# Patient Record
Sex: MALE | Race: WHITE | Employment: UNEMPLOYED | URBAN - METROPOLITAN AREA
[De-identification: names, ages, dates, MRNs, and addresses within clinical notes are randomized per-mention and may not be internally consistent; named-entity substitution may affect disease eponyms.]

---

## 2017-01-19 ENCOUNTER — TELEPHONE (OUTPATIENT)
Dept: FAMILY MEDICINE | Facility: CLINIC | Age: 62
End: 2017-01-19

## 2017-01-19 DIAGNOSIS — F41.9 ANXIETY: Primary | ICD-10-CM

## 2017-01-19 RX ORDER — DIAZEPAM 10 MG
TABLET ORAL
Qty: 60 TABLET | Refills: 0 | Status: SHIPPED | OUTPATIENT
Start: 2017-01-19 | End: 2017-03-14

## 2017-01-19 NOTE — TELEPHONE ENCOUNTER
Controlled Substance Refill Request for Diazepam  Problem List Complete:  No     PROVIDER TO CONSIDER COMPLETION OF PROBLEM LIST AND OVERVIEW/CONTROLLED SUBSTANCE AGREEMENT    Last Written Prescription Date:  12/07/16  Last Fill Quantity: 60,   # refills: 0    Last Office Visit with [facility] primary care provider: 12/07/16 w/Raisa Calvillo    Future Office visit:     Controlled substance agreement on file: No.     Processing:  Fax Rx to [facility]   checked in past 6 months?  No, route to RN

## 2017-01-19 NOTE — TELEPHONE ENCOUNTER
done 1.19.17, report on desk  Routing refill request to provider for review/approval because:  Drug not on the [facility] refill protocol, controlled subtance    Nishi Yang RN, BS  Clinical Nurse Triage .

## 2017-02-03 NOTE — TELEPHONE ENCOUNTER
Prescription approved per [facility] Refill Protocol.  Sent [employer] message.  Is he using 1 or 2 needles daily? We will change Rx later if using 2.   Constantine Sexton RN

## 2017-02-03 NOTE — TELEPHONE ENCOUNTER
BD Ultra Fine Pen [id number]      Last Written Prescription Date: 10/21/16  Last Fill Quantity: 1 device,  # refills: 100   Last Office Visit with [facility], [facility] or [facility] prescribing provider: 12/07/16 Brandee Calvillo

## 2017-03-03 DIAGNOSIS — F41.9 ANXIETY: ICD-10-CM

## 2017-03-03 RX ORDER — DIAZEPAM 10 MG
TABLET ORAL
Qty: 60 TABLET | Refills: 0 | Status: CANCELLED | OUTPATIENT
Start: 2017-03-03

## 2017-03-03 NOTE — TELEPHONE ENCOUNTER
Controlled Substance Refill Request for diazepam (VALIUM) 10 MG tablet    Problem List Complete:  No     PROVIDER TO CONSIDER COMPLETION OF PROBLEM LIST AND OVERVIEW/CONTROLLED SUBSTANCE AGREEMENT    Last Written Prescription Date:  01/19/17  Last Fill Quantity: 60,   # refills: 0    Last Office Visit with [facility] primary care provider: Raisa Calvillo, 12/07/16    Future Office visit: Nothing scheduled as of this date.    Controlled substance agreement on file: No.     Processing:  Patient will  in clinic   checked in past 6 months?  No, route to RN

## 2017-03-09 ENCOUNTER — TELEPHONE (OUTPATIENT)
Dept: FAMILY MEDICINE | Facility: CLINIC | Age: 62
End: 2017-03-09

## 2017-03-09 DIAGNOSIS — G47.00 INSOMNIA, UNSPECIFIED: ICD-10-CM

## 2017-03-09 RX ORDER — ZOLPIDEM TARTRATE 10 MG/1
TABLET ORAL
Qty: 90 TABLET | Refills: 0 | Status: SHIPPED | OUTPATIENT
Start: 2017-03-09 | End: 2017-04-02

## 2017-03-09 NOTE — TELEPHONE ENCOUNTER
Controlled Substance Refill Request for Zolpidem 10 mg tab   Problem List Complete:  No     PROVIDER TO CONSIDER COMPLETION OF PROBLEM LIST AND OVERVIEW/CONTROLLED SUBSTANCE AGREEMENT    Last Written Prescription Date:  12/07/16  Last Fill Quantity: 90,   # refills: 0    Last Office Visit with [facility] primary care provider: 12/07/16 Raisa Tilley Office visit:     Controlled substance agreement on file: No.     Processing:  Fax Rx to [facility] pharmacy   checked in past 6 months?  No, route to RN

## 2017-03-14 ENCOUNTER — TELEPHONE (OUTPATIENT)
Dept: FAMILY MEDICINE | Facility: CLINIC | Age: 62
End: 2017-03-14

## 2017-03-14 DIAGNOSIS — F41.9 ANXIETY: ICD-10-CM

## 2017-03-15 NOTE — TELEPHONE ENCOUNTER
Duplicate, See refill from 3/3/17.    I don't know why that one hasn't been addressed yet, please check into that.  Thanks!    Ena Gilliland, GARYT

## 2017-03-16 RX ORDER — DIAZEPAM 10 MG
TABLET ORAL
Qty: 60 TABLET | Refills: 0 | Status: SHIPPED | OUTPATIENT
Start: 2017-03-16 | End: 2017-05-08

## 2017-03-16 NOTE — TELEPHONE ENCOUNTER
Previous refill request was never routed anywhere by person who made the 3/3/17 encounter    [place]:  reviewed- no concerns    [place]  [email or web address]

## 2017-04-02 ENCOUNTER — TELEPHONE (OUTPATIENT)
Dept: FAMILY MEDICINE | Facility: CLINIC | Age: 62
End: 2017-04-02

## 2017-04-02 DIAGNOSIS — G47.00 INSOMNIA, UNSPECIFIED: ICD-10-CM

## 2017-04-03 NOTE — TELEPHONE ENCOUNTER
Controlled Substance Refill Request for Zolpidem 10 mg tab   Problem List Complete:  No     PROVIDER TO CONSIDER COMPLETION OF PROBLEM LIST AND OVERVIEW/CONTROLLED SUBSTANCE AGREEMENT    Last Written Prescription Date:  03/09/17  Last Fill Quantity: 90,   # refills: 0    Last Office Visit with [facility] primary care provider: 12/07/16 Raisa Calvillo    Future Office visit:   Next 5 appointments (look out 90 days)     Apr 05, 2017 10:30 AM CDT   SHORT with Raisa Calvillo PA-C   [facility]    [address] [place] [id number] [phone]                  Controlled substance agreement on file: No.     Processing:  Fax Rx to [facility] pharmacy   checked in past 6 months?  No, route to RN

## 2017-04-04 RX ORDER — ZOLPIDEM TARTRATE 10 MG/1
TABLET ORAL
Qty: 90 TABLET | Refills: 0 | Status: SHIPPED | OUTPATIENT
Start: 2017-04-04 | End: 2017-06-20

## 2017-04-11 ENCOUNTER — TELEPHONE (OUTPATIENT)
Dept: FAMILY MEDICINE | Facility: CLINIC | Age: 62
End: 2017-04-11

## 2017-04-11 RX ORDER — INSULIN GLARGINE 100 [IU]/ML
24 INJECTION, SOLUTION SUBCUTANEOUS AT BEDTIME
Qty: 15 ML | Refills: 1 | Status: SHIPPED | OUTPATIENT
Start: 2017-04-11 | End: 2017-06-05

## 2017-04-11 NOTE — TELEPHONE ENCOUNTER
Fax from [facility]   PT is out of insulin. And formulary issue Lantus  Must use:  Basaglar  Levemir  Tresiba  Or medical necessity PA only   Please advise   Constantine Sexton RN

## 2017-05-08 DIAGNOSIS — F41.9 ANXIETY: ICD-10-CM

## 2017-05-08 NOTE — TELEPHONE ENCOUNTER
Controlled Substance Refill Request for diazepam (VALIUM) 10 MG tablet  Problem List Complete:  No     PROVIDER TO CONSIDER COMPLETION OF PROBLEM LIST AND OVERVIEW/CONTROLLED SUBSTANCE AGREEMENT    Last Written Prescription Date:  3/16/17  Last Fill Quantity: 60,   # refills: 0    Last Office Visit with [facility] primary care provider: 12/7/2016      Future Office visit:     Controlled substance agreement on file: No.     Processing:  Fax Rx to [facility]   checked in past 6 months?  No, route to SANJU Brand  May 8, 2017  10:48 AM

## 2017-05-09 RX ORDER — DIAZEPAM 10 MG
10 TABLET ORAL EVERY 12 HOURS PRN
Qty: 60 TABLET | Refills: 0 | Status: SHIPPED | OUTPATIENT
Start: 2017-05-09 | End: 2017-06-22

## 2017-05-09 NOTE — TELEPHONE ENCOUNTER
Problem list updated.    run today 5/9/17, report in PCP office in-basket  Constantine Sexton RN

## 2017-05-24 NOTE — TELEPHONE ENCOUNTER
B-D U/F 31G X 8 MM insulin pen needle  Last Written Prescription Date: 2/3/17  Last Fill Quantity: 100,  # refills: 0   Last Office Visit with ROSA, LISA or [facility] prescribing provider:  12/7/2016                                              SANJU Cano  May 24, 2017  1:53 PM

## 2017-05-26 RX ORDER — PEN NEEDLE, DIABETIC 31 GX5/16"
NEEDLE, DISPOSABLE MISCELLANEOUS
Qty: 100 EACH | Refills: 0 | Status: SHIPPED | OUTPATIENT
Start: 2017-05-26 | End: 2017-09-22

## 2017-05-26 NOTE — TELEPHONE ENCOUNTER
Routing refill request to provider for review/approval because:  Barbara given x1 and patient did not follow up, please advise  Pt no showed to last 2 scheduled appts  Chanel Vásquez RN, BSN

## 2017-06-03 DIAGNOSIS — F41.9 ANXIETY: ICD-10-CM

## 2017-06-05 RX ORDER — BUSPIRONE HYDROCHLORIDE 15 MG/1
15 TABLET ORAL 2 TIMES DAILY
Qty: 180 TABLET | Refills: 1 | Status: SHIPPED | OUTPATIENT
Start: 2017-06-05 | End: 2017-06-30

## 2017-06-05 NOTE — LETTER
[address] [address]  [phone]          June 6, 2017    Gerardo Gonzalez                                                                                                                     [address] [facility] [address]            Raisa Carrillo sent a one month refill of Basaglar insulin to your pharmacy today. An office visit is required before your next refill.     Please call [phone] to schedule an appointment. Will you ask the  for a 30 minute visit with Raisa?    Let us know if you have any questions.     Take Constantine barriga RN for Raisa Calvillo PA-C

## 2017-06-05 NOTE — TELEPHONE ENCOUNTER
Prescription approved per [facility] Refill Protocol  Nishi Yang RN BS

## 2017-06-05 NOTE — TELEPHONE ENCOUNTER
insulin glargine (BASAGLAR KWIKPEN) 100 UNIT/ML injection         Last Written Prescription Date: 4/11/17  Last Fill Quantity: 15mL  , # refills: 1  Last Office Visit with [facility], [facility] or [facility] prescribing provider:  12/7/2016        BP Readings from Last 3 Encounters:   12/12/16 (!) 135/98   12/07/16 109/76   07/14/16 138/89     Lab Results   Component Value Date    MICROL <5 07/14/2016     Lab Results   Component Value Date    UMALCR Unable to calculate due to low value 07/14/2016     Creatinine   Date Value Ref Range Status   12/12/2016 0.95 0.66 - 1.25 mg/dL Final   ]  GFR Estimate   Date Value Ref Range Status   12/12/2016 81 >60 mL/min/1.7m2 Final     Comment:     Non  GFR Calc   12/07/2016 78 >60 mL/min/1.7m2 Final     Comment:     Non  GFR Calc   07/14/2016 77 >60 mL/min/1.7m2 Final     Comment:     Non  GFR Calc     GFR Estimate If Black   Date Value Ref Range Status   12/12/2016 >90   GFR Calc   >60 mL/min/1.7m2 Final   12/07/2016 >90   GFR Calc   >60 mL/min/1.7m2 Final   07/14/2016 >90   GFR Calc   >60 mL/min/1.7m2 Final     Lab Results   Component Value Date    CHOL 204 07/14/2016     Lab Results   Component Value Date    HDL 52 07/14/2016     Lab Results   Component Value Date     07/14/2016     Lab Results   Component Value Date    TRIG 152 07/14/2016     Lab Results   Component Value Date    CHOLHDLRATIO 4.4 07/06/2015     Lab Results   Component Value Date    AST 42 12/07/2016     Lab Results   Component Value Date    ALT 41 12/07/2016     Lab Results   Component Value Date    A1C 8.6 12/07/2016    A1C 9.2 07/14/2016    A1C 7.4 03/11/2016    A1C 9.0 07/06/2015    A1C 6.3 12/03/2013     Potassium   Date Value Ref Range Status   12/12/2016 3.8 3.4 - 5.3 mmol/L Final     SANJU Cano  June 5, 2017  2:52 PM

## 2017-06-05 NOTE — TELEPHONE ENCOUNTER
busPIRone (BUSPAR) 15 MG tablet    Last Written Prescription Date: 12/7/2016  Last Fill Quantity: 180;03/03/2017 # refills: 1  Last Office Visit with [facility], [facility] or [facility] prescribing provider:  12/7/2016-Raisa Calvillo        Last PHQ-9 score on record= No flowsheet data found.    Lab Results   Component Value Date    AST 42 12/07/2016     Lab Results   Component Value Date    ALT 41 12/07/2016

## 2017-06-06 RX ORDER — INSULIN GLARGINE 100 [IU]/ML
INJECTION, SOLUTION SUBCUTANEOUS
Qty: 15 ML | Refills: 0 | Status: SHIPPED | OUTPATIENT
Start: 2017-06-06 | End: 2017-06-30

## 2017-06-06 NOTE — TELEPHONE ENCOUNTER
Prescription approved per [facility] Refill Protocol x 1 month.   Raisa arias pt reminded due for OV  (please schedule 30 min)   Left message on VM to call back and letter mailed.   Constantine Sexton RN

## 2017-06-20 ENCOUNTER — TELEPHONE (OUTPATIENT)
Dept: FAMILY MEDICINE | Facility: CLINIC | Age: 62
End: 2017-06-20

## 2017-06-20 DIAGNOSIS — G47.00 INSOMNIA, UNSPECIFIED: ICD-10-CM

## 2017-06-20 NOTE — TELEPHONE ENCOUNTER
Controlled Substance Refill Request for Zolpidem 10 mg   Problem List Complete:  No     PROVIDER TO CONSIDER COMPLETION OF PROBLEM LIST AND OVERVIEW/CONTROLLED SUBSTANCE AGREEMENT    Last Written Prescription Date:  04/04/17  Last Fill Quantity: 90,   # refills: 0    Last Office Visit with [facility] primary care provider: 05/02/17 Raisa Tilley Office visit:     Controlled substance agreement on file: No.     Processing:  Fax Rx to [facility] pharmacy   checked in past 6 months?  No, route to RN

## 2017-06-21 RX ORDER — ZOLPIDEM TARTRATE 10 MG/1
10 TABLET ORAL
Qty: 30 TABLET | Refills: 0 | Status: SHIPPED | OUTPATIENT
Start: 2017-06-21 | End: 2017-06-30

## 2017-06-21 NOTE — TELEPHONE ENCOUNTER
In my outbox.   Please call. Patient is due for office visit for future refills.   Is also due for DM2 visit and labs.

## 2017-06-22 ENCOUNTER — TELEPHONE (OUTPATIENT)
Dept: FAMILY MEDICINE | Facility: CLINIC | Age: 62
End: 2017-06-22

## 2017-06-22 DIAGNOSIS — F41.9 ANXIETY: ICD-10-CM

## 2017-06-22 RX ORDER — DIAZEPAM 10 MG
TABLET ORAL
Qty: 60 TABLET | Refills: 0 | Status: SHIPPED | OUTPATIENT
Start: 2017-06-22 | End: 2017-08-17

## 2017-06-22 NOTE — TELEPHONE ENCOUNTER
Controlled Substance Refill Request for diazepam (VALIUM) 10 MG tablet    Problem List Complete:  No     PROVIDER TO CONSIDER COMPLETION OF PROBLEM LIST AND OVERVIEW/CONTROLLED SUBSTANCE AGREEMENT    Last Written Prescription Date:  05/09/2017  Last Fill Quantity: 60,   # refills: 0    Last Office Visit with [facility] primary care provider: 05/02/2017-Raisa Tilley Office visit:     Controlled substance agreement on file: No.     Processing:  Fax Rx to [facility] pharmacy   checked in past 6 months?  No, route to RN

## 2017-06-23 NOTE — TELEPHONE ENCOUNTER
LM for patient to call back to clinic.   RX faxed.    Patient due for fasting OV visit before any future refills.

## 2017-06-27 NOTE — TELEPHONE ENCOUNTER
Pt returned call-rescheduled appointment to 06/30/17 with Raisa Calvillo, will be fasting.    Azucena Cintron/LEX

## 2017-06-30 ENCOUNTER — OFFICE VISIT (OUTPATIENT)
Dept: FAMILY MEDICINE | Facility: CLINIC | Age: 62
End: 2017-06-30
Payer: COMMERCIAL

## 2017-06-30 VITALS
HEART RATE: 103 BPM | RESPIRATION RATE: 16 BRPM | DIASTOLIC BLOOD PRESSURE: 76 MMHG | WEIGHT: 177 LBS | TEMPERATURE: 98.5 F | OXYGEN SATURATION: 97 % | SYSTOLIC BLOOD PRESSURE: 119 MMHG | HEIGHT: 67 IN | BODY MASS INDEX: 27.78 KG/M2

## 2017-06-30 DIAGNOSIS — G47.00 INSOMNIA, UNSPECIFIED: ICD-10-CM

## 2017-06-30 DIAGNOSIS — F41.9 ANXIETY: ICD-10-CM

## 2017-06-30 DIAGNOSIS — L40.9 PSORIASIS: ICD-10-CM

## 2017-06-30 DIAGNOSIS — L40.50 PSORIATIC ARTHRITIS (H): ICD-10-CM

## 2017-06-30 LAB — HBA1C MFR BLD: 10.2 % (ref 4.3–6)

## 2017-06-30 PROCEDURE — 80061 LIPID PANEL: CPT | Performed by: PHYSICIAN ASSISTANT

## 2017-06-30 PROCEDURE — 36415 COLL VENOUS BLD VENIPUNCTURE: CPT | Performed by: PHYSICIAN ASSISTANT

## 2017-06-30 PROCEDURE — 82043 UR ALBUMIN QUANTITATIVE: CPT | Performed by: PHYSICIAN ASSISTANT

## 2017-06-30 PROCEDURE — 99214 OFFICE O/P EST MOD 30 MIN: CPT | Performed by: PHYSICIAN ASSISTANT

## 2017-06-30 PROCEDURE — 83036 HEMOGLOBIN GLYCOSYLATED A1C: CPT | Performed by: PHYSICIAN ASSISTANT

## 2017-06-30 RX ORDER — METFORMIN HCL 500 MG
1000 TABLET, EXTENDED RELEASE 24 HR ORAL
Qty: 180 TABLET | Refills: 1 | Status: SHIPPED | OUTPATIENT
Start: 2017-06-30 | End: 2018-02-08

## 2017-06-30 RX ORDER — INSULIN GLARGINE 100 [IU]/ML
INJECTION, SOLUTION SUBCUTANEOUS
Qty: 15 ML | Refills: 1 | Status: SHIPPED | OUTPATIENT
Start: 2017-06-30 | End: 2017-11-24

## 2017-06-30 RX ORDER — CLOBETASOL PROPIONATE 0.5 MG/G
OINTMENT TOPICAL
Qty: 45 G | Refills: 1 | Status: SHIPPED | OUTPATIENT
Start: 2017-06-30 | End: 2018-02-08

## 2017-06-30 RX ORDER — ZOLPIDEM TARTRATE 10 MG/1
10 TABLET ORAL
Qty: 90 TABLET | Refills: 0 | Status: CANCELLED | OUTPATIENT
Start: 2017-06-30

## 2017-06-30 RX ORDER — FLUOXETINE 40 MG/1
80 CAPSULE ORAL DAILY
Qty: 180 CAPSULE | Refills: 1 | Status: SHIPPED | OUTPATIENT
Start: 2017-06-30 | End: 2018-02-08

## 2017-06-30 RX ORDER — BUSPIRONE HYDROCHLORIDE 15 MG/1
TABLET ORAL
Qty: 270 TABLET | Refills: 1 | Status: SHIPPED | OUTPATIENT
Start: 2017-06-30 | End: 2018-02-08

## 2017-06-30 ASSESSMENT — ANXIETY QUESTIONNAIRES
5. BEING SO RESTLESS THAT IT IS HARD TO SIT STILL: MORE THAN HALF THE DAYS
6. BECOMING EASILY ANNOYED OR IRRITABLE: MORE THAN HALF THE DAYS
7. FEELING AFRAID AS IF SOMETHING AWFUL MIGHT HAPPEN: MORE THAN HALF THE DAYS
GAD7 TOTAL SCORE: 15
3. WORRYING TOO MUCH ABOUT DIFFERENT THINGS: MORE THAN HALF THE DAYS
IF YOU CHECKED OFF ANY PROBLEMS ON THIS QUESTIONNAIRE, HOW DIFFICULT HAVE THESE PROBLEMS MADE IT FOR YOU TO DO YOUR WORK, TAKE CARE OF THINGS AT HOME, OR GET ALONG WITH OTHER PEOPLE: SOMEWHAT DIFFICULT
2. NOT BEING ABLE TO STOP OR CONTROL WORRYING: MORE THAN HALF THE DAYS
1. FEELING NERVOUS, ANXIOUS, OR ON EDGE: MORE THAN HALF THE DAYS

## 2017-06-30 ASSESSMENT — PATIENT HEALTH QUESTIONNAIRE - PHQ9: 5. POOR APPETITE OR OVEREATING: NEARLY EVERY DAY

## 2017-06-30 NOTE — PROGRESS NOTES
SUBJECTIVE:                                                    Gerardo Gonzalez is a 62 year old male who presents to clinic today for the following health issues:      Diabetes Follow-up      Patient is checking blood sugars: twice daily.    Blood sugar testing frequency justification: Uncontrolled diabetes  Results are as follows:         am - 112-126         bedtime - 240-250    Diabetic concerns: None     Symptoms of hypoglycemia (low blood sugar): shaky, sometimes, will stop and grab a snack when this happens     Paresthesias (numbness or burning in feet) or sores: No     Date of last diabetic eye exam: 6/16, states vision has improved.    Anxiety Follow-Up    Status since last visit: Worsened has been working more hrs at work    Other associated symptoms: trouble sleeping, takes Ambien, sometimes has to take two, would like to discuss dose adjustment     Complicating factors:   Significant life event: Yes-  Job work load    Current substance abuse: None  Depression symptoms: No  NAOMI-7 SCORE 1/2/2012   Total Score 16       GAD7            Amount of exercise or physical activity: walks as much as possible, walks a lot at work.    Problems taking medications regularly: No    Medication side effects: none    Diet: tries to watch diet, sometimes hard with work.      Insomnia  Onset: years    Description:   Time to fall asleep (sleep latency): 1 hour  Middle of night awakening:  YES  Early morning awakening:  YES    Progression of Symptoms:  waxing and waning    Accompanying Signs & Symptoms:  Daytime sleepiness/napping: no  Excessive snoring/apnea: no  Restless legs: no  Frequent urination: no  Chronic pain:  YES- hands    History:  Prior Insomnia: YES    Precipitating factors:   New stressful situation: no  Caffeine intake: YES  OTC decongestants: no  Any new medications: no    Alleviating factors:  Self medicating (alcohol, etc.):  alcohol    Therapies Tried and outcome: ambien. Not working anymore    Joint  Pain    Onset: 1 year    Description:   Location: hands  Character: Dull ache and swelling    Intensity: moderate, severe    Progression of Symptoms: intermittent    Accompanying Signs & Symptoms:  Other symptoms: swelling    History:   Previous similar pain: YES      Precipitating factors:   Trauma or overuse: no     Alleviating factors:  Improved by: rest/inactivity    Therapies Tried and outcome:         Problem list and histories reviewed & adjusted, as indicated.  Additional history: as documented    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)     Elevated liver enzymes     Hyponatremia     Type 2 diabetes mellitus without complication, with long-term current use of insulin (H)     Psoriatic arthritis (H)     Past Surgical History:   Procedure Laterality Date     HC KNEE SCOPE, DIAGNOSTIC      Arthroscopy, Knee- Left       Social History   Substance Use Topics     Smoking status: Never Smoker     Smokeless tobacco: Never Used      Comment: quit in 1978     Alcohol use 3.6 oz/week     6 Cans of beer per week      Comment: weekly     Family History   Problem Relation Age of Onset     DIABETES Mother      age 55     DIABETES Father      age 64     Family History Negative Sister      x3     Hypertension Brother            Reviewed and updated as needed this visit by clinical staff  Tobacco  Allergies  Med Hx  Surg Hx  Fam Hx  Soc Hx      Reviewed and updated as needed this visit by Provider         ROS:  Constitutional, HEENT, cardiovascular, pulmonary, GI, , musculoskeletal, neuro, skin, endocrine and psych systems are negative, except as otherwise noted.    OBJECTIVE:     /76 (BP Location: Right arm, Patient Position: Chair, Cuff Size: Adult Regular)  Pulse 103  Temp 98.5  F (36.9  C) (Oral)  Resp 16  Ht 5'  "7\" (1.702 m)  Wt 177 lb (80.3 kg)  SpO2 97%  BMI 27.72 kg/m2  Body mass index is 27.72 kg/(m^2).  GENERAL APPEARANCE: healthy, alert and no distress  RESP: lungs clear to auscultation - no rales, rhonchi or wheezes  CV: regular rates and rhythm, normal S1 S2, no S3 or S4 and no murmur, click or rub  MS: swelling PIP and DIP of fingers  SKIN: no suspicious lesions or rashes  PSYCH: mentation appears normal and anxious        ASSESSMENT/PLAN:             1. 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)  Will increase insulin and metformin.  Please monitor your diet and sugars.  Exercise.   Recheck in 3 months.   Await a1c, if high, will refer.   - Albumin Random Urine Quantitative  - Hemoglobin A1c  - Lipid Profile with reflex to direct LDL  - insulin glargine (BASAGLAR KWIKPEN) 100 UNIT/ML injection; INJECT 24-26UNITS SUBCUTANEOUS AT BEDTIME  Dispense: 15 mL; Refill: 1  - metFORMIN (GLUCOPHAGE-XR) 500 MG 24 hr tablet; Take 2 tablets (1,000 mg) by mouth daily (with dinner)  Dispense: 180 tablet; Refill: 1  - ENDOCRINOLOGY ADULT REFERRAL    2. Psoriatic arthritis (H)  Recommend rheum. Consult.   - RHEUMATOLOGY REFERRAL    3. Anxiety  Will increase buspar  Recheck in 3 months.   - FLUoxetine (PROZAC) 40 MG capsule; Take 2 capsules (80 mg) by mouth daily  Dispense: 180 capsule; Refill: 1  - busPIRone (BUSPAR) 15 MG tablet; Take 1 tab in the morning and 2 tabs at bedtimes  Dispense: 270 tablet; Refill: 1    4. Psoriasis    - clobetasol (TEMOVATE) 0.05 % ointment; Apply sparingly to affected area twice daily as needed.  Do not apply to face.  Dispense: 45 g; Refill: 1    5. Insomnia, unspecified  Stop ambien, since it is not working anyway.   Trial of elvail, send MyChart in 2 weeks to let me know how it's working.     - amitriptyline (ELAVIL) 25 MG tablet; Take 1-2 tablets (25-50 mg) by mouth At Bedtime  Dispense: 60 tablet; Refill: 1        Raisa Calvillo PA-C, PA-C  [facility]

## 2017-06-30 NOTE — NURSING NOTE
"Chief Complaint   Patient presents with     Diabetes     fasting        Initial /76 (BP Location: Right arm, Patient Position: Chair, Cuff Size: Adult Regular)  Pulse 103  Temp 98.5  F (36.9  C) (Oral)  Resp 16  Ht 5' 7\" (1.702 m)  Wt 177 lb (80.3 kg)  SpO2 97%  BMI 27.72 kg/m2 Estimated body mass index is 27.72 kg/(m^2) as calculated from the following:    Height as of this encounter: 5' 7\" (1.702 m).    Weight as of this encounter: 177 lb (80.3 kg).  Medication Reconciliation: complete.Sabiha PHILLIPS MA      "

## 2017-06-30 NOTE — MR AVS SNAPSHOT
After Visit Summary   6/30/2017    Gerardo Gonzalez    MRN: [id number]           Patient Information     Date Of Birth          1955        Visit Information        Provider Department      6/30/2017 9:30 AM Raisa Calvillo PA-C [facility]        Today's Diagnoses     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)    -  1    Anxiety        Psoriasis        Insomnia, unspecified        Psoriatic arthritis (H)           Follow-ups after your visit        Additional Services     RHEUMATOLOGY REFERRAL       Your provider has referred you to: [facility]:  [facility] Allred   [phone] [email or web address]    Please be aware that coverage of these services is subject to the terms and limitations of your health insurance plan.  Call member services at your health plan with any benefit or coverage questions.      Please bring the following with you to your appointment:    (1) Any X-Rays, CTs or MRIs which have been performed.  Contact the facility where they were done to arrange for  prior to your scheduled appointment.    (2) List of current medications   (3) This referral request   (4) Any documents/labs given to you for this referral                  Who to contact     If you have questions or need follow up information about today's clinic visit or your schedule please contact [facility] directly at [phone].  Normal or non-critical lab and imaging results will be communicated to you by MyChart, letter or phone within 4 business days after the clinic has received the results. If you do not hear from us within 7 days, please contact the clinic through MyChart or phone. If you have a critical or abnormal lab result, we will notify you by phone as soon as possible.  Submit refill requests through [employer] or call your pharmacy and they will forward the refill request to us. Please allow 3 business days for your refill  "to be completed.          Additional Information About Your Visit        OndaViahart Information     [employer] gives you secure access to your electronic health record. If you see a primary care provider, you can also send messages to your care team and make appointments. If you have questions, please call your primary care clinic.  If you do not have a primary care provider, please call [phone] and they will assist you.        Care EveryWhere ID     This is your Care EveryWhere ID. This could be used by other organizations to access your [place] medical records  [id number]        Your Vitals Were     Pulse Temperature Respirations Height Pulse Oximetry BMI (Body Mass Index)    103 98.5  F (36.9  C) (Oral) 16 5' 7\" (1.702 m) 97% 27.72 kg/m2       Blood Pressure from Last 3 Encounters:   06/30/17 119/76   12/12/16 (!) 135/98   12/07/16 109/76    Weight from Last 3 Encounters:   06/30/17 177 lb (80.3 kg)   12/07/16 178 lb (80.7 kg)   07/14/16 188 lb 6.4 oz (85.5 kg)              We Performed the Following     Albumin Random Urine Quantitative     Hemoglobin A1c     Lipid Profile with reflex to direct LDL     RHEUMATOLOGY REFERRAL          Today's Medication Changes          These changes are accurate as of: 6/30/17  9:59 AM.  If you have any questions, ask your nurse or doctor.               Start taking these medicines.        Dose/Directions    amitriptyline 25 MG tablet   Commonly known as:  ELAVIL   Used for:  Insomnia, unspecified   Started by:  Raisa Calvillo PA-C        Dose:  25-50 mg   Take 1-2 tablets (25-50 mg) by mouth At Bedtime   Quantity:  60 tablet   Refills:  1         These medicines have changed or have updated prescriptions.        Dose/Directions    busPIRone 15 MG tablet   Commonly known as:  BUSPAR   This may have changed:    - how much to take  - how to take this  - when to take this  - additional instructions   Used for:  Anxiety   Changed by:  Raisa Calvillo PA-C        " Take 1 tab in the morning and 2 tabs at bedtimes   Quantity:  270 tablet   Refills:  1       * insulin glargine 100 UNIT/ML injection   Commonly known as:  LANTUS   This may have changed:  Another medication with the same name was changed. Make sure you understand how and when to take each.   Used for:  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)   Changed by:  Raisa Calvillo PA-C        Dose:  24 Units   Inject 24 Units Subcutaneous At Bedtime   Quantity:  30 mL   Refills:  2       * insulin glargine 100 UNIT/ML injection   Commonly known as:  LANTUS SOLOSTAR   This may have changed:  Another medication with the same name was changed. Make sure you understand how and when to take each.   Used for:  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)   Changed by:  Raisa Calvillo PA-C        Dose:  24 Units   Inject 24 Units Subcutaneous At Bedtime   Quantity:  3 mL   Refills:  9       * insulin glargine 100 UNIT/ML injection   This may have changed:  See the new instructions.   Used for:  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)   Changed by:  Raisa Calvillo PA-C        INJECT 24-26UNITS SUBCUTANEOUS AT BEDTIME   Quantity:  15 mL   Refills:  1       metFORMIN 500 MG 24 hr tablet   Commonly known as:  GLUCOPHAGE-XR   This may have changed:  how much to take   Used for:  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)   Changed by:  Raisa Calvillo PA-C        Dose:  1000 mg   Take 2 tablets (1,000 mg) by mouth daily (with dinner)   Quantity:  180 tablet   Refills:  1       * Notice:  This list has 3 medication(s) that are the same as other medications prescribed for you. Read the directions carefully, and ask your doctor or other care provider to review them with you.         Where to get your medicines      These medications were sent to [id number] - [place] [place] [facility] [address] [facility]  [address], [place]  [place] [address]     Phone:  [phone]     amitriptyline 25 MG tablet    busPIRone 15 MG tablet    clobetasol 0.05 % ointment    FLUoxetine 40 MG capsule    insulin glargine 100 UNIT/ML injection    metFORMIN 500 MG 24 hr tablet                Primary Care Provider Office Phone # Fax #    Raisa Vivian Calvillo PA-C [phone]       [facility] [address]  [facility] [address]        Equal Access to Services     CRISTY GUTIERREZ : Hadii aad ku hadasho Soomaali, waaxda luqadaha, qaybta kaalmada adeegyada, waxay idiin hayaan adeeg kharash la'warrenn . So [facility] [phone].    ATENCIÓN: Si habla español, tiene a khan disposición servicios gratuitos de asistencia lingüística. [facility] [phone].    We comply with applicable federal civil rights laws and [place] laws. We do not discriminate on the basis of race, color, national origin, age, disability sex, sexual orientation or gender identity.            Thank you!     Thank you for choosing [facility]  for your care. Our goal is always to provide you with excellent care. Hearing back from our patients is one way we can continue to improve our services. Please take a few minutes to complete the written survey that you may receive in the mail after your visit with us. Thank you!             Your Updated Medication List - Protect others around you: Learn how to safely use, store and throw away your medicines at www.disposemymeds.org.          This list is accurate as of: 6/30/17  9:59 AM.  Always use your most recent med list.                   Brand Name Dispense Instructions for use Diagnosis    amitriptyline 25 MG tablet    ELAVIL    60 tablet    Take 1-2 tablets (25-50 mg) by mouth At Bedtime    Insomnia, unspecified       aspirin 81 MG tablet     100    1 tab po QD (Once per day)    Type II or unspecified type diabetes mellitus without mention of complication, not stated as uncontrolled       blood glucose monitoring  lancets     1 Box    1 each 3 times daily    Type 2 diabetes, HbA1C goal < 8% (H)       BLOOD GLUCOSE TEST STRIPS STRP     100 Strip    PER PATIENT HEALTH PLAN    Type II or unspecified type diabetes mellitus without mention of complication, not stated as uncontrolled       BLOOD GLUCOSE TEST STRIPS Strp     100 strip    1 Device by In Vitro route 3 times daily    Type 2 diabetes, HbA1C goal < 8% (H)       busPIRone 15 MG tablet    BUSPAR    270 tablet    Take 1 tab in the morning and 2 tabs at bedtimes    Anxiety       clobetasol 0.05 % ointment    TEMOVATE    45 g    Apply sparingly to affected area twice daily as needed.  Do not apply to face.    Psoriasis       diazepam 10 MG tablet    VALIUM    60 tablet    TAKE 1 TABLET BY MOUTH EVERY 12 HOURS AS NEEDED FOR ANXIETY ***DUE FOR OFICE VISIT***    Anxiety       FLUoxetine 40 MG capsule    PROzac    180 capsule    Take 2 capsules (80 mg) by mouth daily    Anxiety       * insulin glargine 100 UNIT/ML injection    LANTUS    30 mL    Inject 24 Units Subcutaneous At Bedtime    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)       * insulin glargine 100 UNIT/ML injection    LANTUS SOLOSTAR    3 mL    Inject 24 Units Subcutaneous At Bedtime    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)       * insulin glargine 100 UNIT/ML injection     15 mL    INJECT 24-26UNITS SUBCUTANEOUS AT BEDTIME    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)       * insulin pen needle 31G X 8 MM     100 each    1 Device daily Pharmacist may dispense brand and/or needle size per patient's preference and health care plan.    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)       * B-D U/F 31G X 8 MM   Generic drug:  insulin pen needle     100 each    USE AS DIRECTED    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)       losartan 50 MG tablet    COZAAR    90 tablet    Take 1 tablet (50 mg) by mouth daily    Uncontrolled type 2 diabetes  mellitus with nephropathy (H)       metFORMIN 500 MG 24 hr tablet    GLUCOPHAGE-XR    180 tablet    Take 2 tablets (1,000 mg) by mouth daily (with dinner)    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)       simvastatin 80 MG tablet    ZOCOR    90 tablet    Take 1 tablet (80 mg) by mouth At Bedtime    Hyperlipidemia LDL goal <100       zolpidem 10 MG tablet    AMBIEN    30 tablet    Take 1 tablet (10 mg) by mouth nightly as needed for sleep Due for office visit for further refills.    Insomnia, unspecified       * Notice:  This list has 5 medication(s) that are the same as other medications prescribed for you. Read the directions carefully, and ask your doctor or other care provider to review them with you.

## 2017-07-01 ENCOUNTER — TELEPHONE (OUTPATIENT)
Dept: FAMILY MEDICINE | Facility: CLINIC | Age: 62
End: 2017-07-01

## 2017-07-01 LAB
CHOLEST SERPL-MCNC: 151 MG/DL
CREAT UR-MCNC: 205 MG/DL
HDLC SERPL-MCNC: 44 MG/DL
LDLC SERPL CALC-MCNC: 70 MG/DL
MICROALBUMIN UR-MCNC: 80 MG/L
MICROALBUMIN/CREAT UR: 39.02 MG/G CR (ref 0–17)
NONHDLC SERPL-MCNC: 107 MG/DL
TRIGL SERPL-MCNC: 186 MG/DL

## 2017-07-01 ASSESSMENT — ANXIETY QUESTIONNAIRES: GAD7 TOTAL SCORE: 15

## 2017-07-01 NOTE — PATIENT INSTRUCTIONS
Please call ptHarjeet Carrillo,    Your A1c has greatly increased to 10.2. Please take your medications as directed, as we discussed. I think it would be beneficial to have you see our Endocrinology specialist Erika Trammell. She is excellent at diabetes management and located in our office. I have placed a referral for you. We can help you schedule this today.  I recommend an appointment with her in the next 3 months.         ~ Raisa

## 2017-07-05 NOTE — TELEPHONE ENCOUNTER
Left message to call back.   Pt is NOT checking MyChart.    If no reply by tomorrow we will ask PCP if OK to mail letter.   Constantine Sexton RN

## 2017-07-05 NOTE — TELEPHONE ENCOUNTER
Pt returned call, given message below. Appointment scheduled with Erika Trammell 7/14/17.    Xu Lutz   07/05/17

## 2017-08-17 ENCOUNTER — TELEPHONE (OUTPATIENT)
Dept: FAMILY MEDICINE | Facility: CLINIC | Age: 62
End: 2017-08-17

## 2017-08-17 DIAGNOSIS — F41.9 ANXIETY: ICD-10-CM

## 2017-08-18 RX ORDER — DIAZEPAM 10 MG
10 TABLET ORAL EVERY 12 HOURS PRN
Qty: 60 TABLET | Refills: 0 | Status: SHIPPED | OUTPATIENT
Start: 2017-08-18 | End: 2017-09-22

## 2017-08-18 NOTE — TELEPHONE ENCOUNTER
Controlled Substance Refill Request for diazepam (VALIUM) 10 MG tablet  Problem List Complete:  Yes  Priority:  Medium       Overview Addendum 5/9/2017  2:05 PM by Constantine Sexton RN     Patient is followed by MADELYN NORTH for ongoing prescription of benzodiazepines.  All refills should be approved by this provider, or covering partner.     Medication(s): diazepam (VALIUM) 10 MG tablet.   Maximum quantity per month: 60  Clinic visit frequency required: Q 6  months      Controlled substance agreement on file: No  Benzodiazepine use reviewed by psychiatry:  No     Last [facility] website verification:  done on 5/9/2017         Last Written Prescription Date:  6/22/17  Last Fill Quantity: 60,   # refills: 0    Last Office Visit with [facility] primary care provider: 6/30/2017    Clinic visit frequency required: Q 6  months     Future Office visit:     Controlled substance agreement on file: No.     Processing:  Fax Rx to [facility] pharmacy     checked in past 6 months?  Yes 5/9/17    SANJU Cano  August 18, 2017  8:58 AM

## 2017-08-22 ENCOUNTER — TRANSFERRED RECORDS (OUTPATIENT)
Dept: HEALTH INFORMATION MANAGEMENT | Facility: CLINIC | Age: 62
End: 2017-08-22

## 2017-08-22 DIAGNOSIS — G47.00 INSOMNIA, UNSPECIFIED: ICD-10-CM

## 2017-08-22 RX ORDER — MIRTAZAPINE 30 MG/1
30 TABLET, FILM COATED ORAL AT BEDTIME
Qty: 90 TABLET | Refills: 1 | Status: SHIPPED | OUTPATIENT
Start: 2017-08-22 | End: 2018-02-27

## 2017-08-22 NOTE — TELEPHONE ENCOUNTER
LMOVM informing below, DC amitriptyline and start Remeron, call if ?'s, CJ ok to put amitriptyline in DC file?, route back if you want us to  Alda Kingston RN, BSN  Message handled by Nurse Triage.

## 2017-08-22 NOTE — TELEPHONE ENCOUNTER
Call pt back.    Let's change to Remeron, I don't recommend taking more than prescribed dose of Elavil.  Rx sent for Remeron

## 2017-08-22 NOTE — TELEPHONE ENCOUNTER
LMTRC-  Need to know how he is doing with medication per LOV.  Pt will be due next month for DM appt       Per LOV:  5. Insomnia, unspecified  Stop ambien, since it is not working anyway.   Trial of elvail, send MyChart in 2 weeks to let me know how it's working.

## 2017-08-22 NOTE — TELEPHONE ENCOUNTER
AMITRIPTYLINE HCL 25 MG TAB        Last Written Prescription Date: 06/30/17  Last Fill Quantity: 60, # refills: 1  Last Office Visit with [facility], [facility] or [facility] prescribing provider: 06/30/17 Raisa Calvillo       BP Readings from Last 3 Encounters:   06/30/17 119/76   12/12/16 (!) 135/98   12/07/16 109/76

## 2017-08-23 DIAGNOSIS — G47.00 INSOMNIA, UNSPECIFIED: ICD-10-CM

## 2017-09-22 ENCOUNTER — TELEPHONE (OUTPATIENT)
Dept: FAMILY MEDICINE | Facility: CLINIC | Age: 62
End: 2017-09-22

## 2017-09-22 DIAGNOSIS — G47.00 PERSISTENT INSOMNIA: Primary | ICD-10-CM

## 2017-09-22 DIAGNOSIS — F41.9 ANXIETY: ICD-10-CM

## 2017-09-22 RX ORDER — PEN NEEDLE, DIABETIC 31 GX5/16"
NEEDLE, DISPOSABLE MISCELLANEOUS
Qty: 100 EACH | Refills: 0 | Status: SHIPPED | OUTPATIENT
Start: 2017-09-22 | End: 2017-11-28

## 2017-09-22 NOTE — TELEPHONE ENCOUNTER
Zolpidem      Last Written Prescription Date:  6/21/2017  Last Fill Quantity: 30,   # refills: 0  Last Office Visit with [facility] or  Health prescribing provider: 6/21/2017  Future Office visit:       Routing refill request to provider for review/approval because:  Drug not on the [facility], [facility] or  [employer] refill protocol or controlled substance.  Medication not active on med list. ( per 6/30/2017) medication stopped working for patient.     Apple WONG, RN, BSN, PHN  [facility]

## 2017-09-22 NOTE — TELEPHONE ENCOUNTER
B-D U/F 31 GX 8MM insulin pen needle      Last Written Prescription Date: 5/26/2017  Last Fill Quantity: 100each,  # refills: 0   Last Office Visit with [facility] or [facility] prescribing provider: 6/30/2017    Medication is being filled for 1 time refill only due to:  Patient needs to be seen because due for diabetic recheck.     Prescription approved per [facility] Refill Protocol.    Apple WONG RN, BSN, PHN  [facility]

## 2017-09-22 NOTE — TELEPHONE ENCOUNTER
Diazepam      Last Written Prescription Date:  8/18/2017  Last Fill Quantity: 60 tablet,   # refills: 0  Last Office Visit with [facility], [facility] or [facility] prescribing provider: 6/30/2017  Future Office visit:       Routing refill request to provider for review/approval because:  Drug not on the [facility], [facility] or [facility] refill protocol or controlled substance      [place] reviewed:  reviewed- no concerns    [place]  [email or web address]    Last Filled:  8/18/2017, #60    Apple WONG RN, BSN, PHN  [place] [facility] RN

## 2017-09-26 RX ORDER — ZOLPIDEM TARTRATE 10 MG/1
TABLET ORAL
Qty: 90 TABLET | Refills: 0 | Status: SHIPPED | OUTPATIENT
Start: 2017-09-26 | End: 2017-11-21

## 2017-09-26 RX ORDER — DIAZEPAM 10 MG
TABLET ORAL
Qty: 60 TABLET | Refills: 0 | Status: SHIPPED | OUTPATIENT
Start: 2017-09-26 | End: 2017-11-21

## 2017-11-21 DIAGNOSIS — G47.00 PERSISTENT INSOMNIA: ICD-10-CM

## 2017-11-21 DIAGNOSIS — F41.9 ANXIETY: ICD-10-CM

## 2017-11-22 NOTE — TELEPHONE ENCOUNTER
zolpidem (AMBIEN) 10 MG tablet      Last Written Prescription Date:  9/26/2017  Last Fill Quantity: 90,   # refills: 0    Last Office visit: 6/30/2017      Routing refill request to provider for review/approval because:  Drug not on the [facility], [facility] or  Health refill protocol or controlled substance      diazepam (VALIUM) 10 MG tablet   Last Written Prescription Date:  9/26/2017  Last Fill Quantity: 60,   # refills: 0    Last Office visit:  6/30/2017     Routing refill request to provider for review/approval because:  Drug not on the [facility], [facility] or  Health refill protocol or controlled substance

## 2017-11-24 RX ORDER — ZOLPIDEM TARTRATE 10 MG/1
TABLET ORAL
Qty: 90 TABLET | COMMUNITY
Start: 2017-11-24 | End: 2018-02-08

## 2017-11-24 RX ORDER — DIAZEPAM 10 MG
TABLET ORAL
Qty: 60 TABLET | Refills: 0 | COMMUNITY
Start: 2017-11-24 | End: 2017-11-24

## 2017-11-24 RX ORDER — DIAZEPAM 10 MG
TABLET ORAL
Qty: 60 TABLET | Refills: 0 | Status: SHIPPED | OUTPATIENT
Start: 2017-11-24 | End: 2017-12-24

## 2017-11-24 NOTE — TELEPHONE ENCOUNTER
Okay I'm confused, do we need to fax in Valium then?    Rx given to ANDREA Rogers to fax.     Raisa Calvillo PA-C

## 2017-11-24 NOTE — TELEPHONE ENCOUNTER
11/24/17 report on provider's desk.  Appears 9/26/17 zolpidem Rx was not dispensed.   Constantine Sexton RN

## 2017-11-24 NOTE — TELEPHONE ENCOUNTER
[facility] calls to check status, they think they never received ambien from 9/26, last one from July, they will inform pt needs appointment, may have one month of each, took verbal on ambien from 9/26 nahum Kingston RN, BSN  Message handled by Nurse Triage.

## 2017-11-28 ENCOUNTER — TELEPHONE (OUTPATIENT)
Dept: FAMILY MEDICINE | Facility: CLINIC | Age: 62
End: 2017-11-28

## 2017-11-28 NOTE — LETTER
December 4, 2017          Gerardo Gonzalez  [address]  [place] [address]        Dear ,    Your prescriptions for insulin, losartan and requested supplies were refilled 11/30/2017.     You need to schedule an appointment in Erika Trammell in Endocrinology for a follow up ASAP. Scheduling [phone].     I cannot continue to refill medications of ANY sort--i.e. Valium, Ambien, diabetes medications, etc., if you are not following up.     If you have any questions or concerns, please call the clinic at the number listed above.     Raisa Calvillo PA-C

## 2017-11-30 RX ORDER — LOSARTAN POTASSIUM 50 MG/1
TABLET ORAL
Qty: 90 TABLET | Refills: 0 | Status: SHIPPED | OUTPATIENT
Start: 2017-11-30 | End: 2018-02-08

## 2017-11-30 RX ORDER — PEN NEEDLE, DIABETIC 31 GX5/16"
NEEDLE, DISPOSABLE MISCELLANEOUS
Qty: 100 EACH | Refills: 0 | Status: SHIPPED | OUTPATIENT
Start: 2017-11-30 | End: 2018-03-12

## 2017-11-30 NOTE — TELEPHONE ENCOUNTER
Please call pt.     Patient's insulin, losartan and requested supplies filled.    He needs to get an appointment in Erika Trammell in Endocrinology for a follow up ASAP.     I cannot continue to refill medications next month of ANY sort--I.e. Valium, Ambien, DM meds etc if he is not following up.    Raisa Calvillo PA-C

## 2017-11-30 NOTE — TELEPHONE ENCOUNTER
LM on  to call back.     Routing refill request to provider for review/approval because:  Patient needs to be seen because:  Due for diabetes recheck per last office visit note. Patient was also referred to Endo in June/2017, does not look like there was an appointment made.    Apple WONG RN, BSN, PHN  [facility] [facility] RN

## 2017-12-04 NOTE — TELEPHONE ENCOUNTER
Left message on VM. 3rd and final call to reach you by phone for message from Raisa.   Letter mailed.   Constantine Sexton RN

## 2017-12-24 DIAGNOSIS — F41.9 ANXIETY: ICD-10-CM

## 2017-12-26 RX ORDER — DIAZEPAM 10 MG
TABLET ORAL
Qty: 60 TABLET | Refills: 0 | Status: SHIPPED | OUTPATIENT
Start: 2017-12-26 | End: 2018-02-08

## 2017-12-26 NOTE — TELEPHONE ENCOUNTER
Pending Prescriptions:                       Disp   Refills    diazepam (VALIUM) 10 MG tablet [Pharmacy *60 tab*0            Sig: TAKE 1 TABLET BY MOUTH EVERY 12 HOURS AS NEEDED           FOR ANXIETY OR SLEEP. DUE FOR OFFICE VISIT

## 2017-12-26 NOTE — TELEPHONE ENCOUNTER
In my outbox.     Please call pt.   I cannot refill this medication without a follow up visit--Evisit, phone or office visit in next 30 days.    Also, pt due to f/u with Endocrinology for diabetes. Has not done this through [place], has he gone somewhere else?

## 2017-12-27 NOTE — TELEPHONE ENCOUNTER
Called pt-lm regarding setting up appointment before another refill and that he needs to schedule with endocrinology. Prescription faxed to [facility].    Azucena Cintron/LEX

## 2018-01-29 DIAGNOSIS — G47.00 PERSISTENT INSOMNIA: ICD-10-CM

## 2018-01-29 DIAGNOSIS — F41.9 ANXIETY: ICD-10-CM

## 2018-01-29 NOTE — TELEPHONE ENCOUNTER
Controlled Substance Refill Request for diazepam (VALIUM) 10 MG tablet  Problem List Complete:  No     PROVIDER TO CONSIDER COMPLETION OF PROBLEM LIST AND OVERVIEW/CONTROLLED SUBSTANCE AGREEMENT    Last Written Prescription Date:  12/26/2017  Last Fill Quantity: 60 tablet,   # refills: 0    Last Office Visit with [facility] [place] [place] primary care provider: 6/30/2017    Controlled substance agreement on file: No.     Processing:  Staff will hand deliver Rx to on-site pharmacy   checked in past 6 months?  Yes 11/24/2017    Last [place] [employer] website verification:  11/24/17   [email or web address]        Controlled Substance Refill Request for zolpidem (AMBIEN) 10 MG tablet  Problem List Complete:  No     PROVIDER TO CONSIDER COMPLETION OF PROBLEM LIST AND OVERVIEW/CONTROLLED SUBSTANCE AGREEMENT    Last Written Prescription Date:  11/24/2017  Last Fill Quantity: 90 tablet,   # refills: 0    Last Office Visit with [facility] [place] [place] primary care provider: 6/30/2017    Controlled substance agreement on file: No.     Processing:  Staff will hand deliver Rx to on-site pharmacy   checked in past 6 months?  Yes 11/24/2017     Last [place] [employer] website verification:  11/24/17   [email or web address]

## 2018-01-31 RX ORDER — DIAZEPAM 10 MG
TABLET ORAL
Start: 2018-01-31

## 2018-01-31 RX ORDER — ZOLPIDEM TARTRATE 10 MG/1
TABLET ORAL
Start: 2018-01-31

## 2018-01-31 NOTE — TELEPHONE ENCOUNTER
Per huddle with PCP refills denied. Multiple reminders needs visit.    Visits with Joseph Trammell and PCP scheduled.   Constantine Sexton RN

## 2018-02-08 ENCOUNTER — OFFICE VISIT (OUTPATIENT)
Dept: FAMILY MEDICINE | Facility: CLINIC | Age: 63
End: 2018-02-08
Payer: COMMERCIAL

## 2018-02-08 VITALS
DIASTOLIC BLOOD PRESSURE: 81 MMHG | BODY MASS INDEX: 28.72 KG/M2 | SYSTOLIC BLOOD PRESSURE: 128 MMHG | WEIGHT: 183 LBS | HEART RATE: 92 BPM | OXYGEN SATURATION: 97 % | TEMPERATURE: 97.4 F | HEIGHT: 67 IN

## 2018-02-08 DIAGNOSIS — Z13.89 SCREENING FOR DIABETIC PERIPHERAL NEUROPATHY: ICD-10-CM

## 2018-02-08 DIAGNOSIS — G47.00 PERSISTENT INSOMNIA: ICD-10-CM

## 2018-02-08 DIAGNOSIS — F41.9 ANXIETY: ICD-10-CM

## 2018-02-08 DIAGNOSIS — Z23 NEED FOR PROPHYLACTIC VACCINATION WITH TETANUS-DIPHTHERIA (TD): ICD-10-CM

## 2018-02-08 DIAGNOSIS — L40.9 PSORIASIS: ICD-10-CM

## 2018-02-08 DIAGNOSIS — E78.5 HYPERLIPIDEMIA LDL GOAL <100: ICD-10-CM

## 2018-02-08 LAB
ALBUMIN SERPL-MCNC: 4.1 G/DL (ref 3.4–5)
ALP SERPL-CCNC: 76 U/L (ref 40–150)
ALT SERPL W P-5'-P-CCNC: 26 U/L (ref 0–70)
ANION GAP SERPL CALCULATED.3IONS-SCNC: 6 MMOL/L (ref 3–14)
AST SERPL W P-5'-P-CCNC: 27 U/L (ref 0–45)
BILIRUB SERPL-MCNC: 0.5 MG/DL (ref 0.2–1.3)
BUN SERPL-MCNC: 11 MG/DL (ref 7–30)
CALCIUM SERPL-MCNC: 9 MG/DL (ref 8.5–10.1)
CHLORIDE SERPL-SCNC: 101 MMOL/L (ref 94–109)
CO2 SERPL-SCNC: 29 MMOL/L (ref 20–32)
CREAT SERPL-MCNC: 0.83 MG/DL (ref 0.66–1.25)
GFR SERPL CREATININE-BSD FRML MDRD: >90 ML/MIN/1.7M2
GLUCOSE SERPL-MCNC: 275 MG/DL (ref 70–99)
HBA1C MFR BLD: 10.8 % (ref 4.3–6)
POTASSIUM SERPL-SCNC: 4.3 MMOL/L (ref 3.4–5.3)
PROT SERPL-MCNC: 7.4 G/DL (ref 6.8–8.8)
SODIUM SERPL-SCNC: 136 MMOL/L (ref 133–144)

## 2018-02-08 PROCEDURE — 99207 C FOOT EXAM  NO CHARGE: CPT | Performed by: PHYSICIAN ASSISTANT

## 2018-02-08 PROCEDURE — 80053 COMPREHEN METABOLIC PANEL: CPT | Performed by: PHYSICIAN ASSISTANT

## 2018-02-08 PROCEDURE — 83036 HEMOGLOBIN GLYCOSYLATED A1C: CPT | Performed by: PHYSICIAN ASSISTANT

## 2018-02-08 PROCEDURE — 90471 IMMUNIZATION ADMIN: CPT | Performed by: PHYSICIAN ASSISTANT

## 2018-02-08 PROCEDURE — 90715 TDAP VACCINE 7 YRS/> IM: CPT | Performed by: PHYSICIAN ASSISTANT

## 2018-02-08 PROCEDURE — 36415 COLL VENOUS BLD VENIPUNCTURE: CPT | Performed by: PHYSICIAN ASSISTANT

## 2018-02-08 PROCEDURE — 99214 OFFICE O/P EST MOD 30 MIN: CPT | Mod: 25 | Performed by: PHYSICIAN ASSISTANT

## 2018-02-08 RX ORDER — CLOBETASOL PROPIONATE 0.5 MG/G
OINTMENT TOPICAL
Qty: 45 G | Refills: 1 | Status: SHIPPED | OUTPATIENT
Start: 2018-02-08 | End: 2019-11-06

## 2018-02-08 RX ORDER — ZOLPIDEM TARTRATE 10 MG/1
TABLET ORAL
Qty: 90 TABLET | Refills: 1 | Status: SHIPPED | OUTPATIENT
Start: 2018-02-08 | End: 2018-05-09

## 2018-02-08 RX ORDER — LOSARTAN POTASSIUM 50 MG/1
TABLET ORAL
Qty: 90 TABLET | Refills: 0 | Status: SHIPPED | OUTPATIENT
Start: 2018-02-08 | End: 2018-07-14

## 2018-02-08 RX ORDER — FLUOXETINE 40 MG/1
80 CAPSULE ORAL DAILY
Qty: 180 CAPSULE | Refills: 1 | Status: SHIPPED | OUTPATIENT
Start: 2018-02-08 | End: 2018-07-24

## 2018-02-08 RX ORDER — DIAZEPAM 10 MG
TABLET ORAL
Qty: 180 TABLET | Refills: 0 | Status: SHIPPED | OUTPATIENT
Start: 2018-02-08 | End: 2018-05-14

## 2018-02-08 RX ORDER — BUSPIRONE HYDROCHLORIDE 15 MG/1
TABLET ORAL
Qty: 270 TABLET | Refills: 1 | Status: SHIPPED | OUTPATIENT
Start: 2018-02-08 | End: 2018-07-24

## 2018-02-08 RX ORDER — SIMVASTATIN 80 MG
40 TABLET ORAL AT BEDTIME
Qty: 90 TABLET | Refills: 3 | Status: SHIPPED | OUTPATIENT
Start: 2018-02-08 | End: 2019-02-18

## 2018-02-08 RX ORDER — METFORMIN HCL 500 MG
1000 TABLET, EXTENDED RELEASE 24 HR ORAL
Qty: 180 TABLET | Refills: 1 | Status: SHIPPED | OUTPATIENT
Start: 2018-02-08 | End: 2018-10-26

## 2018-02-08 ASSESSMENT — PATIENT HEALTH QUESTIONNAIRE - PHQ9
SUM OF ALL RESPONSES TO PHQ QUESTIONS 1-9: 10
10. IF YOU CHECKED OFF ANY PROBLEMS, HOW DIFFICULT HAVE THESE PROBLEMS MADE IT FOR YOU TO DO YOUR WORK, TAKE CARE OF THINGS AT HOME, OR GET ALONG WITH OTHER PEOPLE: SOMEWHAT DIFFICULT
SUM OF ALL RESPONSES TO PHQ QUESTIONS 1-9: 10

## 2018-02-08 NOTE — MR AVS SNAPSHOT
After Visit Summary   2/8/2018    Gerardo Gonzalez    MRN: [id number]           Patient Information     Date Of Birth          1955        Visit Information        Provider Department      2/8/2018 11:15 AM Raisa Calvillo PA-C [facility]        Today's Diagnoses     Anxiety        Persistent insomnia        Screening for diabetic peripheral neuropathy        Need for prophylactic vaccination and inoculation against influenza        Need for prophylactic vaccination with tetanus-diphtheria (TD)           Follow-ups after your visit        Your next 10 appointments already scheduled     Feb 08, 2018 11:15 AM CST   Office Visit with Raisa Calvillo PA-C   [facility] ([facility])    [address] [place] [id number] [phone]           Bring a current list of meds and any records pertaining to this visit. For Physicals, please bring immunization records and any forms needing to be filled out. Please arrive 10 minutes early to complete paperwork.            Feb 13, 2018  2:30 PM CST   New Visit with IRAM Byrne CNP   [facility] ([address] [address] [phone]              Who to contact     If you have questions or need follow up information about today's clinic visit or your schedule please contact [facility] directly at [phone].  Normal or non-critical lab and imaging results will be communicated to you by MyChart, letter or phone within 4 business days after the clinic has received the results. If you do not hear from us within 7 days, please contact the clinic through MyChart or phone. If you have a critical or abnormal lab result, we will notify you by phone as soon as possible.  Submit refill requests through [facility] or call your pharmacy and they will forward the refill request to us. Please  "allow 3 business days for your refill to be completed.          Additional Information About Your Visit        MyChart Information     [employer] gives you secure access to your electronic health record. If you see a primary care provider, you can also send messages to your care team and make appointments. If you have questions, please call your primary care clinic.  If you do not have a primary care provider, please call [phone] and they will assist you.        Care EveryWhere ID     This is your Care EveryWhere ID. This could be used by other organizations to access your [facility] medical records  [id number]        Your Vitals Were     Pulse Temperature Height Pulse Oximetry BMI (Body Mass Index)       92 97.4  F (36.3  C) (Oral) 5' 7\" (1.702 m) 97% 28.66 kg/m2        Blood Pressure from Last 3 Encounters:   02/08/18 128/81   06/30/17 119/76   12/12/16 (!) 135/98    Weight from Last 3 Encounters:   02/08/18 183 lb (83 kg)   06/30/17 177 lb (80.3 kg)   12/07/16 178 lb (80.7 kg)              Today, you had the following     No orders found for display         Today's Medication Changes          These changes are accurate as of 2/8/18 10:44 AM.  If you have any questions, ask your nurse or doctor.               Stop taking these medicines if you haven't already. Please contact your care team if you have questions.     diazepam 10 MG tablet   Commonly known as:  VALIUM   Stopped by:  Raisa Calvillo PA-C                    Primary Care Provider Office Phone # Fax #    Raisa Calvillo PA-C [phone] [address] [facility] [address]        Equal Access to Services     [facility] : Hadii rashawn Wilson, waaxda lutin, qaybta kaalclaribel wallace. So [facility] [phone].    ATENCIÓN: Si habla español, tiene a khan disposición servicios gratuitos de asistencia lingüística. Justiname al [phone].    We comply with applicable federal civil " rights laws and [place] laws. We do not discriminate on the basis of race, color, national origin, age, disability, sex, sexual orientation, or gender identity.            Thank you!     Thank you for choosing [facility]  for your care. Our goal is always to provide you with excellent care. Hearing back from our patients is one way we can continue to improve our services. Please take a few minutes to complete the written survey that you may receive in the mail after your visit with us. Thank you!             Your Updated Medication List - Protect others around you: Learn how to safely use, store and throw away your medicines at www.disposemymeds.org.          This list is accurate as of 2/8/18 10:44 AM.  Always use your most recent med list.                   Brand Name Dispense Instructions for use Diagnosis    aspirin 81 MG tablet     100    1 tab po QD (Once per day)    Type II or unspecified type diabetes mellitus without mention of complication, not stated as uncontrolled       blood glucose monitoring lancets     1 Box    1 each 3 times daily    Type 2 diabetes, HbA1C goal < 8% (H)       BLOOD GLUCOSE TEST STRIPS STRP     100 Strip    PER PATIENT HEALTH PLAN    Type II or unspecified type diabetes mellitus without mention of complication, not stated as uncontrolled       BLOOD GLUCOSE TEST STRIPS Strp     100 strip    1 Device by In Vitro route 3 times daily    Type 2 diabetes, HbA1C goal < 8% (H)       busPIRone 15 MG tablet    BUSPAR    270 tablet    Take 1 tab in the morning and 2 tabs at bedtimes    Anxiety       clobetasol 0.05 % ointment    TEMOVATE    45 g    Apply sparingly to affected area twice daily as needed.  Do not apply to face.    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)       metFORMIN 500 MG 24 hr tablet    GLUCOPHAGE-XR    180 tablet    Take 2 tablets (1,000 mg) by mouth daily (with dinner)    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)       mirtazapine 30 MG tablet    REMERON    90 tablet    Take 1 tablet (30 mg) by mouth At Bedtime    Insomnia, unspecified       simvastatin 80 MG tablet    ZOCOR    90 tablet    Take 1 tablet (80 mg) by mouth At Bedtime    Hyperlipidemia LDL goal <100       zolpidem 10 MG tablet    AMBIEN    90 tablet    TAKE 1 TABLET BY MOUTH AS NEEDED FOR SLEEP    Persistent insomnia       * Notice:  This list has 5 medication(s) that are the same as other medications prescribed for you. Read the directions carefully, and ask your doctor or other care provider to review them with you.

## 2018-02-09 ASSESSMENT — PATIENT HEALTH QUESTIONNAIRE - PHQ9: SUM OF ALL RESPONSES TO PHQ QUESTIONS 1-9: 10

## 2018-02-13 ENCOUNTER — ALLIED HEALTH/NURSE VISIT (OUTPATIENT)
Dept: EDUCATION SERVICES | Facility: CLINIC | Age: 63
End: 2018-02-13
Payer: COMMERCIAL

## 2018-02-13 ENCOUNTER — OFFICE VISIT (OUTPATIENT)
Dept: ENDOCRINOLOGY | Facility: CLINIC | Age: 63
End: 2018-02-13
Payer: COMMERCIAL

## 2018-02-13 VITALS
RESPIRATION RATE: 16 BRPM | HEART RATE: 84 BPM | SYSTOLIC BLOOD PRESSURE: 128 MMHG | WEIGHT: 181 LBS | BODY MASS INDEX: 28.35 KG/M2 | DIASTOLIC BLOOD PRESSURE: 73 MMHG | TEMPERATURE: 98 F

## 2018-02-13 DIAGNOSIS — E11.65 TYPE 2 DIABETES MELLITUS WITH HYPERGLYCEMIA, UNSPECIFIED LONG TERM INSULIN USE STATUS: ICD-10-CM

## 2018-02-13 DIAGNOSIS — Z79.4 TYPE 2 DIABETES MELLITUS WITHOUT COMPLICATION, WITH LONG-TERM CURRENT USE OF INSULIN (H): Primary | ICD-10-CM

## 2018-02-13 DIAGNOSIS — E11.9 TYPE 2 DIABETES MELLITUS WITHOUT COMPLICATION, WITH LONG-TERM CURRENT USE OF INSULIN (H): Primary | ICD-10-CM

## 2018-02-13 PROCEDURE — 84681 ASSAY OF C-PEPTIDE: CPT | Performed by: CLINICAL NURSE SPECIALIST

## 2018-02-13 PROCEDURE — 36415 COLL VENOUS BLD VENIPUNCTURE: CPT | Performed by: CLINICAL NURSE SPECIALIST

## 2018-02-13 PROCEDURE — 82947 ASSAY GLUCOSE BLOOD QUANT: CPT | Performed by: CLINICAL NURSE SPECIALIST

## 2018-02-13 PROCEDURE — 99204 OFFICE O/P NEW MOD 45 MIN: CPT | Performed by: CLINICAL NURSE SPECIALIST

## 2018-02-13 PROCEDURE — 95250 CONT GLUC MNTR PHYS/QHP EQP: CPT

## 2018-02-13 NOTE — MR AVS SNAPSHOT
After Visit Summary   2/13/2018    Gerardo Gonzalez    MRN: [id number]           Patient Information     Date Of Birth          1955        Visit Information        Provider Department      2/13/2018 2:30 PM Natalie Harding RN [facility]        Today's Diagnoses     Type 2 diabetes mellitus without complication, with long-term current use of insulin (H)    -  1    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)           Follow-ups after your visit        Your next 10 appointments already scheduled     Feb 22, 2018  1:30 PM CST   Diabetic Education with Natalie Harding RN   [facility] ([facility])    [address] [facility] [id number] [phone]            Mar 01, 2018  8:30 AM CST   Diabetic Education with Natalie Harding RN   [facility] ([facility])    [address] [facility] [id number] [phone]              Future tests that were ordered for you today     Open Future Orders        Priority Expected Expires Ordered    GLUCOSE MONITORING CONTINUOUS, >=72 HR Routine  2/13/2019 2/13/2018            Who to contact     If you have questions or need follow up information about today's clinic visit or your schedule please contact [facility] directly at [phone].  Normal or non-critical lab and imaging results will be communicated to you by MyChart, letter or phone within 4 business days after the clinic has received the results. If you do not hear from us within 7 days, please contact the clinic through MyChart or phone. If you have a critical or abnormal lab result, we will notify you by phone as soon as possible.  Submit refill requests through [employer] or call your pharmacy and they will forward the refill request to us. Please allow 3 business days for your refill to be completed.           Additional Information About Your Visit        [email or web address] Information     [employer] gives you secure access to your electronic health record. If you see a primary care provider, you can also send messages to your care team and make appointments. If you have questions, please call your primary care clinic.  If you do not have a primary care provider, please call [phone] and they will assist you.        Care EveryWhere ID     This is your Care EveryWhere ID. This could be used by other organizations to access your [facility] medical records  [id number]         Blood Pressure from Last 3 Encounters:   02/13/18 128/73   02/08/18 128/81   06/30/17 119/76    Weight from Last 3 Encounters:   02/13/18 82.1 kg (181 lb)   02/08/18 83 kg (183 lb)   06/30/17 80.3 kg (177 lb)              We Performed the Following     CONTINUOUS GLUCOSE MONITORING                              [[id number]]        Primary Care Provider Office Phone # Fax #    Raisa Calvillo PA-C [phone] [address] [facility] [address]        Equal Access to Services     [facility]: Hadii aad ku hadasho Soomaali, waaxda luqadaha, qaybta kaalmada adeegyagreg, waxtanner temple . So [facility] [phone].    ATENCIÓN: Si habla español, tiene a khan disposición servicios gratuitos de asistencia lingüística. Llame al [phone].    We comply with applicable federal civil rights laws and [place] laws. We do not discriminate on the basis of race, color, national origin, age, disability, sex, sexual orientation, or gender identity.            Thank you!     Thank you for choosing [facility]  for your care. Our goal is always to provide you with excellent care. Hearing back from our patients is one way we can continue to improve our services. Please take a few minutes to complete the written survey that you may receive in the mail after your visit with us. Thank you!             Your Updated  Medication List - Protect others around you: Learn how to safely use, store and throw away your medicines at www.disposemymeds.org.          This list is accurate as of 2/13/18  4:24 PM.  Always use your most recent med list.                   Brand Name Dispense Instructions for use Diagnosis    aspirin 81 MG tablet     100    1 tab po QD (Once per day)    Type II or unspecified type diabetes mellitus without mention of complication, not stated as uncontrolled       blood glucose monitoring lancets     1 Box    1 each 3 times daily    Type 2 diabetes, HbA1C goal < 8% (H)       BLOOD GLUCOSE TEST STRIPS STRP     100 Strip    PER PATIENT HEALTH PLAN    Type II or unspecified type diabetes mellitus without mention of complication, not stated as uncontrolled       BLOOD GLUCOSE TEST STRIPS Strp     100 strip    1 Device by In Vitro route 3 times daily    Type 2 diabetes, HbA1C goal < 8% (H)       busPIRone 15 MG tablet    BUSPAR    270 tablet    Take 1 tab in the morning and 2 tabs at bedtimes    Anxiety       clobetasol 0.05 % ointment    TEMOVATE    45 g    Apply sparingly to affected area twice daily as needed.  Do not apply to face.    Psoriasis       diazepam 10 MG tablet    VALIUM    180 tablet    TAKE 1 TABLET BY MOUTH EVERY 12 HOURS AS NEEDED FOR ANXIETY OR SLEEP.    Anxiety       FLUoxetine 40 MG capsule    PROzac    180 capsule    Take 2 capsules (80 mg) by mouth daily    Anxiety       * insulin glargine 100 UNIT/ML injection    LANTUS    30 mL    Inject 24 Units Subcutaneous At Bedtime    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)       * insulin glargine 100 UNIT/ML injection    LANTUS SOLOSTAR    3 mL    Inject 24 Units Subcutaneous At Bedtime    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)       * BASAGLAR 100 UNIT/ML injection     15 mL    INJECT 24-26UNITS SUBCUTANEOUS AT BEDTIME    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)       * insulin pen needle 31G X  8 MM     100 each    1 Device daily Pharmacist may dispense brand and/or needle size per patient's preference and health care plan.    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)       metFORMIN 500 MG 24 hr tablet    GLUCOPHAGE-XR    180 tablet    Take 2 tablets (1,000 mg) by mouth daily (with dinner)    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)       mirtazapine 30 MG tablet    REMERON    90 tablet    Take 1 tablet (30 mg) by mouth At Bedtime    Insomnia, unspecified       simvastatin 80 MG tablet    ZOCOR    90 tablet    Take 0.5 tablets (40 mg) by mouth At Bedtime    Hyperlipidemia LDL goal <100       zolpidem 10 MG tablet    AMBIEN    90 tablet    TAKE 1 TABLET BY MOUTH AS NEEDED FOR SLEEP    Persistent insomnia       * Notice:  This list has 5 medication(s) that are the same as other medications prescribed for you. Read the directions carefully, and ask your doctor or other care provider to review them with you.

## 2018-02-13 NOTE — PROGRESS NOTES
Name: Gerardo Gonzalez  Seen at the request of Raisa Calvillo for Diabetes.  HPI:  Gerardo Gonzalez is a 62 year old male who presents for the evaluation/management of:    1. Type 2 DM:  Originally diagnosed in 2006.  Wasn't feeling wall at the time- saw PCP for further evaluation.  Serum glucose was 885 at the time of diagnosis.  Was started on insulin at the time of diagnosis.  Metformin was added shortly after starting insulin.  His A1c was mainly in the 7.0% range until 7/2016 when his job changed - new job much more demanding and longer hours, ++stressed and no time for exercise  Now has a different manager - work schedule is more manageable and he has time for three meals per day and thinks he will be able to restart regular exercise.    Current Regimen: Metformin 1000 mg qd, Basaglar 24-26 units q hs  BS checks: two times per day, before breakfast and dinner  Meter Download: Did not know     Complications:   Diabetes Complications  Description / Detail    Diabetic Retinopathy  No retinopathy, last exam 6 weeks ago   CAD / PAD  No   Neuropathy  No   Nephropathy / Microalbuminuria  Yes: intermittent microalbuminuria   Gastroparesis  No   Hypoglycemia Unawareness  No     2. Hypertension: Blood Pressure today:   BP Readings from Last 3 Encounters:   02/13/18 128/73   02/08/18 128/81   06/30/17 119/76   .  Blood pressure medications include Losartan 50 mg qd.  Takes medications everyday without forgetting a dose.  Denies feeling lightheaded or dizzy.   3. Hyperlipidemia: Takes Simvastatin 40 mg qd for lipid control.  Denies muscle aches of pains.       4. Prevention:  Flu Shot- 11/2017  Pneumovax- 6/26/2016 (23 valent),  3/11/2016 (13-V)  Opthalmology-Yes: annual exam  Dental-semiannually   ASA- Yes, 81 mg qd  Smoking- No  PMH/PSH:  Past Medical History:   Diagnosis Date     Psoriatic arthropathy (H)      Type II or unspecified type diabetes mellitus without mention of complication, not stated as uncontrolled      2006     Vaccination not carried out      Past Surgical History:   Procedure Laterality Date     HC KNEE SCOPE, DIAGNOSTIC      Arthroscopy, Knee- Left     Family Hx:  Family History   Problem Relation Age of Onset     DIABETES Mother      age 55     DIABETES Father      age 64     Family History Negative Sister      x3     Hypertension Brother      Thyroid disease:         DM2: Yes: mother and father         Autoimmune: DM1, SLE, RA, Vitiligo     Social Hx:  Social History     Social History     Marital status:      Spouse name: Virginia     Number of children: 0     Years of education: N/A     Occupational History     operations      [employer]     Social History Main Topics     Smoking status: Never Smoker     Smokeless tobacco: Never Used      Comment: quit in 1978     Alcohol use 3.6 oz/week     6 Cans of beer per week      Comment: weekly     Drug use: No     Sexual activity: Yes     Partners: Female     Other Topics Concern      Service No     Blood Transfusions Yes     no complications     Caffeine Concern No     Occupational Exposure No     Hobby Hazards No     Sleep Concern Yes     Stress Concern Yes     Weight Concern Yes     Special Diet Yes     Back Care Yes     past motorcross racing     Exercise Yes     Bike Helmet No     Seat Belt Yes     Self-Exams No     Social History Narrative          MEDICATIONS:  has a current medication list which includes the following prescription(s): fluoxetine, zolpidem, losartan, metformin, buspirone, diazepam, clobetasol, simvastatin, basaglar, b-d u/f, mirtazapine, insulin pen needle, insulin glargine, insulin glargine, blood glucose monitoring, blood glucose test strips, blood glucose monitoring suppl, and aspirin.    ROS     ROS: 10 point ROS neg other than the symptoms noted above in the HPI.    Physical Exam   VS: /73 (BP Location: Right arm, Patient Position: Chair, Cuff Size: Adult Regular)  Pulse 84  Temp 98  F (36.7  C) (Oral)  Resp  16  Wt 82.1 kg (181 lb)  BMI 28.35 kg/m2  GENERAL: AXOX3, NAD, well dressed, answering questions appropriately, appears stated age.  HEENT: no exophthalmos, no proptosis, EOMI, no lig lag, no retraction  CV: RRR, no rubs, gallops, no murmurs  LUNGS: CTAB, no wheezes, rales, or rhonchi  ABDOMEN: soft, nontender, nondistended  EXTREMITIES: no edema, +pulses, no rashes, no lesions  NEUROLOGY: CN grossly intact, no tremors  MSK: grossly intact  SKIN: no rashes, no lesions    LABS:  A1c:   Component    Latest Ref Rng & Units 12/7/2016 6/30/2017 2/8/2018   Hemoglobin A1C    4.3 - 6.0 % 8.6 (H) 10.2 (H) 10.8 (H)     Basic Metabolic Panel:  !COMPREHENSIVE Latest Ref Rng & Units 2/8/2018   SODIUM 133 - 144 mmol/L 136   POTASSIUM 3.4 - 5.3 mmol/L 4.3   CHLORIDE 94 - 109 mmol/L 101   BUN 7 - 30 mg/dL 11   Creatinine 0.66 - 1.25 mg/dL 0.83   Glucose 70 - 99 mg/dL 275 (H)   ANION GAP 3 - 14 mmol/L 6   CALCIUM 8.5 - 10.1 mg/dL 9.0     LFTS/Cholesterol Panel:  !LIPID/HEPATIC Latest Ref Rng & Units 6/30/2017 2/8/2018   CHOLESTEROL <200 mg/dL 151    TRIGLYCERIDES <150 mg/dL 186 (H)    HDL CHOLESTEROL >39 mg/dL 44    LDL CHOLESTEROL, CALCULATED <100 mg/dL 70    VLDL-CHOLESTEROL 0 - 30 mg/dL     NON HDL CHOLESTEROL <130 mg/dL 107    CHOLESTEROL/HDL RATIO 0.0 - 5.0     AST 0 - 45 U/L  27   ALT 0 - 70 U/L  26     Thyroid Function:   !THYROID Latest Ref Rng & Units 3/11/2016   TSH 0.40 - 4.00 mU/L 1.46     Urine MicroAlbumin:  Component    Latest Ref Rng & Units 6/30/2017   Creatinine Urine    mg/dL 205   Albumin Urine mg/L    mg/L 80   Albumin Urine mg/g Cr    0 - 17 mg/g Cr 39.02 (H)     Vitamin D:    All pertinent notes, labs, and images personally reviewed by me.     A/P  Mr.Tony TAN Gonzalez is a 62 year old here for the evaluation/management of diabetes:    1. DM2 - Uncontrolled.  Recommend checking blood sugars before meals and 2 hours after meals.    Will obtain glucose and cpeptide to assess pancreatic function  Will obtain  diagnostic CGM  Follow up in 1 and 2 weeks for CGM download   Depending on results, consider the addition of a GLP-1 or meal-time insulin.  He would potentially be interested in an insulin pump if testing indicates the need for multiple daily insulin injections.    There is some variability among people, most will usually develop symptoms suggestive of hypoglycemia when blood glucose levels are lowered to the mid 60's. The first set of symptoms are called adrenergic. Patients may experience any of the following nervousness, sweating, intense hunger, trembling, weakness, palpitations, and difficulty speaking.   The acute management of hypoglycemia involves the rapid delivery of a source of easily absorbed sugar. Regular soda, juice, lifesavers, table sugar, are good options. 15 grams of glucose is the dose that is given, followed by an assessment of symptoms and a blood glucose check if possible. If after 10 minutes there is no improvement, another 10-15 grams should be given. This can be repeated up to three times. The equivalency of 10-15 grams of glucose (approximate servings) are: 3-5 hard candies, 3 teaspoons of sugar, or 1/2 cup of regular soda or juice.      2. Hypertension - Controlled, continue Losartan 50 mg qd.    3. Hyperlipidemia - On statin therapy      Labs ordered today:   Orders Placed This Encounter   Procedures     GLUCOSE MONITORING CONTINUOUS, >=72 HR     Glucose     C-peptide     DIABETES EDUCATOR REFERRAL       Radiology/Consults ordered today: DIABETES EDUCATOR REFERRAL    All questions were answered.  The patient indicates understanding of the above issues and agrees with the plan set forth.  Total face to face time greater than or equal to 45 minutes.       Follow-up:  follow up in 1-3 months depending on diabetes ed follow up     Erika Trammell NP  Endocrinology  [facility]  CC: Raisa Calvillo

## 2018-02-13 NOTE — PROGRESS NOTES
Diabetes Self Management Training: Professional Continuous Glucose Monitor Insertion    SUBJECTIVE:   Gerardo Gonzalez is accompanied by self    Patient concerns: high a1c    OBJECTIVE:    Lab Results:  Lab Results   Component Value Date    A1C 10.8 02/08/2018      Lab Results   Component Value Date     02/08/2018     Lab Results   Component Value Date    HDL 44 06/30/2017       Vitals:  There were no vitals taken for this visit.    ASSESSMENT:    LibrePro sensor started today. (Lot # [id number], Serial # [id number], Expiration date 3/31/18) Sensor was inserted with no resistance or bleeding at insertion site.    Pt will plan to wear the sensor through  3/1/18.    WRITTEN AND VERBAL INFORMATION GIVEN TO SUPPORT UNDERSTANDING OF:  LibrePro CGM: Sensor insertion, intention of monitoring for 14 days. Keep records of BG, food intake, exercise, and medication dosing during wear.       Patient verbalizes understanding on all instructions provided.     Educational and other materials:  Food/exercise/medication log sheets  Contact information  Return Check List    PLAN:  Pt was given instructions for tracking BG, medications, food intake and activity.      Follow-up:    2/22/18      Natalie Harding RN,CDE     Encounter Type: Individual     Any diabetes medication dose changes were made via the CDE Protocol and Collaborative Practice Agreement with the patient's referring provider. A copy of this encounter was shared with the provider.

## 2018-02-13 NOTE — LETTER
2/13/2018         RE: Gerardo Gonzalez  [address]  [place] [address]        Dear Colleague,    Thank you for referring your patient, Gerardo Gonzalez, to the [facility]. Please see a copy of my visit note below.    Name: Gerardo Gonzalez  Seen at the request of Raisa Calvillo for Diabetes.  HPI:  Gerardo Gonzalez is a 62 year old male who presents for the evaluation/management of:    1. Type 2 DM:  Originally diagnosed in 2006.  Wasn't feeling wall at the time- saw PCP for further evaluation.  Serum glucose was 885 at the time of diagnosis.  Was started on insulin at the time of diagnosis.  Metformin was added shortly after starting insulin.  His A1c was mainly in the 7.0% range until 7/2016 when his job changed - new job much more demanding and longer hours, ++stressed and no time for exercise  Now has a different manager - work schedule is more manageable and he has time for three meals per day and thinks he will be able to restart regular exercise.    Current Regimen: Metformin 1000 mg qd, Basaglar 24-26 units q hs  BS checks: two times per day, before breakfast and dinner  Meter Download: Did not know     Complications:   Diabetes Complications  Description / Detail    Diabetic Retinopathy  No retinopathy, last exam 6 weeks ago   CAD / PAD  No   Neuropathy  No   Nephropathy / Microalbuminuria  Yes: intermittent microalbuminuria   Gastroparesis  No   Hypoglycemia Unawareness  No     2. Hypertension: Blood Pressure today:   BP Readings from Last 3 Encounters:   02/13/18 128/73   02/08/18 128/81   06/30/17 119/76   .  Blood pressure medications include Losartan 50 mg qd.  Takes medications everyday without forgetting a dose.  Denies feeling lightheaded or dizzy.   3. Hyperlipidemia: Takes Simvastatin 40 mg qd for lipid control.  Denies muscle aches of pains.       4. Prevention:  Flu Shot- 11/2017  Pneumovax- 6/26/2016 (23 valent),  3/11/2016 (13-V)  Opthalmology-Yes: annual  exam  Dental-semiannually   ASA- Yes, 81 mg qd  Smoking- No  PMH/PSH:  Past Medical History:   Diagnosis Date     Psoriatic arthropathy (H)      Type II or unspecified type diabetes mellitus without mention of complication, not stated as uncontrolled     2006     Vaccination not carried out      Past Surgical History:   Procedure Laterality Date     HC KNEE SCOPE, DIAGNOSTIC      Arthroscopy, Knee- Left     Family Hx:  Family History   Problem Relation Age of Onset     DIABETES Mother      age 55     DIABETES Father      age 64     Family History Negative Sister      x3     Hypertension Brother      Thyroid disease:         DM2: Yes: mother and father         Autoimmune: DM1, SLE, RA, Vitiligo     Social Hx:  Social History     Social History     Marital status:      Spouse name: Virginia     Number of children: 0     Years of education: N/A     Occupational History     operations      [employer]     Social History Main Topics     Smoking status: Never Smoker     Smokeless tobacco: Never Used      Comment: quit in 1978     Alcohol use 3.6 oz/week     6 Cans of beer per week      Comment: weekly     Drug use: No     Sexual activity: Yes     Partners: Female     Other Topics Concern      Service No     Blood Transfusions Yes     no complications     Caffeine Concern No     Occupational Exposure No     Hobby Hazards No     Sleep Concern Yes     Stress Concern Yes     Weight Concern Yes     Special Diet Yes     Back Care Yes     past motorcross racing     Exercise Yes     Bike Helmet No     Seat Belt Yes     Self-Exams No     Social History Narrative          MEDICATIONS:  has a current medication list which includes the following prescription(s): fluoxetine, zolpidem, losartan, metformin, buspirone, diazepam, clobetasol, simvastatin, basaglar, b-d u/f, mirtazapine, insulin pen needle, insulin glargine, insulin glargine, blood glucose monitoring, blood glucose test strips, blood glucose monitoring  suppl, and aspirin.    ROS     ROS: 10 point ROS neg other than the symptoms noted above in the HPI.    Physical Exam   VS: /73 (BP Location: Right arm, Patient Position: Chair, Cuff Size: Adult Regular)  Pulse 84  Temp 98  F (36.7  C) (Oral)  Resp 16  Wt 82.1 kg (181 lb)  BMI 28.35 kg/m2  GENERAL: AXOX3, NAD, well dressed, answering questions appropriately, appears stated age.  HEENT: no exophthalmos, no proptosis, EOMI, no lig lag, no retraction  CV: RRR, no rubs, gallops, no murmurs  LUNGS: CTAB, no wheezes, rales, or rhonchi  ABDOMEN: soft, nontender, nondistended  EXTREMITIES: no edema, +pulses, no rashes, no lesions  NEUROLOGY: CN grossly intact, no tremors  MSK: grossly intact  SKIN: no rashes, no lesions    LABS:  A1c:   Component    Latest Ref Rng & Units 12/7/2016 6/30/2017 2/8/2018   Hemoglobin A1C    4.3 - 6.0 % 8.6 (H) 10.2 (H) 10.8 (H)     Basic Metabolic Panel:  !COMPREHENSIVE Latest Ref Rng & Units 2/8/2018   SODIUM 133 - 144 mmol/L 136   POTASSIUM 3.4 - 5.3 mmol/L 4.3   CHLORIDE 94 - 109 mmol/L 101   BUN 7 - 30 mg/dL 11   Creatinine 0.66 - 1.25 mg/dL 0.83   Glucose 70 - 99 mg/dL 275 (H)   ANION GAP 3 - 14 mmol/L 6   CALCIUM 8.5 - 10.1 mg/dL 9.0     LFTS/Cholesterol Panel:  !LIPID/HEPATIC Latest Ref Rng & Units 6/30/2017 2/8/2018   CHOLESTEROL <200 mg/dL 151    TRIGLYCERIDES <150 mg/dL 186 (H)    HDL CHOLESTEROL >39 mg/dL 44    LDL CHOLESTEROL, CALCULATED <100 mg/dL 70    VLDL-CHOLESTEROL 0 - 30 mg/dL     NON HDL CHOLESTEROL <130 mg/dL 107    CHOLESTEROL/HDL RATIO 0.0 - 5.0     AST 0 - 45 U/L  27   ALT 0 - 70 U/L  26     Thyroid Function:   !THYROID Latest Ref Rng & Units 3/11/2016   TSH 0.40 - 4.00 mU/L 1.46     Urine MicroAlbumin:  Component    Latest Ref Rng & Units 6/30/2017   Creatinine Urine    mg/dL 205   Albumin Urine mg/L    mg/L 80   Albumin Urine mg/g Cr    0 - 17 mg/g Cr 39.02 (H)     Vitamin D:    All pertinent notes, labs, and images personally reviewed by me.      A/P  Mr.Tony TAN Gonzalez is a 62 year old here for the evaluation/management of diabetes:    1. DM2 - Uncontrolled.  Recommend checking blood sugars before meals and 2 hours after meals.    Will obtain glucose and cpeptide to assess pancreatic function  Will obtain diagnostic CGM  Follow up in 1 and 2 weeks for CGM download   Depending on results, consider the addition of a GLP-1 or meal-time insulin.  He would potentially be interested in an insulin pump if testing indicates the need for multiple daily insulin injections.    There is some variability among people, most will usually develop symptoms suggestive of hypoglycemia when blood glucose levels are lowered to the mid 60's. The first set of symptoms are called adrenergic. Patients may experience any of the following nervousness, sweating, intense hunger, trembling, weakness, palpitations, and difficulty speaking.   The acute management of hypoglycemia involves the rapid delivery of a source of easily absorbed sugar. Regular soda, juice, lifesavers, table sugar, are good options. 15 grams of glucose is the dose that is given, followed by an assessment of symptoms and a blood glucose check if possible. If after 10 minutes there is no improvement, another 10-15 grams should be given. This can be repeated up to three times. The equivalency of 10-15 grams of glucose (approximate servings) are: 3-5 hard candies, 3 teaspoons of sugar, or 1/2 cup of regular soda or juice.      2. Hypertension - Controlled, continue Losartan 50 mg qd.    3. Hyperlipidemia - On statin therapy      Labs ordered today:   Orders Placed This Encounter   Procedures     GLUCOSE MONITORING CONTINUOUS, >=72 HR     Glucose     C-peptide     DIABETES EDUCATOR REFERRAL       Radiology/Consults ordered today: DIABETES EDUCATOR REFERRAL    All questions were answered.  The patient indicates understanding of the above issues and agrees with the plan set forth.  Total face to face time greater than  or equal to 45 minutes.       Follow-up:  follow up in 1-3 months depending on diabetes ed follow up     Erika Trammell NP  Endocrinology  [facility]  CC: Raisa Calvillo      Again, thank you for allowing me to participate in the care of your patient.        Sincerely,        IRAM Byrne CNP

## 2018-02-13 NOTE — MR AVS SNAPSHOT
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)    -  1    Type 2 diabetes mellitus with hyperglycemia, unspecified long term insulin use status (H)           Follow-ups after your visit        Additional Services     DIABETES EDUCATOR REFERRAL       DIABETES SELF MANAGEMENT TRAINING (DSMT)      Your provider has referred you to Diabetes Education: FMG: Diabetes Education - All [facility] [phone]   [email or web address]     If an urgent visit is needed or A1C is above 12, Care Team to call the Diabetes  Education Team at [phone] or send an In Basket message to the Diabetes Education Pool (P DIAB ED-PATIENT CARE).    A  will call you to make your appointment. If it has been more than 3 business days since your referral was placed, please call the above phone number to schedule.    Type of training and number of hours: Previous Diagnosis: Follow-up DSMT - 2 hours.    Medicare covers: 10 hours of initial DSMT in 12 month period from the time of first visit, plus 2 hours of follow-up DSMT annually, and additional hours as requested for insulin training.    Diabetes Type: Type 2 - On Insulin             Diabetes Co-Morbidities: none               A1C Goal:  <7.0       A1C is: Lab Results       Component                Value               Date                       A1C                      10.8                02/08/2018              Diabetes Education Topics: Diagnostic Continuous Glucose Monitoring     Special Educational Needs Requiring Individual DSMT: None       MEDICAL NUTRITION THERAPY (MNT) for Diabetes    Medical Nutrition  Therapy with a Registered Dietitian can be provided in coordination with Diabetes Self-Management Training to assist in achieving optimal diabetes management.    MNT Type and Hours: Do not initiate MNT at this time.                       Medicare will cover: 3 hours initial MNT in 12 month period after first visit, plus 2 hours of follow-up MNT annually    Please be aware that coverage of these services is subject to the terms and limitations of your health insurance plan.  Call member services at your health plan to determine Diabetes Self-Management Training (Codes  &amp; ) and Medical Nutrition Therapy (Codes 35246 & 12360) benefits and ask which blood glucose monitor brands are covered by your plan.  Please bring the following with you to your appointment:    (1)  List of current medications   (2)  List of Blood Glucose Monitor brands that are covered by your insurance plan  (3)  Blood Glucose Monitor and log book  (4)   Food records for the 3 days prior to your visit    The Certified Diabetes Educator may make diabetes medication adjustments per the CDE Protocol and Collaborative Practice Agreement.                  Follow-up notes from your care team     Return in about 3 months (around 5/13/2018).      Your next 10 appointments already scheduled     Feb 22, 2018  1:30 PM CST   Diabetic Education with Natalie Harding RN   [facility] [place] ([facility])    [address] [address] [phone]            Mar 01, 2018  8:30 AM CST   Diabetic Education with Natalie Harding RN   [facility] [place] ([facility])    [address]  [facility] [id number] [phone]              Who to contact     If you have questions or need follow up information about today's clinic visit or your schedule please contact [facility] directly at [phone].  Normal or non-critical lab  and imaging results will be communicated to you by MyChart, letter or phone within 4 business days after the clinic has received the results. If you do not hear from us within 7 days, please contact the clinic through [employer] or phone. If you have a critical or abnormal lab result, we will notify you by phone as soon as possible.  Submit refill requests through TORIA or call your pharmacy and they will forward the refill request to us. Please allow 3 business days for your refill to be completed.          Additional Information About Your Visit        [employer] Information     TORIA gives you secure access to your electronic health record. If you see a primary care provider, you can also send messages to your care team and make appointments. If you have questions, please call your primary care clinic.  If you do not have a primary care provider, please call [phone] and they will assist you.        Care EveryWhere ID     This is your Care EveryWhere ID. This could be used by other organizations to access your [facility] medical records  [id number]        Your Vitals Were     Pulse Temperature Respirations BMI (Body Mass Index)          84 98  F (36.7  C) (Oral) 16 28.35 kg/m2         Blood Pressure from Last 3 Encounters:   02/13/18 128/73   02/08/18 128/81   06/30/17 119/76    Weight from Last 3 Encounters:   02/13/18 82.1 kg (181 lb)   02/08/18 83 kg (183 lb)   06/30/17 80.3 kg (177 lb)              We Performed the Following     C-peptide     DIABETES EDUCATOR REFERRAL     Glucose        Primary Care Provider Office Phone # Fax #    Raisa Calvillo PA-C [phone] [address] [address]        Equal Access to Services     CRISTY GUTIERREZ : Hadii rashawn Wilson, cat cam, qaybclaribel sanders. So [facility] [phone].    ATENCIÓN: Si habla español, tiene a khan disposición servicios gratuitos de asistencia  lingüísticaHarjeet Quiñones al [phone].    We comply with applicable federal civil rights laws and [place] laws. We do not discriminate on the basis of race, color, national origin, age, disability, sex, sexual orientation, or gender identity.            Thank you!     Thank you for choosing [facility]  for your care. Our goal is always to provide you with excellent care. Hearing back from our patients is one way we can continue to improve our services. Please take a few minutes to complete the written survey that you may receive in the mail after your visit with us. Thank you!             Your Updated Medication List - Protect others around you: Learn how to safely use, store and throw away your medicines at www.disposemymeds.org.          This list is accurate as of 2/13/18 11:59 PM.  Always use your most recent med list.                   Brand Name Dispense Instructions for use Diagnosis    aspirin 81 MG tablet     100    1 tab po QD (Once per day)    Type II or unspecified type diabetes mellitus without mention of complication, not stated as uncontrolled       blood glucose monitoring lancets     1 Box    1 each 3 times daily    Type 2 diabetes, HbA1C goal < 8% (H)       BLOOD GLUCOSE TEST STRIPS STRP     100 Strip    PER PATIENT HEALTH PLAN    Type II or unspecified type diabetes mellitus without mention of complication, not stated as uncontrolled       BLOOD GLUCOSE TEST STRIPS Strp     100 strip    1 Device by In Vitro route 3 times daily    Type 2 diabetes, HbA1C goal < 8% (H)       busPIRone 15 MG tablet    BUSPAR    270 tablet    Take 1 tab in the morning and 2 tabs at bedtimes    Anxiety       clobetasol 0.05 % ointment    TEMOVATE    45 g    Apply sparingly to affected area twice daily as needed.  Do not apply to face.    Psoriasis       diazepam 10 MG tablet    VALIUM    180 tablet    TAKE 1 TABLET BY MOUTH EVERY 12 HOURS AS NEEDED FOR ANXIETY OR SLEEP.    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)       metFORMIN 500 MG 24 hr tablet    GLUCOPHAGE-XR    180 tablet    Take 2 tablets (1,000 mg) by mouth daily (with dinner)    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)       mirtazapine 30 MG tablet    REMERON    90 tablet    Take 1 tablet (30 mg) by mouth At Bedtime    Insomnia, unspecified       simvastatin 80 MG tablet    ZOCOR    90 tablet    Take 0.5 tablets (40 mg) by mouth At Bedtime    Hyperlipidemia LDL goal <100       zolpidem 10 MG tablet    AMBIEN    90 tablet    TAKE 1 TABLET BY MOUTH AS NEEDED FOR SLEEP    Persistent insomnia       * Notice:  This list has 5 medication(s) that are the same as other medications prescribed for you. Read the directions carefully, and ask your doctor or other care provider to review them with you.

## 2018-02-13 NOTE — LETTER
2/13/2018         RE: Gerardo Gonzalez  [address]  [place] [address]        Dear Colleague,    Thank you for referring your patient, Gerardo Gonzalez, to the [facility] [place]. Please see a copy of my visit note below.    Diabetes Self Management Training: Professional Continuous Glucose Monitor Insertion    SUBJECTIVE:   Gerardo Gonzalez is accompanied by self    Patient concerns: high a1c    OBJECTIVE:    Lab Results:  Lab Results   Component Value Date    A1C 10.8 02/08/2018      Lab Results   Component Value Date     02/08/2018     Lab Results   Component Value Date    HDL 44 06/30/2017       Vitals:  There were no vitals taken for this visit.    ASSESSMENT:    LibrePro sensor started today. (Lot # [id number], Serial # [id number], Expiration date 3/31/18) Sensor was inserted with no resistance or bleeding at insertion site.    Pt will plan to wear the sensor through  3/1/18.    WRITTEN AND VERBAL INFORMATION GIVEN TO SUPPORT UNDERSTANDING OF:  LibrePro CGM: Sensor insertion, intention of monitoring for 14 days. Keep records of BG, food intake, exercise, and medication dosing during wear.       Patient verbalizes understanding on all instructions provided.     Educational and other materials:  Food/exercise/medication log sheets  Contact information  Return Check List    PLAN:  Pt was given instructions for tracking BG, medications, food intake and activity.      Follow-up:    2/22/18      Natalie Harding RN,CDE     Encounter Type: Individual     Any diabetes medication dose changes were made via the CDE Protocol and Collaborative Practice Agreement with the patient's referring provider. A copy of this encounter was shared with the provider.

## 2018-02-13 NOTE — NURSING NOTE
"Chief Complaint   Patient presents with     Diabetes       Initial /73 (BP Location: Right arm, Patient Position: Chair, Cuff Size: Adult Regular)  Pulse 84  Temp 98  F (36.7  C) (Oral)  Resp 16  Wt 181 lb (82.1 kg)  BMI 28.35 kg/m2 Estimated body mass index is 28.35 kg/(m^2) as calculated from the following:    Height as of 2/8/18: 5' 7\" (1.702 m).    Weight as of this encounter: 181 lb (82.1 kg).  Medication Reconciliation: complete    "

## 2018-02-14 LAB — GLUCOSE SERPL-MCNC: 363 MG/DL (ref 70–99)

## 2018-02-15 LAB — C PEPTIDE SERPL-MCNC: 3.6 NG/ML (ref 0.9–6.9)

## 2018-02-15 NOTE — PROGRESS NOTES
Destin Carrillo,  You are making some insulin on your own but not nearly as much as you should with a glucose of 363.  We'll talk about this in detail when you follow up to review the continuous glucose monitoring test.  Erika Trammell NP  Endocrinology

## 2018-02-22 ENCOUNTER — ALLIED HEALTH/NURSE VISIT (OUTPATIENT)
Dept: EDUCATION SERVICES | Facility: CLINIC | Age: 63
End: 2018-02-22
Payer: COMMERCIAL

## 2018-02-22 ENCOUNTER — TELEPHONE (OUTPATIENT)
Dept: FAMILY MEDICINE | Facility: CLINIC | Age: 63
End: 2018-02-22

## 2018-02-22 PROCEDURE — G0108 DIAB MANAGE TRN  PER INDIV: HCPCS

## 2018-02-22 RX ORDER — INSULIN GLARGINE 100 [IU]/ML
12 INJECTION, SOLUTION SUBCUTANEOUS 2 TIMES DAILY
Qty: 15 ML | Refills: 0
Start: 2018-02-22 | End: 2018-10-26

## 2018-02-22 NOTE — PROGRESS NOTES
Diabetes Self Management Training: Follow-up Visit and Continuous Glucose Monitor Download    Gerardo Gonzalez presents today for modification of medication(s) and download of continuous glucose monitoring related to Type 2 diabetes.    He is accompanied by self    Patient's diabetes management related comments/concerns: feels he is eating bad while traveling, no issues with wearing the CGM      Current Diabetes Management per Patient:  Taking diabetes medications?   yes:     Diabetes Medication(s)     Biguanides Sig    metFORMIN (GLUCOPHAGE-XR) 500 MG 24 hr tablet Take 2 tablets (1,000 mg) by mouth daily (with dinner)    Insulin Sig    BASAGLAR 100 UNIT/ML injection INJECT 24-26UNITS SUBCUTANEOUS AT BEDTIME     *Abbreviated insulin dose documentation key: Insulin Trade Name (gevmljzjx-hcyhl-faqiye-bedtime) - i.e. Humalog 5-5-5-0 (Humalog 5 units at breakfast, 5 units at lunch, and 5 units at dinner).    LibrePro Continuous Glucose Monitor Interpretation         Most Recent A1c Result:    Lab Results   Component Value Date    A1C 10.8 2018     Indication/s for LibrePro study: Difficult to manage hypoglycemia and/or hyperglycemia, despite multiple adjustments in self-monitoring of blood glucose and diabetes medication administration.    Statistics:   1. Sensor worn for 10 days.  2. Glucose excursions:   Percent in target is: 21%  Percent above target is: 79%  Percent below target is: 0%  3. Estimated A1c: 11.2%                        Data evaluation:   1. Sensor modal day evaluation shows the followin. Consistent day-to-day patterns noted: pattern of daytime hyperglycemia, downward drift at night  2. Average glucose: 275 mg/dL.  3. Low glucose events: 0 events with and average duration of 0 minutes          Patient's Logbook shows the following:   Carbohydrate counting is: patient does not carbohydrate count  Food log shows that patient is consistently eating lunch & dinner but does on occasion skip  breakfast. Patient verbalizes his goal of trying to avoid skipping breakfast. He typically eats a tuna, ham sandwich, hamburgers, enchiladas or pizza. He has been eating frosted shredded wheat or eggs for breakfast with coffee & sweet-n-low. He is trying to quit drinking Diet Coke and has substituted Crystal Light for this.     Assessment:  CGM data shows that patient is having a decrease in blood sugars into goal in the morning hours, likely due to basaglar before bed the night before. He spikes around lunch and remains high through the evening and into the night. Discussed need for either a GLP-1 or mealtime insulin dosing to help control highs as well as splitting basaglar dosing to improve coverage. Patient is willing to start an additional medication to help control his blood sugars. Patient does feel his low blood sugars and verbalizes understanding of need to carry a snack with him at all times for hypoglycemia treatment, especially since he skips meals when traveling.       INTERVENTION:  Per corkydle with Erika Trammell, ordered Trulicity and educated on how to give the weekly injections. Patient verbalized understanding and demonstrated demo injection without issue. If patient has any abdominal pain, with or without nausea and/or vomiting, stop Trulicity and call clinic at [phone] or go to the emergency room.  Split basaglar dosing to 12 units in the evening & 12 units in the morning  Discussed food logs, dietary changes, and foods that contain carbohydrates. Patient verbalized understanding.   Take metformin with dinner meal instead of right before bed.     Education provided today on:  AADE Self-Care Behaviors:  Healthy Eating: carbohydrate counting and consistency in amount, composition, and timing of food intake  Taking Medication: drawing up, administering and storing injectable diabetes medications, side effects of prescribed medications, when to take medications and dosing   Problem Solving:  carrying a carbohydrate source at all times    Pt verbalized understanding of concepts discussed and recommendations provided today.       Education Materials Provided:  No new materials provided today    PLAN:  See Patient Instructions for co-developed, patient-stated behavior change goals.  Keep a food record for the next visit.  AVS printed and provided to patient today.    FOLLOW-UP:  Follow-up appointment scheduled on 3/1/18.  Chart routed to referring provider.    Natalie Harding RN,SHARIE  Vicky Downing RD, LD  Time Spent: 60 minutes  Encounter Type: Individual    Any diabetes medication dose changes were made via the CDE Protocol and Collaborative Practice Agreement with the patient's referring provider. A copy of this encounter was shared with the provider.

## 2018-02-22 NOTE — PATIENT INSTRUCTIONS
Start Trulicity today and take once weekly on Thursdays  Split basaglar insulin dose - 12 units in the morning and 12 units in the evening  Take metformin with dinner   If you have any abdominal pain, with or without nausea and/or vomiting, stop Trulicity and call clinic at [phone] or go to the emergency room.

## 2018-02-22 NOTE — LETTER
2018         RE: Gerardo Gonzalez  [address]  [place] [address]        Dear Colleague,    Thank you for referring your patient, Gerardo Gonzalez, to the [facility]. Please see a copy of my visit note below.    Diabetes Self Management Training: Follow-up Visit and Continuous Glucose Monitor Download    Gerardo Gonzalez presents today for modification of medication(s) and download of continuous glucose monitoring related to Type 2 diabetes.    He is accompanied by self    Patient's diabetes management related comments/concerns: feels he is eating bad while traveling, no issues with wearing the CGM      Current Diabetes Management per Patient:  Taking diabetes medications?   yes:     Diabetes Medication(s)     Biguanides Sig    metFORMIN (GLUCOPHAGE-XR) 500 MG 24 hr tablet Take 2 tablets (1,000 mg) by mouth daily (with dinner)    Insulin Sig    BASAGLAR 100 UNIT/ML injection INJECT 24-26UNITS SUBCUTANEOUS AT BEDTIME     *Abbreviated insulin dose documentation key: Insulin Trade Name (vqaxfoxym-awyyb-zlbdgs-bedtime) - i.e. Humalog 5-5-5-0 (Humalog 5 units at breakfast, 5 units at lunch, and 5 units at dinner).    LibrePro Continuous Glucose Monitor Interpretation         Most Recent A1c Result:    Lab Results   Component Value Date    A1C 10.8 2018     Indication/s for LibrePro study: Difficult to manage hypoglycemia and/or hyperglycemia, despite multiple adjustments in self-monitoring of blood glucose and diabetes medication administration.    Statistics:   1. Sensor worn for 10 days.  2. Glucose excursions:   Percent in target is: 21%  Percent above target is: 79%  Percent below target is: 0%  3. Estimated A1c: 11.2%                        Data evaluation:   1. Sensor modal day evaluation shows the followin. Consistent day-to-day patterns noted: pattern of daytime hyperglycemia, downward drift at night  2. Average glucose: 275 mg/dL.  3. Low glucose events: 0  events with and average duration of 0 minutes          Patient's Logbook shows the following:   Carbohydrate counting is: patient does not carbohydrate count  Food log shows that patient is consistently eating lunch & dinner but does on occasion skip breakfast. Patient verbalizes his goal of trying to avoid skipping breakfast. He typically eats a tuna, ham sandwich, hamburgers, enchiladas or pizza. He has been eating frosted shredded wheat or eggs for breakfast with coffee & sweet-n-low. He is trying to quit drinking Diet Coke and has substituted Crystal Light for this.     Assessment:  CGM data shows that patient is having a decrease in blood sugars into goal in the morning hours, likely due to basaglar before bed the night before. He spikes around lunch and remains high through the evening and into the night. Discussed need for either a GLP-1 or mealtime insulin dosing to help control highs as well as splitting basaglar dosing to improve coverage. Patient is willing to start an additional medication to help control his blood sugars. Patient does feel his low blood sugars and verbalizes understanding of need to carry a snack with him at all times for hypoglycemia treatment, especially since he skips meals when traveling.       INTERVENTION:  Per corkydle with Erika Trammell, ordered Trulicity and educated on how to give the weekly injections. Patient verbalized understanding and demonstrated demo injection without issue. If patient has any abdominal pain, with or without nausea and/or vomiting, stop Trulicity and call clinic at [phone] or go to the emergency room.  Split basaglar dosing to 12 units in the evening & 12 units in the morning  Discussed food logs, dietary changes, and foods that contain carbohydrates. Patient verbalized understanding.   Take metformin with dinner meal instead of right before bed.     Education provided today on:  AADE Self-Care Behaviors:  Healthy Eating: carbohydrate counting and  consistency in amount, composition, and timing of food intake  Taking Medication: drawing up, administering and storing injectable diabetes medications, side effects of prescribed medications, when to take medications and dosing   Problem Solving: carrying a carbohydrate source at all times    Pt verbalized understanding of concepts discussed and recommendations provided today.       Education Materials Provided:  No new materials provided today    PLAN:  See Patient Instructions for co-developed, patient-stated behavior change goals.  Keep a food record for the next visit.  AVS printed and provided to patient today.    FOLLOW-UP:  Follow-up appointment scheduled on 3/1/18.  Chart routed to referring provider.    Natalie Harding RN,CDE  Vicky Downing RD, LD  Time Spent: 60 minutes  Encounter Type: Individual    Any diabetes medication dose changes were made via the CDE Protocol and Collaborative Practice Agreement with the patient's referring provider. A copy of this encounter was shared with the provider.

## 2018-02-22 NOTE — MR AVS SNAPSHOT
After Visit Summary   2/22/2018    Gerardo Gonzalez    MRN: [id number]           Patient Information     Date Of Birth          1955        Visit Information        Provider Department      2/22/2018 1:30 PM Natalie Harding RN [facility]        Today's Diagnoses     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)          Care Instructions    Start Trulicity today and take once weekly   Split basaglar insulin dose - 12 units in the morning and 12 units in the evening  Take metformin with dinner   If you have any abdominal pain, with or without nausea and/or vomiting, stop Trulicity and call clinic at [phone] or go to the emergency room.            Follow-ups after your visit        Your next 10 appointments already scheduled     Mar 01, 2018  8:30 AM CST   Diabetic Education with Natalie Harding RN   [facility]    [address] [address] [phone]              Who to contact     If you have questions or need follow up information about today's clinic visit or your schedule please contact [facility] directly at [phone].  Normal or non-critical lab and imaging results will be communicated to you by MyChart, letter or phone within 4 business days after the clinic has received the results. If you do not hear from us within 7 days, please contact the clinic through MyChart or phone. If you have a critical or abnormal lab result, we will notify you by phone as soon as possible.  Submit refill requests through [employer] or call your pharmacy and they will forward the refill request to us. Please allow 3 business days for your refill to be completed.          Additional Information About Your Visit        MyChart Information     [employer] gives you secure access to your electronic health record. If you see a primary care provider, you can  also send messages to your care team and make appointments. If you have questions, please call your primary care clinic.  If you do not have a primary care provider, please call [phone] and they will assist you.        Care EveryWhere ID     This is your Care EveryWhere ID. This could be used by other organizations to access your [facility] medical records  [id number]         Blood Pressure from Last 3 Encounters:   02/13/18 128/73   02/08/18 128/81   06/30/17 119/76    Weight from Last 3 Encounters:   02/13/18 82.1 kg (181 lb)   02/08/18 83 kg (183 lb)   06/30/17 80.3 kg (177 lb)              Today, you had the following     No orders found for display         Today's Medication Changes          These changes are accurate as of 2/22/18  2:19 PM.  If you have any questions, ask your nurse or doctor.               Start taking these medicines.        Dose/Directions    dulaglutide 0.75 MG/0.5ML pen   Commonly known as:  TRULICITY   Used for:  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)        Dose:  0.75 mg   Inject 0.75 mg Subcutaneous every 7 days   Quantity:  2 mL   Refills:  0         These medicines have changed or have updated prescriptions.        Dose/Directions    BASAGLAR 100 UNIT/ML injection   This may have changed:  See the new instructions.   Used for:  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)        Dose:  12 Units   Inject 12 Units Subcutaneous 2 times daily   Quantity:  15 mL   Refills:  0            Where to get your medicines      These medications were sent to [facility] [address]  [address] [facility] [address]     Phone:  [phone]     dulaglutide 0.75 MG/0.5ML pen         Some of these will need a paper prescription and others can be bought over the counter.  Ask your nurse if you have questions.     You don't need a prescription for these medications     BASAGLAR 100 UNIT/ML injection                 Primary Care Provider Office Phone # Fax #    Raisa ELIGIO Calvillo PA-C [phone] [address] [facility] [address]        Equal Access to Services     CRISTY GUTIERREZ : Hadii rashawn ku deseano Solindaali, waaxda luqadaha, qaybta kaalmada adeegyada, claribel thompsonradha omaira. So [place] [phone].    ATENCIÓN: Si habla español, tiene a khan disposición servicios gratuitos de asistencia lingüística. Llame al [phone].    We comply with applicable federal civil rights laws and [place] laws. We do not discriminate on the basis of race, color, national origin, age, disability, sex, sexual orientation, or gender identity.            Thank you!     Thank you for choosing [facility]  for your care. Our goal is always to provide you with excellent care. Hearing back from our patients is one way we can continue to improve our services. Please take a few minutes to complete the written survey that you may receive in the mail after your visit with us. Thank you!             Your Updated Medication List - Protect others around you: Learn how to safely use, store and throw away your medicines at www.disposemymeds.org.          This list is accurate as of 2/22/18  2:19 PM.  Always use your most recent med list.                   Brand Name Dispense Instructions for use Diagnosis    aspirin 81 MG tablet     100    1 tab po QD (Once per day)    Type II or unspecified type diabetes mellitus without mention of complication, not stated as uncontrolled       BASAGLAR 100 UNIT/ML injection     15 mL    Inject 12 Units Subcutaneous 2 times daily    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)       blood glucose monitoring lancets     1 Box    1 each 3 times daily    Type 2 diabetes, HbA1C goal < 8% (H)       BLOOD GLUCOSE TEST STRIPS STRP     100 Strip    PER PATIENT HEALTH PLAN    Type II or unspecified type diabetes mellitus without mention of  complication, not stated as uncontrolled       BLOOD GLUCOSE TEST STRIPS Strp     100 strip    1 Device by In Vitro route 3 times daily    Type 2 diabetes, HbA1C goal < 8% (H)       busPIRone 15 MG tablet    BUSPAR    270 tablet    Take 1 tab in the morning and 2 tabs at bedtimes    Anxiety       clobetasol 0.05 % ointment    TEMOVATE    45 g    Apply sparingly to affected area twice daily as needed.  Do not apply to face.    Psoriasis       diazepam 10 MG tablet    VALIUM    180 tablet    TAKE 1 TABLET BY MOUTH EVERY 12 HOURS AS NEEDED FOR ANXIETY OR SLEEP.    Anxiety       dulaglutide 0.75 MG/0.5ML pen    TRULICITY    2 mL    Inject 0.75 mg Subcutaneous every 7 days    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)       FLUoxetine 40 MG capsule    PROzac    180 capsule    Take 2 capsules (80 mg) by mouth daily    Anxiety       * insulin pen needle 31G X 8 MM     100 each    1 Device daily Pharmacist may dispense brand and/or needle size per patient's preference and health care plan.    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)       metFORMIN 500 MG 24 hr tablet    GLUCOPHAGE-XR    180 tablet    Take 2 tablets (1,000 mg) by mouth daily (with dinner)    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)       mirtazapine 30 MG tablet    REMERON    90 tablet    Take 1 tablet (30 mg) by mouth At Bedtime    Insomnia, unspecified       simvastatin 80 MG tablet    ZOCOR    90 tablet    Take 0.5 tablets (40 mg) by mouth At Bedtime    Hyperlipidemia LDL goal <100       zolpidem 10 MG tablet    AMBIEN    90 tablet    TAKE 1 TABLET BY  MOUTH AS NEEDED FOR SLEEP    Persistent insomnia       * Notice:  This list has 2 medication(s) that are the same as other medications prescribed for you. Read the directions carefully, and ask your doctor or other care provider to review them with you.

## 2018-02-23 NOTE — TELEPHONE ENCOUNTER
Returned patient call, CGM was on for 10 days, no need to replace it.  Recommend he test bg before and 2 hour after meals over the next week to evaluate how change to insulin and Trulicity is working for him. Patient states understanding and agrees with plan.  Natalie Harding RN,CDE

## 2018-02-27 DIAGNOSIS — G47.09 OTHER INSOMNIA: ICD-10-CM

## 2018-02-27 DIAGNOSIS — E78.5 HYPERLIPIDEMIA LDL GOAL <100: ICD-10-CM

## 2018-02-27 NOTE — TELEPHONE ENCOUNTER
"Requested Prescriptions   Pending Prescriptions Disp Refills     mirtazapine (REMERON) 30 MG tablet [Pharmacy Med Name: MIRTAZAPINE 30 MG TABLET] 90 tablet 1    Last Written Prescription Date:  08/22/2017  Last Fill Quantity: 90 tablet,  # refills: 1   Last office visit: 2/8/2018 with prescribing provider:  02/08/2018   Future Office Visit:     Sig: TAKE 1 TABLET (30 MG) BY MOUTH AT BEDTIME    Atypical Antidepressants Protocol Passed    2/27/2018  2:07 AM       Passed - Recent or future visit with authorizing provider's specialty    Patient had office visit in the last year or has a visit in the next 30 days with authorizing provider.  See \"Patient Info\" tab in inbasket, or \"Choose Columns\" in Meds & Orders section of the refill encounter.            Passed - Patient is age 18 or older            simvastatin (ZOCOR) 80 MG tablet [Pharmacy Med Name: SIMVASTATIN 80 MG TABLET] 90 tablet 0      DUPLICATE !!! MEDICATION FILLED ON 02/08/2018 FOR 1 YEAR SUPPLY    Last Written Prescription Date:  02/08/2018  Last Fill Quantity: 90 tablet,  # refills: 3   Last office visit: 2/8/2018 with prescribing provider:  02/08/2018   Future Office Visit:     Sig: TAKE 1 TABLET (80 MG) BY MOUTH AT BEDTIME    Statins Protocol Passed    2/27/2018  2:07 AM       Passed - LDL on file in past 12 months    Recent Labs   Lab Test  06/30/17   1005   LDL  70            Passed - No abnormal creatine kinase in past 12 months    No lab results found.         Passed - Recent or future visit with authorizing provider    Patient had office visit in the last year or has a visit in the next 30 days with authorizing provider.  See \"Patient Info\" tab in inbasket, or \"Choose Columns\" in Meds & Orders section of the refill encounter.            Passed - Patient is age 18 or older          Salty VEGAT  "

## 2018-02-28 ENCOUNTER — TELEPHONE (OUTPATIENT)
Dept: FAMILY MEDICINE | Facility: CLINIC | Age: 63
End: 2018-02-28

## 2018-02-28 ENCOUNTER — DOCUMENTATION ONLY (OUTPATIENT)
Dept: ENDOCRINOLOGY | Facility: CLINIC | Age: 63
End: 2018-02-28
Payer: COMMERCIAL

## 2018-02-28 DIAGNOSIS — Z79.4 TYPE 2 DIABETES MELLITUS WITHOUT COMPLICATION, WITH LONG-TERM CURRENT USE OF INSULIN (H): Primary | ICD-10-CM

## 2018-02-28 DIAGNOSIS — E11.9 TYPE 2 DIABETES MELLITUS WITHOUT COMPLICATION, WITH LONG-TERM CURRENT USE OF INSULIN (H): Primary | ICD-10-CM

## 2018-02-28 DIAGNOSIS — E78.5 HYPERLIPIDEMIA LDL GOAL <100: ICD-10-CM

## 2018-02-28 PROCEDURE — 95251 CONT GLUC MNTR ANALYSIS I&R: CPT | Performed by: CLINICAL NURSE SPECIALIST

## 2018-02-28 NOTE — PROGRESS NOTES
LibrePro Continuous Glucose Monitor Interpretation          Most Recent A1c Result:          Lab Results   Component Value Date     A1C 10.8 2018      Indication/s for LibrePro study: Difficult to manage hypoglycemia and/or hyperglycemia, despite multiple adjustments in self-monitoring of blood glucose and diabetes medication administration.     Statistics:   1. Sensor worn for 10 days.  2. Glucose excursions:   Percent in target is: 21%  Percent above target is: 79%  Percent below target is: 0%  3. Estimated A1c: 11.2%      Data evaluation:   1. Sensor modal day evaluation shows the followin. Consistent day-to-day patterns noted: pattern of daytime hyperglycemia, downward drift at night  2. Average glucose: 275 mg/dL.  3. Low glucose events: 0 events with and average duration of 0 minutes         Patient's Logbook shows the following:   Carbohydrate counting is: patient does not carbohydrate count  Food log shows that patient is consistently eating lunch & dinner but does on occasion skip breakfast. Patient verbalizes his goal of trying to avoid skipping breakfast. He typically eats a tuna, ham sandwich, hamburgers, enchiladas or pizza. He has been eating frosted shredded wheat or eggs for breakfast with coffee & sweet-n-low. He is trying to quit drinking Diet Coke and has substituted Crystal Light for this.      Assessment:  CGM data shows that patient is having a decrease in blood sugars into goal in the morning hours, likely due to basaglar before bed the night before. He spikes around lunch and remains high through the evening and into the night. Discussed need for either a GLP-1 or mealtime insulin dosing to help control highs as well as splitting basaglar dosing to improve coverage. Patient is willing to start an additional medication to help control his blood sugars. Patient does feel his low blood sugars and verbalizes understanding of need to carry a snack with him at all times for hypoglycemia  treatment, especially since he skips meals when traveling.         Plan:  Start Trulicity 0.75 mg weekly, educated on how to give the weekly injections. Patient verbalized understanding and demonstrated demo injection without issue. Advised if has any abdominal pain, with or without nausea and/or vomiting, stop Trulicity and call clinic at [phone] or go to the emergency room.  Split basaglar dosing to 12 units in the evening & 12 units in the morning  Discussed food logs, dietary changes, and foods that contain carbohydrates. Patient verbalized understanding.   Take metformin with dinner meal instead of right before bed.      Education provided:  AADE Self-Care Behaviors:  Healthy Eating: carbohydrate counting and consistency in amount, composition, and timing of food intake  Taking Medication: drawing up, administering and storing injectable diabetes medications, side effects of prescribed medications, when to take medications and dosing   Problem Solving: carrying a carbohydrate source at all times     Pt verbalized understanding of concepts discussed and recommendations provided today.        Education Materials Provided:  No new materials provided today        FOLLOW-UP:  Follow-up appointment scheduled with diabetes ed on 3/1/18.     Natalie Harding RN,SHARIE  Vicky Downing RD, LD  Erika Trammell NP  Endocrinology  [facility]

## 2018-02-28 NOTE — TELEPHONE ENCOUNTER
"Requested Prescriptions   Pending Prescriptions Disp Refills             simvastatin (ZOCOR) 80 MG tablet  This may be too soon  Last Written Prescription Date:  2/8/18  Last Fill Quantity: 90 tablets,  # refills: 3   Last office visit: 2/8/2018 with prescribing provider:  Rajinder   Future Office Visit:   90 tablet 3     Sig: Take 0.5 tablets (40 mg) by mouth At Bedtime    Statins Protocol Passed    2/28/2018 11:46 AM       Passed - LDL on file in past 12 months    Recent Labs   Lab Test  06/30/17   1005   LDL  70            Passed - No abnormal creatine kinase in past 12 months    No lab results found.         Passed - Recent or future visit with authorizing provider    Patient had office visit in the last year or has a visit in the next 30 days with authorizing provider.  See \"Patient Info\" tab in inbasket, or \"Choose Columns\" in Meds & Orders section of the refill encounter.            Passed - Patient is age 18 or older        "

## 2018-03-01 RX ORDER — SIMVASTATIN 80 MG
40 TABLET ORAL AT BEDTIME
Start: 2018-03-01

## 2018-03-01 RX ORDER — SIMVASTATIN 80 MG
TABLET ORAL
Start: 2018-03-01

## 2018-03-01 RX ORDER — MIRTAZAPINE 30 MG/1
TABLET, FILM COATED ORAL
Qty: 90 TABLET | Refills: 1 | Status: SHIPPED | OUTPATIENT
Start: 2018-03-01 | End: 2018-08-20

## 2018-03-01 NOTE — TELEPHONE ENCOUNTER
Simvastatin denied with note to pharmacist to check file refills sent E-Prescribing Status: Receipt confirmed by pharmacy (2/8/2018 11:23 AM CST)    Raisa, please advise mirtazapine - also taking zolpidem for insomnia.  Thanks.   Constantine Sexton, RN

## 2018-03-01 NOTE — TELEPHONE ENCOUNTER
Denied with note to pharmacy : check file. Refill sent E-Prescribing Status: Receipt confirmed by pharmacy (2/8/2018 11:23 AM CST)  Constantine Sexton RN

## 2018-03-12 NOTE — TELEPHONE ENCOUNTER
"Requested Prescriptions   Pending Prescriptions Disp Refills     B-D U/F 31G X 8 MM insulin pen needle [Pharmacy Med Name: BD ULTRA-FINE PEN NDL [id number]]  Last Written Prescription Date:  2/3/2017  Last Fill Quantity: 100 each,  # refills: 0   Last office visit: 2/8/2018 with prescribing provider:  Rajinder Roach     Sig: USE AS DIRECTED *PLEASE FOLLOW UP WITH DR FOR DIABETIC RECHECK*    Diabetic Supplies Protocol Passed    3/12/2018  1:49 PM       Passed - Patient is 18 years of age or older       Passed - Recent (6 mo) or future (30 days) visit within the authorizing provider's specialty    Patient had office visit in the last 6 months or has a visit in the next 30 days with authorizing provider.  See \"Patient Info\" tab in inbasket, or \"Choose Columns\" in Meds & Orders section of the refill encounter.              "

## 2018-03-13 NOTE — TELEPHONE ENCOUNTER
Prescription approved per [facility] Refill Protocol.    Constantine Sexton RN

## 2018-03-19 ENCOUNTER — TELEPHONE (OUTPATIENT)
Dept: EDUCATION SERVICES | Facility: CLINIC | Age: 63
End: 2018-03-19

## 2018-03-19 ENCOUNTER — ALLIED HEALTH/NURSE VISIT (OUTPATIENT)
Dept: EDUCATION SERVICES | Facility: CLINIC | Age: 63
End: 2018-03-19
Payer: COMMERCIAL

## 2018-03-19 DIAGNOSIS — E11.9 TYPE 2 DIABETES MELLITUS WITHOUT COMPLICATION, WITH LONG-TERM CURRENT USE OF INSULIN (H): Primary | ICD-10-CM

## 2018-03-19 DIAGNOSIS — Z79.4 TYPE 2 DIABETES MELLITUS WITHOUT COMPLICATION, WITH LONG-TERM CURRENT USE OF INSULIN (H): Primary | ICD-10-CM

## 2018-03-19 PROCEDURE — G0108 DIAB MANAGE TRN  PER INDIV: HCPCS

## 2018-03-19 NOTE — PROGRESS NOTES
Diabetes Self Management Training: Follow-up Visit    Gerardo Gonzalez presents today for education and evaluation of glucose control related to Type 2 diabetes.    He is accompanied by self    Patient's diabetes management related comments/concerns: doing well on the Trulicity, blood sugars are now in the 100 range and feeling really good.    Patient would like this visit to be focused around the following diabetes-related behaviors and goals: Monitoring and Taking Medication    ASSESSMENT:  Patient Problem List reviewed for relevant medical history and current medical status.    Current Diabetes Management per Patient:  Taking diabetes medications?   yes:     Diabetes Medication(s)     Biguanides Sig    metFORMIN (GLUCOPHAGE-XR) 500 MG 24 hr tablet Take 2 tablets (1,000 mg) by mouth daily (with dinner)    Insulin Sig    BASAGLAR 100 UNIT/ML injection Inject 12 Units Subcutaneous 2 times daily    Incretin Mimetic Agents (GLP-1 Receptor Agonists) Sig    dulaglutide (TRULICITY) 0.75 MG/0.5ML pen Inject 0.75 mg Subcutaneous every 7 days        Patient glucose self monitoring as follows: two times daily.   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 yes  Symptoms of low blood sugar? none    Vitals:  There were no vitals taken for this  "visit.  Estimated body mass index is 28.35 kg/(m^2) as calculated from the following:    Height as of 2/8/18: 1.702 m (5' 7\").    Weight as of 2/13/18: 82.1 kg (181 lb).   Last 3 BP:   BP Readings from Last 3 Encounters:   02/13/18 128/73   02/08/18 128/81   06/30/17 119/76       History   Smoking Status     Never Smoker   Smokeless Tobacco     Never Used     Comment: quit in 1978       Labs:  Lab Results   Component Value Date    A1C 10.8 02/08/2018     Lab Results   Component Value Date     02/13/2018     Lab Results   Component Value Date    LDL 70 06/30/2017     HDL Cholesterol   Date Value Ref Range Status   06/30/2017 44 >39 mg/dL Final   ]  GFR Estimate   Date Value Ref Range Status   02/08/2018 >90 >60 mL/min/1.7m2 Final     Comment:     Non  GFR Calc     GFR Estimate If Black   Date Value Ref Range Status   02/08/2018 >90 >60 mL/min/1.7m2 Final     Comment:      GFR Calc     Lab Results   Component Value Date    CR 0.83 02/08/2018     No results found for: MICROALBUMIN    Health Beliefs and Attitudes:   Patient Activation Measure Survey Score:  No flowsheet data found.    Diabetes knowledge and skills assessment:     Patient is knowledgeable in diabetes management concepts related to: Healthy Eating-portion control, SBG monitoring, and consistently taking medicatoin    Patient needs further education on the following diabetes management concepts: Healthy Eating, Being Active, Monitoring, Taking Medication and Problem Solving    Barriers to Learning Assessment: No Barriers identified    Based on learning assessment above, most appropriate setting for further diabetes education would be: Group class or Individual setting.    INTERVENTION:  Patient taking basaglar 15-0-0-15, he is not sure why he is taking 15 units bid.  He is responding well to the Trulicity and glucose is trending down, recommend increase Trulicity to 1.5mg weekly.  If he continues to respond well to " the Trulicity he may experience hypoglycemia, recommend decrease to the Basaglar to 12-0-0-12 to prevent hypoglycemia.     Education provided today on:  AADE Self-Care Behaviors:  Healthy Eating: consistency in amount, composition, and timing of food intake  Being Active: relationship to blood glucose, describe appropriate activity program and precautions to take  Monitoring: log and interpret results and individual blood glucose targets  Taking Medication: action of prescribed medication, side effects of prescribed medications, when to take medications and dosing  Problem Solving: low blood glucose - causes, signs/symptoms, treatment and prevention and carrying a carbohydrate source at all times  Reducing Risks: A1C - goals, relating to blood glucose levels, how often to check    Opportunities for ongoing education and support in diabetes-self management were discussed.    Pt verbalized understanding of concepts discussed and recommendations provided today.       Education Materials Provided:  BG Log Sheet and One Touch Verio Flex meter kit    PLAN:  See Patient Instructions for co-developed, patient-stated behavior change goals.  AVS printed and provided to patient today.    FOLLOW-UP:  Chart routed to referring provider.    Ongoing plan for education and support: Follow-up visit with diabetes educator in 1 month    Natalie Harding RN,CDE   Time Spent: 45 minutes  Encounter Type: Individual    Any diabetes medication dose changes were made via the CDE Protocol and Collaborative Practice Agreement with the patient's referring provider. A copy of this encounter was shared with the provider.

## 2018-03-19 NOTE — MR AVS SNAPSHOT
After Visit Summary   3/19/2018    Gerardo Gonzalez    MRN: [id number]           Patient Information     Date Of Birth          1955        Visit Information        Provider Department      3/19/2018 10:30 AM Natalie Harding RN [facility] [place]        Care Instructions      Care Plan:  Meal Plan Recommendation: eat 3 meals a day, have small snacks between meals, if needed, use portion control and choose heart healthy foods  Exercise / activity plan: 30 minutes 5 times a week   Check blood sugars- 2 times a day before meals and 2 hours after the start of meals (rotate between breakfast, lunch, supper) and if you have symptoms of low blood sugars  Recommend decrease to insulin - 12 units twice a day  Recommend increase to Trulicity 1.5mg dose once weekly    Follow up:  Follow-up diabetes education appointment scheduled on 4/27/18 at 10:30am.      Bring blood glucose meter and logbook with you to all doctor and follow-up appointments.     [facility] for the [facility] Area:  For Your Diabetes or Nutrition Education Appointments Call:  [phone]   For Diabetes or Nutrition Related Questions Call or Email:   [phone]  [email or web address]  Fax: [phone]   If you need a medication refill please contact your pharmacy. Please allow 3 business days for your refills to be completed.     Instructions for emailing the Diabetes Educators    If you need to communicate a non-urgent message to a Diabetes Educator via email, please send to [email or web address].    Please follow the following email guidelines:    Subject line: Secure: your clinic name (example: Secure: [facility])  In the email please include: First name, middle initial, last name and date of birth.    We will be in touch with you within one (1) business day.            Follow-ups after your visit        Your next 10 appointments already scheduled     Apr 27, 2018 10:30  AM CDT   Diabetic Education with Natalie Harding RN   [facility] [place] ([facility])    [address]  [facility] [address] [phone]              Who to contact     If you have questions or need follow up information about today's clinic visit or your schedule please contact [facility] directly at [phone].  Normal or non-critical lab and imaging results will be communicated to you by [employer], letter or phone within 4 business days after the clinic has received the results. If you do not hear from us within 7 days, please contact the clinic through [employer] or phone. If you have a critical or abnormal lab result, we will notify you by phone as soon as possible.  Submit refill requests through [employer] or call your pharmacy and they will forward the refill request to us. Please allow 3 business days for your refill to be completed.          Additional Information About Your Visit        [id number] Information     [employer] gives you secure access to your electronic health record. If you see a primary care provider, you can also send messages to your care team and make appointments. If you have questions, please call your primary care clinic.  If you do not have a primary care provider, please call [phone] and they will assist you.        Care EveryWhere ID     This is your Care EveryWhere ID. This could be used by other organizations to access your [facility] medical records  [id number]         Blood Pressure from Last 3 Encounters:   02/13/18 128/73   02/08/18 128/81   06/30/17 119/76    Weight from Last 3 Encounters:   02/13/18 82.1 kg (181 lb)   02/08/18 83 kg (183 lb)   06/30/17 80.3 kg (177 lb)              Today, you had the following     No orders found for display       Primary Care Provider Office Phone # Fax #    Raisa Calvillo PA-C [phone] [id number]  [facility] [address] [address]  Access to Services     [facility]: Hadii aad ku hadlarisagwyn Joseali, waaxda luqadaha, qaybta kaalmaclaribel kimlbe. So [facility] [phone].    ATENCIÓN: Si habla español, tiene a khan disposición servicios gratuitos de asistencia lingüística. Llame al [phone].    We comply with applicable federal civil rights laws and [place] laws. We do not discriminate on the basis of race, color, national origin, age, disability, sex, sexual orientation, or gender identity.            Thank you!     Thank you for choosing [facility]  for your care. Our goal is always to provide you with excellent care. Hearing back from our patients is one way we can continue to improve our services. Please take a few minutes to complete the written survey that you may receive in the mail after your visit with us. Thank you!             Your Updated Medication List - Protect others around you: Learn how to safely use, store and throw away your medicines at www.disposemymeds.org.          This list is accurate as of 3/19/18 11:13 AM.  Always use your most recent med list.                   Brand Name Dispense Instructions for use Diagnosis    aspirin 81 MG tablet     100    1 tab po QD (Once per day)    Type II or unspecified type diabetes mellitus without mention of complication, not stated as uncontrolled       BASAGLAR 100 UNIT/ML injection     15 mL    Inject 12 Units Subcutaneous 2 times daily    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)       blood glucose monitoring lancets     1 Box    1 each 3 times daily    Type 2 diabetes, HbA1C goal < 8% (H)       BLOOD GLUCOSE TEST STRIPS STRP     100 Strip    PER PATIENT HEALTH PLAN    Type II or unspecified type diabetes mellitus without mention of complication, not stated as uncontrolled       BLOOD GLUCOSE TEST STRIPS Strp     100 strip    1 Device by In Vitro route 3 times daily    Type 2 diabetes, HbA1C  goal < 8% (H)       busPIRone 15 MG tablet    BUSPAR    270 tablet    Take 1 tab in the morning and 2 tabs at bedtimes    Anxiety       clobetasol 0.05 % ointment    TEMOVATE    45 g    Apply sparingly to affected area twice daily as needed.  Do not apply to face.    Psoriasis       diazepam 10 MG tablet    VALIUM    180 tablet    TAKE 1 TABLET BY MOUTH EVERY 12 HOURS AS NEEDED FOR ANXIETY OR SLEEP.    Anxiety       dulaglutide 0.75 MG/0.5ML pen    TRULICITY    2 mL    Inject 0.75 mg Subcutaneous every 7 days    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)       FLUoxetine 40 MG capsule    PROzac    180 capsule    Take 2 capsules (80 mg) by mouth daily    Anxiety       * insulin pen needle 31G X 8 MM     100 each    1 Device daily Pharmacist may dispense brand and/or needle size per patient's preference and health care plan.    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)       * insulin pen needle 31G X 8 MM    B-D U/F    100 each    Inject 1 each Subcutaneous 2 times daily    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)       losartan 50 MG tablet    COZAAR    90 tablet    TAKE 1 TABLET (50 MG) BY MOUTH DAILY    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)       metFORMIN 500 MG 24 hr tablet    GLUCOPHAGE-XR    180 tablet    Take 2 tablets (1,000 mg) by mouth daily (with dinner)    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)       mirtazapine 30 MG tablet    REMERON    90 tablet    TAKE 1 TABLET (30 MG) BY MOUTH AT BEDTIME    Other insomnia       simvastatin 80 MG tablet    ZOCOR    90 tablet    Take 0.5 tablets (40 mg) by mouth At Bedtime    Hyperlipidemia LDL goal <100       zolpidem 10 MG tablet    AMBIEN    90 tablet    TAKE 1 TABLET BY MOUTH AS NEEDED FOR SLEEP    Persistent insomnia       * Notice:  This list has 2 medication(s) that are the same as other medications prescribed for you. Read the directions carefully, and ask your doctor  or other care provider to review them with you.

## 2018-03-19 NOTE — LETTER
3/19/2018         RE: Gerardo Gonzalez  [address]  [place] [address]        Dear Colleague,    Thank you for referring your patient, Gerardo Gonzalez, to the [facility] [place]. Please see a copy of my visit note below.    Diabetes Self Management Training: Follow-up Visit    Gerardo Gonzalez presents today for education and evaluation of glucose control related to Type 2 diabetes.    He is accompanied by self    Patient's diabetes management related comments/concerns: doing well on the Trulicity, blood sugars are now in the 100 range and feeling really good.    Patient would like this visit to be focused around the following diabetes-related behaviors and goals: Monitoring and Taking Medication    ASSESSMENT:  Patient Problem List reviewed for relevant medical history and current medical status.    Current Diabetes Management per Patient:  Taking diabetes medications?   yes:     Diabetes Medication(s)     Biguanides Sig    metFORMIN (GLUCOPHAGE-XR) 500 MG 24 hr tablet Take 2 tablets (1,000 mg) by mouth daily (with dinner)    Insulin Sig    BASAGLAR 100 UNIT/ML injection Inject 12 Units Subcutaneous 2 times daily    Incretin Mimetic Agents (GLP-1 Receptor Agonists) Sig    dulaglutide (TRULICITY) 0.75 MG/0.5ML pen Inject 0.75 mg Subcutaneous every 7 days        Patient glucose self monitoring as follows: two times daily.   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 yes  Symptoms of low blood sugar? none    Vitals:  There were no vitals taken for this visit.  Estimated body mass index is 28.35 kg/(m^2) as calculated from the following:    Height as of 2/8/18: 1.702 m (5' 7\").    Weight as of 2/13/18: 82.1 kg (181 lb).   Last 3 BP:   BP Readings from Last 3 Encounters:   02/13/18 128/73   02/08/18 128/81   06/30/17 119/76       History   Smoking Status     Never Smoker   Smokeless Tobacco     Never Used     Comment: quit in 1978       Labs:  Lab Results   Component Value Date    A1C 10.8 02/08/2018     Lab Results   Component Value Date     02/13/2018     Lab Results   Component Value Date    LDL 70 06/30/2017     HDL Cholesterol   Date Value Ref Range Status   06/30/2017 44 >39 mg/dL Final   ]  GFR Estimate   Date Value Ref Range Status   02/08/2018 >90 >60 mL/min/1.7m2 Final     Comment:     Non  GFR Calc     GFR Estimate If Black   Date Value Ref Range Status   02/08/2018 >90 >60 mL/min/1.7m2 Final     Comment:      GFR Calc     Lab Results   Component Value Date    CR 0.83 02/08/2018     No results found for: MICROALBUMIN    Health Beliefs and Attitudes:   Patient Activation Measure Survey Score:  No flowsheet data found.    Diabetes knowledge and skills assessment:     Patient is knowledgeable in diabetes management concepts related to: Healthy Eating-portion control, SBG monitoring, and consistently taking medicatoin    Patient needs further education on the following diabetes management concepts: Healthy Eating, Being Active, Monitoring, Taking Medication and Problem Solving    Barriers to Learning Assessment: No Barriers identified    Based on learning assessment above, most appropriate setting for further diabetes education would be: Group class or Individual " setting.    INTERVENTION:  Patient taking basaglar 15-0-0-15, he is not sure why he is taking 15 units bid.  He is responding well to the Trulicity and glucose is trending down, recommend increase Trulicity to 1.5mg weekly.  If he continues to respond well to the Trulicity he may experience hypoglycemia, recommend decrease to the Basaglar to 12-0-0-12 to prevent hypoglycemia.     Education provided today on:  AADE Self-Care Behaviors:  Healthy Eating: consistency in amount, composition, and timing of food intake  Being Active: relationship to blood glucose, describe appropriate activity program and precautions to take  Monitoring: log and interpret results and individual blood glucose targets  Taking Medication: action of prescribed medication, side effects of prescribed medications, when to take medications and dosing  Problem Solving: low blood glucose - causes, signs/symptoms, treatment and prevention and carrying a carbohydrate source at all times  Reducing Risks: A1C - goals, relating to blood glucose levels, how often to check    Opportunities for ongoing education and support in diabetes-self management were discussed.    Pt verbalized understanding of concepts discussed and recommendations provided today.       Education Materials Provided:  BG Log Sheet and One Touch Verio Flex meter kit    PLAN:  See Patient Instructions for co-developed, patient-stated behavior change goals.  AVS printed and provided to patient today.    FOLLOW-UP:  Chart routed to referring provider.    Ongoing plan for education and support: Follow-up visit with diabetes educator in 1 month    Natalie Harding RN,CDE   Time Spent: 45 minutes  Encounter Type: Individual    Any diabetes medication dose changes were made via the CDE Protocol and Collaborative Practice Agreement with the patient's referring provider. A copy of this encounter was shared with the provider.

## 2018-03-19 NOTE — TELEPHONE ENCOUNTER
ErikaGerardo adler is doing well on the Trulicity, his glucose is not consistently under 200 and having no side effects. Recommend he increase to the 1.5mg dose.  If you agree please send script to pharmacy.  Natalie Harding RN,CDE

## 2018-03-19 NOTE — PATIENT INSTRUCTIONS
Care Plan:  Meal Plan Recommendation: eat 3 meals a day, have small snacks between meals, if needed, use portion control and choose heart healthy foods  Exercise / activity plan: 30 minutes 5 times a week   Check blood sugars- 2 times a day before meals and 2 hours after the start of meals (rotate between breakfast, lunch, supper) and if you have symptoms of low blood sugars  Recommend decrease to insulin - 12 units twice a day  Recommend increase to Trulicity 1.5mg dose once weekly    Follow up:  Follow-up diabetes education appointment scheduled on 4/27/18 at 10:30am.      Bring blood glucose meter and logbook with you to all doctor and follow-up appointments.     [facility] for the [facility] Area:  For Your Diabetes or Nutrition Education Appointments Call:  [phone]   For Diabetes or Nutrition Related Questions Call or Email:   [phone]  [email or web address]  Fax: [phone]   If you need a medication refill please contact your pharmacy. Please allow 3 business days for your refills to be completed.     Instructions for emailing the Diabetes Educators    If you need to communicate a non-urgent message to a Diabetes Educator via email, please send to [email or web address].    Please follow the following email guidelines:    Subject line: Secure: your clinic name (example: Secure: [facility])  In the email please include: First name, middle initial, last name and date of birth.    We will be in touch with you within one (1) business day.

## 2018-03-23 NOTE — TELEPHONE ENCOUNTER
Rx for Trulicity 1.5 mg weekly sent to his pharmacy.  Glad he's doing well on it!  Erika Trammell NP  Endocrinology

## 2018-05-09 ENCOUNTER — TELEPHONE (OUTPATIENT)
Dept: FAMILY MEDICINE | Facility: CLINIC | Age: 63
End: 2018-05-09

## 2018-05-09 DIAGNOSIS — G47.00 PERSISTENT INSOMNIA: ICD-10-CM

## 2018-05-09 DIAGNOSIS — F41.9 ANXIETY: ICD-10-CM

## 2018-05-09 NOTE — TELEPHONE ENCOUNTER
Note from pharmacy: Pt requests new Rx. Has been taking more than one tablet at night so he now only has one left.  We will need a new Rx with increased dose.   I put the note on Raisa's desk.  Constantine Sexton RN

## 2018-05-09 NOTE — TELEPHONE ENCOUNTER
Pending Prescriptions:                       Disp   Refills    zolpidem (AMBIEN) 10 MG tablet            90 tab*1            Sig: TAKE 1 TABLET BY MOUTH AS NEEDED FOR SLEEP    Rx given to pt at [facility] with [facility] 2/18/18 #90 + 1.    LMTCB  Do you have this prescription?   Constantine Sexton, RN

## 2018-05-10 RX ORDER — ZOLPIDEM TARTRATE 10 MG/1
TABLET ORAL
Qty: 90 TABLET | Refills: 1 | Status: SHIPPED | OUTPATIENT
Start: 2018-05-10 | End: 2018-10-09

## 2018-05-10 NOTE — TELEPHONE ENCOUNTER
LM to pt to call back, needs message below, absolutely no early fill, to take 1/d only, needs to see Raisa if the 10 mg is not effective to discuss other options    Call out to pharmacy, message given, rx faxed to pharmacy, they will dispense early since there is not a change in rx    Nishi Yang RN, BS  Clinical Nurse Triage.

## 2018-05-10 NOTE — TELEPHONE ENCOUNTER
Please call pharmacy.  2 tablets of Ambien 10 mg is NOT recommended.    Rx will be sent for 1 tab daily. In my outbox.     Please call pt as well.  Taking 2 tabs of Ambien 10 mg is VERY dangerous.     If you are having to take 2 tabs to get sleep, this medication is NO longer working.     You need to f/u in office to discuss further options.    Raisa Calvillo PA-C

## 2018-05-14 DIAGNOSIS — F41.9 ANXIETY: ICD-10-CM

## 2018-05-14 NOTE — TELEPHONE ENCOUNTER
Controlled Substance Refill Request for diazepam (VALIUM) 10 MG tablet  Problem List Complete:  Yes    Last Written Prescription Date:  2/8/2018  Last Fill Quantity: 180 tablet,   # refills: 0    [place] [facility] Visit with [facility] [place]: 2/8/2018, Rajinder    Patient is followed by MADELYN NORTH for ongoing prescription of benzodiazepines.  All refills should be approved by this provider, or covering partner.    Medication(s): diazepam (VALIUM) 10 MG tablet.   Maximum quantity per month: 60  Clinic visit frequency required: Q 6  months     Controlled substance agreement on file: No  Benzodiazepine use reviewed by psychiatry:  No      Clinic visit frequency required: Q 6  months     Future Office visit:     Controlled substance agreement on file: No.     Processing:  Staff will hand deliver Rx to on-site pharmacy    Last [facility] website verification:  5.10.18   [email or web address]     checked in past 6 months?  Yes 5/10/2018

## 2018-05-15 RX ORDER — DIAZEPAM 10 MG
TABLET ORAL
Qty: 180 TABLET | Refills: 0 | Status: SHIPPED | OUTPATIENT
Start: 2018-05-15 | End: 2018-08-23

## 2018-05-29 NOTE — TELEPHONE ENCOUNTER
"Last Written Prescription Date:  2/22/18  Last Fill Quantity: 15 mL,  # refills: 0   Last office visit: 2/8/2018 with prescribing provider:  Rajinder   Future Office Visit:      Requested Prescriptions   Pending Prescriptions Disp Refills     BASAGLAR 100 UNIT/ML injection [Pharmacy Med Name: BASAGLAR 100 UNIT/ML KWIKPEN]  0     Sig: INJECT 24-26UNITS SUBCUTANEOUS AT BEDTIME    Long Acting Insulin Protocol Failed    5/27/2018  5:28 PM       Failed - HgbA1C in past 3 or 6 months    If HgbA1C is 8 or greater, it needs to be on file within the past 3 months.  If less than 8, must be on file within the past 6 months.     Recent Labs   Lab Test  02/08/18   1111   A1C  10.8*            Passed - Blood pressure less than 140/90 in past 6 months    BP Readings from Last 3 Encounters:   02/13/18 128/73   02/08/18 128/81   06/30/17 119/76                Passed - LDL on file in past 12 months    Recent Labs   Lab Test  06/30/17   1005   LDL  70            Passed - Microalbumin on file in past 12 months    Recent Labs   Lab Test  06/30/17   1005   MICROL  80   UMALCR  39.02*            Passed - Serum creatinine on file in past 12 months    Recent Labs   Lab Test  02/08/18   1111   CR  0.83            Passed - Patient is age 18 or older       Passed - Recent (6 mo) or future (30 days) visit within the authorizing provider's specialty    Patient had office visit in the last 6 months or has a visit in the next 30 days with authorizing provider or within the authorizing provider's specialty.  See \"Patient Info\" tab in inbasket, or \"Choose Columns\" in Meds & Orders section of the refill encounter.              "

## 2018-05-29 NOTE — TELEPHONE ENCOUNTER
Is scheduled for diabetes ed 6/4/18  Pharmacy is request 15 pens = 45 mL - OK?  Constantine Sexton, RN

## 2018-05-30 RX ORDER — INSULIN GLARGINE 100 [IU]/ML
INJECTION, SOLUTION SUBCUTANEOUS
Qty: 45 ML | Refills: 0 | Status: SHIPPED | OUTPATIENT
Start: 2018-05-30 | End: 2018-10-04

## 2018-07-16 RX ORDER — LOSARTAN POTASSIUM 50 MG/1
TABLET ORAL
Qty: 90 TABLET | Refills: 1 | Status: SHIPPED | OUTPATIENT
Start: 2018-07-16 | End: 2019-01-25

## 2018-07-16 NOTE — TELEPHONE ENCOUNTER
"Requested Prescriptions   Pending Prescriptions Disp Refills     losartan (COZAAR) 50 MG tablet [Pharmacy Med Name: LOSARTAN POTASSIUM 50 MG TAB]  Last Written Prescription Date:  2/8/2018  Last Fill Quantity: 90 tablet,  # refills: 0   Last office visit: 2/8/2018 with prescribing provider:  Rajinder        90 tablet 0     Sig: TAKE 1 TABLET (50 MG) BY MOUTH DAILY    Angiotensin-II Receptors Passed    7/14/2018  5:12 PM       Passed - Blood pressure under 140/90 in past 12 months    BP Readings from Last 3 Encounters:   02/13/18 128/73   02/08/18 128/81   06/30/17 119/76                Passed - Recent (12 mo) or future (30 days) visit within the authorizing provider's specialty    Patient had office visit in the last 12 months or has a visit in the next 30 days with authorizing provider or within the authorizing provider's specialty.  See \"Patient Info\" tab in inbasket, or \"Choose Columns\" in Meds & Orders section of the refill encounter.           Passed - Patient is age 18 or older       Passed - Normal serum creatinine on file in past 12 months    Recent Labs   Lab Test  02/08/18   1111   CR  0.83            Passed - Normal serum potassium on file in past 12 months    Recent Labs   Lab Test  02/08/18   1111   POTASSIUM  4.3                      "

## 2018-07-20 ENCOUNTER — ALLIED HEALTH/NURSE VISIT (OUTPATIENT)
Dept: NURSING | Facility: CLINIC | Age: 63
End: 2018-07-20
Payer: COMMERCIAL

## 2018-07-20 ENCOUNTER — TELEPHONE (OUTPATIENT)
Dept: FAMILY MEDICINE | Facility: CLINIC | Age: 63
End: 2018-07-20

## 2018-07-20 VITALS
TEMPERATURE: 98 F | OXYGEN SATURATION: 96 % | SYSTOLIC BLOOD PRESSURE: 128 MMHG | DIASTOLIC BLOOD PRESSURE: 74 MMHG | HEART RATE: 93 BPM

## 2018-07-20 DIAGNOSIS — L03.90 CELLULITIS: Primary | ICD-10-CM

## 2018-07-20 PROCEDURE — 99207 ZZC NO CHARGE NURSE ONLY: CPT

## 2018-07-20 NOTE — MR AVS SNAPSHOT
After Visit Summary   7/20/2018    Gerardo Gonzalez    MRN: [id number]           Patient Information     Date Of Birth          1955        Visit Information        Provider Department      7/20/2018 2:30 PM  NURSE [facility]        Today's Diagnoses     Cellulitis    -  1       Follow-ups after your visit        Your next 10 appointments already scheduled     Jul 24, 2018 11:30 AM CDT   Office Visit with Raisa Calvillo PA-C   [facility]    [address] [place] [id number] [phone]           Bring a current list of meds and any records pertaining to this visit. For Physicals, please bring immunization records and any forms needing to be filled out. Please arrive 10 minutes early to complete paperwork.              Who to contact     If you have questions or need follow up information about today's clinic visit or your schedule please contact [facility] directly at [phone].  Normal or non-critical lab and imaging results will be communicated to you by APRhart, letter or phone within 4 business days after the clinic has received the results. If you do not hear from us within 7 days, please contact the clinic through [employer] or phone. If you have a critical or abnormal lab result, we will notify you by phone as soon as possible.  Submit refill requests through inSparq or call your pharmacy and they will forward the refill request to us. Please allow 3 business days for your refill to be completed.          Additional Information About Your Visit        MyChart Information     [employer] gives you secure access to your electronic health record. If you see a primary care provider, you can also send messages to your care team and make appointments. If you have questions, please call your primary care clinic.  If you do not have a primary care provider, please call [phone] and they will  assist you.        Care EveryWhere ID     This is your Care EveryWhere ID. This could be used by other organizations to access your [place] medical records  [id number]        Your Vitals Were     Pulse Temperature Pulse Oximetry             93 98  F (36.7  C) (Oral) 96%          Blood Pressure from Last 3 Encounters:   07/20/18 128/74   02/13/18 128/73   02/08/18 128/81    Weight from Last 3 Encounters:   02/13/18 181 lb (82.1 kg)   02/08/18 183 lb (83 kg)   06/30/17 177 lb (80.3 kg)              Today, you had the following     No orders found for display       Primary Care Provider Office Phone # Fax #    Raisa Calvillo PA-C [phone] [address] [facility] [address]        Equal Access to Services     CRISTY GUTIERREZ : Hadii aad ku hadasho Sosuzy, waaxda luqadaha, qaybta kaalmada adeyokoyagreg, claribel temple . So [facility] [phone].    ATENCIÓN: Si habla español, tiene a khan disposición servicios gratuitos de asistencia lingüística. Nuria al [phone].    We comply with applicable federal civil rights laws and [place] laws. We do not discriminate on the basis of race, color, national origin, age, disability, sex, sexual orientation, or gender identity.            Thank you!     Thank you for choosing [facility]  for your care. Our goal is always to provide you with excellent care. Hearing back from our patients is one way we can continue to improve our services. Please take a few minutes to complete the written survey that you may receive in the mail after your visit with us. Thank you!             Your Updated Medication List - Protect others around you: Learn how to safely use, store and throw away your medicines at www.disposemymeds.org.          This list is accurate as of 7/20/18  3:10 PM.  Always use your most recent med list.                   Brand Name Dispense Instructions for use Diagnosis    aspirin 81 MG tablet     100    1  tab po QD (Once per day)    Type II or unspecified type diabetes mellitus without mention of complication, not stated as uncontrolled       * BASAGLAR 100 UNIT/ML injection     15 mL    Inject 12 Units Subcutaneous 2 times daily    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)       * BASAGLAR 100 UNIT/ML injection     45 mL    INJECT 24-26UNITS SUBCUTANEOUS AT BEDTIME    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)       * blood glucose monitoring lancets     1 Box    1 each 3 times daily    Type 2 diabetes, HbA1C goal < 8% (H)       * blood glucose monitoring lancets     100 each    Use to test blood sugars 2 times daily or as directed.(delica)    Type 2 diabetes mellitus without complication, with long-term current use of insulin (H)       BLOOD GLUCOSE TEST STRIPS STRP     100 Strip    PER PATIENT HEALTH PLAN    Type II or unspecified type diabetes mellitus without mention of complication, not stated as uncontrolled       * BLOOD GLUCOSE TEST STRIPS Strp     100 strip    1 Device by In Vitro route 3 times daily    Type 2 diabetes, HbA1C goal < 8% (H)       * blood glucose monitoring test strip    ONETOUCH VERIO IQ    100 strip    Use to test blood sugars 2 times daily or as directed.(Verio)    Type 2 diabetes mellitus without complication, with long-term current use of insulin (H)       busPIRone 15 MG tablet    BUSPAR    270 tablet    Take 1 tab in the morning and 2 tabs at bedtimes    Anxiety       clobetasol 0.05 % ointment    TEMOVATE    45 g    Apply sparingly to affected area twice daily as needed.  Do not apply to face.    Psoriasis       diazepam 10 MG tablet    VALIUM    180 tablet    TAKE 1 TABLET BY MOUTH EVERY 12 HOURS AS NEEDED FOR ANXIETY.    Anxiety       * dulaglutide 0.75 MG/0.5ML pen    TRULICITY    2 mL    Inject 0.75 mg Subcutaneous every 7 days    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)       * dulaglutide 1.5 MG/0.5ML pen    TRULICITY    6 mL     Inject 1.5 mg Subcutaneous every 7 days    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)       FLUoxetine 40 MG capsule    PROzac    180 capsule    Take 2 capsules (80 mg) by mouth daily    Anxiety       * insulin pen needle 31G X 8 MM     100 each    1 Device daily Pharmacist may dispense brand and/or needle size per patient's preference and health care plan.    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)       * insulin pen needle 31G X 8 MM    B-D U/F    100 each    Inject 1 each Subcutaneous 2 times daily    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)       losartan 50 MG tablet    COZAAR    90 tablet    TAKE 1 TABLET (50 MG) BY MOUTH DAILY    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)       metFORMIN 500 MG 24 hr tablet    GLUCOPHAGE-XR    180 tablet    Take 2 tablets (1,000 mg) by mouth daily (with dinner)    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)       mirtazapine 30 MG tablet    REMERON    90 tablet    TAKE 1 TABLET (30 MG) BY MOUTH AT BEDTIME    Other insomnia       simvastatin 80 MG tablet    ZOCOR    90 tablet    Take 0.5 tablets (40 mg) by mouth At Bedtime    Hyperlipidemia LDL goal <100       zolpidem 10 MG tablet    AMBIEN    90 tablet    TAKE 1 TABLET BY MOUTH AS NEEDED FOR SLEEP    Persistent insomnia       * Notice:  This list has 10 medication(s) that are the same as other medications prescribed for you. Read the directions carefully, and ask your doctor or other care provider to review them with you.

## 2018-07-20 NOTE — TELEPHONE ENCOUNTER
Gerardo calls, went to [facility] instead of [facility] today for insect bites. [facility] provider advised ER. Does Raisa agree?   Pt informed per Raisa, yes agrees, go to ER.   Message handled by Nurse Triage with [facility] - provider name: Raisa Calvillo PA-C.  Constantine Sexton RN

## 2018-07-20 NOTE — NURSING NOTE
"Patient states he was doing yard work 5 days ago and the next day he noted an area on his left forearm was itchy, red and swollen. Patient has been putting on \"campophynique\" and a triple antibiotic. When he starts scratching them, another part of his body will start to get itchy. Also of note, he has red and inflamed berna around both eyes and forehead, and a few small hives on right arm. Vitals stable.     Left arm appears red, inflamed, warm and painful to touch. There is some drainage. Two to 3 raised areas noted within, possibly bug bites or blisters.    Advise he be seen by one of our providers.    Patient states he gets shaky like this when his sugars are low. He did not test this morning, but last night he was running at 90. Taking medications/insulin as prescribed. Did not have breakfast this morning.     No problems with swallowing or breathing - just itchiness. Can't even wear his contacts.    Huddled with Angelika Mahmood. Patient's sugars are already running high, he is shakey. Worried about possible abscess or bone issues. Patient may need more than oral antibiotics, possibly IV abx and fluids. She advised he be seen in ED. Notified patient of this along with Angelika. Patient stated understanding and is in agreement with plan.     Patient states he is going to wait at home, have something to eat, and whenl his wife gets home then he will have her take him to the ER.     Rosalinda ALONSO, Triage RN    "

## 2018-07-20 NOTE — TELEPHONE ENCOUNTER
PT calls, I was working in yard last week and going on about 7-8 days , I can tell 2 big bug bites but I am a type 2 diabetic   But I am miserable , I want to scratch so bad but other parts of my body want to be scratched too.    He thinks these wounds appear infected. Agrees to go to  today.   Message handled by Nurse Triage./brady PCP  Constantine Sexton RN

## 2018-07-24 ENCOUNTER — OFFICE VISIT (OUTPATIENT)
Dept: FAMILY MEDICINE | Facility: CLINIC | Age: 63
End: 2018-07-24
Payer: COMMERCIAL

## 2018-07-24 VITALS
BODY MASS INDEX: 27.72 KG/M2 | DIASTOLIC BLOOD PRESSURE: 67 MMHG | WEIGHT: 177 LBS | HEART RATE: 111 BPM | TEMPERATURE: 98.1 F | RESPIRATION RATE: 16 BRPM | SYSTOLIC BLOOD PRESSURE: 99 MMHG

## 2018-07-24 DIAGNOSIS — F41.9 ANXIETY: ICD-10-CM

## 2018-07-24 DIAGNOSIS — L03.114 CELLULITIS OF LEFT UPPER EXTREMITY: ICD-10-CM

## 2018-07-24 LAB
CHOLEST SERPL-MCNC: 118 MG/DL
CREAT UR-MCNC: 137 MG/DL
HBA1C MFR BLD: 8.6 % (ref 0–5.6)
HDLC SERPL-MCNC: 43 MG/DL
LDLC SERPL CALC-MCNC: 46 MG/DL
MICROALBUMIN UR-MCNC: 77 MG/L
MICROALBUMIN/CREAT UR: 56.06 MG/G CR (ref 0–17)
NONHDLC SERPL-MCNC: 75 MG/DL
TRIGL SERPL-MCNC: 147 MG/DL
TSH SERPL DL<=0.005 MIU/L-ACNC: 0.93 MU/L (ref 0.4–4)

## 2018-07-24 PROCEDURE — 80061 LIPID PANEL: CPT | Performed by: PHYSICIAN ASSISTANT

## 2018-07-24 PROCEDURE — 84443 ASSAY THYROID STIM HORMONE: CPT | Performed by: PHYSICIAN ASSISTANT

## 2018-07-24 PROCEDURE — 96372 THER/PROPH/DIAG INJ SC/IM: CPT | Performed by: PHYSICIAN ASSISTANT

## 2018-07-24 PROCEDURE — 83036 HEMOGLOBIN GLYCOSYLATED A1C: CPT | Performed by: PHYSICIAN ASSISTANT

## 2018-07-24 PROCEDURE — 99214 OFFICE O/P EST MOD 30 MIN: CPT | Mod: 25 | Performed by: PHYSICIAN ASSISTANT

## 2018-07-24 PROCEDURE — 82043 UR ALBUMIN QUANTITATIVE: CPT | Performed by: PHYSICIAN ASSISTANT

## 2018-07-24 PROCEDURE — 36415 COLL VENOUS BLD VENIPUNCTURE: CPT | Performed by: PHYSICIAN ASSISTANT

## 2018-07-24 RX ORDER — BUSPIRONE HYDROCHLORIDE 15 MG/1
30 TABLET ORAL 2 TIMES DAILY
Qty: 360 TABLET | Refills: 1 | Status: SHIPPED | OUTPATIENT
Start: 2018-07-24 | End: 2018-12-18

## 2018-07-24 RX ORDER — FLUOXETINE 40 MG/1
80 CAPSULE ORAL DAILY
Qty: 180 CAPSULE | Refills: 1 | Status: SHIPPED | OUTPATIENT
Start: 2018-07-24 | End: 2019-02-03

## 2018-07-24 RX ORDER — CEPHALEXIN 500 MG/1
500 CAPSULE ORAL 4 TIMES DAILY
Qty: 40 CAPSULE | Refills: 0 | Status: SHIPPED | OUTPATIENT
Start: 2018-07-24 | End: 2018-10-26

## 2018-07-24 RX ORDER — CEFTRIAXONE 1 G/1
1000 INJECTION, POWDER, FOR SOLUTION INTRAMUSCULAR; INTRAVENOUS ONCE
Qty: 10 ML | Refills: 0 | OUTPATIENT
Start: 2018-07-24 | End: 2018-07-24

## 2018-07-24 ASSESSMENT — ANXIETY QUESTIONNAIRES
1. FEELING NERVOUS, ANXIOUS, OR ON EDGE: MORE THAN HALF THE DAYS
6. BECOMING EASILY ANNOYED OR IRRITABLE: MORE THAN HALF THE DAYS
5. BEING SO RESTLESS THAT IT IS HARD TO SIT STILL: NEARLY EVERY DAY
3. WORRYING TOO MUCH ABOUT DIFFERENT THINGS: MORE THAN HALF THE DAYS
7. FEELING AFRAID AS IF SOMETHING AWFUL MIGHT HAPPEN: MORE THAN HALF THE DAYS
2. NOT BEING ABLE TO STOP OR CONTROL WORRYING: NEARLY EVERY DAY
GAD7 TOTAL SCORE: 17
IF YOU CHECKED OFF ANY PROBLEMS ON THIS QUESTIONNAIRE, HOW DIFFICULT HAVE THESE PROBLEMS MADE IT FOR YOU TO DO YOUR WORK, TAKE CARE OF THINGS AT HOME, OR GET ALONG WITH OTHER PEOPLE: SOMEWHAT DIFFICULT

## 2018-07-24 ASSESSMENT — PATIENT HEALTH QUESTIONNAIRE - PHQ9: 5. POOR APPETITE OR OVEREATING: NEARLY EVERY DAY

## 2018-07-24 NOTE — MR AVS SNAPSHOT
After Visit Summary   7/24/2018    Gerardo Gonzalez    MRN: [id number]           Patient Information     Date Of Birth          1955        Visit Information        Provider Department      7/24/2018 11:30 AM Raisa Calvillo PA-C [facility]        Today's Diagnoses     Screening for HIV (human immunodeficiency virus)    -  1       Follow-ups after your visit        Who to contact     If you have questions or need follow up information about today's clinic visit or your schedule please contact [facility] directly at [phone].  Normal or non-critical lab and imaging results will be communicated to you by [employer], letter or phone within 4 business days after the clinic has received the results. If you do not hear from us within 7 days, please contact the clinic through [employer] or phone. If you have a critical or abnormal lab result, we will notify you by phone as soon as possible.  Submit refill requests through [employer] or call your pharmacy and they will forward the refill request to us. Please allow 3 business days for your refill to be completed.          Additional Information About Your Visit        MyChart Information     [employer] gives you secure access to your electronic health record. If you see a primary care provider, you can also send messages to your care team and make appointments. If you have questions, please call your primary care clinic.  If you do not have a primary care provider, please call [phone] and they will assist you.        Care EveryWhere ID     This is your Care EveryWhere ID. This could be used by other organizations to access your [place] medical records  [id number]        Your Vitals Were     Pulse Temperature Respirations BMI (Body Mass Index)          111 98.1  F (36.7  C) (Oral) 16 27.72 kg/m2         Blood Pressure from Last 3 Encounters:   07/24/18 99/67   07/20/18 128/74   02/13/18 128/73    Weight from Last 3  Encounters:   07/24/18 177 lb (80.3 kg)   02/13/18 181 lb (82.1 kg)   02/08/18 183 lb (83 kg)              Today, you had the following     No orders found for display       Primary Care Provider Office Phone # Fax #    Raisa Calvillo PA-C [phone] [address]  [facility] [address]        Equal Access to Services     CRISTY GUTIERREZ : Hadii aad ku hadasho Soomaali, waaxda luqadaha, qaybta kaalmada adeegyada, waxay idiin hayaan adeeg khosielsh la'warrenn . So [facility] [phone].    ATENCIÓN: Si habla español, tiene a khan disposición servicios gratuitos de asistencia lingüística. Llame al [phone].    We comply with applicable federal civil rights laws and [place] laws. We do not discriminate on the basis of race, color, national origin, age, disability, sex, sexual orientation, or gender identity.            Thank you!     Thank you for choosing [facility]  for your care. Our goal is always to provide you with excellent care. Hearing back from our patients is one way we can continue to improve our services. Please take a few minutes to complete the written survey that you may receive in the mail after your visit with us. Thank you!             Your Updated Medication List - Protect others around you: Learn how to safely use, store and throw away your medicines at www.disposemymeds.org.          This list is accurate as of 7/24/18 11:52 AM.  Always use your most recent med list.                   Brand Name Dispense Instructions for use Diagnosis    aspirin 81 MG tablet     100    1 tab po QD (Once per day)    Type II or unspecified type diabetes mellitus without mention of complication, not stated as uncontrolled       * BASAGLAR 100 UNIT/ML injection     15 mL    Inject 12 Units Subcutaneous 2 times daily    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)       * BASAGLAR 100 UNIT/ML injection     45 mL    INJECT 24-26UNITS SUBCUTANEOUS AT BEDTIME     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)       * blood glucose monitoring lancets     1 Box    1 each 3 times daily    Type 2 diabetes, HbA1C goal < 8% (H)       * blood glucose monitoring lancets     100 each    Use to test blood sugars 2 times daily or as directed.(delica)    Type 2 diabetes mellitus without complication, with long-term current use of insulin (H)       BLOOD GLUCOSE TEST STRIPS STRP     100 Strip    PER PATIENT HEALTH PLAN    Type II or unspecified type diabetes mellitus without mention of complication, not stated as uncontrolled       * BLOOD GLUCOSE TEST STRIPS Strp     100 strip    1 Device by In Vitro route 3 times daily    Type 2 diabetes, HbA1C goal < 8% (H)       * blood glucose monitoring test strip    ONETOUCH VERIO IQ    100 strip    Use to test blood sugars 2 times daily or as directed.(Verio)    Type 2 diabetes mellitus without complication, with long-term current use of insulin (H)       busPIRone 15 MG tablet    BUSPAR    270 tablet    Take 1 tab in the morning and 2 tabs at bedtimes    Anxiety       clobetasol 0.05 % ointment    TEMOVATE    45 g    Apply sparingly to affected area twice daily as needed.  Do not apply to face.    Psoriasis       diazepam 10 MG tablet    VALIUM    180 tablet    TAKE 1 TABLET BY MOUTH EVERY 12 HOURS AS NEEDED FOR ANXIETY.    Anxiety       * dulaglutide 0.75 MG/0.5ML pen    TRULICITY    2 mL    Inject 0.75 mg Subcutaneous every 7 days    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)       * dulaglutide 1.5 MG/0.5ML pen    TRULICITY    6 mL    Inject 1.5 mg Subcutaneous every 7 days    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)       FLUoxetine 40 MG capsule    PROzac    180 capsule    Take 2 capsules (80 mg) by mouth daily    Anxiety       * insulin pen needle 31G X 8 MM     100 each    1 Device daily Pharmacist may dispense brand and/or needle size per patient's preference and health care plan.     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)       * insulin pen needle 31G X 8 MM    B-D U/F    100 each    Inject 1 each Subcutaneous 2 times daily    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)       losartan 50 MG tablet    COZAAR    90 tablet    TAKE 1 TABLET (50 MG) BY MOUTH DAILY    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)       metFORMIN 500 MG 24 hr tablet    GLUCOPHAGE-XR    180 tablet    Take 2 tablets (1,000 mg) by mouth daily (with dinner)    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)       mirtazapine 30 MG tablet    REMERON    90 tablet    TAKE 1 TABLET (30 MG) BY MOUTH AT BEDTIME    Other insomnia       simvastatin 80 MG tablet    ZOCOR    90 tablet    Take 0.5 tablets (40 mg) by mouth At Bedtime    Hyperlipidemia LDL goal <100       zolpidem 10 MG tablet    AMBIEN    90 tablet    TAKE 1 TABLET BY MOUTH AS NEEDED FOR SLEEP    Persistent insomnia       * Notice:  This list has 10 medication(s) that are the same as other medications prescribed for you. Read the directions carefully, and ask your doctor or other care provider to review them with you.

## 2018-07-24 NOTE — PROGRESS NOTES
"  SUBJECTIVE:   Gerardo Gonzalez is a 63 year old male who presents to clinic today for the following health issues:      Diabetes Follow-up    Patient is checking blood sugars: twice daily.  Results are as follows:              postprandial after breakfast - 130's              postprandial after supper- 180's on average    Diabetic concerns: None     Symptoms of hypoglycemia (low blood sugar): none     Paresthesias (numbness or burning in feet) or sores: No     Date of last diabetic eye exam: 1.5 months ago, [facility]      BP Readings from Last 2 Encounters:   07/24/18 99/67   07/20/18 128/74     Hemoglobin A1C (%)   Date Value   02/08/2018 10.8 (H)   06/30/2017 10.2 (H)     LDL Cholesterol Calculated (mg/dL)   Date Value   06/30/2017 70   07/14/2016 122 (H)       Diabetes Management Resources  Hyperlipidemia Follow-Up      Rate your low fat/cholesterol diet?: \"better\"    Taking statin?  Yes, no muscle aches from statin    Other lipid medications/supplements?:  none    Anxiety Follow-Up    Status since last visit: Improved since last visit, medication is helping    Other associated symptoms:None    Complicating factors:   Significant life event: Yes-  Lost 6 members of his family in the last 2 months   Current substance abuse: None  Depression symptoms: Yes-  Lack of motivation, little energy  NAOMI-7 SCORE 1/2/2012 6/30/2017   Total Score 16 -   Total Score - 15       NAOMI-7    Amount of exercise or physical activity: works in his yard 3-4 hours twice a week, walks for about 1 hour 3 X a week    Problems taking medications regularly: No    Medication side effects: none    Diet: regular (no restrictions) but watching what he eats, portion control      Rash, possible insect bites, possible cellulitis   Onset: 10 days    Description:   Location: Left arm, also having some skin peeling on forehead, thinks this may be related.  Character: flakey, painful, red  Itching (Pruritis): YES    Progression of Symptoms:  " intermittent    Accompanying Signs & Symptoms:  Fever: no   Body aches or joint pain: no   Sore throat symptoms: no   Recent cold symptoms: no     History:   Previous similar rash: no     Precipitating factors:   Exposure to similar rash: no   New exposures: None   Recent travel: no     Alleviating factors:  none    Therapies Tried and outcome: Aquaphor, triple anti biotic cream        Problem list and histories reviewed & adjusted, as indicated.  Additional history: as documented    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)     Elevated liver enzymes     Hyponatremia     Type 2 diabetes mellitus without complication, with long-term current use of insulin (H)     Psoriatic arthritis (H)     Past Surgical History:   Procedure Laterality Date     HC KNEE SCOPE, DIAGNOSTIC      Arthroscopy, Knee- Left       Social History   Substance Use Topics     Smoking status: Never Smoker     Smokeless tobacco: Never Used      Comment: quit in 1978     Alcohol use 3.6 oz/week     6 Cans of beer per week      Comment: glass of wine once a week with dinner     Family History   Problem Relation Age of Onset     Diabetes Mother      age 55     Diabetes Father      age 64     Family History Negative Sister      x3     Hypertension Brother            Reviewed and updated as needed this visit by clinical staff  Tobacco  Allergies  Meds  Med Hx  Surg Hx  Fam Hx  Soc Hx      Reviewed and updated as needed this visit by Provider         ROS:  Constitutional, HEENT, cardiovascular, pulmonary, GI, , musculoskeletal, neuro, skin, endocrine and psych systems are negative, except as otherwise noted.    OBJECTIVE:     BP 99/67 (BP Location: Right arm, Patient Position: Chair, Cuff Size: Adult Large)  Pulse 111  Temp 98.1  F (36.7  C) (Oral)   Resp 16  Wt 177 lb (80.3 kg)  BMI 27.72 kg/m2  Body mass index is 27.72 kg/(m^2).  GENERAL APPEARANCE: healthy, alert and no distress  RESP: lungs clear to auscultation - no rales, rhonchi or wheezes  CV: regular rates and rhythm, normal S1 S2, no S3 or S4 and no murmur, click or rub  ABDOMEN: soft, nontender, without hepatosplenomegaly or masses and bowel sounds normal  MS: extremities normal- no gross deformities noted  SKIN: left forearm with swelling, warmth and erythema  PSYCH: mentation appears normal and affect normal/bright        ASSESSMENT/PLAN:             1. 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)  F/u with ENdo, await A1c  - HEMOGLOBIN A1C  - Lipid panel reflex to direct LDL Fasting  - Albumin Random Urine Quantitative with Creat Ratio  - TSH WITH FREE T4 REFLEX    2. Cellulitis of left upper extremity  Close monitoring. If symptoms persist or worsen return to clinic, UC or ER. The patient indicates understanding of these issues and agrees with the plan.    - cefTRIAXone (ROCEPHIN) 1 GM vial; Inject 1 g (1,000 mg) into the muscle once for 1 dose  Dispense: 10 mL; Refill: 0  - cephALEXin (KEFLEX) 500 MG capsule; Take 1 capsule (500 mg) by mouth 4 times daily  Dispense: 40 capsule; Refill: 0  - CEFTRIAXONE NA INJ /250MG  - INJECTION INTRAMUSCULAR OR SUB-Q    3. Anxiety  Stable.   - FLUoxetine (PROZAC) 40 MG capsule; Take 2 capsules (80 mg) by mouth daily  Dispense: 180 capsule; Refill: 1  - busPIRone (BUSPAR) 15 MG tablet; Take 2 tablets (30 mg) by mouth 2 times daily  Dispense: 360 tablet; Refill: 1        Raisa Calvillo PA-C  [facility]

## 2018-07-25 ASSESSMENT — ANXIETY QUESTIONNAIRES: GAD7 TOTAL SCORE: 17

## 2018-08-01 ENCOUNTER — TELEPHONE (OUTPATIENT)
Dept: FAMILY MEDICINE | Facility: CLINIC | Age: 63
End: 2018-08-01

## 2018-08-01 DIAGNOSIS — G47.00 PERSISTENT INSOMNIA: ICD-10-CM

## 2018-08-01 RX ORDER — ZOLPIDEM TARTRATE 10 MG/1
TABLET ORAL
Qty: 90 TABLET | Refills: 1 | Status: CANCELLED | OUTPATIENT
Start: 2018-08-01

## 2018-08-01 NOTE — TELEPHONE ENCOUNTER
Fax received from [facility]    Zolpidem dose has increased to 2 tablets at bedtime. The are requesting a new script.     Last Refill on file:    5/10/2018, #90 with 1 refill. Patient should have refill available.    [place] reviewed:  reviewed- no concerns    [place]  [email or web address]    Last Filled:   5/27/2018, #90      PER 5/9/2018 Refill Request notes from [facility]:     Please call pharmacy.  2 tablets of Ambien 10 mg is NOT recommended.     Rx will be sent for 1 tab daily. In my outbox.      Please call pt as well.  Taking 2 tabs of Ambien 10 mg is VERY dangerous.      If you are having to take 2 tabs to get sleep, this medication is NO longer working.      You need to f/u in office to discuss further options

## 2018-08-01 NOTE — TELEPHONE ENCOUNTER
Informed [facility] pharmacist of PCP previous note. They will deny the request and inform patient to f/u in clinic.    Apple WONG RN, BSN, PHN  [facility]

## 2018-08-20 ENCOUNTER — TELEPHONE (OUTPATIENT)
Dept: FAMILY MEDICINE | Facility: CLINIC | Age: 63
End: 2018-08-20

## 2018-08-20 DIAGNOSIS — G47.09 OTHER INSOMNIA: ICD-10-CM

## 2018-08-20 NOTE — TELEPHONE ENCOUNTER
"Requested Prescriptions   Pending Prescriptions Disp Refills     mirtazapine (REMERON) 30 MG tablet [Pharmacy Med Name: MIRTAZAPINE 30 MG TABLET]  Last Written Prescription Date:  3/1/2018  Last Fill Quantity: 90 tablet,  # refills: 1   Last office visit: 7/24/2018 with prescribing provider:  Rajinder Tilley Office Visit:   Next 5 appointments (look out 90 days)     Oct 11, 2018  1:00 PM CDT   Return Visit with IRAM Byrne CNP   [facility]    [address] [facility] [address]   [phone]                  90 tablet 1     Sig: TAKE 1 TABLET (30 MG) BY MOUTH AT BEDTIME    Atypical Antidepressants Protocol Passed    8/20/2018  1:54 AM       Passed - Recent (12 mo) or future (30 days) visit within the authorizing provider's specialty    Patient had office visit in the last 12 months or has a visit in the next 30 days with authorizing provider or within the authorizing provider's specialty.  See \"Patient Info\" tab in inbasket, or \"Choose Columns\" in Meds & Orders section of the refill encounter.           Passed - Patient is age 18 or older          "

## 2018-08-20 NOTE — TELEPHONE ENCOUNTER
Note on Rx: Patient not taking. Reported on 7/24/2018  Raisa please review  Constantine Sexton RN

## 2018-08-22 RX ORDER — MIRTAZAPINE 30 MG/1
TABLET, FILM COATED ORAL
Qty: 90 TABLET | Refills: 1 | Status: SHIPPED | OUTPATIENT
Start: 2018-08-22 | End: 2018-10-26

## 2018-08-23 DIAGNOSIS — F41.9 ANXIETY: ICD-10-CM

## 2018-08-23 NOTE — TELEPHONE ENCOUNTER
Controlled Substance Refill Request for diazepam (VALIUM) 10 MG tablet  Problem List Complete:  No     PROVIDER TO CONSIDER COMPLETION OF PROBLEM LIST AND OVERVIEW/CONTROLLED SUBSTANCE AGREEMENT    Last Written Prescription Date:  5/15/218  Last Fill Quantity: 180 tablet,   # refills: 0    Last Office Visit with CECILIA primary care provider: 7/24/2018Rajinder Office visit:   Next 5 appointments (look out 90 days)     Oct 11, 2018  1:00 PM CDT   Return Visit with IRAM Bryne CNP   [facility]    [address] [facility] [address]   [phone]                  Controlled substance agreement on file: No.     Processing:  Staff will hand deliver Rx to on-site pharmacy     checked in past 3 months?  No, route to RN    Last JOHANA website verification:  5.10.18   [email or web address]

## 2018-08-24 RX ORDER — DIAZEPAM 10 MG
TABLET ORAL
Qty: 180 TABLET | Refills: 0 | Status: SHIPPED | OUTPATIENT
Start: 2018-08-24 | End: 2018-10-26

## 2018-09-04 NOTE — TELEPHONE ENCOUNTER
"Last Written Prescription Date:  3/13/18  Last Fill Quantity: 100 each,  # refills: 4   Last office visit: 7/24/2018 with prescribing provider:  Rajinder   Future Office Visit:   Next 5 appointments (look out 90 days)     Oct 11, 2018  1:00 PM CDT   Return Visit with IRAM Byrne CNP   [facility]    [address] [facility] [address] [phone]                 Requested Prescriptions   Pending Prescriptions Disp Refills     B-D U/F 31G X 8 MM insulin pen needle [Pharmacy Med Name: BD UF SHORT PEN NEEDLE [id number]]  1     Sig: INJECT 1 EACH SUBCUTANEOUS 2 TIMES DAILY    Diabetic Supplies Protocol Passed    9/2/2018  6:28 PM       Passed - Patient is 18 years of age or older       Passed - Recent (6 mo) or future (30 days) visit within the authorizing provider's specialty    Patient had office visit in the last 6 months or has a visit in the next 30 days with authorizing provider.  See \"Patient Info\" tab in inbasket, or \"Choose Columns\" in Meds & Orders section of the refill encounter.              "

## 2018-09-05 RX ORDER — PEN NEEDLE, DIABETIC 31 GX5/16"
NEEDLE, DISPOSABLE MISCELLANEOUS
Qty: 100 EACH | Refills: 1 | Status: SHIPPED | OUTPATIENT
Start: 2018-09-05 | End: 2019-05-30

## 2018-09-06 NOTE — TELEPHONE ENCOUNTER
"Requested Prescriptions   Pending Prescriptions Disp Refills     TRULICITY 1.5 MG/0.5ML pen [Pharmacy Med Name: TRULICITY 1.5 MG/0.5 ML PEN]    Last Written Prescription Date: 2/22/18  Last Fill Quantity: 2mL,  # refills: 0   Last office visit: 2/13/2018 with prescribing provider:  Jp   Future Office Visit:   Next 5 appointments (look out 90 days)     Oct 11, 2018  1:00 PM CDT   Return Visit with IRAM Byrne CNP   [facility]    [address] [facility] [address]   [phone]                   1     Sig: INJECT 1.5 MG SUBCUTANEOUSLY EVERY 7 DAYS    GLP-1 Agonists Protocol Failed    9/6/2018  4:04 PM       Failed - Recent (6 mo) or future (30 days) visit within the authorizing provider's specialty    Patient had office visit in the last 6 months or has a visit in the next 30 days with authorizing provider.  See \"Patient Info\" tab in inbasket, or \"Choose Columns\" in Meds & Orders section of the refill encounter.           Passed - Blood pressure less than 140/90 in past 6 months    BP Readings from Last 3 Encounters:   07/24/18 99/67   07/20/18 128/74   02/13/18 128/73                Passed - LDL on file in past 12 months    Recent Labs   Lab Test  07/24/18   1222   LDL  46            Passed - Microalbumin on file in past 12 months    Recent Labs   Lab Test  07/24/18   1222   MICROL  77   UMALCR  56.06*            Passed - HgbA1C in past 3 or 6 months    If HgbA1C is 8 or greater, it needs to be on file within the past 3 months.  If less than 8, must be on file within the past 6 months.     Recent Labs   Lab Test  07/24/18   1222   A1C  8.6*            Passed - Patient is age 18 or older       Passed - Normal serum creatinine on file in past 12 months    Recent Labs   Lab Test  02/08/18   1111   CR  0.83               "

## 2018-09-07 RX ORDER — DULAGLUTIDE 1.5 MG/.5ML
INJECTION, SOLUTION SUBCUTANEOUS
Qty: 3 ML | Refills: 0 | Status: SHIPPED | OUTPATIENT
Start: 2018-09-07 | End: 2018-10-26

## 2018-10-04 RX ORDER — INSULIN GLARGINE 100 [IU]/ML
INJECTION, SOLUTION SUBCUTANEOUS
Qty: 45 ML | Refills: 0 | Status: SHIPPED | OUTPATIENT
Start: 2018-10-04 | End: 2019-01-09

## 2018-10-04 NOTE — TELEPHONE ENCOUNTER
"Requested Prescriptions   Pending Prescriptions Disp Refills     BASAGLAR 100 UNIT/ML injection [Pharmacy Med Name: BASAGLAR 100 UNIT/ML KWIKPEN]  Last Written Prescription Date:  5/30/2018  Last Fill Quantity: 45 mL,  # refills: 0   Last office visit: 7/24/2018 with prescribing provider:  Rajinder Tilley Office Visit:   Next 5 appointments (look out 90 days)     Oct 11, 2018  1:00 PM CDT   [facility] IRAM [facility]   [facility]    [address] [facility] [address]   [phone]                   0     Sig: INJECT 24-26 UNITS SUBCUTANEOUS AT BEDTIME    Long Acting Insulin Protocol Passed    10/4/2018 11:31 AM       Passed - Blood pressure less than 140/90 in past 6 months    BP Readings from Last 3 Encounters:   07/24/18 99/67   07/20/18 128/74   02/13/18 128/73                Passed - LDL on file in past 12 months    Recent Labs   Lab Test  07/24/18   1222   LDL  46            Passed - Microalbumin on file in past 12 months    Recent Labs   Lab Test  07/24/18   1222   MICROL  77   UMALCR  56.06*            Passed - Serum creatinine on file in past 12 months    Recent Labs   Lab Test  02/08/18   1111   CR  0.83            Passed - HgbA1C in past 3 or 6 months    If HgbA1C is 8 or greater, it needs to be on file within the past 3 months.  If less than 8, must be on file within the past 6 months.     Recent Labs   Lab Test  07/24/18   1222   A1C  8.6*            Passed - Patient is age 18 or older       Passed - Recent (6 mo) or future (30 days) visit within the authorizing provider's specialty    Patient had office visit in the last 6 months or has a visit in the next 30 days with authorizing provider or within the authorizing provider's specialty.  See \"Patient Info\" tab in inbasket, or \"Choose Columns\" in Meds & Orders section of the refill encounter.              "

## 2018-10-04 NOTE — TELEPHONE ENCOUNTER
Prescription approved per [facility] Refill Protocol.  Constantine Sexton RN

## 2018-10-09 DIAGNOSIS — G47.00 PERSISTENT INSOMNIA: ICD-10-CM

## 2018-10-09 NOTE — LETTER
[facility]  [address] [place], [address]  [phone]        October 22, 2018    Gerardo Gonzalez                                                                                                                                                       [id number] [address] [id number]            Dear Gerardo,    We have tried contacting you multiple times but have been unsuccessful. Please contact us to update your information.    We have sent 30 tabs ambien to your pharmacy. However, this should not be taken within 6 hours of valium. It can be dangerous and can interact with each other.     If you have any questions or concerns please contact myself or my nurse at [phone].      Sincerely,      Marc Shipman PA-C for Raisa Calvillo PA-C

## 2018-10-09 NOTE — TELEPHONE ENCOUNTER
Requested Prescriptions   Pending Prescriptions Disp Refills     zolpidem (AMBIEN) 10 MG tablet [Pharmacy Med Name: ZOLPIDEM TARTRATE 10 MG TABLET] 90 tablet 1     Sig: TAKE 1 TABLET BY MOUTH AS NEEDED FOR SLEEP    There is no refill protocol information for this order        zolpidem (AMBIEN) 10 MG tablet      Last Written Prescription Date:  5/10/18  Last Fill Quantity: 90 tablet,   # refills: 1  Last Office Visit: 7/24/2018 (Rajinder)  Future Office visit:    Next 5 appointments (look out 90 days)     Oct 11, 2018  1:00 PM CDT   Return Visit with IRAM Byrne CNP   [facility]    [address]  [facility] [address] [phone]                   Routing refill request to provider for review/approval because:  Drug not on the [facility], [facility] or [facility] refill protocol or controlled substance

## 2018-10-11 NOTE — TELEPHONE ENCOUNTER
Routing refill request to provider for review/approval because:  Drug not on the [facility] refill protocol     Nishi Yang RN, BS  Clinical Nurse Triage.

## 2018-10-12 RX ORDER — ZOLPIDEM TARTRATE 10 MG/1
TABLET ORAL
Qty: 30 TABLET | Refills: 0 | Status: SHIPPED | OUTPATIENT
Start: 2018-10-12 | End: 2018-10-31

## 2018-10-12 NOTE — TELEPHONE ENCOUNTER
30 tabs sent in zia's absence. However, should not be taking within 6 hours of Valium. Dangerous and can interact with each other. In outbox.     -Luis Felipe Shipman, PAC

## 2018-10-12 NOTE — TELEPHONE ENCOUNTER
Prescription faxed to [facility], 10/12/18    Azucena Cintron/LEX

## 2018-10-26 ENCOUNTER — OFFICE VISIT (OUTPATIENT)
Dept: FAMILY MEDICINE | Facility: CLINIC | Age: 63
End: 2018-10-26
Payer: COMMERCIAL

## 2018-10-26 VITALS
BODY MASS INDEX: 27.41 KG/M2 | RESPIRATION RATE: 16 BRPM | DIASTOLIC BLOOD PRESSURE: 88 MMHG | WEIGHT: 175 LBS | OXYGEN SATURATION: 96 % | SYSTOLIC BLOOD PRESSURE: 139 MMHG | HEART RATE: 99 BPM | TEMPERATURE: 98.3 F

## 2018-10-26 DIAGNOSIS — Z23 NEED FOR PROPHYLACTIC VACCINATION AND INOCULATION AGAINST INFLUENZA: ICD-10-CM

## 2018-10-26 DIAGNOSIS — G47.00 PERSISTENT INSOMNIA: ICD-10-CM

## 2018-10-26 DIAGNOSIS — G47.09 OTHER INSOMNIA: ICD-10-CM

## 2018-10-26 DIAGNOSIS — F41.9 ANXIETY: ICD-10-CM

## 2018-10-26 DIAGNOSIS — N52.9 ERECTILE DYSFUNCTION, UNSPECIFIED ERECTILE DYSFUNCTION TYPE: ICD-10-CM

## 2018-10-26 LAB — HBA1C MFR BLD: 8.3 % (ref 0–5.6)

## 2018-10-26 PROCEDURE — 99214 OFFICE O/P EST MOD 30 MIN: CPT | Mod: 25 | Performed by: PHYSICIAN ASSISTANT

## 2018-10-26 PROCEDURE — 90682 RIV4 VACC RECOMBINANT DNA IM: CPT | Performed by: PHYSICIAN ASSISTANT

## 2018-10-26 PROCEDURE — 90471 IMMUNIZATION ADMIN: CPT | Performed by: PHYSICIAN ASSISTANT

## 2018-10-26 PROCEDURE — 36415 COLL VENOUS BLD VENIPUNCTURE: CPT | Performed by: PHYSICIAN ASSISTANT

## 2018-10-26 PROCEDURE — 83036 HEMOGLOBIN GLYCOSYLATED A1C: CPT | Performed by: PHYSICIAN ASSISTANT

## 2018-10-26 RX ORDER — SILDENAFIL 100 MG/1
100 TABLET, FILM COATED ORAL DAILY PRN
Qty: 6 TABLET | Refills: 1 | Status: SHIPPED | OUTPATIENT
Start: 2018-10-26 | End: 2018-10-26

## 2018-10-26 RX ORDER — ZOLPIDEM TARTRATE 10 MG/1
TABLET ORAL
Qty: 30 TABLET | Refills: 0 | Status: CANCELLED | OUTPATIENT
Start: 2018-10-26

## 2018-10-26 RX ORDER — SILDENAFIL 100 MG/1
100 TABLET, FILM COATED ORAL DAILY PRN
Qty: 6 TABLET | Refills: 1 | Status: SHIPPED | OUTPATIENT
Start: 2018-10-26 | End: 2020-01-03

## 2018-10-26 RX ORDER — METFORMIN HCL 500 MG
1000 TABLET, EXTENDED RELEASE 24 HR ORAL
Qty: 180 TABLET | Refills: 1 | Status: SHIPPED | OUTPATIENT
Start: 2018-10-26 | End: 2019-03-21

## 2018-10-26 RX ORDER — GABAPENTIN 300 MG/1
CAPSULE ORAL
Qty: 90 CAPSULE | Refills: 1 | Status: SHIPPED | OUTPATIENT
Start: 2018-10-26 | End: 2018-12-19

## 2018-10-26 RX ORDER — DIAZEPAM 5 MG
5 TABLET ORAL EVERY 12 HOURS PRN
Qty: 20 TABLET | Refills: 0 | Status: SHIPPED | OUTPATIENT
Start: 2018-10-26 | End: 2018-10-26

## 2018-10-26 RX ORDER — MIRTAZAPINE 30 MG/1
TABLET, FILM COATED ORAL
Qty: 90 TABLET | Refills: 1 | Status: CANCELLED | OUTPATIENT
Start: 2018-10-26

## 2018-10-26 RX ORDER — DIAZEPAM 10 MG
TABLET ORAL
Qty: 180 TABLET | Refills: 0 | Status: CANCELLED | OUTPATIENT
Start: 2018-10-26

## 2018-10-26 RX ORDER — DIAZEPAM 5 MG
5 TABLET ORAL EVERY 12 HOURS PRN
Qty: 60 TABLET | Refills: 0 | Status: SHIPPED | OUTPATIENT
Start: 2018-10-26 | End: 2018-12-28

## 2018-10-26 NOTE — PROGRESS NOTES
"  SUBJECTIVE:   Gerardo Gonzalez is a 63 year old male who presents to clinic today for the following health issues:      Diabetes Follow-up    Patient is checking blood sugars: three times daily.   Blood sugar testing frequency justification: Uncontrolled diabetes  Results are as follows:         am - \"low\" 83-87         lunchtime - 120         suppertime - 190-200    Diabetic concerns: None     Symptoms of hypoglycemia (low blood sugar): none     Paresthesias (numbness or burning in feet) or sores: No     Date of last diabetic eye exam: 7/2018    BP Readings from Last 2 Encounters:   07/24/18 99/67   07/20/18 128/74     Hemoglobin A1C (%)   Date Value   07/24/2018 8.6 (H)   02/08/2018 10.8 (H)     LDL Cholesterol Calculated (mg/dL)   Date Value   07/24/2018 46   06/30/2017 70       Diabetes Management Resources  Hyperlipidemia Follow-Up      Rate your low fat/cholesterol diet?: good    Taking statin?  Yes, possible muscle aches from statin    Other lipid medications/supplements?:  none      Amount of exercise or physical activity: Walking or yard work a total of 5 hours a week    Problems taking medications regularly: No    Medication side effects: none    Diet: regular (no restrictions)        Medication Followup of ambien, valium, sydneyerson    Taking Medication as prescribed: yes    Side Effects:  Strange dreams with remeron    Medication Helping Symptoms:  NO    ambien helps with sleep    Valium with anxiety     Anxiety Follow-Up    Status since last visit: No change    Other associated symptoms:None    Complicating factors:   Significant life event: No   Current substance abuse: None  Depression symptoms: No  NAOMI-7 SCORE 1/2/2012 6/30/2017 7/24/2018   Total Score 16 - -   Total Score - 15 17       NAOMI-7    Problem list and histories reviewed & adjusted, as indicated.  Additional history: as documented    Concern - ED  Onset: several years ago    Description:   Difficulty achieving and maintaining " erection    Intensity: moderate    Progression of Symptoms:  waxing and waning    Accompanying Signs & Symptoms:      Previous history of similar problem:   yes    Precipitating factors:   Worsened by: none    Alleviating factors:  Improved by:     Therapies Tried and outcome: Cialis helped.        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)     Elevated liver enzymes     Hyponatremia     Type 2 diabetes mellitus without complication, with long-term current use of insulin (H)     Psoriatic arthritis (H)     Erectile dysfunction, unspecified erectile dysfunction type     Past Surgical History:   Procedure Laterality Date     HC KNEE SCOPE, DIAGNOSTIC      Arthroscopy, Knee- Left       Social History   Substance Use Topics     Smoking status: Never Smoker     Smokeless tobacco: Never Used      Comment: quit in 1978     Alcohol use 3.6 oz/week     6 Cans of beer per week      Comment: glass of wine once a week with dinner     Family History   Problem Relation Age of Onset     Diabetes Mother      age 55     Diabetes Father      age 64     Family History Negative Sister      x3     Hypertension Brother            Reviewed and updated as needed this visit by clinical staff       Reviewed and updated as needed this visit by Provider         ROS:  Constitutional, HEENT, cardiovascular, pulmonary, gi and gu systems are negative, except as otherwise noted.    OBJECTIVE:     /88 (BP Location: Right arm, Patient Position: Chair, Cuff Size: Adult Large)  Pulse 99  Temp 98.3  F (36.8  C) (Oral)  Resp 16  Wt 175 lb (79.4 kg)  SpO2 96%  BMI 27.41 kg/m2  Body mass index is 27.41 kg/(m^2).  GENERAL APPEARANCE: healthy, alert and no distress  RESP: lungs clear to auscultation - no rales, rhonchi or wheezes  CV: regular rates and  rhythm, normal S1 S2, no S3 or S4 and no murmur, click or rub  MS: extremities normal- no gross deformities noted  NEURO: Normal strength and tone, mentation intact and speech normal  PSYCH: mentation appears normal and anxious        ASSESSMENT/PLAN:             1. 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)  Recheck 3 mos. Has endo appt coming up.  Await labs.   - HEMOGLOBIN A1C  - dulaglutide (TRULICITY) 1.5 MG/0.5ML pen; Inject 1.5 mg Subcutaneous every 7 days  Dispense: 3 mL; Refill: 2  - metFORMIN (GLUCOPHAGE-XR) 500 MG 24 hr tablet; Take 2 tablets (1,000 mg) by mouth daily (with dinner)  Dispense: 180 tablet; Refill: 1  - gabapentin (NEURONTIN) 300 MG capsule; Take 1 tablet (300 mg) every night for 1-3 days, then 1 tablet twice daily for 1-3 days, then 1 tablet three times daily  Dispense: 90 capsule; Refill: 1    2. Anxiety  Poorly controlled.   Will add gabapentin.   Recheck 4 weeks.   - gabapentin (NEURONTIN) 300 MG capsule; Take 1 tablet (300 mg) every night for 1-3 days, then 1 tablet twice daily for 1-3 days, then 1 tablet three times daily  Dispense: 90 capsule; Refill: 1  - diazepam (VALIUM) 5 MG tablet; Take 1 tablet (5 mg) by mouth every 12 hours as needed for anxiety  Dispense: 60 tablet; Refill: 0    3. Other insomnia  Stop remeron.  May continue ambien  - gabapentin (NEURONTIN) 300 MG capsule; Take 1 tablet (300 mg) every night for 1-3 days, then 1 tablet twice daily for 1-3 days, then 1 tablet three times daily  Dispense: 90 capsule; Refill: 1    4. Persistent insomnia  Continue ambien    5. Erectile dysfunction, unspecified erectile dysfunction type  Risks/benefits of medication use discussed with patient. Patient reports understanding and accepts trial of medication.  Rx handed to pt.   - sildenafil (VIAGRA) 100 MG tablet; Take 1 tablet (100 mg) by mouth daily as needed 30 min to 4 hrs before sex. Do not use with nitroglycerin, terazosin or doxazosin.  Dispense: 6 tablet;  Refill: 1    6. Need for prophylactic vaccination and inoculation against influenza    - FLU VACCINE, (RIV4) RECOMBINANT HA  , IM (FluBlok, egg free) [00409]- >18 YRS ([facility] recommended  50-64 YRS)  - Vaccine Administration, Initial [09348]        Raisa Calvillo PA-C  [facility]      Injectable Influenza Immunization Documentation    1.  Is the person to be vaccinated sick today?   No    2. Does the person to be vaccinated have an allergy to a component   of the vaccine?   No  Egg Allergy Algorithm Link    3. Has the person to be vaccinated ever had a serious reaction   to influenza vaccine in the past?   No    4. Has the person to be vaccinated ever had Guillain-Barré syndrome?   No    Form completed by Suresh Caban MA

## 2018-10-26 NOTE — MR AVS SNAPSHOT
After Visit Summary   10/26/2018    Gerardo Gonzalez    MRN: [id number]           Patient Information     Date Of Birth          1955        Visit Information        Provider Department      10/26/2018 11:15 AM Raisa Calvillo PA-C [facility]        Today's Diagnoses     Screening for HIV (human immunodeficiency virus)        Need for prophylactic vaccination and inoculation against influenza           Follow-ups after your visit        Your next 10 appointments already scheduled     Jan 04, 2019 10:00 AM CST   Return Visit with IRAM Byrne CNP   [facility] ([facility]    [address]  [facility] [id number] [phone]              Who to contact     If you have questions or need follow up information about today's clinic visit or your schedule please contact [facility] directly at [phone].  Normal or non-critical lab and imaging results will be communicated to you by MyChart, letter or phone within 4 business days after the clinic has received the results. If you do not hear from us within 7 days, please contact the clinic through [employer] or phone. If you have a critical or abnormal lab result, we will notify you by phone as soon as possible.  Submit refill requests through [employer] or call your pharmacy and they will forward the refill request to us. Please allow 3 business days for your refill to be completed.          Additional Information About Your Visit        MyChart Information     [employer] gives you secure access to your electronic health record. If you see a primary care provider, you can also send messages to your care team and make appointments. If you have questions, please call your primary care clinic.  If you do not have a primary care provider, please call [phone] and they will assist you.        Care EveryWhere ID     This is your Care EveryWhere ID. This could be used  by other organizations to access your [facility] medical records  [id number]        Your Vitals Were     Pulse Temperature Respirations Pulse Oximetry BMI (Body Mass Index)       99 98.3  F (36.8  C) (Oral) 16 96% 27.41 kg/m2        Blood Pressure from Last 3 Encounters:   10/26/18 139/88   07/24/18 99/67   07/20/18 128/74    Weight from Last 3 Encounters:   10/26/18 175 lb (79.4 kg)   07/24/18 177 lb (80.3 kg)   02/13/18 181 lb (82.1 kg)              We Performed the Following     FLU VACCINE, (RIV4) RECOMBINANT HA  , IM (FluBlok, egg free) [14704]- >18 YRS ([facility] recommended  50-64 YRS)     Vaccine Administration, Initial [89944]        Primary Care Provider Office Phone # Fax #    Raisa Calvillo PA-C [phone] [address] [facility] [address]        Equal Access to Services     CRISTY GUTIERREZ : Hadii aad ku hadasho Soomaali, waaxda luqadaha, qaybta kaalmada adeegyada, waxay idiin haywarrenn ale temple . So [facility] [phone].    ATENCIÓN: Si habla español, tiene a khan disposición servicios gratuitos de asistencia lingüística. [facility] [phone].    We comply with applicable federal civil rights laws and [place] laws. We do not discriminate on the basis of race, color, national origin, age, disability, sex, sexual orientation, or gender identity.            Thank you!     Thank you for choosing [facility]  for your care. Our goal is always to provide you with excellent care. Hearing back from our patients is one way we can continue to improve our services. Please take a few minutes to complete the written survey that you may receive in the mail after your visit with us. Thank you!             Your Updated Medication List - Protect others around you: Learn how to safely use, store and throw away your medicines at www.disposemymeds.org.          This list is accurate as of 10/26/18 11:35 AM.  Always use your most recent med list.                   Brand Name  Dispense Instructions for use Diagnosis    aspirin 81 MG tablet     100    1 tab po QD (Once per day)    Type II or unspecified type diabetes mellitus without mention of complication, not stated as uncontrolled       BASAGLAR 100 UNIT/ML injection     45 mL    INJECT 24-26 UNITS SUBCUTANEOUS AT BEDTIME    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)       * blood glucose monitoring lancets     1 Box    1 each 3 times daily    Type 2 diabetes, HbA1C goal < 8% (H)       * blood glucose monitoring lancets     100 each    Use to test blood sugars 2 times daily or as directed.(delica)    Type 2 diabetes mellitus without complication, with long-term current use of insulin (H)       BLOOD GLUCOSE TEST STRIPS STRP     100 Strip    PER PATIENT HEALTH PLAN    Type II or unspecified type diabetes mellitus without mention of complication, not stated as uncontrolled       * BLOOD GLUCOSE TEST STRIPS Strp     100 strip    1 Device by In Vitro route 3 times daily    Type 2 diabetes, HbA1C goal < 8% (H)       * blood glucose monitoring test strip    ONETOUCH VERIO IQ    100 strip    Use to test blood sugars 2 times daily or as directed.(Verio)    Type 2 diabetes mellitus without complication, with long-term current use of insulin (H)       busPIRone 15 MG tablet    BUSPAR    360 tablet    Take 2 tablets (30 mg) by mouth 2 times daily    Anxiety       clobetasol 0.05 % ointment    TEMOVATE    45 g    Apply sparingly to affected area twice daily as needed.  Do not apply to face.    Psoriasis       diazepam 10 MG tablet    VALIUM    180 tablet    TAKE 1 TABLET BY MOUTH EVERY 12 HRS AS NEEDED FOR ANXIETY    Anxiety       FLUoxetine 40 MG capsule    PROzac    180 capsule    Take 2 capsules (80 mg) by mouth daily    Anxiety       * insulin pen needle 31G X 8 MM     100 each    1 Device daily Pharmacist may dispense brand and/or needle size per patient's preference and health care plan.    Uncontrolled type 2 diabetes  mellitus with nephropathy (H)       * B-D U/F 31G X 8 MM   Generic drug:  insulin pen needle     100 each    INJECT 1 EACH SUBCUTANEOUS 2 TIMES DAILY    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)       losartan 50 MG tablet    COZAAR    90 tablet    TAKE 1 TABLET (50 MG) BY MOUTH DAILY    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)       metFORMIN 500 MG 24 hr tablet    GLUCOPHAGE-XR    180 tablet    Take 2 tablets (1,000 mg) by mouth daily (with dinner)    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)       mirtazapine 30 MG tablet    REMERON    90 tablet    TAKE 1 TABLET (30 MG) BY MOUTH AT BEDTIME    Other insomnia       simvastatin 80 MG tablet    ZOCOR    90 tablet    Take 0.5 tablets (40 mg) by mouth At Bedtime    Hyperlipidemia LDL goal <100       TRULICITY 1.5 MG/0.5ML pen   Generic drug:  dulaglutide     3 mL    INJECT 1.5 MG SUBCUTANEOUSLY EVERY 7 DAYS    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)       zolpidem 10 MG tablet    AMBIEN    30 tablet    TAKE 1 TABLET BY MOUTH AS NEEDED FOR SLEEP    Persistent insomnia       * Notice:  This list has 6 medication(s) that are the same as other medications prescribed for you. Read the directions carefully, and ask your doctor or other care provider to review them with you.

## 2018-10-31 DIAGNOSIS — G47.00 PERSISTENT INSOMNIA: ICD-10-CM

## 2018-10-31 NOTE — TELEPHONE ENCOUNTER
Requested Prescriptions   Pending Prescriptions Disp Refills     zolpidem (AMBIEN) 10 MG tablet [Pharmacy Med Name: ZOLPIDEM TARTRATE 10 MG TABLET] 90 tablet 1     Sig: TAKE 1 TABLET BY MOUTH AS NEEDED FOR SLEEP    There is no refill protocol information for this order        Last Written Prescription Date:  10/12/18  Last Fill Quantity: 30,  # refills: 0   Last Office Visit: 10/26/2018 Rajinder       Return in about 4 weeks (around 11/23/2018) for Medication Check.     Future Office Visit:    Next 5 appointments (look out 90 days)     Jan 04, 2019 10:00 AM CST   Return Visit with IRAM Byrne [facility] ([facility])    [address]  [facility] [address] [phone]

## 2018-11-01 RX ORDER — ZOLPIDEM TARTRATE 10 MG/1
TABLET ORAL
Qty: 90 TABLET | Refills: 1 | Status: SHIPPED | OUTPATIENT
Start: 2018-11-10 | End: 2019-03-21

## 2018-11-01 NOTE — TELEPHONE ENCOUNTER
Persistent insomnia: Continue ambien         Return in about 4 weeks (around 11/23/2018) for Medication Check.     Routing refill request to provider for review/approval because:  Drug not on the [facility] refill protocol   Last fill 10.12.18 (would not be due yet)    Nishi Yang RN, BS  Clinical Nurse Triage.

## 2018-12-14 DIAGNOSIS — F41.9 ANXIETY: ICD-10-CM

## 2018-12-14 NOTE — TELEPHONE ENCOUNTER
"Per Pharmacy: Buspirone 10mg tablet is unavailable/back order      Requested Prescriptions   Pending Prescriptions Disp Refills     busPIRone (BUSPAR) 15 MG tablet    Last Written Prescription Date:  7/24/18  Last Fill Quantity: 360 tablets,  # refills: 1   Last office visit: 10/26/2018 with prescribing provider:  Rajinder Tilley Office Visit:   Next 5 appointments (look out 90 days)    Jan 04, 2019 10:00 AM CST  Return Visit with IRAM Byrne CNP  [facility] ([facility]) [address] [facility] [address]  [phone]          360 tablet 1     Sig: Take 2 tablets (30 mg) by mouth 2 times daily    Atypical Antidepressants Protocol Passed - 12/14/2018  1:50 PM       Passed - Recent (12 mo) or future (30 days) visit within the authorizing provider's specialty    Patient had office visit in the last 12 months or has a visit in the next 30 days with authorizing provider or within the authorizing provider's specialty.  See \"Patient Info\" tab in inbasket, or \"Choose Columns\" in Meds & Orders section of the refill encounter.             Passed - Patient is age 18 or older        "

## 2018-12-18 RX ORDER — BUSPIRONE HYDROCHLORIDE 15 MG/1
30 TABLET ORAL 2 TIMES DAILY
Qty: 360 TABLET | Refills: 1 | Status: SHIPPED | OUTPATIENT
Start: 2018-12-18 | End: 2019-03-21

## 2018-12-19 DIAGNOSIS — F41.9 ANXIETY: ICD-10-CM

## 2018-12-19 DIAGNOSIS — G47.09 OTHER INSOMNIA: ICD-10-CM

## 2018-12-19 NOTE — TELEPHONE ENCOUNTER
Patient was to due to f/u in 4 weeks on gabapentin. He could do OV, phone or Evisit option on [employer].     Please call to inform.     Rx not sent yet.

## 2018-12-19 NOTE — TELEPHONE ENCOUNTER
Left message to call back .   Pending Prescriptions:                       Disp   Refills    gabapentin (NEURONTIN) 300 MG capsule [Ph*90 cap*1            Sig: TAKE 1 CAP (300 MG) NIGHTLY X 1-3 DAYS THEN 1 CAP           TWICE DAILY X 1-3 DAYS THEN 1 CAP 3 TIMES DAILY    Constantine Sexton RN

## 2018-12-21 NOTE — TELEPHONE ENCOUNTER
Left message to call back.   1. Raisa's message re gabapentin  2. Remind Gerardo appointment with Erika 1/4/19 10:00 AM  Constantine Sexton RN

## 2018-12-26 RX ORDER — GABAPENTIN 300 MG/1
CAPSULE ORAL
Qty: 90 CAPSULE | Refills: 1 | COMMUNITY
Start: 2018-12-26 | End: 2019-01-03 | Stop reason: DRUGHIGH

## 2018-12-26 NOTE — TELEPHONE ENCOUNTER
lmovm informing [facility] to have pt call us, refill denied, inform pt to call us, will close and wait for pt to call   Alda Kingston RN, BSN  Message handled by Nurse Triage.

## 2018-12-28 DIAGNOSIS — F41.9 ANXIETY: ICD-10-CM

## 2018-12-29 NOTE — TELEPHONE ENCOUNTER
Requested Prescriptions   Pending Prescriptions Disp Refills     diazepam (VALIUM) 5 MG tablet [Pharmacy Med Name: DIAZEPAM 5 MG TABLET] 60 tablet 0     Sig: TAKE 1 TABLET BY MOUTH EVERY 12 HOURS AS NEEDED FOR ANXIETY    There is no refill protocol information for this order        Last Written Prescription Date:  10/26/18  Last Fill Quantity: 60,  # refills: 0   Last Office Visit: 10/26/2018 Rajinder      Return in about 4 weeks (around 11/23/2018) for Medication Check.     Future Office Visit:    Next 5 appointments (look out 90 days)    Jan 04, 2019 10:00 AM CST  Return Visit with IRAM Byrne CNP  [facility] [address]  [facility] [address] [phone]

## 2019-01-02 ENCOUNTER — TELEPHONE (OUTPATIENT)
Dept: FAMILY MEDICINE | Facility: CLINIC | Age: 64
End: 2019-01-02

## 2019-01-02 DIAGNOSIS — G47.09 OTHER INSOMNIA: ICD-10-CM

## 2019-01-02 DIAGNOSIS — F41.9 ANXIETY: ICD-10-CM

## 2019-01-02 RX ORDER — DIAZEPAM 5 MG
TABLET ORAL
Qty: 60 TABLET | Refills: 0 | Status: SHIPPED | OUTPATIENT
Start: 2019-01-02 | End: 2019-03-21

## 2019-01-02 NOTE — TELEPHONE ENCOUNTER
[place] reviewed:  reviewed- no concerns    [place]  [email or web address]

## 2019-01-03 RX ORDER — GABAPENTIN 300 MG/1
CAPSULE ORAL
Qty: 90 CAPSULE | Refills: 1 | OUTPATIENT
Start: 2019-01-03

## 2019-01-03 RX ORDER — GABAPENTIN 600 MG/1
600 TABLET ORAL 3 TIMES DAILY
Qty: 270 TABLET | Refills: 1 | Status: SHIPPED | OUTPATIENT
Start: 2019-01-03 | End: 2019-03-21

## 2019-01-03 NOTE — TELEPHONE ENCOUNTER
Disp Refills Start End BETY   gabapentin (NEURONTIN) 300 MG capsule (Discontinued) 90 capsule 1 10/26/2018 12/19/2018 --   Sig: Take 1 tablet (300 mg) every night for 1-3 days, then 1 tablet twice daily for 1-3 days, then 1 tablet three times daily   Sent to pharmacy as: gabapentin (NEURONTIN) 300 MG capsule   Class: E-Prescribe     Last rx was filled in Oct and not Dec    Call out to pt to see what dose taking, tolerating? Benefit?    LMTCB    Nishi Yang RN, BS  Clinical Nurse Triage.

## 2019-01-03 NOTE — TELEPHONE ENCOUNTER
Patient calling back.   is taking Gabapentin 300 mg 1 cap 3 times a day.  Helps for about a couple hours at most.   has been a very trying holiday season for him.  Would like to have Raisa increase dose.  Wanting to only take once a day.  Discussed not long acting so do need to take more than once a day.  He is open to ideas or suggestions.  Please advise.  Call him back with plan.  Bozena Francisco RN

## 2019-01-03 NOTE — TELEPHONE ENCOUNTER
Return in about 4 weeks (around 11/23/2018) for Medication Check.   Routing refill request to provider for review/approval because:  Drug not on the [facility] refill protocol   Patient needs to be seen because:  Was due for recheck in Nov, appt with Erika Trammell in March    Nishi Yang RN, BS  Clinical Nurse Triage.

## 2019-01-03 NOTE — TELEPHONE ENCOUNTER
Please call pt and see what dose of gabapentin he's taking.  Is it helping?    Raisa Calvillo PA-C

## 2019-01-03 NOTE — TELEPHONE ENCOUNTER
gabapentin (NEURONTIN) 300 MG capsule     Sig: TAKE 1 CAP (300 MG) NIGHTLY X 1-3 DAYS THEN 1 CAP TWICE DAILY X 1-3 DAYS THEN 1 CAP 3 TIMES DAILY     Last Written Prescription Date: 12/26/18  Last Quantity: 90, # refills: 1  Last Office Visit with [facility] or [facility] prescribing provider: 10/26/2018 Rajinder      Return in about 4 weeks (around 11/23/2018) for Medication Check.     Next 5 appointments (look out 90 days)    Mar 14, 2019  7:00 AM CDT  Return Visit with IRAM Byrne [facility] ([facility]) [address]  [facility] [address] [phone]           Creatinine   Date Value Ref Range Status   02/08/2018 0.83 0.66 - 1.25 mg/dL Final     Lab Results   Component Value Date    AST 27 02/08/2018     Lab Results   Component Value Date    ALT 26 02/08/2018     BP Readings from Last 3 Encounters:   10/26/18 139/88   07/24/18 99/67   07/20/18 128/74     Associated Diagnoses     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) [E11.21, E11.65]       Anxiety [F41.9]       Other insomnia [G47.09]             Routing refill request to provider for review/approval because:  Drug not on the [facility] or [facility] refill protocol or controlled substance    SANJU Cano  January 3, 2019  9:54 AM

## 2019-01-10 RX ORDER — INSULIN GLARGINE 100 [IU]/ML
INJECTION, SOLUTION SUBCUTANEOUS
Qty: 45 ML | Refills: 0 | Status: SHIPPED | OUTPATIENT
Start: 2019-01-10 | End: 2019-03-22

## 2019-01-10 NOTE — TELEPHONE ENCOUNTER
"Requested Prescriptions   Pending Prescriptions Disp Refills     insulin glargine (BASAGLAR KWIKPEN) 100 UNIT/ML pen [Pharmacy Med Name: BASAGLAR 100 UNIT/ML KWIKPEN]  0    Last Written Prescription Date:  10/4/18  Last Fill Quantity: 45mL  ,  # refills: 0   Last Office Visit: 10/26/2018 Rajinder      Return in about 4 weeks (around 11/23/2018) for Medication Check.     Future Office Visit:    Next 5 appointments (look out 90 days)    Mar 14, 2019  7:00 AM CDT  Return Visit with IRAM Byrne CNP  [facility] ([facility]) [address] [facility] [address]  [phone]          Sig: INJECT 24-26 UNITS SUBCUTANEOUS AT BEDTIME    Long Acting Insulin Protocol Passed - 1/9/2019 12:47 PM       Passed - Blood pressure less than 140/90 in past 6 months    BP Readings from Last 3 Encounters:   10/26/18 139/88   07/24/18 99/67   07/20/18 128/74                Passed - LDL on file in past 12 months    Recent Labs   Lab Test 07/24/18  1222   LDL 46            Passed - Microalbumin on file in past 12 months    Recent Labs   Lab Test 07/24/18  1222   MICROL 77   UMALCR 56.06*            Passed - Serum creatinine on file in past 12 months    Recent Labs   Lab Test 02/08/18  1111   CR 0.83            Passed - HgbA1C in past 3 or 6 months    If HgbA1C is 8 or greater, it needs to be on file within the past 3 months.  If less than 8, must be on file within the past 6 months.     Recent Labs   Lab Test 10/26/18  1156   A1C 8.3*            Passed - Medication is active on med list       Passed - Patient is age 18 or older       Passed - Recent (6 mo) or future (30 days) visit within the authorizing provider's specialty    Patient had office visit in the last 6 months or has a visit in the next 30 days with authorizing provider or within the authorizing provider's specialty.  See \"Patient Info\" tab in inbasket, or \"Choose Columns\" in Meds & Orders section of the refill encounter. "

## 2019-01-18 ENCOUNTER — TELEPHONE (OUTPATIENT)
Dept: FAMILY MEDICINE | Facility: CLINIC | Age: 64
End: 2019-01-18

## 2019-01-18 NOTE — TELEPHONE ENCOUNTER
Panel Management Review      Patient has the following on his problem list: None      Composite cancer screening  Chart review shows that this patient is due/due soon for the following None  Summary:    Patient is due/failing the following:   DM, needs to follow up with Erika Trammell NP    Action needed:   Patient needs office visit for DM    Type of outreach:    None, Patient has pending appt. 3/2019    Questions for provider review:    None                                                                                                                                    Suresh Caban MA       Chart routed to none .

## 2019-01-25 NOTE — TELEPHONE ENCOUNTER
"Requested Prescriptions   Pending Prescriptions Disp Refills     losartan (COZAAR) 50 MG tablet [Pharmacy Med Name: LOSARTAN POTASSIUM 50 MG TAB]    Last Written Prescription Date:  7/16/18  Last Fill Quantity: 90 tablet,  # refills: 1   Last office visit: 10/26/2018 with prescribing provider:  Rajinder Tilley Office Visit:   Next 5 appointments (look out 90 days)    Mar 14, 2019  7:00 AM CDT  Return Visit with IRAM Byrne CNP  [facility] [address] [facility] [address]  [phone]          90 tablet 1     Sig: TAKE 1 TABLET BY MOUTH EVERY DAY    Angiotensin-II Receptors Passed - 1/25/2019  1:38 AM       Passed - Blood pressure under 140/90 in past 12 months    BP Readings from Last 3 Encounters:   10/26/18 139/88   07/24/18 99/67   07/20/18 128/74                Passed - Recent (12 mo) or future (30 days) visit within the authorizing provider's specialty    Patient had office visit in the last 12 months or has a visit in the next 30 days with authorizing provider or within the authorizing provider's specialty.  See \"Patient Info\" tab in inbasket, or \"Choose Columns\" in Meds & Orders section of the refill encounter.             Passed - Medication is active on med list       Passed - Patient is age 18 or older       Passed - Normal serum creatinine on file in past 12 months    Recent Labs   Lab Test 02/08/18  1111   CR 0.83            Passed - Normal serum potassium on file in past 12 months    Recent Labs   Lab Test 02/08/18  1111   POTASSIUM 4.3                      "

## 2019-01-28 RX ORDER — LOSARTAN POTASSIUM 50 MG/1
TABLET ORAL
Qty: 90 TABLET | Refills: 0 | Status: SHIPPED | OUTPATIENT
Start: 2019-01-28 | End: 2019-03-21

## 2019-01-28 NOTE — TELEPHONE ENCOUNTER
Prescription approved per [facility] Refill Protocol.  Constantine Sexton RN

## 2019-02-03 DIAGNOSIS — F41.9 ANXIETY: ICD-10-CM

## 2019-02-03 NOTE — LETTER
February 5, 2019          Gerardo Gonzalez  [address] [place] [address]        Dear Gerardo,     We sent a one month refill of fluoxetine to your pharmacy today. This is a reminder to schedule an appointment with your provider. When you last saw Raisa on October 26, she recommended an anxiety follow up visit in 4 weeks.     Taking care of your health is important to us and ongoing visits with a provider are vital to your care. Please let us know if you have any questions.     If you have already scheduled an appointment, please disregard this reminder.     Sincerely,     JOSEPH Fitch - Care Team of Raisa Calvillo PA-C

## 2019-02-04 NOTE — TELEPHONE ENCOUNTER
"Requested Prescriptions   Pending Prescriptions Disp Refills     FLUoxetine (PROZAC) 40 MG capsule [Pharmacy Med Name: FLUOXETINE HCL 40 MG CAPSULE]  Last Written Prescription Date:  7/24/2018  Last Fill Quantity: 180 capsule,  # refills: 1   Last office visit: 10/26/2018 with prescribing provider:  Rajinder Tilley Office Visit:   Next 5 appointments (look out 90 days)    Mar 14, 2019  7:00 AM CDT  Return Visit with IRAM Byrne CNP  [facility] [address] [facility] [address]  [phone]          180 capsule 1     Sig: TAKE 2 CAPSULES (80 MG) BY MOUTH DAILY    SSRIs Protocol Passed - 2/3/2019  8:22 AM       Passed - Recent (12 mo) or future (30 days) visit within the authorizing provider's specialty    Patient had office visit in the last 12 months or has a visit in the next 30 days with authorizing provider or within the authorizing provider's specialty.  See \"Patient Info\" tab in inbasket, or \"Choose Columns\" in Meds & Orders section of the refill encounter.             Passed - Medication is active on med list       Passed - Patient is age 18 or older          "

## 2019-02-05 RX ORDER — FLUOXETINE 40 MG/1
80 CAPSULE ORAL DAILY
Qty: 60 CAPSULE | Refills: 0 | Status: SHIPPED | OUTPATIENT
Start: 2019-02-05 | End: 2019-03-08

## 2019-02-05 NOTE — TELEPHONE ENCOUNTER
10/26/18 visit note: Anxiety  Poorly controlled.   Will add gabapentin.   Recheck 4 weeks.    Medication is being filled for 1 time refill only due to:  Patient needs to be seen because above .   Left message to call back or log in to [employer].   Letter mailed too.   Constantine Sexton RN

## 2019-02-11 ENCOUNTER — TRANSFERRED RECORDS (OUTPATIENT)
Dept: MULTI SPECIALTY CLINIC | Facility: CLINIC | Age: 64
End: 2019-02-11

## 2019-02-13 DIAGNOSIS — G47.09 OTHER INSOMNIA: ICD-10-CM

## 2019-02-14 RX ORDER — MIRTAZAPINE 30 MG/1
TABLET, FILM COATED ORAL
Qty: 90 TABLET | Refills: 0 | Status: SHIPPED | OUTPATIENT
Start: 2019-02-14 | End: 2019-03-21

## 2019-02-14 NOTE — TELEPHONE ENCOUNTER
"Last Written Prescription Date:  10.26.18  Last Fill Quantity: 90,  # refills: 1   Last office visit: 10/26/2018 with prescribing provider:  Rajinder   Future Office Visit:   Next 5 appointments (look out 90 days)    Feb 19, 2019  8:00 AM CST  SHORT with Raisa Calvillo PA-C  [address] [address]  [phone]   Mar 14, 2019  7:00 AM CDT  Return Visit with IRAM Byrne CNP  [facility]) [address] [place] [address]  [phone]           Requested Prescriptions   Pending Prescriptions Disp Refills     mirtazapine (REMERON) 30 MG tablet [Pharmacy Med Name: MIRTAZAPINE 30 MG TABLET] 90 tablet 1     Sig: TAKE 1 TABLET (30 MG) BY MOUTH AT BEDTIME    Atypical Antidepressants Protocol Failed - 2/13/2019  1:12 AM       Failed - Medication active on med list       Passed - Recent (12 mo) or future (30 days) visit within the authorizing provider's specialty    Patient had office visit in the last 12 months or has a visit in the next 30 days with authorizing provider or within the authorizing provider's specialty.  See \"Patient Info\" tab in inbasket, or \"Choose Columns\" in Meds & Orders section of the refill encounter.             Passed - Patient is age 18 or older        Pt may not have enough to last until appt, will approve refill per protocol    Nishi Yang RN, BS  Clinical Nurse Triage.    "

## 2019-02-18 DIAGNOSIS — E78.5 HYPERLIPIDEMIA LDL GOAL <100: ICD-10-CM

## 2019-02-20 RX ORDER — SIMVASTATIN 80 MG
TABLET ORAL
Qty: 90 TABLET | Refills: 0 | Status: SHIPPED | OUTPATIENT
Start: 2019-02-20 | End: 2019-03-21

## 2019-03-08 ENCOUNTER — TELEPHONE (OUTPATIENT)
Dept: FAMILY MEDICINE | Facility: CLINIC | Age: 64
End: 2019-03-08

## 2019-03-08 DIAGNOSIS — F41.9 ANXIETY: ICD-10-CM

## 2019-03-08 NOTE — TELEPHONE ENCOUNTER
"Requested Prescriptions   Pending Prescriptions Disp Refills     FLUoxetine (PROZAC) 40 MG capsule [Pharmacy Med Name: FLUOXETINE HCL 40 MG CAPSULE]    Last Written Prescription Date: 2/5/19   Last Fill Quantity: 60 capsules,  # refills: 0   Last office visit: 10/26/2018 with prescribing provider   Rajinder  Future Office Visit:   Next 5 appointments (look out 90 days)    May 16, 2019 11:00 AM CDT  Return Visit with IRAM Byrne CNP  [facility] [address] [facility] [address]  [phone]          60 capsule 0     Sig: TAKE 2 CAPSULES (80 MG) BY MOUTH DAILY    SSRIs Protocol Passed - 3/8/2019  1:29 AM       Passed - Recent (12 mo) or future (30 days) visit within the authorizing provider's specialty    Patient had office visit in the last 12 months or has a visit in the next 30 days with authorizing provider or within the authorizing provider's specialty.  See \"Patient Info\" tab in inbasket, or \"Choose Columns\" in Meds & Orders section of the refill encounter.             Passed - Medication is active on med list       Passed - Patient is age 18 or older          "

## 2019-03-12 RX ORDER — FLUOXETINE 40 MG/1
80 CAPSULE ORAL DAILY
Qty: 60 CAPSULE | Refills: 0 | Status: SHIPPED | OUTPATIENT
Start: 2019-03-12 | End: 2019-03-21

## 2019-03-15 NOTE — TELEPHONE ENCOUNTER
3rd final attempt. LMTCB.   Does not appear reviewed last several MyChart messages.  Letter mailed.   Constantine Sexton RN

## 2019-03-21 ENCOUNTER — OFFICE VISIT (OUTPATIENT)
Dept: FAMILY MEDICINE | Facility: CLINIC | Age: 64
End: 2019-03-21
Payer: COMMERCIAL

## 2019-03-21 VITALS
HEART RATE: 114 BPM | WEIGHT: 165 LBS | RESPIRATION RATE: 16 BRPM | BODY MASS INDEX: 25.9 KG/M2 | SYSTOLIC BLOOD PRESSURE: 115 MMHG | TEMPERATURE: 98.6 F | DIASTOLIC BLOOD PRESSURE: 72 MMHG | HEIGHT: 67 IN

## 2019-03-21 DIAGNOSIS — E78.5 HYPERLIPIDEMIA LDL GOAL <100: ICD-10-CM

## 2019-03-21 DIAGNOSIS — G47.00 PERSISTENT INSOMNIA: ICD-10-CM

## 2019-03-21 LAB — HBA1C MFR BLD: 8.2 % (ref 0–5.6)

## 2019-03-21 PROCEDURE — 80053 COMPREHEN METABOLIC PANEL: CPT | Performed by: PHYSICIAN ASSISTANT

## 2019-03-21 PROCEDURE — 83036 HEMOGLOBIN GLYCOSYLATED A1C: CPT | Performed by: PHYSICIAN ASSISTANT

## 2019-03-21 PROCEDURE — 36415 COLL VENOUS BLD VENIPUNCTURE: CPT | Performed by: PHYSICIAN ASSISTANT

## 2019-03-21 PROCEDURE — 99214 OFFICE O/P EST MOD 30 MIN: CPT | Performed by: PHYSICIAN ASSISTANT

## 2019-03-21 RX ORDER — ZOLPIDEM TARTRATE 10 MG/1
TABLET ORAL
Qty: 90 TABLET | Refills: 1 | Status: SHIPPED | OUTPATIENT
Start: 2019-03-21 | End: 2019-10-22

## 2019-03-21 RX ORDER — METFORMIN HCL 500 MG
1000 TABLET, EXTENDED RELEASE 24 HR ORAL
Qty: 180 TABLET | Refills: 1 | Status: SHIPPED | OUTPATIENT
Start: 2019-03-21 | End: 2019-11-06

## 2019-03-21 RX ORDER — SIMVASTATIN 80 MG
TABLET ORAL
Qty: 90 TABLET | Refills: 1 | Status: SHIPPED | OUTPATIENT
Start: 2019-03-21 | End: 2019-11-06

## 2019-03-21 RX ORDER — LOSARTAN POTASSIUM 50 MG/1
50 TABLET ORAL DAILY
Qty: 90 TABLET | Refills: 1 | Status: SHIPPED | OUTPATIENT
Start: 2019-03-21 | End: 2019-11-03

## 2019-03-21 ASSESSMENT — ANXIETY QUESTIONNAIRES
GAD7 TOTAL SCORE: 7
5. BEING SO RESTLESS THAT IT IS HARD TO SIT STILL: SEVERAL DAYS
4. TROUBLE RELAXING: SEVERAL DAYS
7. FEELING AFRAID AS IF SOMETHING AWFUL MIGHT HAPPEN: SEVERAL DAYS
7. FEELING AFRAID AS IF SOMETHING AWFUL MIGHT HAPPEN: SEVERAL DAYS
GAD7 TOTAL SCORE: 7
GAD7 TOTAL SCORE: 7
1. FEELING NERVOUS, ANXIOUS, OR ON EDGE: SEVERAL DAYS
2. NOT BEING ABLE TO STOP OR CONTROL WORRYING: SEVERAL DAYS
3. WORRYING TOO MUCH ABOUT DIFFERENT THINGS: SEVERAL DAYS
6. BECOMING EASILY ANNOYED OR IRRITABLE: SEVERAL DAYS

## 2019-03-21 ASSESSMENT — MIFFLIN-ST. JEOR: SCORE: 1512.07

## 2019-03-21 ASSESSMENT — PATIENT HEALTH QUESTIONNAIRE - PHQ9
10. IF YOU CHECKED OFF ANY PROBLEMS, HOW DIFFICULT HAVE THESE PROBLEMS MADE IT FOR YOU TO DO YOUR WORK, TAKE CARE OF THINGS AT HOME, OR GET ALONG WITH OTHER PEOPLE: SOMEWHAT DIFFICULT
SUM OF ALL RESPONSES TO PHQ QUESTIONS 1-9: 9
SUM OF ALL RESPONSES TO PHQ QUESTIONS 1-9: 9

## 2019-03-21 NOTE — PROGRESS NOTES
SUBJECTIVE:   Gerardo Gonzalez is a 63 year old male who presents to clinic today for the following health issues:      History of Present Illness     Diabetes:     Frequency of checking blood sugars::  1 time a day    Diabetic concerns::  None    Hypoglycemia symptoms::  Shaky    Paraesthesia present::  No    Eye Exam in the last year::  Yes    [id number]    Diabetes Management Resources    Diet:  Diabetic  Taking medications regularly:  Yes      Problem list and histories reviewed & adjusted, as indicated.  Additional history: none    Diabetes Follow-up    Patient is checking blood sugars: twice daily.    Blood sugar testing frequency justification: On insulin, frequency appropriate   Results are as follows:    Diabetic concerns: low blood sugar several less than 70 in the past few weeks     Symptoms of hypoglycemia (low blood sugar): shaky     Paresthesias (numbness or burning in feet) or sores: No     Date of last diabetic eye exam:     BP Readings from Last 2 Encounters:   03/21/19 115/72   10/26/18 139/88     Hemoglobin A1C (%)   Date Value   03/21/2019 8.2 (H)   10/26/2018 8.3 (H)     LDL Cholesterol Calculated (mg/dL)   Date Value   07/24/2018 46   06/30/2017 70       Diabetes Management Resources  Hyperlipidemia Follow-Up      Rate your low fat/cholesterol diet?: good    Taking statin?  Yes, no muscle aches from statin    Other lipid medications/supplements?:  none    Hypertension Follow-up      Outpatient blood pressures are not being checked.    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)     Elevated liver enzymes     Hyponatremia     Type 2 diabetes mellitus without complication, with long-term current use of insulin (H)     Psoriatic arthritis (H)     Erectile dysfunction,  "unspecified erectile dysfunction type     Past Surgical History:   Procedure Laterality Date     HC KNEE SCOPE, DIAGNOSTIC      Arthroscopy, Knee- Left       Social History     Tobacco Use     Smoking status: Never Smoker     Smokeless tobacco: Never Used     Tobacco comment: quit in 1978   Substance Use Topics     Alcohol use: Yes     Alcohol/week: 3.6 oz     Types: 6 Cans of beer per week     Comment: glass of wine once a week with dinner     Family History   Problem Relation Age of Onset     Diabetes Mother         age 55     Diabetes Father         age 64     Family History Negative Sister         x3     Hypertension Brother            ROS:  Constitutional, HEENT, cardiovascular, pulmonary, GI, , musculoskeletal, neuro, skin, endocrine and psych systems are negative, except as otherwise noted.    OBJECTIVE:     /72 (BP Location: Right arm, Patient Position: Chair, Cuff Size: Adult Large)   Pulse 114   Temp 98.6  F (37  C) (Oral)   Resp 16   Ht 1.702 m (5' 7\")   Wt 74.8 kg (165 lb)   BMI 25.84 kg/m    Body mass index is 25.84 kg/m .  GENERAL APPEARANCE: healthy, alert and no distress  RESP: lungs clear to auscultation - no rales, rhonchi or wheezes  CV: regular rates and rhythm, normal S1 S2, no S3 or S4 and no murmur, click or rub  MS: extremities normal- no gross deformities noted  PSYCH: mentation appears normal and anxious        ASSESSMENT/PLAN:             1. 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)  Await A1c, monitor sugars  F/u with Endo.   - Comprehensive metabolic panel  - Hemoglobin A1c  - metFORMIN (GLUCOPHAGE-XR) 500 MG 24 hr tablet; Take 2 tablets (1,000 mg) by mouth daily (with dinner)  Dispense: 180 tablet; Refill: 1  - losartan (COZAAR) 50 MG tablet; Take 1 tablet (50 mg) by mouth daily  Dispense: 90 tablet; Refill: 1  - dulaglutide (TRULICITY) 1.5 MG/0.5ML pen; Inject 1.5 mg Subcutaneous every 7 days  Dispense: 3 mL; Refill: 2    2. Hyperlipidemia LDL goal " <100  Continue meds  - simvastatin (ZOCOR) 80 MG tablet; TAKE 1/2 TABLETS (40 MG) BY MOUTH AT BEDTIME  Dispense: 90 tablet; Refill: 1    3. Persistent insomnia  Stable. Rx faxed.   - zolpidem (AMBIEN) 10 MG tablet; TAKE 1 TABLET BY MOUTH AS NEEDED FOR SLEEP  Dispense: 90 tablet; Refill: 1        Raisa Calvillo PA-C  [facility]  Answers for HPI/ROS submitted by the patient on 3/21/2019   Chronic problems general questions HPI Form  If you checked off any problems, how difficult have these problems made it for you to do your work, take care of things at home, or get along with other people?: Somewhat difficult  PHQ9 TOTAL SCORE: 9  NAOMI 7 TOTAL SCORE: 7

## 2019-03-22 LAB
ALBUMIN SERPL-MCNC: 3.9 G/DL (ref 3.4–5)
ALP SERPL-CCNC: 82 U/L (ref 40–150)
ALT SERPL W P-5'-P-CCNC: 91 U/L (ref 0–70)
ANION GAP SERPL CALCULATED.3IONS-SCNC: 6 MMOL/L (ref 3–14)
AST SERPL W P-5'-P-CCNC: 94 U/L (ref 0–45)
BILIRUB SERPL-MCNC: 0.5 MG/DL (ref 0.2–1.3)
BUN SERPL-MCNC: 11 MG/DL (ref 7–30)
CALCIUM SERPL-MCNC: 9.8 MG/DL (ref 8.5–10.1)
CHLORIDE SERPL-SCNC: 110 MMOL/L (ref 94–109)
CO2 SERPL-SCNC: 26 MMOL/L (ref 20–32)
CREAT SERPL-MCNC: 1.21 MG/DL (ref 0.66–1.25)
GFR SERPL CREATININE-BSD FRML MDRD: 64 ML/MIN/{1.73_M2}
GLUCOSE SERPL-MCNC: 162 MG/DL (ref 70–99)
POTASSIUM SERPL-SCNC: 4.1 MMOL/L (ref 3.4–5.3)
PROT SERPL-MCNC: 7.6 G/DL (ref 6.8–8.8)
SODIUM SERPL-SCNC: 142 MMOL/L (ref 133–144)

## 2019-03-22 ASSESSMENT — PATIENT HEALTH QUESTIONNAIRE - PHQ9: SUM OF ALL RESPONSES TO PHQ QUESTIONS 1-9: 9

## 2019-03-22 ASSESSMENT — ANXIETY QUESTIONNAIRES: GAD7 TOTAL SCORE: 7

## 2019-03-23 NOTE — TELEPHONE ENCOUNTER
"Requested Prescriptions   Pending Prescriptions Disp Refills     insulin glargine (BASAGLAR KWIKPEN) 100 UNIT/ML pen [Pharmacy Med Name: BASAGLAR 100 UNIT/ML KWIKPEN]  0     Sig: INJECT 24-26 UNITS SUBCUTANEOUS AT BEDTIME    Long Acting Insulin Protocol Passed - 3/22/2019  1:12 PM       Passed - Blood pressure less than 140/90 in past 6 months    BP Readings from Last 3 Encounters:   03/21/19 115/72   10/26/18 139/88   07/24/18 99/67                Passed - LDL on file in past 12 months    Recent Labs   Lab Test 07/24/18  1222   LDL 46            Passed - Microalbumin on file in past 12 months    Recent Labs   Lab Test 07/24/18  1222   MICROL 77   UMALCR 56.06*            Passed - Serum creatinine on file in past 12 months    Recent Labs   Lab Test 03/21/19  1051   CR 1.21            Passed - HgbA1C in past 3 or 6 months    If HgbA1C is 8 or greater, it needs to be on file within the past 3 months.  If less than 8, must be on file within the past 6 months.     Recent Labs   Lab Test 03/21/19  1051   A1C 8.2*            Passed - Medication is active on med list       Passed - Patient is age 18 or older       Passed - Recent (6 mo) or future (30 days) visit within the authorizing provider's specialty    Patient had office visit in the last 6 months or has a visit in the next 30 days with authorizing provider or within the authorizing provider's specialty.  See \"Patient Info\" tab in inbasket, or \"Choose Columns\" in Meds & Orders section of the refill encounter.              Last Written Prescription Date:  01/10/2019  Last Fill Quantity: 45ml,  # refills: 0   Last office visit: 3/21/2019 with prescribing provider:  Raisa Calvillo   Future Office Visit:   Next 5 appointments (look out 90 days)    May 16, 2019 11:00 AM CDT  [facility] IRAM [facility]ne CNP  [facility] [address]  [facility] [address] [phone]           "

## 2019-03-26 RX ORDER — INSULIN GLARGINE 100 [IU]/ML
INJECTION, SOLUTION SUBCUTANEOUS
Qty: 45 ML | Refills: 0 | Status: SHIPPED | OUTPATIENT
Start: 2019-03-26 | End: 2019-09-04

## 2019-03-26 NOTE — TELEPHONE ENCOUNTER
Prescription approved per [facility] Refill Protocol.  Sahra Saez RN

## 2019-03-29 DIAGNOSIS — R74.8 ELEVATED LIVER ENZYMES: Primary | ICD-10-CM

## 2019-04-04 ENCOUNTER — TELEPHONE (OUTPATIENT)
Dept: FAMILY MEDICINE | Facility: CLINIC | Age: 64
End: 2019-04-04

## 2019-04-04 NOTE — TELEPHONE ENCOUNTER
Patient states he wants to go over his visit from last week with nurse.  He wouldn't say what he specifically wanted or needed.

## 2019-04-05 NOTE — TELEPHONE ENCOUNTER
Pt calling in to say that he is applying for SS disability due to DM and chronic pain from Psoriatic arthritis with skin lesions  Someone form social security disability may request medical records to verify health issues    FYI to PCP    Nishi Yang RN, BS  Clinical Nurse Triage.

## 2019-04-15 NOTE — TELEPHONE ENCOUNTER
Medication has been discontinued in patient chart    Controlled Substance Refill Request for diazepam (VALIUM) 5 MG tablet  Problem List Complete:  No     PROVIDER TO CONSIDER COMPLETION OF PROBLEM LIST AND OVERVIEW/CONTROLLED SUBSTANCE AGREEMENT    Last Written Prescription Date:  8/24/2018  Last Fill Quantity: 180 tablet,   # refills: 0    THE MOST RECENT OFFICE VISIT MUST BE WITHIN THE PAST 3 MONTHS. AT LEAST ONE FACE TO FACE VISIT MUST OCCUR EVERY 6 MONTHS. ADDITIONAL VISITS CAN BE VIRTUAL.  (THIS STATEMENT SHOULD BE DELETED.)    Last Office Visit with [facility] primary care provider: 3/21/2019Rajinder Office visit:   Next 5 appointments (look out 90 days)    May 16, 2019 11:00 AM CDT  Return Visit with IRAM Byrne CNP  [facility]) [address]  [facility] [address] [phone]          Controlled substance agreement:   Encounter-Level CSA:    There are no encounter-level csa.     Patient-Level CSA:    There are no patient-level csa.         Last Urine Drug Screen: No results found for: CDAUT, No results found for: COMDAT, No results found for: THC13, PCP13, COC13, MAMP13, OPI13, AMP13, BZO13, TCA13, MTD13, BAR13, OXY13, PPX13, BUP13     Processing:  Staff will hand deliver Rx to on-site pharmacy     [email or web address]       checked in past 3 months?  No, route to RN     Last [facility] website verification:  done on 8/24/18   [email or web address]

## 2019-04-17 ENCOUNTER — VIRTUAL VISIT (OUTPATIENT)
Dept: FAMILY MEDICINE | Facility: CLINIC | Age: 64
End: 2019-04-17
Payer: COMMERCIAL

## 2019-04-17 ENCOUNTER — TELEPHONE (OUTPATIENT)
Dept: FAMILY MEDICINE | Facility: CLINIC | Age: 64
End: 2019-04-17

## 2019-04-17 DIAGNOSIS — M54.50 ACUTE BILATERAL LOW BACK PAIN WITHOUT SCIATICA: Primary | ICD-10-CM

## 2019-04-17 PROCEDURE — 99441 ZZC PHYSICIAN TELEPHONE EVALUATION 5-10 MIN: CPT | Performed by: PHYSICIAN ASSISTANT

## 2019-04-17 RX ORDER — DIAZEPAM 5 MG
TABLET ORAL
Start: 2019-04-17

## 2019-04-17 RX ORDER — IBUPROFEN 800 MG/1
800 TABLET, FILM COATED ORAL EVERY 8 HOURS PRN
Qty: 60 TABLET | Refills: 1 | Status: SHIPPED | OUTPATIENT
Start: 2019-04-17 | End: 2019-04-23

## 2019-04-17 NOTE — PROGRESS NOTES
"Gerardo Gonzalez is a 63 year old male who is being evaluated via a billable telephone visit.      The patient has been notified of following:     \"This telephone visit will be conducted via a call between you and your physician/provider. We have found that certain health care needs can be provided without the need for a physical exam.  This service lets us provide the care you need with a short phone conversation.  If a prescription is necessary we can send it directly to your pharmacy.  If lab work is needed we can place an order for that and you can then stop by our lab to have the test done at a later time.    If during the course of the call the physician/provider feels a telephone visit is not appropriate, you will not be charged for this service.\"     Consent has been obtained for this service by 1 care team member: yes. See the scanned image in the medical record.    Gerardo Gonzalez complains of    Chief Complaint   Patient presents with     Medication Request     Pain medication request       I have reviewed and updated the patient's Past Medical History, Social History, Family History and Medication List.    ALLERGIES  Ace inhibitors and Atorvastatin calcium    Suresh Caban MA      Additional provider notes: Joint or Musculoskeletal Pain  Duration of complaint: 1 week. Pt reports he slipped and fell in the garage on some melted snow last week.  Patient states \"mucles are knotted\" up due to fall. Did not hit head. NO LOC   Description:   Location: lower back  Character: Dull ache  Intensity: moderate  Progression of Symptoms: intermittent  Accompanying Signs & Symptoms: Other symptoms: none      Assessment/Plan:  Acute bilateral low back pain without sciatica  (primary encounter diagnosis)    I have reviewed the note as documented above.  This accurately captures the substance of my conversation with the patient, Gerardo Gonzalez        Total time of call between patient and provider was 5 minutes "     Raisa Calvillo PA-C

## 2019-04-17 NOTE — TELEPHONE ENCOUNTER
"Pt confirms he is taking Valium for pain  Location - hands, back, head  Occurs every day, worse with rain/snow  DX - Psoriatic arthritis (H)    Message given that Valium is not be used for pain, there are better options available  Offered to make appt to discuss \"well, that doesn't help me for today\"  I don't see Gabapentin on med list, but does show in   Gabapentin last filled 1.4.19, Diazepam last filled 1.2.19, report on desk    Set up phone visit for this afternoon, pt kept insisting we just fill the Valium because it has always worked well   States Dr Patel gave it to him originally for this pain    Route to provider to review     Nishi Yang RN Nurse Triage    "

## 2019-04-17 NOTE — TELEPHONE ENCOUNTER
We called Gerardo. Informed Raisa does not perscribe Valium for pain.  Try gabapentin?  He said this does not work.   Will wait for Raisa's phone visit. Informed and agrees it is OK if phone visit is later than scheduled.    Message handled by Nurse Triage with [facility] - provider name: Raisa Calvillo PA-C.  Constantine Sexton RN

## 2019-04-17 NOTE — TELEPHONE ENCOUNTER
Please call back. I clarified with Gerardo at his las visit and he stated he was NOT taking Valium. Valium is NOT for pain. Is he sure he did not mean gabapentin?  Gabapentin is for pain and mood.    I do not recommend VALIUM for long term use.

## 2019-04-17 NOTE — TELEPHONE ENCOUNTER
Pt reported to PCP during phone visit today problem with zolpidem 10 mg refill at pharmacy.   We called [facility]. Pharmacist said it is refill too soon issue.  Patient picked up 90 pills on 2/17/19.    Raisa caraballo.   JOSEPH Fitch - Care Team of Raisa Calvillo PA-C

## 2019-04-17 NOTE — TELEPHONE ENCOUNTER
Gerardo calls to check status.    States he did not stop this med. Takes for pain. Requests this refilled or Rx for another type of pain killer.   Constantine Sexton RN

## 2019-04-22 ENCOUNTER — TELEPHONE (OUTPATIENT)
Dept: FAMILY MEDICINE | Facility: CLINIC | Age: 64
End: 2019-04-22

## 2019-04-22 DIAGNOSIS — L40.50 PSORIATIC ARTHRITIS (H): Primary | ICD-10-CM

## 2019-04-22 NOTE — TELEPHONE ENCOUNTER
Patient would like to know if DEE Calvillo would prescribe something stronger then ibuprofen (ADVIL/MOTRIN) 800 MG tablet for his arthritis. Patient's pharmacy is [facility] off of [address] in [place].  Jessie Smith

## 2019-04-23 RX ORDER — MELOXICAM 15 MG/1
15 TABLET ORAL DAILY
Qty: 30 TABLET | Refills: 1 | Status: SHIPPED | OUTPATIENT
Start: 2019-04-23 | End: 2019-06-15

## 2019-04-23 NOTE — TELEPHONE ENCOUNTER
We can try Mobic 15 mg daily. It is still an anti-inflammatory which is great for arthritis. Please take it with food. Do NOT use ibuprofen along with it.    Raisa Calvillo PA-C

## 2019-04-23 NOTE — TELEPHONE ENCOUNTER
No answer, voicemail box is full. Unable to leave a message.  (if pt declines mobic CJ will refer to rheumatology)   Constantine Sexton RN

## 2019-04-29 NOTE — TELEPHONE ENCOUNTER
[facility]:  [facility]   [phone] . Rheum referral placed. Pt to call for appt. However I am concerned joint pain has suddenly increased more. He may want to be seen in clinic through primary care sooner. He can certainly see PCP or another provider.    Raisa Calvillo PA-C

## 2019-04-29 NOTE — TELEPHONE ENCOUNTER
Gerardo calls, Mobic is not effective at all. He even tried taking 2 pills but still no relief. Wants something stronger for pain.   He accept referral to rheumatology. Informed we will call him back tomorrow. Please answer your phone or watch caller ID. We could not reach you last week because no answer and voicemail is full.   Constantine Sexton, RN

## 2019-05-18 DIAGNOSIS — M54.50 ACUTE BILATERAL LOW BACK PAIN WITHOUT SCIATICA: ICD-10-CM

## 2019-05-20 NOTE — TELEPHONE ENCOUNTER
Routing refill request to provider for review/approval because:  Drug not on the [facility] refill protocol   Chanel Vásquez RN, BSN

## 2019-05-20 NOTE — TELEPHONE ENCOUNTER
tiZANidine (ZANAFLEX) 4 MG tablet      Last Written Prescription Date:  04/17/2019  Last Fill Quantity: 90 tablet,   # refills: 0  Last Office Visit: 04/17/2019  Future Office visit:       Routing refill request to provider for review/approval because:  Drug not on the [facility], [facility] or [facility] refill protocol or controlled substance      Salty BILLS

## 2019-05-31 RX ORDER — PEN NEEDLE, DIABETIC 31 GX5/16"
NEEDLE, DISPOSABLE MISCELLANEOUS
Qty: 100 EACH | Refills: 1 | Status: SHIPPED | OUTPATIENT
Start: 2019-05-31 | End: 2019-09-07

## 2019-05-31 NOTE — TELEPHONE ENCOUNTER
"Requested Prescriptions   Pending Prescriptions Disp Refills     B-D U/F 31G X 8 MM insulin pen needle [Pharmacy Med Name: BD UF SHORT PEN NEEDLE [id number]]  1     Sig: INJECT 1 EACH SUBCUTANEOUS 2 TIMES DAILY      Last Written Prescription Date:  9/5/18  Last Fill Quantity: 100 each,  # refills: 1   Last office visit: 4/17/2019 with prescribing provider:  Rajinder Tilley Office Visit:             Diabetic Supplies Protocol Passed - 5/30/2019 12:32 PM        Passed - Medication is active on med list        Passed - Patient is 18 years of age or older        Passed - Recent (6 mo) or future (30 days) visit within the authorizing provider's specialty     Patient had office visit in the last 6 months or has a visit in the next 30 days with authorizing provider.  See \"Patient Info\" tab in inbasket, or \"Choose Columns\" in Meds & Orders section of the refill encounter.              "

## 2019-05-31 NOTE — TELEPHONE ENCOUNTER
Needles  Routing refill request to provider for review/approval because:  Script is from 2017 - RN unable to approve    Jemima Joiner, RN, BSN

## 2019-06-15 ENCOUNTER — TELEPHONE (OUTPATIENT)
Dept: FAMILY MEDICINE | Facility: CLINIC | Age: 64
End: 2019-06-15

## 2019-06-15 DIAGNOSIS — L40.50 PSORIATIC ARTHRITIS (H): ICD-10-CM

## 2019-06-15 NOTE — LETTER
June 21, 2019      Gerardo Gonzalez  [address]  [place] [address]        Dear Gerardo,     We have attempted to reach you by phone and been unsuccessful.  Please see Raisa's message below.      Raisa Calvillo PA-C   12:39 PM   Note      RN to call     Patient needs visit. I cannot fill something different without a visit. Patient NEEDS to see specialist, this is NOT appropriate for primary care to manage.     New referral placed to:  [facility], YOLIE Brooks [phone]     Dr. Mcgarry (previous Rheum. Referral) is leaving [place].        Mobic filled for today.        Raisa Calvillo PA-C             Sincerely,        Raisa DEL VALLE. KAYE Calvillo/Bozena Francisco RN

## 2019-06-17 NOTE — TELEPHONE ENCOUNTER
Routing refill request to provider for review/approval because:  Labs out of range:  ALT, AST, and CBC  Unclear of plan, patient had reported medication was not working at all on 4/22/19 telephone encounter. Provider had concerns about increased joint pain and recommended OV. Has not been seen.    Gerardo calls, Mobic is not effective at all. He even tried taking 2 pills but still no relief. Wants something stronger for pain.   He accept referral to rheumatology. Informed we will call him back tomorrow. Please answer your phone or watch caller ID. We could not reach you last week because no answer and voicemail is full.   Constatnine Sexton RN    G:  [facility]   [phone] . Rheum referral placed. Pt to call for appt. However I am concerned joint pain has suddenly increased more. He may want to be seen in clinic through primary care sooner. He can certainly see PCP or another provider.     KAYE Moreira RN

## 2019-06-17 NOTE — TELEPHONE ENCOUNTER
"Requested Prescriptions   Pending Prescriptions Disp Refills     meloxicam (MOBIC) 15 MG tablet [Pharmacy Med Name: MELOXICAM 15 MG TABLET] 30 tablet 1     Sig: TAKE 1 TABLET BY MOUTH EVERY DAY     Last Written Prescription Date:  04/23/2019  Last Fill Quantity: 30 tablet,  # refills: 1   Last office visit: 4/17/2019 with prescribing provider:  04/17/2019   Future Office Visit:          NSAID Medications Failed - 6/15/2019  8:29 AM        Failed - Normal ALT on file in past 12 months     Recent Labs   Lab Test 03/21/19  1051   ALT 91*             Failed - Normal AST on file in past 12 months     Recent Labs   Lab Test 03/21/19  1051   AST 94*             Failed - Normal CBC on file in past 12 months     Recent Labs   Lab Test 12/12/16  1618   WBC 7.0   RBC 5.86   HGB 15.5   HCT 46.2                    Passed - Blood pressure under 140/90 in past 12 months     BP Readings from Last 3 Encounters:   03/21/19 115/72   10/26/18 139/88   07/24/18 99/67                 Passed - Recent (12 mo) or future (30 days) visit within the authorizing provider's specialty     Patient had office visit in the last 12 months or has a visit in the next 30 days with authorizing provider or within the authorizing provider's specialty.  See \"Patient Info\" tab in inbasket, or \"Choose Columns\" in Meds & Orders section of the refill encounter.              Passed - Patient is age 6-64 years        Passed - Medication is active on med list        Passed - Normal serum creatinine on file in past 12 months     Recent Labs   Lab Test 03/21/19  1051   CR 1.21             Salty VEGAT  "

## 2019-06-18 RX ORDER — MELOXICAM 15 MG/1
TABLET ORAL
Qty: 30 TABLET | Refills: 1 | Status: SHIPPED | OUTPATIENT
Start: 2019-06-18 | End: 2019-08-18

## 2019-06-18 NOTE — TELEPHONE ENCOUNTER
RN to call    Patient needs visit. I cannot fill something different without a visit. Patient NEEDS to see specialist, this is NOT appropriate for primary care to manage.    New referral placed to:  [facility], YOLIE Brooks [phone]    Dr. Mcgarry (previous Rheum. Referral) is leaving [place].      Mobic filled for today.      Raisa Calvillo PA-C

## 2019-06-19 DIAGNOSIS — M54.50 ACUTE BILATERAL LOW BACK PAIN WITHOUT SCIATICA: ICD-10-CM

## 2019-06-19 NOTE — TELEPHONE ENCOUNTER
Refill due 6/20/19.  Not [facility] med.  Sent to provider.  Please advise.  Have left 2 messages regarding Mobic and seeing rheumatology and has not returned calls.  Bozena Francisco RN

## 2019-06-19 NOTE — TELEPHONE ENCOUNTER
Will fill 1 more time. Future will be denied unless pt has f/u with me or Rheum set up.    Raisa Calvillo PA-C

## 2019-06-20 NOTE — TELEPHONE ENCOUNTER
Patient Contact    Attempt # 3    Was call answered?  No.  Unable to leave message to call back today    Alda Kingston RN, BSN  Message handled by Nurse Triage.

## 2019-07-09 DIAGNOSIS — M54.50 ACUTE BILATERAL LOW BACK PAIN WITHOUT SCIATICA: ICD-10-CM

## 2019-07-09 NOTE — TELEPHONE ENCOUNTER
"Last Written Prescription Date:  4/17/19  Last Fill Quantity: 60 tablet,  # refills: 1   Last office visit: 3/21/2019 with prescribing provider:  RAGHAVENDRA Calvillo   Future Office Visit:        Routing refill request to provider for review/approval because:  Drug not active on patient's medication list    Discontinued 4/23/2019, Reason for Discontinue: Therapy completed     Requested Prescriptions   Pending Prescriptions Disp Refills     ibuprofen (ADVIL/MOTRIN) 800 MG tablet [Pharmacy Med Name: IBUPROFEN 800 MG TABLET] 60 tablet 1     Sig: TAKE 1 TABLET (800 MG) BY MOUTH EVERY 8 HOURS AS NEEDED FOR MODERATE PAIN       NSAID Medications Failed - 7/9/2019 12:10 PM        Failed - Normal ALT on file in past 12 months     Recent Labs   Lab Test 03/21/19  1051   ALT 91*             Failed - Normal AST on file in past 12 months     Recent Labs   Lab Test 03/21/19  1051   AST 94*             Failed - Normal CBC on file in past 12 months     Recent Labs   Lab Test 12/12/16  1618   WBC 7.0   RBC 5.86   HGB 15.5   HCT 46.2                    Failed - Medication is active on med list        Passed - Blood pressure under 140/90 in past 12 months     BP Readings from Last 3 Encounters:   03/21/19 115/72   10/26/18 139/88   07/24/18 99/67                 Passed - Recent (12 mo) or future (30 days) visit within the authorizing provider's specialty     Patient had office visit in the last 12 months or has a visit in the next 30 days with authorizing provider or within the authorizing provider's specialty.  See \"Patient Info\" tab in inbasket, or \"Choose Columns\" in Meds & Orders section of the refill encounter.              Passed - Patient is age 6-64 years        Passed - Normal serum creatinine on file in past 12 months     Recent Labs   Lab Test 03/21/19  1051   CR 1.21               "

## 2019-07-09 NOTE — TELEPHONE ENCOUNTER
LMTCB-    Is he wanting to be back on this medication? Discontinued 4/23/19     Pilar Salomon RN Flex

## 2019-07-10 ENCOUNTER — TELEPHONE (OUTPATIENT)
Dept: FAMILY MEDICINE | Facility: CLINIC | Age: 64
End: 2019-07-10

## 2019-07-10 DIAGNOSIS — F41.9 ANXIETY: ICD-10-CM

## 2019-07-10 NOTE — LETTER
[facility]  [id number] [place] [id number] [phone]      July 15, 2019      Gerardo Gonzalez  [address] [facility] [address]      Gerardo -     We have been trying to reach you to discuss a medication refill request   Your voicemail is full and we are unable to leave you a message   We have also sent you a my chart message however does not appear you have viewed this message yet     Please call triage nurse to discuss [phone]     You are due for a follow up visit to continue to receive other refills - please schedule [phone]     Take care   Cristal Calix Registered Nurse   [facility]

## 2019-07-10 NOTE — TELEPHONE ENCOUNTER
Fax from [facility] AV that patient requests new RX for Fluoxetine 40 mg.  RX was DC'd by Raisa on 3/21/19 as stopped by patient.  I do not see mention of this in note.  Did he continue to take this?  Need to clarify since Raisa DC'd 3/21/19.  If really taking will need to go to provider to sign.   L/M to call.  Bozena Francisco RN

## 2019-07-12 DIAGNOSIS — G47.09 OTHER INSOMNIA: ICD-10-CM

## 2019-07-12 DIAGNOSIS — F41.9 ANXIETY: ICD-10-CM

## 2019-07-12 RX ORDER — IBUPROFEN 800 MG/1
800 TABLET, FILM COATED ORAL EVERY 8 HOURS PRN
Qty: 60 TABLET | Refills: 1 | Status: SHIPPED | OUTPATIENT
Start: 2019-07-12 | End: 2019-11-21

## 2019-07-12 RX ORDER — GABAPENTIN 600 MG/1
600 TABLET ORAL 3 TIMES DAILY
Qty: 90 TABLET | Refills: 0 | Status: SHIPPED | OUTPATIENT
Start: 2019-07-12 | End: 2019-11-06

## 2019-07-12 NOTE — TELEPHONE ENCOUNTER
Prescription approved per [facility] Refill Protocol  Nishi Yang RN BS

## 2019-07-12 NOTE — TELEPHONE ENCOUNTER
Disp Refills Start End BETY   gabapentin (NEURONTIN) 600 MG tablet (Discontinued) 270 tablet 1 1/3/2019 3/21/2019 --   Sig - Route: Take 1 tablet (600 mg) by mouth 3 times daily - Oral   Sent to pharmacy as: gabapentin (NEURONTIN) 600 MG tablet   Class: E-Prescribe   Reason for Discontinue      Return in about 3 months (around 7/17/2019) for Diabetes Recheck.   Routing refill request to provider for review/approval because:  Drug not on [place] [facility] [place]   If you send a month supply, send back to us to write letter to make appt, due for DM recheck    Nishi Yang RN, BS  Clinical Nurse Triage.

## 2019-07-12 NOTE — TELEPHONE ENCOUNTER
Below regarding Gabapentin.  When reach need to also schedule appt.  JOSEPH Zepeda Brian C, MD 33 minutes ago (9:57 AM)         30 d  Needs appt  Baldomero Hauser

## 2019-07-12 NOTE — TELEPHONE ENCOUNTER
Went directly to [facility].  Did yesterday also.  Now mailbox full.  Sent [employer] message.  Bozena Francisco RN

## 2019-07-12 NOTE — TELEPHONE ENCOUNTER
Requested Prescriptions   Pending Prescriptions Disp Refills     gabapentin (NEURONTIN) 600 MG tablet [Pharmacy Med Name: GABAPENTIN 600 MG TABLET] 270 tablet 1     Sig: TAKE 1 TABLET (600 MG) BY MOUTH 3 TIMES DAILY       There is no refill protocol information for this order      Discontinued 1/3/19     gabapentin (NEURONTIN) 300 MG capsule (Discontinued)   Last Written Prescription Date:  12/26/18-1/3/19  Last Fill Quantity: 90 capsules,   # refills: 1  Last Office Visit: 3/21/19 Rajinder  Future Office visit:       Routing refill request to provider for review/approval because:  Drug not on the [facility], [facility] or [facility] refill protocol or controlled substance

## 2019-07-15 RX ORDER — FLUOXETINE 40 MG/1
80 CAPSULE ORAL DAILY
Qty: 60 CAPSULE | Refills: 0 | OUTPATIENT
Start: 2019-07-15

## 2019-07-15 NOTE — TELEPHONE ENCOUNTER
Unable to reach patient via phone - unable to leave message as voicemail full     My chart message sent last week - patient has not viewed this     Fluoxetine refused - advised patient to contact clinic (this was stopped at earlier office visit see note below)     Letter mailed to patient today asking him to call to schedule an appointment - this is needed for continued refills of medications (gabapentin-per Dr. Hauser)   Asked patient to call triage to discuss medication refill request (fluoxetine)     Cristal Calix, Registered Nurse   [facility]

## 2019-08-14 ENCOUNTER — TELEPHONE (OUTPATIENT)
Dept: FAMILY MEDICINE | Facility: CLINIC | Age: 64
End: 2019-08-14

## 2019-08-14 ENCOUNTER — TRANSFERRED RECORDS (OUTPATIENT)
Dept: HEALTH INFORMATION MANAGEMENT | Facility: CLINIC | Age: 64
End: 2019-08-14

## 2019-08-14 NOTE — TELEPHONE ENCOUNTER
Fax from ? No pharmacy listed that Zolpidem needs PA.  PA renewal.  Please send.  See 7/27/16 telephone encounter.  Bozena Francisco RN    Phone- [phone]  ID- [phone]

## 2019-08-18 ENCOUNTER — TELEPHONE (OUTPATIENT)
Dept: FAMILY MEDICINE | Facility: CLINIC | Age: 64
End: 2019-08-18

## 2019-08-18 DIAGNOSIS — L40.50 PSORIATIC ARTHRITIS (H): ICD-10-CM

## 2019-08-18 NOTE — TELEPHONE ENCOUNTER
"Requested Prescriptions   Pending Prescriptions Disp Refills     meloxicam (MOBIC) 15 MG tablet [Pharmacy Med Name: MELOXICAM 15 MG TABLET] 30 tablet 1     Sig: TAKE 1 TABLET BY MOUTH EVERY DAY       NSAID Medications Failed - 8/18/2019  8:25 AM        Failed - Normal ALT on file in past 12 months     Recent Labs   Lab Test 03/21/19  1051   ALT 91*             Failed - Normal AST on file in past 12 months     Recent Labs   Lab Test 03/21/19  1051   AST 94*             Failed - Normal CBC on file in past 12 months     Recent Labs   Lab Test 12/12/16  1618   WBC 7.0   RBC 5.86   HGB 15.5   HCT 46.2                    Passed - Blood pressure under 140/90 in past 12 months     BP Readings from Last 3 Encounters:   03/21/19 115/72   10/26/18 139/88   07/24/18 99/67                 Passed - Recent (12 mo) or future (30 days) visit within the authorizing provider's specialty     Patient had office visit in the last 12 months or has a visit in the next 30 days with authorizing provider or within the authorizing provider's specialty.  See \"Patient Info\" tab in inbasket, or \"Choose Columns\" in Meds & Orders section of the refill encounter.              Passed - Patient is age 6-64 years        Passed - Medication is active on med list        Passed - Normal serum creatinine on file in past 12 months     Recent Labs   Lab Test 03/21/19  1051   CR 1.21               Last Written Prescription Date:  06/18/2019  Last Fill Quantity: 30,  # refills: 1   Last office visit: 3/21/2019 with prescribing provider:  Raisa becerra   Future Office Visit:      "

## 2019-08-19 NOTE — TELEPHONE ENCOUNTER
Routing refill request to provider for review/approval because:  LFTs are elevated.  CBC is not current in the past year. Last checked 2016.    Ava Lozano, BSN, RN

## 2019-08-20 RX ORDER — MELOXICAM 15 MG/1
TABLET ORAL
Qty: 30 TABLET | Refills: 1 | Status: SHIPPED | OUTPATIENT
Start: 2019-08-20 | End: 2019-10-14

## 2019-08-20 NOTE — TELEPHONE ENCOUNTER
Central Prior Authorization Team   Phone: [phone]      PA Initiation    Medication: Zolpidem  Insurance Company: [employer] - Phone [phone] Fax [phone]  Pharmacy Filling the Rx: [facility]ACY #[id number] - [place], [place] - [address]  Filling Pharmacy Phone: [phone]  Filling Pharmacy Fax:    Start Date: 8/20/2019

## 2019-08-20 NOTE — TELEPHONE ENCOUNTER
Prior Authorization Approval    Authorization Effective Date: 7/21/2019  Authorization Expiration Date: 8/19/2022  Medication: Zolpidem  Approved Dose/Quantity:    Reference #:     Insurance Company: [employer] - Phone [phone] Fax [phone]  Expected CoPay:       CoPay Card Available:      Foundation Assistance Needed:    Which Pharmacy is filling the prescription (Not needed for infusion/clinic administered): [facility]/PHARMACY #[id number] - [place], [place] - [address] [address]  Pharmacy Notified: Yes  Patient Notified: Yes **Instructed pharmacy to notify patient when script is ready to /ship.**

## 2019-09-04 ENCOUNTER — TELEPHONE (OUTPATIENT)
Dept: FAMILY MEDICINE | Facility: CLINIC | Age: 64
End: 2019-09-04

## 2019-09-04 NOTE — LETTER
September 6, 2019      Gerardo Gonzalez  [address] [place] [address]        Dear Gerardo,     We recently received a call from your pharmacy requesting a refill of Basaglar.     A review of your chart indicates that an appointment is required with your provider for med check and fasting labs also due.  Raisa also advised that you schedule with Daniel Haile for your diabetes. Please call the clinic at [phone] to schedule your appointments.     Taking care of your health is important to us and ongoing visits with your provider are vital to your care.  We look forward to seeing you in the near future.     Sincerely,        Raisa Calvillo PA-C/Bozena Francisco RN

## 2019-09-05 NOTE — TELEPHONE ENCOUNTER
"Requested Prescriptions   Pending Prescriptions Disp Refills     insulin glargine (BASAGLAR KWIKPEN) 100 UNIT/ML pen [Pharmacy Med Name: BASAGLAR 100 UNIT/ML KWIKPEN]  Last Written Prescription Date:  3/26/2019  Last Fill Quantity: 45 mL,  # refills: 0   Last office visit: 4/17/2019 with prescribing provider:  Rajinder   Future Office Visit:      1     Sig: INJECT 24-26 UNITS SUBCUTANEOUS AT BEDTIME       Long Acting Insulin Protocol Failed - 9/4/2019  8:37 AM        Failed - LDL on file in past 12 months     Recent Labs   Lab Test 07/24/18  1222   LDL 46             Failed - HgbA1C in past 3 or 6 months     If HgbA1C is 8 or greater, it needs to be on file within the past 3 months.  If less than 8, must be on file within the past 6 months.     Recent Labs   Lab Test 03/21/19  1051   A1C 8.2*             Passed - Blood pressure less than 140/90 in past 6 months     BP Readings from Last 3 Encounters:   03/21/19 115/72   10/26/18 139/88   07/24/18 99/67                 Passed - Microalbumin on file in past 12 months     Recent Labs   Lab Test 07/24/18  1222   MICROL 77   UMALCR 56.06*             Passed - Serum creatinine on file in past 12 months     Recent Labs   Lab Test 03/21/19  1051   CR 1.21             Passed - Medication is active on med list        Passed - Patient is age 18 or older        Passed - Recent (6 mo) or future (30 days) visit within the authorizing provider's specialty     Patient had office visit in the last 6 months or has a visit in the next 30 days with authorizing provider or within the authorizing provider's specialty.  See \"Patient Info\" tab in inbasket, or \"Choose Columns\" in Meds & Orders section of the refill encounter.              "

## 2019-09-06 RX ORDER — INSULIN GLARGINE 100 [IU]/ML
INJECTION, SOLUTION SUBCUTANEOUS
Qty: 15 ML | Refills: 0 | Status: SHIPPED | OUTPATIENT
Start: 2019-09-06 | End: 2019-10-25

## 2019-09-06 NOTE — TELEPHONE ENCOUNTER
Failing LDL and A1C.  No upcoming appt.  See below from different refill.  Has only seen Erika once 2/13/18.  Letter sent- advised visit with Rina.  Sent to provider.  Bozena Francisco RN    August 21, 2019   Italia Robins           11:20 AM   Note      Mail box is full  Italia Robins/      August 20, 2019   Ashly San           3:37 PM   Note      Tried to call the pt.mail box is full.  Ashly San MA  [facility].                          8:42 AM   Raisa Calvillo PA-C routed this conversation to Cr Sb2/3/4 [place] Team ([facility])   Raisa Calvillo PA-C           8:41 AM   Note      Please call pt. Due for OV for labs and needs endo f/u appt.     Raisa Calvillo PA-C

## 2019-09-09 ENCOUNTER — TELEPHONE (OUTPATIENT)
Dept: FAMILY MEDICINE | Facility: CLINIC | Age: 64
End: 2019-09-09

## 2019-09-09 RX ORDER — PEN NEEDLE, DIABETIC 31 GX5/16"
NEEDLE, DISPOSABLE MISCELLANEOUS
Qty: 200 EACH | Refills: 0 | Status: SHIPPED | OUTPATIENT
Start: 2019-09-09 | End: 2019-12-12

## 2019-09-09 NOTE — TELEPHONE ENCOUNTER
"Requested Prescriptions   Pending Prescriptions Disp Refills     B-D U/F 31G X 8 MM insulin pen needle [Pharmacy Med Name: BD UF SHORT PEN NEEDLE [id number]]  Last Written Prescription Date:  5/31/2019  Last Fill Quantity: 100 each,  # refills: 1   Last office visit: 4/17/2019 with prescribing provider:  Rajinder Tilley Office Visit:      1     Sig: INJECT 1 EACH SUBCUTANEOUS 2 TIMES DAILY       Diabetic Supplies Protocol Passed - 9/7/2019  2:20 PM        Passed - Medication is active on med list        Passed - Patient is 18 years of age or older        Passed - Recent (6 mo) or future (30 days) visit within the authorizing provider's specialty     Patient had office visit in the last 6 months or has a visit in the next 30 days with authorizing provider.  See \"Patient Info\" tab in inbasket, or \"Choose Columns\" in Meds & Orders section of the refill encounter.              "

## 2019-09-09 NOTE — TELEPHONE ENCOUNTER
Prescription approved per [facility], [facility] or [facility] refill protocol.  Rosalinda MORRISON RN   [facility]

## 2019-09-09 NOTE — TELEPHONE ENCOUNTER
Fax from [facility] that Trulicity needs PA.  Please advise.  Bozena Francisco RN    Visit [email or web address] and enter TRX code [id number]

## 2019-09-13 NOTE — TELEPHONE ENCOUNTER
Central Prior Authorization Team   Phone: [phone]      PA Initiation    Medication: Trulicity  Insurance Company: [employer] - Phone [phone] Fax [phone]  Pharmacy Filling the Rx: [facility]/PHARMACY #[id number] - [place], [place] - [address]  Filling Pharmacy Phone: [phone]  Filling Pharmacy Fax:    Start Date: 9/13/2019

## 2019-09-13 NOTE — TELEPHONE ENCOUNTER
Prior Authorization Approval    Authorization Effective Date: 9/13/2019  Authorization Expiration Date: 9/12/2022  Medication: Trulicity  Approved Dose/Quantity:    Reference #:     Insurance Company: [employer] - Phone [phone] Fax [phone]  Expected CoPay:       CoPay Card Available:      Foundation Assistance Needed:    Which Pharmacy is filling the prescription (Not needed for infusion/clinic administered): [facility]/PHARMACY #[id number] - [place], [place] - [address] [address]  Pharmacy Notified: Yes  Patient Notified: Yes **Instructed pharmacy to notify patient when script is ready to /ship.**

## 2019-09-13 NOTE — TELEPHONE ENCOUNTER
Note placed on med that PA was approved     Pharmacy to notify patient per note below     Closing encounter     Cristal Calix, Registered Nurse   [facility]

## 2019-10-07 ENCOUNTER — TELEPHONE (OUTPATIENT)
Dept: FAMILY MEDICINE | Facility: CLINIC | Age: 64
End: 2019-10-07

## 2019-10-14 DIAGNOSIS — L40.50 PSORIATIC ARTHRITIS (H): ICD-10-CM

## 2019-10-14 NOTE — TELEPHONE ENCOUNTER
"Requested Prescriptions   Pending Prescriptions Disp Refills     meloxicam (MOBIC) 15 MG tablet [Pharmacy Med Name: MELOXICAM 15 MG TABLET]  Last Written Prescription Date:  8/20/2019  Last Fill Quantity: 30 tablet,  # refills: 1   Last office visit: 4/17/2019 with prescribing provider:  Rajinder   Future Office Visit:     30 tablet 1     Sig: TAKE 1 TABLET BY MOUTH EVERY DAY       NSAID Medications Failed - 10/14/2019  1:06 AM        Failed - Normal ALT on file in past 12 months     Recent Labs   Lab Test 03/21/19  1051   ALT 91*             Failed - Normal AST on file in past 12 months     Recent Labs   Lab Test 03/21/19  1051   AST 94*             Failed - Normal CBC on file in past 12 months     Recent Labs   Lab Test 12/12/16  1618   WBC 7.0   RBC 5.86   HGB 15.5   HCT 46.2                    Passed - Blood pressure under 140/90 in past 12 months     BP Readings from Last 3 Encounters:   03/21/19 115/72   10/26/18 139/88   07/24/18 99/67                 Passed - Recent (12 mo) or future (30 days) visit within the authorizing provider's specialty     Patient has had an office visit with the authorizing provider or a provider within the authorizing providers department within the previous 12 mos or has a future within next 30 days. See \"Patient Info\" tab in inbasket, or \"Choose Columns\" in Meds & Orders section of the refill encounter.              Passed - Patient is age 6-64 years        Passed - Medication is active on med list        Passed - Normal serum creatinine on file in past 12 months     Recent Labs   Lab Test 03/21/19  1051   CR 1.21               "

## 2019-10-15 RX ORDER — MELOXICAM 15 MG/1
TABLET ORAL
Qty: 30 TABLET | Refills: 1 | Status: SHIPPED | OUTPATIENT
Start: 2019-10-15 | End: 2019-11-06

## 2019-10-15 NOTE — TELEPHONE ENCOUNTER
Routing refill request to provider for review/approval because:  Labs out of range:  AST/ALT  Labs not current:  CBC  Rosalinda Dover RN

## 2019-10-22 ENCOUNTER — TELEPHONE (OUTPATIENT)
Dept: FAMILY MEDICINE | Facility: CLINIC | Age: 64
End: 2019-10-22

## 2019-10-22 DIAGNOSIS — G47.00 PERSISTENT INSOMNIA: ICD-10-CM

## 2019-10-22 NOTE — LETTER
October 23, 2019      Gerardo Gonzalez  [address]  [place] [address]        Dear Gerardo,     We recently received a refill request for your zolpidem (AMBIEN) 10 MG tablet. Our records show you are due for a follow up with Endocrinology and a medication check with Raisa Calvillo. Please call [phone] and they can help you schedule.     Sincerely,        Raisa Calvillo PA-C

## 2019-10-23 RX ORDER — ZOLPIDEM TARTRATE 10 MG/1
TABLET ORAL
Qty: 30 TABLET | Refills: 0 | Status: SHIPPED | OUTPATIENT
Start: 2019-10-23 | End: 2020-07-29

## 2019-10-23 NOTE — TELEPHONE ENCOUNTER
Controlled Substance Refill Request for zolpidem (AMBIEN) 10 MG tablet  Problem List Complete:  No     PROVIDER TO CONSIDER COMPLETION OF PROBLEM LIST AND OVERVIEW/CONTROLLED SUBSTANCE AGREEMENT    Last Written Prescription Date:  3/21/2019  Last Fill Quantity: 90 tablet,   # refills: 1    THE MOST RECENT OFFICE VISIT MUST BE WITHIN THE PAST 3 MONTHS. AT LEAST ONE FACE TO FACE VISIT MUST OCCUR EVERY 6 MONTHS. ADDITIONAL VISITS CAN BE VIRTUAL.  (THIS STATEMENT SHOULD BE DELETED.)    Last Office Visit with [facility] [place] care provider: 3/21/2019Rajinder Office visit:     Controlled substance agreement:   Encounter-Level CSA:    There are no encounter-level csa.     Patient-Level CSA:    There are no patient-level csa.         Last Urine Drug Screen: No results found for: CDAUT, No results found for: COMDAT, No results found for: THC13, PCP13, COC13, MAMP13, OPI13, AMP13, BZO13, TCA13, MTD13, BAR13, OXY13, PPX13, BUP13     Processing:  Staff will hand deliver Rx to on-site pharmacy     [email or web address]       checked in past 3 months?  No, route to RN

## 2019-10-23 NOTE — TELEPHONE ENCOUNTER
Rx sent for 30. Call pt due for follow-up appt with Endocrinology and follow-up appt with KAYE Moreira PA-C

## 2019-10-23 NOTE — TELEPHONE ENCOUNTER
Routing refill request to provider for review/approval because:  Drug not on the [facility] refill protocol   Pended to pharmacy with 30 day as pt due for 6 month follow up.  Angi Stein RN

## 2019-10-25 ENCOUNTER — TELEPHONE (OUTPATIENT)
Dept: FAMILY MEDICINE | Facility: CLINIC | Age: 64
End: 2019-10-25

## 2019-10-25 RX ORDER — INSULIN GLARGINE 100 [IU]/ML
INJECTION, SOLUTION SUBCUTANEOUS
Qty: 3 ML | Refills: 1 | Status: SHIPPED | OUTPATIENT
Start: 2019-10-25 | End: 2019-11-18

## 2019-10-25 NOTE — TELEPHONE ENCOUNTER
"Requested Prescriptions   Pending Prescriptions Disp Refills     insulin glargine (BASAGLAR KWIKPEN) 100 UNIT/ML pen [Pharmacy Med Name: BASAGLAR 100 UNIT/ML KWIKPEN]  1     Sig: INJECT 24-26 UNITS SUBCUTANEOUS AT BEDTIME       Long Acting Insulin Protocol Failed - 10/25/2019  1:35 AM        Failed - Blood pressure less than 140/90 in past 6 months     BP Readings from Last 3 Encounters:   03/21/19 115/72   10/26/18 139/88   07/24/18 99/67                 Failed - LDL on file in past 12 months     Recent Labs   Lab Test 07/24/18  1222   LDL 46             Failed - Microalbumin on file in past 12 months     Recent Labs   Lab Test 07/24/18  1222   MICROL 77   UMALCR 56.06*             Failed - HgbA1C in past 3 or 6 months     If HgbA1C is 8 or greater, it needs to be on file within the past 3 months.  If less than 8, must be on file within the past 6 months.     Recent Labs   Lab Test 03/21/19  1051   A1C 8.2*             Failed - Recent (6 mo) or future (30 days) visit within the authorizing provider's specialty     Patient had office visit in the last 6 months or has a visit in the next 30 days with authorizing provider or within the authorizing provider's specialty.  See \"Patient Info\" tab in inbasket, or \"Choose Columns\" in Meds & Orders section of the refill encounter.            Passed - Serum creatinine on file in past 12 months     Recent Labs   Lab Test 03/21/19  1051   CR 1.21             Passed - Medication is active on med list        Passed - Patient is age 18 or older        Last Written Prescription Date:  9/6/2019  Last Fill Quantity: 15,  # refills: 0   Last office visit: 4/17/2019 with prescribing provider:  Raisa Calvillo     Future Office Visit:      Routing refill request to provider for review/approval because:  Barbara given x1 and patient did not follow up, please advise  Labs not current:  A1C  Over due for diabetes follow up    Apple WILDER, RN, BSN, PHN          "

## 2019-10-25 NOTE — TELEPHONE ENCOUNTER
Rx sent for 30 days. Call pt due for follow-up appt with Endocrinology and follow-up appt with KAYE Moreira PA-C

## 2019-11-04 RX ORDER — LOSARTAN POTASSIUM 50 MG/1
TABLET ORAL
Qty: 90 TABLET | Refills: 0 | Status: SHIPPED | OUTPATIENT
Start: 2019-11-04 | End: 2020-01-27

## 2019-11-04 NOTE — TELEPHONE ENCOUNTER
Barbara refill given per RN protocol.   Due for office visit: medication follow up   Pharmacy note to inform pt of office visit needed for continued medication use.     Anna Marie Drake, RN, BSN

## 2019-11-04 NOTE — TELEPHONE ENCOUNTER
"Requested Prescriptions   Pending Prescriptions Disp Refills     losartan (COZAAR) 50 MG tablet [Pharmacy Med Name: LOSARTAN POTASSIUM 50 MG TAB]  Last Written Prescription Date:  3/21/2019  Last Fill Quantity: 90 tablet,  # refills: 1   Last office visit: 4/17/2019 with prescribing provider:  Rajinder   Future Office Visit:     90 tablet 1     Sig: TAKE 1 TABLET BY MOUTH EVERY DAY       Angiotensin-II Receptors Passed - 11/3/2019 11:54 AM        Passed - Last blood pressure under 140/90 in past 12 months     BP Readings from Last 3 Encounters:   03/21/19 115/72   10/26/18 139/88   07/24/18 99/67                 Passed - Recent (12 mo) or future (30 days) visit within the authorizing provider's specialty     Patient has had an office visit with the authorizing provider or a provider within the authorizing providers department within the previous 12 mos or has a future within next 30 days. See \"Patient Info\" tab in inbasket, or \"Choose Columns\" in Meds & Orders section of the refill encounter.              Passed - Medication is active on med list        Passed - Patient is age 18 or older        Passed - Normal serum creatinine on file in past 12 months     Recent Labs   Lab Test 03/21/19  1051   CR 1.21             Passed - Normal serum potassium on file in past 12 months     Recent Labs   Lab Test 03/21/19  1051   POTASSIUM 4.1                      "

## 2019-11-05 ENCOUNTER — TELEPHONE (OUTPATIENT)
Dept: FAMILY MEDICINE | Facility: CLINIC | Age: 64
End: 2019-11-05

## 2019-11-05 NOTE — TELEPHONE ENCOUNTER
Gerardo is today to ask for refill on Ambien and request dosage be increased to 1-2 tablets per night instead of 1. He is due to be seen for further refills and also diabetic follow up. I have scheduled him with Raisa Calvillo PA-C tomorrow to discuss medications and for diabetic follow up. Ev Mijares R.N.

## 2019-11-06 ENCOUNTER — OFFICE VISIT (OUTPATIENT)
Dept: FAMILY MEDICINE | Facility: CLINIC | Age: 64
End: 2019-11-06
Payer: COMMERCIAL

## 2019-11-06 VITALS
HEART RATE: 108 BPM | RESPIRATION RATE: 20 BRPM | HEIGHT: 67 IN | SYSTOLIC BLOOD PRESSURE: 130 MMHG | DIASTOLIC BLOOD PRESSURE: 78 MMHG | WEIGHT: 170 LBS | BODY MASS INDEX: 26.68 KG/M2 | OXYGEN SATURATION: 96 % | TEMPERATURE: 98.4 F

## 2019-11-06 DIAGNOSIS — Z23 NEED FOR PROPHYLACTIC VACCINATION AND INOCULATION AGAINST INFLUENZA: ICD-10-CM

## 2019-11-06 DIAGNOSIS — L40.50 PSORIATIC ARTHRITIS (H): ICD-10-CM

## 2019-11-06 DIAGNOSIS — E78.5 HYPERLIPIDEMIA LDL GOAL <100: ICD-10-CM

## 2019-11-06 DIAGNOSIS — G47.00 PERSISTENT INSOMNIA: ICD-10-CM

## 2019-11-06 DIAGNOSIS — F41.9 ANXIETY: ICD-10-CM

## 2019-11-06 LAB — HBA1C MFR BLD: 14.7 % (ref 0–5.6)

## 2019-11-06 PROCEDURE — 83036 HEMOGLOBIN GLYCOSYLATED A1C: CPT | Performed by: PHYSICIAN ASSISTANT

## 2019-11-06 PROCEDURE — 99207 C FOOT EXAM  NO CHARGE: CPT | Performed by: PHYSICIAN ASSISTANT

## 2019-11-06 PROCEDURE — 82043 UR ALBUMIN QUANTITATIVE: CPT | Performed by: PHYSICIAN ASSISTANT

## 2019-11-06 PROCEDURE — 80061 LIPID PANEL: CPT | Performed by: PHYSICIAN ASSISTANT

## 2019-11-06 PROCEDURE — 36415 COLL VENOUS BLD VENIPUNCTURE: CPT | Performed by: PHYSICIAN ASSISTANT

## 2019-11-06 PROCEDURE — 80053 COMPREHEN METABOLIC PANEL: CPT | Performed by: PHYSICIAN ASSISTANT

## 2019-11-06 PROCEDURE — 83721 ASSAY OF BLOOD LIPOPROTEIN: CPT | Performed by: PHYSICIAN ASSISTANT

## 2019-11-06 PROCEDURE — 99214 OFFICE O/P EST MOD 30 MIN: CPT | Mod: 25 | Performed by: PHYSICIAN ASSISTANT

## 2019-11-06 PROCEDURE — 90471 IMMUNIZATION ADMIN: CPT | Performed by: PHYSICIAN ASSISTANT

## 2019-11-06 PROCEDURE — 90682 RIV4 VACC RECOMBINANT DNA IM: CPT | Performed by: PHYSICIAN ASSISTANT

## 2019-11-06 RX ORDER — METFORMIN HCL 500 MG
1000 TABLET, EXTENDED RELEASE 24 HR ORAL
Qty: 180 TABLET | Refills: 1 | Status: SHIPPED | OUTPATIENT
Start: 2019-11-06 | End: 2020-04-30

## 2019-11-06 RX ORDER — BUSPIRONE HYDROCHLORIDE 30 MG/1
TABLET ORAL
Refills: 1 | COMMUNITY
Start: 2019-02-09 | End: 2020-07-29

## 2019-11-06 RX ORDER — LOSARTAN POTASSIUM 50 MG/1
50 TABLET ORAL DAILY
Qty: 90 TABLET | Refills: 0 | Status: CANCELLED | OUTPATIENT
Start: 2019-11-06

## 2019-11-06 RX ORDER — ADALIMUMAB 40MG/0.4ML
40 KIT SUBCUTANEOUS
COMMUNITY
Start: 2019-10-29 | End: 2022-12-21

## 2019-11-06 RX ORDER — GABAPENTIN 800 MG/1
800 TABLET ORAL 3 TIMES DAILY
Qty: 90 TABLET | Refills: 2 | Status: SHIPPED | OUTPATIENT
Start: 2019-11-06 | End: 2019-12-06

## 2019-11-06 RX ORDER — TRAMADOL HYDROCHLORIDE 50 MG/1
TABLET ORAL
Refills: 0 | COMMUNITY
Start: 2019-08-14 | End: 2020-07-29

## 2019-11-06 RX ORDER — DIAZEPAM 5 MG
TABLET ORAL
Refills: 0 | COMMUNITY
Start: 2019-01-02 | End: 2019-11-07

## 2019-11-06 RX ORDER — GABAPENTIN 600 MG/1
600 TABLET ORAL 3 TIMES DAILY
Qty: 90 TABLET | Refills: 0 | Status: CANCELLED | OUTPATIENT
Start: 2019-11-06

## 2019-11-06 RX ORDER — MELOXICAM 15 MG/1
15 TABLET ORAL DAILY
Qty: 90 TABLET | Refills: 0 | Status: SHIPPED | OUTPATIENT
Start: 2019-11-06 | End: 2020-07-29

## 2019-11-06 RX ORDER — SIMVASTATIN 80 MG
TABLET ORAL
Qty: 90 TABLET | Refills: 0 | Status: SHIPPED | OUTPATIENT
Start: 2019-11-06 | End: 2020-03-24

## 2019-11-06 RX ORDER — FLUOXETINE 40 MG/1
CAPSULE ORAL
Refills: 0 | COMMUNITY
Start: 2019-02-05 | End: 2019-11-07

## 2019-11-06 RX ORDER — QUETIAPINE FUMARATE 25 MG/1
25-50 TABLET, FILM COATED ORAL
Qty: 60 TABLET | Refills: 2 | Status: SHIPPED | OUTPATIENT
Start: 2019-11-06 | End: 2020-01-07

## 2019-11-06 RX ORDER — MIRTAZAPINE 30 MG/1
TABLET, FILM COATED ORAL
Refills: 0 | COMMUNITY
Start: 2019-02-14 | End: 2020-07-29

## 2019-11-06 ASSESSMENT — MIFFLIN-ST. JEOR: SCORE: 1529.74

## 2019-11-06 NOTE — PROGRESS NOTES
Gerardo Gonzalez is a 64 year old male who presents to clinic today for the following health issues:      Diabetes:   He presents for follow up of diabetes.  He is checking home blood glucose two times daily. He checks blood glucose after meals.  Blood glucose is sometimes over 200 and sometimes over 70. He is aware of hypoglycemia symptoms including shakiness. He has no concerns regarding his diabetes at this time.  He is having excessive thirst. The patient has had a diabetic eye exam in the last 12 months. Eye exam performed on [id number].    Diabetes Management Resources  History of Present Illness        Diabetes:   He presents for follow up of diabetes.  He is checking home blood glucose two times daily. He checks blood glucose after meals.  Blood glucose is sometimes over 200 and sometimes over 70. He is aware of hypoglycemia symptoms including shakiness. He has no concerns regarding his diabetes at this time.  He is having excessive thirst. The patient has had a diabetic eye exam in the last 12 months. Eye exam performed on [id number].    Diabetes Management Resources     Hyperlipidemia Follow-Up      Are you having any of the following symptoms? (Select all that apply)  No complaints of shortness of breath, chest pain or pressure.  No increased sweating or nausea with activity.  No left-sided neck or arm pain.  No complaints of pain in calves when walking 1-2 blocks.    Are you regularly taking any medication or supplement to lower your cholesterol?   Yes- simvastatin    Are you having muscle aches or other side effects that you think could be caused by your cholesterol lowering medication?  No    Anxiety Follow-Up    How are you doing with your anxiety since your last visit? No change    Are you having other symptoms that might be associated with anxiety? No    Have you had a significant life event? No     Are you feeling depressed? No    Do you have any concerns with your use of alcohol or other drugs?  No    Social History     Tobacco Use     Smoking status: Never Smoker     Smokeless tobacco: Never Used   Substance Use Topics     Alcohol use: Yes     Alcohol/week: 6.0 standard drinks     Types: 6 Cans of beer per week     Comment: occ glass of wine     Drug use: No     NAOMI-7 SCORE 6/30/2017 7/24/2018 3/21/2019   Total Score - - -   Total Score - - 7 (mild anxiety)   Total Score 15 17 7     PHQ 2/8/2018 3/21/2019   PHQ-9 Total Score 10 9   Q9: Thoughts of better off dead/self-harm past 2 weeks Not at all Several days   F/U: Thoughts of suicide or self-harm - No   F/U: Safety concerns - No     Last PHQ-9 3/21/2019   1.  Little interest or pleasure in doing things 1   2.  Feeling down, depressed, or hopeless 1   3.  Trouble falling or staying asleep, or sleeping too much 1   4.  Feeling tired or having little energy 1   5.  Poor appetite or overeating 1   6.  Feeling bad about yourself 1   7.  Trouble concentrating 1   8.  Moving slowly or restless 1   Q9: Thoughts of better off dead/self-harm past 2 weeks 1   PHQ-9 Total Score 9   In the past two weeks have you had thoughts of suicide or self harm? No   Do you have concerns about your personal safety or the safety of others? No       Chronic Pain Follow-Up       Type / Location of Pain: all over  Analgesia/pain control:       Recent changes:  same      Overall control: Inadequate pain control  Activity level/function:      Daily activities:  Able to do light housework, cooking    Work:  Unable to work  Adverse effects:  No  Adherance    Taking medication as directed?  No: does not have medications    Participating in other treatments: yes--just started Humira with Dermatology  Has been referred to Rheum, but never follow-up   Risk Factors:    Sleep:  Poor    Mood/anxiety:  slightly worsened    Recent family or social stressors:  unemployment    Other aggravating factors: prolonged sitting  PHQ-9 SCORE 2/8/2018 3/21/2019   PHQ-9 Total Score MyChart 10 (Moderate  "depression) 9 (Mild depression)   PHQ-9 Total Score 10 9     NAOMI-7 SCORE 6/30/2017 7/24/2018 3/21/2019   Total Score - - -   Total Score - - 7 (mild anxiety)   Total Score 15 17 7     Encounter-Level CSA:    There are no encounter-level csa.     Patient-Level CSA:    There are no patient-level csa.             Poor sleep, would like to take 20 mg of Ambien at night for sleep.  Has used other products don't seem to work.   Used to take Ambien with Valium. Has been off valium for almost a year.     Past Medical History:   Diagnosis Date     Psoriatic arthropathy (H)      Type II or unspecified type diabetes mellitus without mention of complication, not stated as uncontrolled     2006     Vaccination not carried out      Past Surgical History:   Procedure Laterality Date     HC KNEE SCOPE, DIAGNOSTIC      Arthroscopy, Knee- Left     Family History   Problem Relation Age of Onset     Diabetes Mother         age 55     Diabetes Father         age 64     Family History Negative Sister         x3     Hypertension Brother      Social History     Tobacco Use     Smoking status: Never Smoker     Smokeless tobacco: Never Used   Substance Use Topics     Alcohol use: Yes     Alcohol/week: 6.0 standard drinks     Types: 6 Cans of beer per week     Comment: occ glass of wine     Reviewed and updated as needed this visit by Provider         Review of Systems   ROS COMP: Constitutional, HEENT, cardiovascular, pulmonary, GI, , musculoskeletal, neuro, skin, endocrine and psych systems are negative, except as otherwise noted.      Objective    /78 (BP Location: Right arm, Patient Position: Chair, Cuff Size: Adult Large)   Pulse 108   Temp 98.4  F (36.9  C) (Oral)   Resp 20   Ht 1.702 m (5' 7\")   Wt 77.1 kg (170 lb)   SpO2 96%   BMI 26.63 kg/m    Body mass index is 26.63 kg/m .  Physical Exam   Foot Exam: sensory exam unremarkable. Pulse palpable. Mycotic nails.   GENERAL APPEARANCE: healthy, alert and no distress  RESP: " lungs clear to auscultation - no rales, rhonchi or wheezes  CV: regular rates and rhythm, normal S1 S2, no S3 or S4 and no murmur, click or rub  ABDOMEN: soft, nontender, without hepatosplenomegaly or masses and bowel sounds normal  NEURO: Normal strength and tone, mentation intact and speech normal  DIABETIC FOOT EXAM: normal DP and PT pulses, no trophic changes or ulcerative lesions and normal sensory exam  PSYCH: mentation appears normal and anxious    Diagnostic Test Results:  Labs reviewed in Epic  No results found for this or any previous visit (from the past 24 hour(s)).        Assessment & Plan   Problem List Items Addressed This Visit     Anxiety  Will increase gabapentin  Follow-up 3 months.     Relevant Medications    busPIRone HCl (BUSPAR) 30 MG tablet    mirtazapine (REMERON) 30 MG tablet    gabapentin (NEURONTIN) 800 MG tablet    HYPERLIPIDEMIA LDL GOAL <100    Relevant Medications    simvastatin (ZOCOR) 80 MG tablet    Other Relevant Orders    Lipid panel reflex to direct LDL Non-fasting (Completed)    Persistent insomnia  Discussed Ambien not great for LT use.  Cannot increase dose. Will try increase of gabapentin and add Seroquel.  Patient will hold Ambien for now.  Referral given for sleep medicine.     Relevant Medications    QUEtiapine (SEROQUEL) 25 MG tablet    gabapentin (NEURONTIN) 800 MG tablet    Other Relevant Orders    SLEEP EVALUATION & MANAGEMENT REFERRAL - ADULT -[facility]  [phone] (Age 18 and up)    Psoriatic arthritis (H)  Referral given AGAIN to rheumatology  Referral to pain management as well.     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 Has been out of medication and not using meds.    Relevant Medications    metFORMIN (GLUCOPHAGE-XR) 500  "MG 24 hr tablet    gabapentin (NEURONTIN) 800 MG tablet    dulaglutide (TRULICITY) 1.5 MG/0.5ML pen    Other Relevant Orders    HEMOGLOBIN A1C (Completed)    Albumin Random Urine Quantitative with Creat Ratio (Completed)    REVIEW OF HEALTH MAINTENANCE PROTOCOL ORDERS (Completed)    FOOT EXAM (Completed)    ENDOCRINOLOGY ADULT REFERRAL    AMBULATORY ADULT DIABETES EDUCATOR REFERRAL (Completed)    Comprehensive metabolic panel (Completed)      Other Visit Diagnoses     Need for prophylactic vaccination and inoculation against influenza        Relevant Orders    INFLUENZA QUAD, RECOMBINANT, P-FREE (RIV4) (FLUBLOCK) [15193] (Completed)    Vaccine Administration, Initial [02941] (Completed)         BMI:   Estimated body mass index is 26.63 kg/m  as calculated from the following:    Height as of this encounter: 1.702 m (5' 7\").    Weight as of this encounter: 77.1 kg (170 lb).     Return in about 3 months (around 2/6/2020) for DM f/u, Pain Med F/U.    Scribe Disclosure:   I, Deborah Peng, am serving as a scribe; to document services personally performed by Raisa Calvillo PA-C - -based on data collection and the provider's statements to me.     Provider Disclosure:  I agree with above History, Review of Systems, Physical exam and Plan.  I have reviewed the content of the documentation and have edited it as needed. I have personally performed the services documented here and the documentation accurately represents those services and the decisions I have made.      Electronically signed by:  Raisa Calvillo PA-C  [facility]"

## 2019-11-06 NOTE — PROGRESS NOTES
Pt states he has an upcoming diabetic eye appt on 11/8/19.  Will get us a hard copy of results.  Claudia Garcia CMA

## 2019-11-07 ENCOUNTER — TELEPHONE (OUTPATIENT)
Dept: FAMILY MEDICINE | Facility: CLINIC | Age: 64
End: 2019-11-07

## 2019-11-07 ENCOUNTER — HEALTH MAINTENANCE LETTER (OUTPATIENT)
Age: 64
End: 2019-11-07

## 2019-11-07 DIAGNOSIS — Z79.4 TYPE 2 DIABETES MELLITUS WITHOUT COMPLICATION, WITH LONG-TERM CURRENT USE OF INSULIN (H): ICD-10-CM

## 2019-11-07 DIAGNOSIS — E11.9 TYPE 2 DIABETES MELLITUS WITHOUT COMPLICATION, WITH LONG-TERM CURRENT USE OF INSULIN (H): ICD-10-CM

## 2019-11-07 LAB
ALBUMIN SERPL-MCNC: 3.9 G/DL (ref 3.4–5)
ALP SERPL-CCNC: 149 U/L (ref 40–150)
ALT SERPL W P-5'-P-CCNC: 18 U/L (ref 0–70)
ANION GAP SERPL CALCULATED.3IONS-SCNC: 8 MMOL/L (ref 3–14)
AST SERPL W P-5'-P-CCNC: 15 U/L (ref 0–45)
BILIRUB SERPL-MCNC: 0.4 MG/DL (ref 0.2–1.3)
BUN SERPL-MCNC: 12 MG/DL (ref 7–30)
CALCIUM SERPL-MCNC: 9.1 MG/DL (ref 8.5–10.1)
CHLORIDE SERPL-SCNC: 97 MMOL/L (ref 94–109)
CHOLEST SERPL-MCNC: 248 MG/DL
CO2 SERPL-SCNC: 26 MMOL/L (ref 20–32)
CREAT SERPL-MCNC: 0.9 MG/DL (ref 0.66–1.25)
CREAT UR-MCNC: 22 MG/DL
GFR SERPL CREATININE-BSD FRML MDRD: 89 ML/MIN/{1.73_M2}
GLUCOSE SERPL-MCNC: 525 MG/DL (ref 70–99)
HDLC SERPL-MCNC: 413 MG/DL
LDLC SERPL CALC-MCNC: ABNORMAL MG/DL
LDLC SERPL DIRECT ASSAY-MCNC: 149 MG/DL
MICROALBUMIN UR-MCNC: 8 MG/L
MICROALBUMIN/CREAT UR: 33.33 MG/G CR (ref 0–17)
NONHDLC SERPL-MCNC: <1 MG/DL
POTASSIUM SERPL-SCNC: 4.3 MMOL/L (ref 3.4–5.3)
PROT SERPL-MCNC: 7.3 G/DL (ref 6.8–8.8)
SODIUM SERPL-SCNC: 131 MMOL/L (ref 133–144)
TRIGL SERPL-MCNC: 522 MG/DL

## 2019-11-07 RX ORDER — BLOOD SUGAR DIAGNOSTIC
STRIP MISCELLANEOUS
Qty: 200 STRIP | Refills: 1 | Status: SHIPPED | OUTPATIENT
Start: 2019-11-07 | End: 2022-07-01

## 2019-11-07 NOTE — TELEPHONE ENCOUNTER
Please call pt.     Glucose was 528, critically high.     A1c was VERY high at 14.7%.  Has he been using insulin or has he not been taking it??  Let's get him in with MTM, I would like to see if Barbara or Lori can see him soon.    Please help Gerardo get this scheduled (otherwise, he will not do this on his own)    MTM referral placed. This will be covered for him to see MTM.

## 2019-11-07 NOTE — TELEPHONE ENCOUNTER
"Last Written Prescription Date:  3/19/18  Last Fill Quantity: 100 strip,  # refills: 11   Last office visit: 2/13/2018 with prescribing provider:  JUDITH Trammell   Future Office Visit:   Next 5 appointments (look out 90 days)    Feb 05, 2020  1:30 PM CST  Office Visit with Raisa Calvillo PA-C, NATALY EXAM ROOM 05  [facility] ([facility]) [address] [place] [id number] [phone]         Requested Prescriptions   Pending Prescriptions Disp Refills     ONETOUCH VERIO IQ test strip [Pharmacy Med Name: ONE TOUCH VERIO TEST STRIP] 200 strip 5     Sig: USE TO TEST BLOOD SUGARS 2 TIMES DAILY OR AS DIRECTED.(VERIO)       Diabetic Supplies Protocol Failed - 11/7/2019  2:45 PM        Failed - Recent (6 mo) or future (30 days) visit within the authorizing provider's specialty     Patient had office visit in the last 6 months or has a visit in the next 30 days with authorizing provider.  See \"Patient Info\" tab in inbasket, or \"Choose Columns\" in Meds & Orders section of the refill encounter.            Passed - Medication is active on med list        Passed - Patient is 18 years of age or older          "

## 2019-11-11 ENCOUNTER — MYC MEDICAL ADVICE (OUTPATIENT)
Dept: PHARMACY | Facility: CLINIC | Age: 64
End: 2019-11-11

## 2019-11-11 ENCOUNTER — TELEPHONE (OUTPATIENT)
Dept: FAMILY MEDICINE | Facility: CLINIC | Age: 64
End: 2019-11-11

## 2019-11-11 NOTE — TELEPHONE ENCOUNTER
MTM referral from: [facility] visit (referral by provider)    MTM referral outreach attempt #2 on November 11, 2019 at 11:57 AM      Outcome: Patient not reachable after several attempts, will route to MTM Pharmacist/Provider as an FYI. Thank you for the referral.    See [facility] Pharmacy Coordinator

## 2019-11-18 ENCOUNTER — TRANSFERRED RECORDS (OUTPATIENT)
Dept: HEALTH INFORMATION MANAGEMENT | Facility: CLINIC | Age: 64
End: 2019-11-18

## 2019-11-18 LAB — RETINOPATHY: NEGATIVE

## 2019-11-18 NOTE — TELEPHONE ENCOUNTER
"Requested Prescriptions   Pending Prescriptions Disp Refills     insulin glargine (BASAGLAR KWIKPEN) 100 UNIT/ML pen [Pharmacy Med Name: BASAGLAR 100 UNIT/ML KWIKPEN]  Last Written Prescription Date:  10/25/2019  Last Fill Quantity: 3 mL,  # refills: 1   Last office visit: 11/6/2019 with prescribing provider:  Rajinder     Future Office Visit:   Next 5 appointments (look out 90 days)    Feb 05, 2020  1:30 PM CST  Acute Visit with Raisa Calvillo PA-C, CR EXAM ROOM 05  [facility] [address] [place] [id number] [phone]           1     Sig: INJECT 24-26 UNITS SUBCUTANEOUS AT BEDTIME       Long Acting Insulin Protocol Passed - 11/18/2019  3:36 PM        Passed - Blood pressure less than 140/90 in past 6 months     BP Readings from Last 3 Encounters:   11/06/19 130/78   03/21/19 115/72   10/26/18 139/88                 Passed - LDL on file in past 12 months     Recent Labs   Lab Test 11/06/19  1555   LDL Cannot estimate LDL when triglyceride exceeds 400 mg/dL  149*             Passed - Microalbumin on file in past 12 months     Recent Labs   Lab Test 11/06/19  1555   MICROL 8   UMALCR 33.33*             Passed - Serum creatinine on file in past 12 months     Recent Labs   Lab Test 11/06/19  1555   CR 0.90             Passed - HgbA1C in past 3 or 6 months     If HgbA1C is 8 or greater, it needs to be on file within the past 3 months.  If less than 8, must be on file within the past 6 months.     Recent Labs   Lab Test 11/06/19  1555   A1C 14.7*             Passed - Medication is active on med list        Passed - Patient is age 18 or older        Passed - Recent (6 mo) or future (30 days) visit within the authorizing provider's specialty     Patient had office visit in the last 6 months or has a visit in the next 30 days with authorizing provider or within the authorizing provider's specialty.  See \"Patient Info\" tab in inbasket, or \"Choose " "Columns\" in Meds & Orders section of the refill encounter.              "

## 2019-11-19 ENCOUNTER — TELEPHONE (OUTPATIENT)
Dept: FAMILY MEDICINE | Facility: CLINIC | Age: 64
End: 2019-11-19

## 2019-11-19 RX ORDER — INSULIN GLARGINE 100 [IU]/ML
INJECTION, SOLUTION SUBCUTANEOUS
Qty: 9 ML | Refills: 0 | Status: SHIPPED | OUTPATIENT
Start: 2019-11-19 | End: 2019-12-16

## 2019-11-19 NOTE — TELEPHONE ENCOUNTER
Patient calling, states at his last appointment on 11/6/19 Raisa Calvillo told him to call in if his Gabapentin isn't working for him and would prescribe something stronger. Please call patient back at [phone]      Deja Lyles-Patient Rep

## 2019-11-21 DIAGNOSIS — M54.50 ACUTE BILATERAL LOW BACK PAIN WITHOUT SCIATICA: ICD-10-CM

## 2019-11-22 RX ORDER — IBUPROFEN 800 MG/1
800 TABLET, FILM COATED ORAL EVERY 8 HOURS PRN
Qty: 60 TABLET | Refills: 1 | Status: SHIPPED | OUTPATIENT
Start: 2019-11-22 | End: 2019-12-31

## 2019-11-22 NOTE — TELEPHONE ENCOUNTER
Routing refill request to provider for review/approval because:  Labs not current:  CBC.  Nyasia Rico RN

## 2019-11-22 NOTE — TELEPHONE ENCOUNTER
"Requested Prescriptions   Pending Prescriptions Disp Refills     ibuprofen (ADVIL/MOTRIN) 800 MG tablet [Pharmacy Med Name: IBUPROFEN 800 MG TABLET] 60 tablet 1     Sig: TAKE 1 TABLET (800 MG) BY MOUTH EVERY 8 HOURS AS NEEDED FOR MODERATE PAIN       Last Written Prescription Date: 7/12/19   Last Fill Quantity: 60 tablet,  # refills: 1   Last office visit: 11/6/2019 with prescribing provider:   Rajinder    Future Office Visit:   Next 5 appointments (look out 90 days)    Feb 05, 2020  1:30 PM CST  Acute Visit with Raisa Calvillo PA-C, CR EXAM ROOM 05  [facility]) [address] [place] [id number] [phone]             NSAID Medications Failed - 11/21/2019  9:30 AM        Failed - Normal CBC on file in past 12 months     Recent Labs   Lab Test 12/12/16  1618   WBC 7.0   RBC 5.86   HGB 15.5   HCT 46.2                    Passed - Blood pressure under 140/90 in past 12 months     BP Readings from Last 3 Encounters:   11/06/19 130/78   03/21/19 115/72   10/26/18 139/88                 Passed - Normal ALT on file in past 12 months     Recent Labs   Lab Test 11/06/19  1555   ALT 18             Passed - Normal AST on file in past 12 months     Recent Labs   Lab Test 11/06/19  1555   AST 15             Passed - Recent (12 mo) or future (30 days) visit within the authorizing provider's specialty     Patient has had an office visit with the authorizing provider or a provider within the authorizing providers department within the previous 12 mos or has a future within next 30 days. See \"Patient Info\" tab in inbasket, or \"Choose Columns\" in Meds & Orders section of the refill encounter.              Passed - Patient is age 6-64 years        Passed - Medication is active on med list        Passed - Normal serum creatinine on file in past 12 months     Recent Labs   Lab Test 11/06/19  1555   CR 0.90               "

## 2019-11-28 DIAGNOSIS — G47.00 PERSISTENT INSOMNIA: ICD-10-CM

## 2019-11-28 DIAGNOSIS — F41.9 ANXIETY: ICD-10-CM

## 2019-11-29 NOTE — TELEPHONE ENCOUNTER
Requested Prescriptions   Pending Prescriptions Disp Refills     QUEtiapine (SEROQUEL) 25 MG tablet [Pharmacy Med Name: QUETIAPINE FUMARATE 25 MG TAB]  Medication may not be due for a refill.    Last Written Prescription Date:  11/6/19  Last Fill Quantity: 60 tablet,  # refills: 2   Last office visit: 11/6/2019 with prescribing provider:  Rajinder     Future Office Visit:   Next 5 appointments (look out 90 days)    Feb 05, 2020  1:30 PM CST  Acute Visit with Raisa Calvillo PA-C, CR EXAM ROOM 05  [facility] ([facility]) [address] [place] [phone]          60 tablet 2     Sig: TAKE 1-2 TABLETS (25-50 MG) BY MOUTH NIGHTLY AS NEEDED (INSOMNIA)       Antipsychotic Medications Failed - 11/28/2019 12:31 PM        Failed - CBC on file in past 12 months     Recent Labs   Lab Test 12/12/16  1618   WBC 7.0   RBC 5.86   HGB 15.5   HCT 46.2                    Passed - Blood pressure under 140/90 in past 12 months     BP Readings from Last 3 Encounters:   11/06/19 130/78   03/21/19 115/72   10/26/18 139/88                 Passed - Patient is 12 years of age or older        Passed - Lipid panel on file within the past 12 months     Recent Labs   Lab Test 11/06/19  1555  07/06/15  0838   CHOL 248*   < > 201*   TRIG 522*   < > 350*      < > 46   LDL Cannot estimate LDL when triglyceride exceeds 400 mg/dL  149*   < > 85   NHDL <1   < >  --    VLDL  --   --  70*   CHOLHDLRATIO  --   --  4.4    < > = values in this interval not displayed.               Passed - Heart Rate on file within past 12 months     Pulse Readings from Last 3 Encounters:   11/06/19 108   03/21/19 114   10/26/18 99               Passed - A1c or Glucose on file in past 12 months     Recent Labs   Lab Test 11/06/19  1555   *   A1C 14.7*       Please review patients last 3 weights. If a weight gain of >10 lbs exists, you may refill the prescription once after  "instructing the patient to schedule an appointment within the next 30 days.    Wt Readings from Last 3 Encounters:   11/06/19 77.1 kg (170 lb)   03/21/19 74.8 kg (165 lb)   10/26/18 79.4 kg (175 lb)             Passed - Medication is active on med list        Passed - Recent (6 mo) or future (30 days) visit within the authorizing provider's specialty     Patient had office visit in the last 6 months or has a visit in the next 30 days with authorizing provider or within the authorizing provider's specialty.  See \"Patient Info\" tab in inbasket, or \"Choose Columns\" in Meds & Orders section of the refill encounter.            gabapentin (NEURONTIN) 800 MG tablet [Pharmacy Med Name: GABAPENTIN 800 MG TABLET]  Medication may not be due for a refill.    Last Written Prescription Date:  11/6/19  Last Fill Quantity: 90 tablet,   # refills: 2  Last Office Visit: 11/6/2019 with Rajinder    Future Office visit:    Next 5 appointments (look out 90 days)    Feb 05, 2020  1:30 PM CST  Acute Visit with Raisa Calvillo PA-C, CR EXAM ROOM 05  [facility] [address] [place] [phone]           Routing refill request to provider for review/approval because:  Drug not on the [facility], [facility] or  Health refill protocol or controlled substance 90 tablet 2     Sig: TAKE 1 TABLET (800 MG) BY MOUTH 3 TIMES DAILY       There is no refill protocol information for this order        "

## 2019-12-02 RX ORDER — GABAPENTIN 800 MG/1
800 TABLET ORAL 3 TIMES DAILY
Start: 2019-12-02

## 2019-12-02 RX ORDER — QUETIAPINE FUMARATE 25 MG/1
25-50 TABLET, FILM COATED ORAL
Start: 2019-12-02

## 2019-12-05 ENCOUNTER — TELEPHONE (OUTPATIENT)
Dept: FAMILY MEDICINE | Facility: CLINIC | Age: 64
End: 2019-12-05

## 2019-12-05 DIAGNOSIS — G47.00 PERSISTENT INSOMNIA: ICD-10-CM

## 2019-12-05 DIAGNOSIS — F41.9 ANXIETY: ICD-10-CM

## 2019-12-05 NOTE — TELEPHONE ENCOUNTER
"Pt calls, saw CJ 11/6/19, calls with update, aware CJ out until 12/10, ok for 12/10, pt informs:    ~has appointment with rheumatology 1/4/20  ~dermatologist prescribing Humira  ~CJ prescribed gabapentin 800 tid, not helping, WONDERS if can increase  Dose, informs CJ told pt to call with update  ~see 11/19/19 phone encounter, same issue, pt never called back  ~\"Participating in other treatments: yes--just started Humira with Dermatology~Cannot increase dose. Will try increase of gabapentin and add Seroquel\"  ~see ibuprofen 800 refill 11/21/19, pt informs takes occasional for migraine, related diagnosis back pain, ok to take with humira, FYI, let him know if issues    Routed to  for 12/10, please advise f/u plan, route to inform   Telephone Information:   Mobile [phone]     Alda Kingston RN, BSN  Message handled by Nurse Triage.    "

## 2019-12-06 RX ORDER — GABAPENTIN 800 MG/1
TABLET ORAL
Qty: 120 TABLET | Refills: 2 | Status: SHIPPED | OUTPATIENT
Start: 2019-12-06 | End: 2020-03-03

## 2019-12-06 NOTE — TELEPHONE ENCOUNTER
Can add another gabapentin @ bedtime,   See Rx instructions.    MTM has tried several times to reach out to pt.   I would recommend seeing them, to help with glucose control and for pain as well.     Raisa Calvillo PA-C

## 2019-12-10 NOTE — TELEPHONE ENCOUNTER
Pt calls, informed, agrees, restarted metformin so sugars improving, agrees to call and schedule MTM appointment, driving now so will call back to schedule  Alda Kingston RN, BSN  Message handled by Nurse Triage.

## 2019-12-13 RX ORDER — PEN NEEDLE, DIABETIC 31 GX5/16"
NEEDLE, DISPOSABLE MISCELLANEOUS
Qty: 200 EACH | Refills: 0 | Status: SHIPPED | OUTPATIENT
Start: 2019-12-13 | End: 2020-06-29

## 2019-12-13 NOTE — TELEPHONE ENCOUNTER
Prescription approved per [facility].  Yue Mason RN on 12/13/2019 at 10:04 AM

## 2019-12-16 RX ORDER — INSULIN GLARGINE 100 [IU]/ML
INJECTION, SOLUTION SUBCUTANEOUS
Qty: 9 ML | Refills: 0 | Status: SHIPPED | OUTPATIENT
Start: 2019-12-16 | End: 2019-12-19

## 2019-12-17 ENCOUNTER — TELEPHONE (OUTPATIENT)
Dept: FAMILY MEDICINE | Facility: CLINIC | Age: 64
End: 2019-12-17

## 2019-12-17 DIAGNOSIS — G47.00 PERSISTENT INSOMNIA: ICD-10-CM

## 2019-12-18 NOTE — TELEPHONE ENCOUNTER
Last Written Prescription Date:  10.23.19  Last Fill Quantity: 30,  # refills: 0   Last office visit: 11/6/2019 with prescribing provider:  Rajinder   Future Office Visit:   Next 5 appointments (look out 90 days)    Feb 05, 2020  1:30 PM CST  Acute Visit with Raisa Calvillo PA-C, CR EXAM ROOM 05  [facility] [address] [place] [id number] [phone]           Routing refill request to provider for review/approval because:  Drug not on the [facility] refill protocol

## 2019-12-18 NOTE — TELEPHONE ENCOUNTER
Persistent insomnia  Discussed Ambien not great for LT use.  Cannot increase dose. Will try increase of gabapentin and add Seroquel.  Patient will hold Ambien for now.  Referral given for sleep medicine.      Relevant Medications     QUEtiapine (SEROQUEL) 25 MG tablet     gabapentin (NEURONTIN) 800 MG tablet     Other Relevant Orders     SLEEP EVALUATION & MANAGEMENT REFERRAL - ADULT -[facility]  [phone] (Age 18 and up)     These are my notes from the last visit.   How has sleep been for pt?  I see he does not have appt yet with sleep med. Can we help him schedule something?    If not sleeping well route back and we can increase dose of Seroquel and or gabapentin.    Raisa Calvillo PA-C

## 2019-12-19 ENCOUNTER — TELEPHONE (OUTPATIENT)
Dept: FAMILY MEDICINE | Facility: CLINIC | Age: 64
End: 2019-12-19

## 2019-12-19 RX ORDER — INSULIN GLARGINE 100 [IU]/ML
INJECTION, SOLUTION SUBCUTANEOUS
Qty: 9 ML | Refills: 0 | Status: SHIPPED | OUTPATIENT
Start: 2019-12-19 | End: 2020-01-14

## 2019-12-19 NOTE — TELEPHONE ENCOUNTER
Fax from [facility] that patient states he uses 35 units of Basaglar a day.  T'd up.  Please advise.  Bozena Francisco RN

## 2019-12-20 RX ORDER — ZOLPIDEM TARTRATE 10 MG/1
TABLET ORAL
Qty: 30 TABLET | Refills: 0 | OUTPATIENT
Start: 2019-12-20

## 2019-12-26 DIAGNOSIS — F41.9 ANXIETY: ICD-10-CM

## 2019-12-26 DIAGNOSIS — G47.00 PERSISTENT INSOMNIA: ICD-10-CM

## 2019-12-27 DIAGNOSIS — M54.50 ACUTE BILATERAL LOW BACK PAIN WITHOUT SCIATICA: ICD-10-CM

## 2019-12-27 RX ORDER — GABAPENTIN 800 MG/1
TABLET ORAL
Qty: 90 TABLET | Refills: 2
Start: 2019-12-27

## 2019-12-27 RX ORDER — QUETIAPINE FUMARATE 25 MG/1
25-50 TABLET, FILM COATED ORAL
Qty: 60 TABLET | Refills: 2
Start: 2019-12-27

## 2019-12-27 NOTE — TELEPHONE ENCOUNTER
Spoke to pharmacy and patient has refills available for  there.     Denied at this time.     Pilar Salomon RN Flex

## 2019-12-30 NOTE — TELEPHONE ENCOUNTER
"Requested Prescriptions   Pending Prescriptions Disp Refills     ibuprofen (ADVIL/MOTRIN) 800 MG tablet [Pharmacy Med Name: IBUPROFEN 800 MG TABLET] 60 tablet 1     Sig: TAKE 1 TABLET (800 MG) BY MOUTH EVERY 8 HOURS AS NEEDED FOR MODERATE PAIN       NSAID Medications Failed - 12/27/2019  9:39 AM        Failed - Normal CBC on file in past 12 months     Recent Labs   Lab Test 12/12/16  1618   WBC 7.0   RBC 5.86   HGB 15.5   HCT 46.2                    Passed - Blood pressure under 140/90 in past 12 months     BP Readings from Last 3 Encounters:   11/06/19 130/78   03/21/19 115/72   10/26/18 139/88                 Passed - Normal ALT on file in past 12 months     Recent Labs   Lab Test 11/06/19  1555   ALT 18             Passed - Normal AST on file in past 12 months     Recent Labs   Lab Test 11/06/19  1555   AST 15             Passed - Recent (12 mo) or future (30 days) visit within the authorizing provider's specialty     Patient has had an office visit with the authorizing provider or a provider within the authorizing providers department within the previous 12 mos or has a future within next 30 days. See \"Patient Info\" tab in inbasket, or \"Choose Columns\" in Meds & Orders section of the refill encounter.              Passed - Patient is age 6-64 years        Passed - Medication is active on med list        Passed - Normal serum creatinine on file in past 12 months     Recent Labs   Lab Test 11/06/19  1555   CR 0.90             Last Written Prescription Date:  11/22/19  Last Fill Quantity: 60,   # refills: 1  Last Office Visit: 11/6/19  Future Office visit:    Next 5 appointments (look out 90 days)    Feb 05, 2020  1:30 PM CST  Acute Visit with Raisa Calvillo PA-C, CR EXAM ROOM 05  [facility] [address] [place] [id number] [phone]           Routing refill request to provider for review/approval because:  CBC Labs overdue   "

## 2019-12-31 RX ORDER — IBUPROFEN 800 MG/1
800 TABLET, FILM COATED ORAL EVERY 8 HOURS PRN
Qty: 60 TABLET | Refills: 1 | Status: SHIPPED | OUTPATIENT
Start: 2019-12-31 | End: 2020-02-12

## 2020-01-02 DIAGNOSIS — N52.9 ERECTILE DYSFUNCTION, UNSPECIFIED ERECTILE DYSFUNCTION TYPE: ICD-10-CM

## 2020-01-02 NOTE — TELEPHONE ENCOUNTER
"Request is for \" Cialis\"  Per pharmacy they do not have a recent record and are unsure of strength/dose.    "

## 2020-01-03 RX ORDER — SILDENAFIL 100 MG/1
100 TABLET, FILM COATED ORAL DAILY PRN
Qty: 6 TABLET | Refills: 1 | Status: SHIPPED | OUTPATIENT
Start: 2020-01-03 | End: 2020-02-25

## 2020-01-03 NOTE — TELEPHONE ENCOUNTER
"Requested Prescriptions   Pending Prescriptions Disp Refills     sildenafil (VIAGRA) 100 MG tablet 6 tablet 1     Sig: Take 1 tablet (100 mg) by mouth daily as needed 30 min to 4 hrs before sex. Do not use with nitroglycerin, terazosin or doxazosin.   Last Written Prescription Date:  10/26/18  Last Fill Quantity: 6,  # refills: 1   Last office visit: 11/6/2019 with prescribing provider   Future Office Visit:   Next 5 appointments (look out 90 days)    Feb 05, 2020  1:30 PM CST  Acute Visit with Raisa Calvillo PA-C, CR EXAM ROOM 05  [facility] [address] [place] [address] [phone]             Erectile Dysfuction Protocol Passed - 1/2/2020  9:18 AM        Passed - Absence of nitrates on medication list        Passed - Absence of Alpha Blockers on Med list        Passed - Recent (12 mo) or future (30 days) visit within the authorizing provider's specialty     Patient has had an office visit with the authorizing provider or a provider within the authorizing providers department within the previous 12 mos or has a future within next 30 days. See \"Patient Info\" tab in inbasket, or \"Choose Columns\" in Meds & Orders section of the refill encounter.              Passed - Medication is active on med list        Passed - Patient is age 18 or older        Prescription approved per [facility] Refill Protocol.  Yue Mason RN on 1/3/2020 at 11:05 AM    "

## 2020-01-04 DIAGNOSIS — G47.00 PERSISTENT INSOMNIA: ICD-10-CM

## 2020-01-07 RX ORDER — QUETIAPINE FUMARATE 25 MG/1
25-50 TABLET, FILM COATED ORAL
Qty: 60 TABLET | Refills: 2 | Status: SHIPPED | OUTPATIENT
Start: 2020-01-07 | End: 2020-03-24

## 2020-01-07 NOTE — TELEPHONE ENCOUNTER
Routing refill request to provider for review/approval because:  Labs not current:  CBC      Requested Prescriptions   Pending Prescriptions Disp Refills     QUEtiapine (SEROQUEL) 25 MG tablet 60 tablet 2     Sig: Take 1-2 tablets (25-50 mg) by mouth nightly as needed (insomnia)       Antipsychotic Medications Failed - 1/7/2020 11:11 AM        Failed - CBC on file in past 12 months     Recent Labs   Lab Test 12/12/16  1618   WBC 7.0   RBC 5.86   HGB 15.5   HCT 46.2                    Passed - Blood pressure under 140/90 in past 12 months     BP Readings from Last 3 Encounters:   11/06/19 130/78   03/21/19 115/72   10/26/18 139/88                 Passed - Patient is 12 years of age or older        Passed - Lipid panel on file within the past 12 months     Recent Labs   Lab Test 11/06/19  1555  07/06/15  0838   CHOL 248*   < > 201*   TRIG 522*   < > 350*      < > 46   LDL Cannot estimate LDL when triglyceride exceeds 400 mg/dL  149*   < > 85   NHDL <1   < >  --    VLDL  --   --  70*   CHOLHDLRATIO  --   --  4.4    < > = values in this interval not displayed.               Passed - Heart Rate on file within past 12 months     Pulse Readings from Last 3 Encounters:   11/06/19 108   03/21/19 114   10/26/18 99               Passed - A1c or Glucose on file in past 12 months     Recent Labs   Lab Test 11/06/19  1555   *   A1C 14.7*       Please review patients last 3 weights. If a weight gain of >10 lbs exists, you may refill the prescription once after instructing the patient to schedule an appointment within the next 30 days.    Wt Readings from Last 3 Encounters:   11/06/19 77.1 kg (170 lb)   03/21/19 74.8 kg (165 lb)   10/26/18 79.4 kg (175 lb)             Passed - Medication is active on med list        Passed - Recent (6 mo) or future (30 days) visit within the authorizing provider's specialty     Patient had office visit in the last 6 months or has a visit in the next 30 days with authorizing  "provider or within the authorizing provider's specialty.  See \"Patient Info\" tab in inbasket, or \"Choose Columns\" in Meds & Orders section of the refill encounter.                Stephanie Haro RN, BSN, PHN    "

## 2020-01-11 DIAGNOSIS — F41.9 ANXIETY: ICD-10-CM

## 2020-01-11 DIAGNOSIS — G47.00 PERSISTENT INSOMNIA: ICD-10-CM

## 2020-01-13 RX ORDER — GABAPENTIN 800 MG/1
TABLET ORAL
Qty: 120 TABLET | Refills: 2 | OUTPATIENT
Start: 2020-01-13

## 2020-01-14 RX ORDER — INSULIN GLARGINE 100 [IU]/ML
INJECTION, SOLUTION SUBCUTANEOUS
Qty: 9 ML | Refills: 0 | Status: SHIPPED | OUTPATIENT
Start: 2020-01-14 | End: 2020-02-07

## 2020-01-14 NOTE — TELEPHONE ENCOUNTER
"Requested Prescriptions   Pending Prescriptions Disp Refills     insulin glargine (BASAGLAR KWIKPEN) 100 UNIT/ML pen 9 mL 0     Sig: INJECT 35 UNITS SUBCUTANEOUS AT BEDTIME   Last Written Prescription Date:  12/19/19  Last Fill Quantity: 9 mL,  # refills: 0   Last office visit: 11/6/2019 with prescribing provider   Future Office Visit:   Next 5 appointments (look out 90 days)    Feb 05, 2020  1:30 PM CST  Acute Visit with Raisa Calvillo PA-C, CR EXAM ROOM 05  [facility] [address] [place] [address]  [phone]             Long Acting Insulin Protocol Passed - 1/14/2020  9:21 AM        Passed - Blood pressure less than 140/90 in past 6 months     BP Readings from Last 3 Encounters:   11/06/19 130/78   03/21/19 115/72   10/26/18 139/88                 Passed - LDL on file in past 12 months     Recent Labs   Lab Test 11/06/19  1555   LDL Cannot estimate LDL when triglyceride exceeds 400 mg/dL  149*             Passed - Microalbumin on file in past 12 months     Recent Labs   Lab Test 11/06/19  1555   MICROL 8   UMALCR 33.33*             Passed - Serum creatinine on file in past 12 months     Recent Labs   Lab Test 11/06/19  1555   CR 0.90             Passed - HgbA1C in past 3 or 6 months     If HgbA1C is 8 or greater, it needs to be on file within the past 3 months.  If less than 8, must be on file within the past 6 months.     Recent Labs   Lab Test 11/06/19  1555   A1C 14.7*             Passed - Medication is active on med list        Passed - Patient is age 18 or older        Passed - Recent (6 mo) or future (30 days) visit within the authorizing provider's specialty     Patient had office visit in the last 6 months or has a visit in the next 30 days with authorizing provider or within the authorizing provider's specialty.  See \"Patient Info\" tab in inbasket, or \"Choose Columns\" in Meds & Orders section of the refill encounter.        "     Prescription approved per [facility] Refill Protocol.  Yue Mason RN on 1/14/2020 at 11:13 AM

## 2020-01-23 DIAGNOSIS — F41.9 ANXIETY: ICD-10-CM

## 2020-01-23 DIAGNOSIS — G47.00 PERSISTENT INSOMNIA: ICD-10-CM

## 2020-01-23 RX ORDER — GABAPENTIN 800 MG/1
TABLET ORAL
Qty: 120 TABLET | Refills: 2 | OUTPATIENT
Start: 2020-01-23

## 2020-01-23 NOTE — TELEPHONE ENCOUNTER
Rx was sent 12/6/19, for #120 (30 day) with 2 refills     SHOULD HAVE REFILLS REMAINING     Marleni LAINEZ RN

## 2020-01-27 RX ORDER — LOSARTAN POTASSIUM 50 MG/1
TABLET ORAL
Qty: 90 TABLET | Refills: 1 | Status: SHIPPED | OUTPATIENT
Start: 2020-01-27 | End: 2020-07-23

## 2020-01-27 NOTE — TELEPHONE ENCOUNTER
"Cozaar  Last Written Prescription Date:  11/04/2019  Last Fill Quantity: 90,  # refills: 0   Last office visit: 11/6/2019 with prescribing provider:  Rajnider   Future Office Visit:   Next 5 appointments (look out 90 days)    Feb 05, 2020  1:30 PM CST  Acute Visit with Raisa Calvillo PA-C, CR EXAM ROOM 05  [facility] [address] [place] [id number] [phone]      Prescription approved per [facility] Refill Protocol.    Requested Prescriptions   Pending Prescriptions Disp Refills     losartan (COZAAR) 50 MG tablet [Pharmacy Med Name: LOSARTAN POTASSIUM 50 MG TAB] 90 tablet 0     Sig: TAKE 1 TABLET BY MOUTH EVERY DAY       Angiotensin-II Receptors Passed - 1/27/2020  1:50 AM        Passed - Last blood pressure under 140/90 in past 12 months     BP Readings from Last 3 Encounters:   11/06/19 130/78   03/21/19 115/72   10/26/18 139/88           Passed - Recent (12 mo) or future (30 days) visit within the authorizing provider's specialty     Patient has had an office visit with the authorizing provider or a provider within the authorizing providers department within the previous 12 mos or has a future within next 30 days. See \"Patient Info\" tab in inbasket, or \"Choose Columns\" in Meds & Orders section of the refill encounter.          Passed - Medication is active on med list        Passed - Patient is age 18 or older        Passed - Normal serum creatinine on file in past 12 months     Recent Labs   Lab Test 11/06/19  1555   CR 0.90           Passed - Normal serum potassium on file in past 12 months     Recent Labs   Lab Test 11/06/19  1555   POTASSIUM 4.3         Krystyna Mariscal RN   "

## 2020-02-01 DIAGNOSIS — G47.00 PERSISTENT INSOMNIA: ICD-10-CM

## 2020-02-04 RX ORDER — QUETIAPINE FUMARATE 25 MG/1
25-50 TABLET, FILM COATED ORAL
Refills: 0
Start: 2020-02-04

## 2020-02-04 NOTE — TELEPHONE ENCOUNTER
Seroquel      Last Written Prescription Date:  1/7/20  Last Fill Quantity: 60,   # refills: 2  Last Office Visit: 11/6/19  Future Office visit:    Next 5 appointments (look out 90 days)    Feb 05, 2020  1:30 PM CST  Acute Visit with Raisa Calvillo PA-C, NATALY EXAM ROOM 05  [facility]) [address] [place] [id number] [phone]         Requested Prescriptions   Pending Prescriptions Disp Refills     QUEtiapine (SEROQUEL) 25 MG tablet [Pharmacy Med Name: QUETIAPINE FUMARATE 25 MG TAB] 60 tablet 2     Sig: TAKE 1-2 TABLETS (25-50 MG) BY MOUTH NIGHTLY AS NEEDED (INSOMNIA)       Antipsychotic Medications Failed - 2/1/2020  1:40 PM        Failed - CBC on file in past 12 months     Recent Labs   Lab Test 12/12/16  1618   WBC 7.0   RBC 5.86   HGB 15.5   HCT 46.2                    Passed - Blood pressure under 140/90 in past 12 months     BP Readings from Last 3 Encounters:   11/06/19 130/78   03/21/19 115/72   10/26/18 139/88                 Passed - Patient is 12 years of age or older        Passed - Lipid panel on file within the past 12 months     Recent Labs   Lab Test 11/06/19  1555  07/06/15  0838   CHOL 248*   < > 201*   TRIG 522*   < > 350*      < > 46   LDL Cannot estimate LDL when triglyceride exceeds 400 mg/dL  149*   < > 85   NHDL <1   < >  --    VLDL  --   --  70*   CHOLHDLRATIO  --   --  4.4    < > = values in this interval not displayed.               Passed - Heart Rate on file within past 12 months     Pulse Readings from Last 3 Encounters:   11/06/19 108   03/21/19 114   10/26/18 99               Passed - A1c or Glucose on file in past 12 months     Recent Labs   Lab Test 11/06/19  1555   *   A1C 14.7*       Please review patients last 3 weights. If a weight gain of >10 lbs exists, you may refill the prescription once after instructing the patient to schedule an appointment within the next 30 days.    Wt  "Readings from Last 3 Encounters:   11/06/19 77.1 kg (170 lb)   03/21/19 74.8 kg (165 lb)   10/26/18 79.4 kg (175 lb)             Passed - Medication is active on med list        Passed - Recent (6 mo) or future (30 days) visit within the authorizing provider's specialty     Patient had office visit in the last 6 months or has a visit in the next 30 days with authorizing provider or within the authorizing provider's specialty.  See \"Patient Info\" tab in inbasket, or \"Choose Columns\" in Meds & Orders section of the refill encounter.                  "

## 2020-02-05 ENCOUNTER — TELEPHONE (OUTPATIENT)
Dept: PHARMACY | Facility: CLINIC | Age: 65
End: 2020-02-05

## 2020-02-05 NOTE — TELEPHONE ENCOUNTER
Pt unable to come to visit today and is due for covisit with PCP and MTM. Please call him to help reschedule.    Thanks,  Barbara Kc, PharmD  Medication Therapy Management Provider, [facility]  Pager: [phone]

## 2020-02-05 NOTE — TELEPHONE ENCOUNTER
Called pt-lm to call clinic and schedule appointments with MTM and provider Raisa Calvillo.    Azucena Cintron/LEX

## 2020-02-05 NOTE — LETTER
[facility]  [address] [place], [address]  [phone]        February 11, 2020    Gerardo Gonzalez                                                                                                                                                       [address] [facility] [address]            Dear Gerardo,    We have tried to reach you unsuccessfully by phone. You are due for a office visit with Raisa Calvillo and Medication Therapy Management. Please call and schedule a appointment.      Raisa Calvillo PA-C

## 2020-02-07 RX ORDER — INSULIN GLARGINE 100 [IU]/ML
INJECTION, SOLUTION SUBCUTANEOUS
Qty: 9 ML | Refills: 0 | Status: SHIPPED | OUTPATIENT
Start: 2020-02-07 | End: 2020-03-18

## 2020-02-12 DIAGNOSIS — M54.50 ACUTE BILATERAL LOW BACK PAIN WITHOUT SCIATICA: ICD-10-CM

## 2020-02-12 RX ORDER — IBUPROFEN 800 MG/1
TABLET, FILM COATED ORAL
Qty: 60 TABLET | Refills: 1 | Status: SHIPPED | OUTPATIENT
Start: 2020-02-12 | End: 2020-04-09

## 2020-02-12 NOTE — TELEPHONE ENCOUNTER
"Last Written Prescription Date:  12/31/19  Last Fill Quantity: 60,  # refills: 1   Last office visit: 11/6/2019 with prescribing provider:  PCP   Future Office Visit: none     Routing refill request to provider for review/approval because:  Labs not current:  CBC    Marleni LAINEZ RN        Requested Prescriptions   Pending Prescriptions Disp Refills     ibuprofen (ADVIL/MOTRIN) 800 MG tablet [Pharmacy Med Name: IBUPROFEN 800 MG TABLET] 60 tablet 1     Sig: TAKE 1 TABLET BY MOUTH EVERY 8 HOURS AS NEEDED FOR MODERATE PAIN       NSAID Medications Failed - 2/12/2020  1:29 AM        Failed - Normal CBC on file in past 12 months     Recent Labs   Lab Test 12/12/16  1618   WBC 7.0   RBC 5.86   HGB 15.5   HCT 46.2                    Passed - Blood pressure under 140/90 in past 12 months     BP Readings from Last 3 Encounters:   11/06/19 130/78   03/21/19 115/72   10/26/18 139/88                 Passed - Normal ALT on file in past 12 months     Recent Labs   Lab Test 11/06/19  1555   ALT 18             Passed - Normal AST on file in past 12 months     Recent Labs   Lab Test 11/06/19  1555   AST 15             Passed - Recent (12 mo) or future (30 days) visit within the authorizing provider's specialty     Patient has had an office visit with the authorizing provider or a provider within the authorizing providers department within the previous 12 mos or has a future within next 30 days. See \"Patient Info\" tab in inbasket, or \"Choose Columns\" in Meds & Orders section of the refill encounter.              Passed - Patient is age 6-64 years        Passed - Medication is active on med list        Passed - Normal serum creatinine on file in past 12 months     Recent Labs   Lab Test 11/06/19  1555   CR 0.90               "

## 2020-02-24 DIAGNOSIS — N52.9 ERECTILE DYSFUNCTION, UNSPECIFIED ERECTILE DYSFUNCTION TYPE: ICD-10-CM

## 2020-02-25 RX ORDER — SILDENAFIL 100 MG/1
100 TABLET, FILM COATED ORAL DAILY PRN
Qty: 6 TABLET | Refills: 1 | Status: SHIPPED | OUTPATIENT
Start: 2020-02-25 | End: 2020-04-23

## 2020-02-25 NOTE — TELEPHONE ENCOUNTER
"Requested Prescriptions   Pending Prescriptions Disp Refills     sildenafil (VIAGRA) 100 MG tablet [Pharmacy Med Name: SILDENAFIL 100 MG TABLET] 6 tablet 1     Sig: PLEASE SEE ATTACHED FOR DETAILED DIRECTIONS   Last Written Prescription Date:  1/3/2020  Last Fill Quantity: 60,  # refills: 2   Last office visit: 11/6/2019 with prescribing provider   Future Office Visit:        Erectile Dysfuction Protocol Passed - 2/24/2020  1:08 PM        Passed - Absence of nitrates on medication list        Passed - Absence of Alpha Blockers on Med list        Passed - Recent (12 mo) or future (30 days) visit within the authorizing provider's specialty     Patient has had an office visit with the authorizing provider or a provider within the authorizing providers department within the previous 12 mos or has a future within next 30 days. See \"Patient Info\" tab in inbasket, or \"Choose Columns\" in Meds & Orders section of the refill encounter.              Passed - Medication is active on med list        Passed - Patient is age 18 or older        Prescription approved per [facility] Refill Protocol.  Yue Mason RN on 2/25/2020 at 12:35 PM    "

## 2020-02-29 DIAGNOSIS — F41.9 ANXIETY: ICD-10-CM

## 2020-02-29 DIAGNOSIS — G47.00 PERSISTENT INSOMNIA: ICD-10-CM

## 2020-02-29 NOTE — TELEPHONE ENCOUNTER
Routing refill request to provider to review approval because:  Drug not on the [facility], [facility] or [facility] refill protocol or controlled substance  Last Written Prescription Date:  12/6/2019  Last Fill Quantity: 120,  # refills: 2   Last office visit: 11/6/2019 with prescribing provider:     Future Office Visit:       Yana Gu RN, BSN  Message handled by Nurse Triage.

## 2020-03-03 RX ORDER — GABAPENTIN 800 MG/1
TABLET ORAL
Qty: 120 TABLET | Refills: 2 | Status: SHIPPED | OUTPATIENT
Start: 2020-03-03 | End: 2020-05-29

## 2020-03-18 RX ORDER — INSULIN GLARGINE 100 [IU]/ML
INJECTION, SOLUTION SUBCUTANEOUS
Qty: 9 ML | Refills: 3 | Status: SHIPPED | OUTPATIENT
Start: 2020-03-18 | End: 2020-06-04

## 2020-03-23 DIAGNOSIS — G47.00 PERSISTENT INSOMNIA: ICD-10-CM

## 2020-03-23 DIAGNOSIS — E78.5 HYPERLIPIDEMIA LDL GOAL <100: ICD-10-CM

## 2020-03-23 NOTE — LETTER
[facility]  [address] [place], [place], [address]  [phone]        March 26, 2020    Gerardo Gonzalez                                                                                                                                                       [address] [facility] [address]            Dear Gerardo,    Your medication has been filled.  A phone visit to discuss your health is needed.  We did try several times unsuccessfully to reach you by phone.    Please call the clinic at [phone] to make a phone visit.  If you have already made an appointment please disregard this letter.      Sincerely,     TAN Elam

## 2020-03-24 RX ORDER — QUETIAPINE FUMARATE 25 MG/1
25-50 TABLET, FILM COATED ORAL
Qty: 180 TABLET | Refills: 0 | Status: SHIPPED | OUTPATIENT
Start: 2020-03-24 | End: 2020-06-19

## 2020-03-24 RX ORDER — SIMVASTATIN 80 MG
TABLET ORAL
Qty: 45 TABLET | Refills: 1 | Status: SHIPPED | OUTPATIENT
Start: 2020-03-24 | End: 2020-09-22

## 2020-04-09 DIAGNOSIS — M54.50 ACUTE BILATERAL LOW BACK PAIN WITHOUT SCIATICA: ICD-10-CM

## 2020-04-09 RX ORDER — IBUPROFEN 800 MG/1
TABLET, FILM COATED ORAL
Qty: 60 TABLET | Refills: 1 | Status: SHIPPED | OUTPATIENT
Start: 2020-04-09 | End: 2020-06-04

## 2020-04-20 DIAGNOSIS — N52.9 ERECTILE DYSFUNCTION, UNSPECIFIED ERECTILE DYSFUNCTION TYPE: ICD-10-CM

## 2020-04-22 NOTE — TELEPHONE ENCOUNTER
04/22/2020    [facility] called, still waiting on the refill of   sildenafil (VIAGRA) 100 MG tablet 100 mg, DAILY PRN     To be okay    Kira Vinson Patient Representative

## 2020-04-23 RX ORDER — SILDENAFIL 100 MG/1
TABLET, FILM COATED ORAL
Qty: 6 TABLET | Refills: 3 | Status: SHIPPED | OUTPATIENT
Start: 2020-04-23 | End: 2020-11-11

## 2020-04-23 NOTE — TELEPHONE ENCOUNTER
Prescription approved per [facility] Refill Protocol.    Jemima Joiner, RN, BSN

## 2020-04-28 NOTE — TELEPHONE ENCOUNTER
Routing refill request to provider for review/approval because:  Patient needs to be seen because:  Due for visit and A1c

## 2020-04-30 RX ORDER — METFORMIN HCL 500 MG
1000 TABLET, EXTENDED RELEASE 24 HR ORAL
Qty: 60 TABLET | Refills: 0 | Status: SHIPPED | OUTPATIENT
Start: 2020-04-30 | End: 2020-05-27

## 2020-04-30 NOTE — TELEPHONE ENCOUNTER
PCP:    Multiple attempts to reach the Pt. Have not heard back from them yet. Please deny or approve medication.     Kasie Aguirre RN   [facility] -- Triage Nurse

## 2020-05-31 DIAGNOSIS — G47.00 PERSISTENT INSOMNIA: ICD-10-CM

## 2020-06-01 RX ORDER — QUETIAPINE FUMARATE 25 MG/1
25-50 TABLET, FILM COATED ORAL
Qty: 180 TABLET | Refills: 0 | OUTPATIENT
Start: 2020-06-01

## 2020-06-01 NOTE — TELEPHONE ENCOUNTER
Routing refill request to provider for review/approval because:  Labs not current:  CBC  Patient needs to be seen because:  Patient due for visit    Care team please assist patient in scheduling appt.  PCP please advise on refill.    Sanchez SIMON RN, BSN

## 2020-06-04 ENCOUNTER — MYC MEDICAL ADVICE (OUTPATIENT)
Dept: ENDOCRINOLOGY | Facility: CLINIC | Age: 65
End: 2020-06-04

## 2020-06-04 DIAGNOSIS — M54.50 ACUTE BILATERAL LOW BACK PAIN WITHOUT SCIATICA: ICD-10-CM

## 2020-06-04 RX ORDER — INSULIN GLARGINE 100 [IU]/ML
INJECTION, SOLUTION SUBCUTANEOUS
Qty: 15 ML | Refills: 0 | Status: SHIPPED | OUTPATIENT
Start: 2020-06-04 | End: 2020-07-10

## 2020-06-04 RX ORDER — IBUPROFEN 800 MG/1
TABLET, FILM COATED ORAL
Qty: 60 TABLET | Refills: 0 | Status: SHIPPED | OUTPATIENT
Start: 2020-06-04 | End: 2020-07-29

## 2020-06-04 NOTE — TELEPHONE ENCOUNTER
DATE OF SERVICE:  04/19/2018

 

She was intubated on the vent.  This morning sedation fentanyl had been off for 30 minutes.  She is s
till not waking up.  Pupils are 2 mm.  She is jaundiced.

 

PHYSICAL EXAMINATION: 

VITAL SIGNS:  Pulse 110, blood pressure 100/67, sats 100%, respiration 19.  I's and O's have been 604
 in, 1005 out.

CHEST:  Chest revealed extensive rhonchi.

CARDIAC:  Sinus tachycardia.

ABDOMEN:  Distended.

EXTREMITIES:  3+ edema.

NEUROLOGIC:  Sedated.

 

White count 20,000, H&H 10 and 33, platelet count is normal.  PO2 63, pCO2 of 37.47, rate of 14, 40%,
 PEEP of 5.  Creatinine 2.6.

 

The port is slightly elevated.  X-ray shows bilateral pleural effusions, left greater than right.

 

IMPRESSION:

1.  Metastatic breast cancer with extensive ascites, left pleural effusion.

2.  Status post cardiopulmonary arrest, probably anoxic injury.

3.  Leukocytosis.

 

PLAN:  I discussed with Dr. Murphy and the  at length.  We will not do a thoracentesis unless
 the patient is a little bit more responsive.  I am concerned she might have sustained anoxic injury.
  There is no way to tell until the sedation is completely worn off.

 

In the meantime, continue supportive care, empiric antibiotics, stress dose of steroids, DVT prophyla
xis, proton pump inhibitors.

 

One-half hour critical care time. Routing refill request to provider for review/approval because:  Labs not current:  CBC  Review dosing quantity needs    Giana Christianson RN on 6/4/2020 at 8:49 AM

## 2020-06-04 NOTE — TELEPHONE ENCOUNTER
Routing refill request to provider for review/approval because:  Labs out of range:  A1c  Labs not current:  A1c    Giana Christianson RN on 6/4/2020 at 3:45 PM

## 2020-06-04 NOTE — TELEPHONE ENCOUNTER
Patient called to confirm his telephone visit with Erika Trammell as scheduled for 6/18/2020 at 11:30 a.m.  Alexa Leon CMA on 6/4/2020 at 6:25 PM

## 2020-06-04 NOTE — TELEPHONE ENCOUNTER
The following [employer] message sent to the patient as well as a voicemail left with appointment details:    Destin Carrillo,    I received a request from Raisa Calvillo regarding a recent refill request regarding your insulin.  She approved 1 refill and recommends that you follow up with Endocrinology as you are overdue to be seen for your diabetes.  Erika Trammell NP books out several months in advance.  I was able to squeeze you in for a phone visit during our lunch hour on Thursday, June 18th at 11:30 a.m.  Due to COVID-19, we are not currently seeing patients in the clinic for Endocrinology.  I am hoping this appointment time will work for you.  If it does, I will call you around 11:20 a.m. to prepare you and update your chart for the appointment.  If this time does not work for you, please call the appointment line at [phone] to schedule an appointment at your convenience.      Thank you,  Alexa Leon M.A.  Endocrinology  [facility]  [phone] [facility]

## 2020-06-18 DIAGNOSIS — G47.00 PERSISTENT INSOMNIA: ICD-10-CM

## 2020-06-19 RX ORDER — QUETIAPINE FUMARATE 25 MG/1
25-50 TABLET, FILM COATED ORAL
Qty: 60 TABLET | Refills: 0 | Status: SHIPPED | OUTPATIENT
Start: 2020-06-19 | End: 2020-07-23

## 2020-06-22 NOTE — TELEPHONE ENCOUNTER
Routing refill request to provider for review/approval because:  Labs not current and pt has been no show for last several visit     Sahra Saez RN

## 2020-06-23 DIAGNOSIS — G47.00 PERSISTENT INSOMNIA: ICD-10-CM

## 2020-06-23 DIAGNOSIS — F41.9 ANXIETY: ICD-10-CM

## 2020-06-23 RX ORDER — GABAPENTIN 800 MG/1
TABLET ORAL
Qty: 120 TABLET | Refills: 0 | Status: SHIPPED | OUTPATIENT
Start: 2020-06-23 | End: 2020-07-27

## 2020-06-23 RX ORDER — METFORMIN HCL 500 MG
1000 TABLET, EXTENDED RELEASE 24 HR ORAL
Qty: 60 TABLET | Refills: 0 | Status: SHIPPED | OUTPATIENT
Start: 2020-06-23 | End: 2020-07-20

## 2020-06-23 NOTE — TELEPHONE ENCOUNTER
Pt no showed appointment with endo 6/18/20,  see letters sent with  no response, called [facility], informed this is LAST refill, INFORM pt to contact clinic to schedule appointments, call if ?'s  Alda Kingston RN, BSN  Message handled by CLINIC NURSE.

## 2020-06-23 NOTE — TELEPHONE ENCOUNTER
No show virtual visit 4/1/20.   Last visit 11/6/19.  No upcoming appt.  Limited quantity 5/29/20.  Not [facility] med.  Sent to provider.  Please advise.  Bozena Francisco RN      May 29, 2020   5:11 PM   Baldomero Hauser MD routed this conversation to Kaz CernaChrobel)   Baldomero Hauser MD           5:11 PM   Note      Limited quantity  Needs visit  Baldomero Hauser MD

## 2020-06-29 RX ORDER — PEN NEEDLE, DIABETIC 31 GX5/16"
NEEDLE, DISPOSABLE MISCELLANEOUS
Qty: 100 EACH | Refills: 0 | Status: SHIPPED | OUTPATIENT
Start: 2020-06-29 | End: 2020-08-17

## 2020-06-29 NOTE — TELEPHONE ENCOUNTER
Last visit 11/6/19.  Last 7 visits have been cancel or no shows with you, Amauri.  Nothing scheduled.  Please advise.  Bozena Francisco RN

## 2020-07-02 DIAGNOSIS — F41.9 ANXIETY: ICD-10-CM

## 2020-07-02 DIAGNOSIS — G47.00 PERSISTENT INSOMNIA: ICD-10-CM

## 2020-07-02 NOTE — TELEPHONE ENCOUNTER
Routing refill request to provider for review/approval because:  Drug not on the [facility] refill protocol     Kasie Aguirre RN   [facility] -- Triage Nurse

## 2020-07-03 RX ORDER — GABAPENTIN 800 MG/1
TABLET ORAL
Qty: 120 TABLET | Refills: 0 | OUTPATIENT
Start: 2020-07-03

## 2020-07-16 RX ORDER — DULAGLUTIDE 1.5 MG/.5ML
1.5 INJECTION, SOLUTION SUBCUTANEOUS
Qty: 0.5 ML | Refills: 2 | Status: SHIPPED | OUTPATIENT
Start: 2020-07-16 | End: 2020-09-08

## 2020-07-16 NOTE — TELEPHONE ENCOUNTER
Routing refill request to provider for review/approval because:  A1c not at goal and needs OV     Sahra Saez, RN

## 2020-07-18 DIAGNOSIS — M54.50 ACUTE BILATERAL LOW BACK PAIN WITHOUT SCIATICA: ICD-10-CM

## 2020-07-18 DIAGNOSIS — G47.00 PERSISTENT INSOMNIA: ICD-10-CM

## 2020-07-20 DIAGNOSIS — G47.00 PERSISTENT INSOMNIA: ICD-10-CM

## 2020-07-20 RX ORDER — IBUPROFEN 800 MG/1
TABLET, FILM COATED ORAL
Qty: 60 TABLET | Refills: 0 | OUTPATIENT
Start: 2020-07-20

## 2020-07-20 RX ORDER — QUETIAPINE FUMARATE 25 MG/1
25-50 TABLET, FILM COATED ORAL
Qty: 60 TABLET | Refills: 0 | OUTPATIENT
Start: 2020-07-20

## 2020-07-20 RX ORDER — METFORMIN HCL 500 MG
1000 TABLET, EXTENDED RELEASE 24 HR ORAL
Qty: 30 TABLET | Refills: 0 | Status: SHIPPED | OUTPATIENT
Start: 2020-07-20 | End: 2020-09-08

## 2020-07-20 NOTE — TELEPHONE ENCOUNTER
Routing refill request to provider for review/approval because:  Barbara given x1 and patient did not follow up, please advise  Labs not current:  CBC, A1c  Patient needs to be seen because:  Due for diabetic visit.     Giana Christianson RN on 7/20/2020 at 2:20 PM

## 2020-07-21 DIAGNOSIS — G47.00 PERSISTENT INSOMNIA: ICD-10-CM

## 2020-07-21 RX ORDER — ZOLPIDEM TARTRATE 10 MG/1
TABLET ORAL
Qty: 30 TABLET | OUTPATIENT
Start: 2020-07-21

## 2020-07-21 NOTE — LETTER
[address] [id number] [phone]          July 22, 2020    Gerardo Gonzalez                                                                                                                     [id number] [facility] [address]            Dear Gerardo,     We have made several attempts to contact you.  We are not able to refill your medication until you have a visit.  We are offering telephone visits, video visits, or office visits at this time.  It is very important that you have a visit with your provider to discuss your healthcare and medications.  You will need to call the clinic at [phone] to schedule an appointment prior to any further refills of your medication.         Sincerely,         Raisa Calvillo/Shelia Henriquez, EMTB

## 2020-07-21 NOTE — TELEPHONE ENCOUNTER
Routing refill request to provider for review/approval because:  Labs not current:  CBC  Patient needs to be seen because:  Due for med check    Zolpidem denied today due to need for follow-up appointment.  No visit scheduled.      Bozena Francisco RN

## 2020-07-22 RX ORDER — QUETIAPINE FUMARATE 25 MG/1
25-50 TABLET, FILM COATED ORAL
Qty: 60 TABLET | Refills: 0 | OUTPATIENT
Start: 2020-07-22

## 2020-07-22 NOTE — TELEPHONE ENCOUNTER
Patient Returning Call  Reason for call:  Calling to request refill for medication  Information relayed to patient:  Relayed below info to pt, scheduled in person visit with Iva Torres on 7/23, pt declined video and phone visit.   Patient has additional questions:  No  What are your questions/concerns:  none  Okay to leave a detailed message?: No at Home number on file [phone] (home)      Deja Lyles-Patient Rep

## 2020-07-22 NOTE — TELEPHONE ENCOUNTER
Many attempts to contact pt with no success. Per Raisa becerra  Send a certified letter to the pt.  Letter printed and sent as certified.-Shelia Henriquez, EMTB

## 2020-07-23 DIAGNOSIS — G47.00 PERSISTENT INSOMNIA: ICD-10-CM

## 2020-07-23 RX ORDER — QUETIAPINE FUMARATE 25 MG/1
25-50 TABLET, FILM COATED ORAL
Qty: 60 TABLET | Refills: 0 | OUTPATIENT
Start: 2020-07-23

## 2020-07-23 RX ORDER — LOSARTAN POTASSIUM 50 MG/1
TABLET ORAL
Qty: 90 TABLET | Refills: 1 | Status: SHIPPED | OUTPATIENT
Start: 2020-07-23 | End: 2021-02-08

## 2020-07-23 NOTE — TELEPHONE ENCOUNTER
Prescription approved per [facility] Refill Protocol.  Chanel Vásquez RN, BSN

## 2020-07-25 DIAGNOSIS — F41.9 ANXIETY: ICD-10-CM

## 2020-07-25 DIAGNOSIS — G47.00 PERSISTENT INSOMNIA: ICD-10-CM

## 2020-07-27 RX ORDER — GABAPENTIN 800 MG/1
TABLET ORAL
Qty: 120 TABLET | Refills: 0 | Status: SHIPPED | OUTPATIENT
Start: 2020-07-27 | End: 2020-08-21

## 2020-07-28 ENCOUNTER — TELEPHONE (OUTPATIENT)
Dept: FAMILY MEDICINE | Facility: CLINIC | Age: 65
End: 2020-07-28

## 2020-07-28 NOTE — TELEPHONE ENCOUNTER
Certified letter has been sent to the pt.  Receipt that the letter was received by the pt was sent to the clinic.  Many letters had been sent to the pt.-Shelia Henriquez, DHARAB

## 2020-07-28 NOTE — PROGRESS NOTES
Pre-Visit Planning     Future Appointments   Date Time Provider Department Center   7/29/2020 10:00 AM Daniel Hua PA-C [facility]     Arrival Time for this Appointment:  9:40 AM   Appointment Notes for this encounter:   AB reviewed- Med review for sleeping medication-pt didn't want video or phone appt, requested in person visit    Questionnaires Reviewed/Assigned  Additional questionnaires assigned      Patient preferred phone number: [phone]    Unable to reach. Left voicemail. Advised patient to call clinic back at [phone].

## 2020-07-29 ENCOUNTER — OFFICE VISIT (OUTPATIENT)
Dept: FAMILY MEDICINE | Facility: CLINIC | Age: 65
End: 2020-07-29
Payer: COMMERCIAL

## 2020-07-29 VITALS
RESPIRATION RATE: 21 BRPM | TEMPERATURE: 98.1 F | HEART RATE: 108 BPM | WEIGHT: 161.1 LBS | DIASTOLIC BLOOD PRESSURE: 87 MMHG | BODY MASS INDEX: 24.41 KG/M2 | HEIGHT: 68 IN | SYSTOLIC BLOOD PRESSURE: 127 MMHG | OXYGEN SATURATION: 97 %

## 2020-07-29 DIAGNOSIS — L40.50 PSORIATIC ARTHRITIS (H): ICD-10-CM

## 2020-07-29 DIAGNOSIS — F41.9 ANXIETY AND DEPRESSION: Primary | ICD-10-CM

## 2020-07-29 DIAGNOSIS — F32.A ANXIETY AND DEPRESSION: Primary | ICD-10-CM

## 2020-07-29 DIAGNOSIS — G47.00 PERSISTENT INSOMNIA: ICD-10-CM

## 2020-07-29 LAB — HBA1C MFR BLD: 12.7 % (ref 0–5.6)

## 2020-07-29 PROCEDURE — 36415 COLL VENOUS BLD VENIPUNCTURE: CPT | Performed by: PHYSICIAN ASSISTANT

## 2020-07-29 PROCEDURE — 99214 OFFICE O/P EST MOD 30 MIN: CPT | Performed by: PHYSICIAN ASSISTANT

## 2020-07-29 PROCEDURE — 83036 HEMOGLOBIN GLYCOSYLATED A1C: CPT | Performed by: PHYSICIAN ASSISTANT

## 2020-07-29 RX ORDER — MELOXICAM 15 MG/1
15 TABLET ORAL DAILY
Qty: 90 TABLET | Refills: 1 | Status: SHIPPED | OUTPATIENT
Start: 2020-07-29 | End: 2021-02-08

## 2020-07-29 RX ORDER — QUETIAPINE FUMARATE 25 MG/1
25-50 TABLET, FILM COATED ORAL
Qty: 180 TABLET | Refills: 1 | Status: SHIPPED | OUTPATIENT
Start: 2020-07-29 | End: 2020-11-11

## 2020-07-29 ASSESSMENT — ANXIETY QUESTIONNAIRES
4. TROUBLE RELAXING: MORE THAN HALF THE DAYS
6. BECOMING EASILY ANNOYED OR IRRITABLE: SEVERAL DAYS
7. FEELING AFRAID AS IF SOMETHING AWFUL MIGHT HAPPEN: NOT AT ALL
GAD7 TOTAL SCORE: 7
GAD7 TOTAL SCORE: 7
2. NOT BEING ABLE TO STOP OR CONTROL WORRYING: SEVERAL DAYS
3. WORRYING TOO MUCH ABOUT DIFFERENT THINGS: SEVERAL DAYS
5. BEING SO RESTLESS THAT IT IS HARD TO SIT STILL: SEVERAL DAYS
1. FEELING NERVOUS, ANXIOUS, OR ON EDGE: SEVERAL DAYS
7. FEELING AFRAID AS IF SOMETHING AWFUL MIGHT HAPPEN: NOT AT ALL
GAD7 TOTAL SCORE: 7

## 2020-07-29 ASSESSMENT — PATIENT HEALTH QUESTIONNAIRE - PHQ9
SUM OF ALL RESPONSES TO PHQ QUESTIONS 1-9: 23
10. IF YOU CHECKED OFF ANY PROBLEMS, HOW DIFFICULT HAVE THESE PROBLEMS MADE IT FOR YOU TO DO YOUR WORK, TAKE CARE OF THINGS AT HOME, OR GET ALONG WITH OTHER PEOPLE: SOMEWHAT DIFFICULT
SUM OF ALL RESPONSES TO PHQ QUESTIONS 1-9: 23

## 2020-07-29 ASSESSMENT — PAIN SCALES - GENERAL: PAINLEVEL: NO PAIN (0)

## 2020-07-29 ASSESSMENT — MIFFLIN-ST. JEOR: SCORE: 1500.24

## 2020-07-29 NOTE — PROGRESS NOTES
"Subjective     Gerardo Gonzalez is a 65 year old male who presents to clinic today for the following health issues:    History of Present Illness        Back Pain:  He presents for follow up of back pain. Patient's back pain is a chronic problem.  Location of back pain:  Right lower back, left lower back, right middle of back, left middle of back, right upper back, left upper back and right shoulder  Description of back pain: gnawing  Back pain spreads: left side of neck    Since patient first noticed back pain, pain is: gradually worsening  Does back pain interfere with his job:  Not applicable      Mental Health Follow-up:  Patient presents to follow-up on Depression & Anxiety.Patient's depression since last visit has been:  Worse  The patient is having other symptoms associated with depression.  Patient's anxiety since last visit has been:  No change  The patient is having other symptoms associated with anxiety.  Any significant life events: health concerns  Patient is not feeling anxious or having panic attacks.  Patient has no concerns about alcohol or drug use.     Social History  Tobacco Use    Smoking status: Former Smoker      Packs/day: 0.00      Years: 4.00      Pack years: 0    Smokeless tobacco: Never Used  Alcohol use: Yes    Alcohol/week: 6.0 standard drinks    Types: 6 Cans of beer per week    Comment: occ glass of wine  Drug use: No      Today's PHQ-9         PHQ-9 Total Score:     (P) 23   PHQ-9 Q9 Thoughts of better off dead/self-harm past 2 weeks :   (P) Not at all   Thoughts of suicide or self harm:      Self-harm Plan:        Self-harm Action:          Safety concerns for self or others:           Migraines:   Since the patient's last clinic visit, headaches are: no change  The patient is getting headaches:  1  He is not able to do normal daily activities when he has a migraine.  The patient is taking the following rescue/relief medications:  Ibuprofen (Advil, Motrin)   Patient states \"I get no " "relief\" from the rescue/relief medications.   The patient is taking the following medications to prevent migraines:  No medications to prevent migraines  In the past 4 weeks, the patient has gone to an Urgent Care or Emergency Room 0 times times due to headaches.    He eats 0-1 servings of fruits and vegetables daily.He consumes 3 sweetened beverage(s) daily.He exercises with enough effort to increase his heart rate 9 or less minutes per day.  He exercises with enough effort to increase his heart rate 3 or less days per week.          Anxiety Follow-Up    How are you doing with your anxiety since your last visit? Worsened     Are you having other symptoms that might be associated with anxiety? Yes:  headaches    Have you had a significant life event? No     Are you feeling depressed? Yes:  worse    Do you have any concerns with your use of alcohol or other drugs? No     Has been in bed for the past 3 days because he just wants to sleep and has no energy. Thinks that he may be depressed. Finding he is more irritable and has a short temper. Is here today because he wants to see what I can do for his arthritis pain. See PHQ-9 and NAOMI-7 below.    Social History     Tobacco Use     Smoking status: Former Smoker     Packs/day: 0.00     Years: 4.00     Pack years: 0.00     Smokeless tobacco: Never Used   Substance Use Topics     Alcohol use: Yes     Alcohol/week: 6.0 standard drinks     Types: 6 Cans of beer per week     Comment: occ glass of wine     Drug use: No     NAOMI-7 SCORE 7/24/2018 3/21/2019 7/29/2020   Total Score - - -   Total Score - 7 (mild anxiety) 7 (mild anxiety)   Total Score 17 7 7     PHQ 2/8/2018 3/21/2019 7/29/2020   PHQ-9 Total Score 10 9 23   Q9: Thoughts of better off dead/self-harm past 2 weeks Not at all Several days Not at all   F/U: Thoughts of suicide or self-harm - No -   F/U: Safety concerns - No -         Hypothyroidism Follow-up      Since last visit, patient describes the following " symptoms: Weight stable, no hair loss, no skin changes, no constipation, no loose stools and fatigue      How many servings of fruits and vegetables do you eat daily?  2-3    On average, how many sweetened beverages do you drink each day (Examples: soda, juice, sweet tea, etc.  Do NOT count diet or artificially sweetened beverages)?   2    How many days per week do you exercise enough to make your heart beat faster? 3 or less    How many minutes a day do you exercise enough to make your heart beat faster? 60 or more    How many days per week do you miss taking your medication? 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)     Elevated liver enzymes     Hyponatremia     Type 2 diabetes mellitus without complication, with long-term current use of insulin (H)     Psoriatic arthritis (H)     Erectile dysfunction, unspecified erectile dysfunction type     Persistent insomnia     Past Surgical History:   Procedure Laterality Date     HC KNEE SCOPE, DIAGNOSTIC      Arthroscopy, Knee- Left       Social History     Tobacco Use     Smoking status: Former Smoker     Packs/day: 0.00     Years: 4.00     Pack years: 0.00     Smokeless tobacco: Never Used   Substance Use Topics     Alcohol use: Yes     Alcohol/week: 6.0 standard drinks     Types: 6 Cans of beer per week     Comment: occ glass of wine     Family History   Problem Relation Age of Onset     Diabetes Mother         age 55     Diabetes Father         age 64     Family History Negative Sister         x3     Hypertension Brother          Current Outpatient Medications   Medication Sig Dispense Refill     meloxicam (MOBIC) 15 MG tablet Take 1 tablet (15 mg) by mouth daily 90 tablet 1     QUEtiapine (SEROQUEL) 25 MG tablet Take 1-2 tablets (25-50 mg) by mouth nightly as needed  (insomnia) 180 tablet 1     sertraline (ZOLOFT) 50 MG tablet Take 1 tablet (50 mg) by mouth daily 30 tablet 0     ASPIRIN 81 MG OR TABS 1 tab po QD (Once per day) 100 11     B-D U/F 31G X 8 MM insulin pen needle INJECT 1 EACH SUBCUTANEOUS 2 TIMES DAILY 100 each 0     blood glucose monitoring (ONE TOUCH DELICA) lancets Use to test blood sugars 2 times daily or as directed.(delica) 100 each 11     blood glucose monitoring (ONE TOUCH ULTRASOFT) lancets 1 each 3 times daily 1 Box 6     blood glucose monitoring (ONETOUCH VERIO IQ) test strip Use to test blood sugars 2 times daily or as directed.(Verio) 100 strip 11     BLOOD GLUCOSE TEST STRIPS STRP PER PATIENT HEALTH PLAN 100 Strip 11     dulaglutide (TRULICITY) 1.5 MG/0.5ML pen Inject 1.5 mg Subcutaneous every 7 days Due for office visit. Last fill 0.5 mL 2     gabapentin (NEURONTIN) 800 MG tablet TAKE 1 TABLET IN THE MORNING, 1 TAB IN THE AFTERNOON AND 2 TABS AT BEDTIME. DUE FOR VISIT 120 tablet 0     Glucose Blood (BLOOD GLUCOSE TEST STRIPS) STRP 1 Device by In Vitro route 3 times daily 100 strip 3     HUMIRA *CF* PEN 40 MG/0.4ML pen kit        HUMIRA *CF* PEN-PS/UV STARTER 80 MG/0.8ML & 40MG/0.4ML pen kit        insulin glargine (BASAGLAR KWIKPEN) 100 UNIT/ML pen INJECT 35 UNITS SUBCUTANEOUS AT BEDTIME 15 mL 0     insulin pen needle 31G X 8 MM 1 Device daily Pharmacist may dispense brand and/or needle size per patient's preference and health care plan. 100 each 0     losartan (COZAAR) 50 MG tablet TAKE 1 TABLET BY MOUTH EVERY DAY 90 tablet 1     metFORMIN (GLUCOPHAGE-XR) 500 MG 24 hr tablet Take 2 tablets (1,000 mg) by mouth daily (with dinner) Needs visit. 30 tablet 0     ONETOUCH VERIO IQ test strip USE TO TEST BLOOD SUGARS 2 TIMES DAILY OR AS DIRECTED.(VERIO) 200 strip 1     sildenafil (VIAGRA) 100 MG tablet TAKE 1 TABLET (100 MG) BY MOUTH DAILY AS NEEDED (30 MINS TO 4 HOURS PRIOR TO SEXUAL ACTIVITY 6 tablet 3     simvastatin (ZOCOR) 80 MG tablet TAKE 1/2 TABLETS  "(40 MG) BY MOUTH AT BEDTIME 45 tablet 1     Allergies   Allergen Reactions     Ace Inhibitors Cough     Atorvastatin Calcium      Body Aches       Reviewed and updated as needed this visit by Provider         Review of Systems   Constitutional, HEENT, cardiovascular, pulmonary, GI, , musculoskeletal, neuro, skin, endocrine and psych systems are negative, except as otherwise noted.        Objective    /87 (BP Location: Right arm, Patient Position: Sitting, Cuff Size: Adult Regular)   Pulse 108   Temp 98.1  F (36.7  C) (Oral)   Resp 21   Ht 1.727 m (5' 8\")   Wt 73.1 kg (161 lb 1.6 oz)   SpO2 97%   BMI 24.50 kg/m    Body mass index is 24.5 kg/m .       Physical Exam   GENERAL: healthy, alert and no distress  EYES: Eyes grossly normal to inspection, PERRL and conjunctivae and sclerae normal  MS: no gross musculoskeletal defects noted, no edema  SKIN: no suspicious lesions or rashes  NEURO: Normal strength and tone, mentation intact and speech normal  PSYCH: mentation appears normal, affect normal/bright, judgement and insight intact and appearance well groomed    Diagnostic Test Results:  none         Assessment & Plan     (F41.9,  F32.9) Anxiety and depression  (primary encounter diagnosis)    Comment: Patient is willing to try medication today. Started him on Zoloft. Will re-check in 1 month via virtual visit.    Plan: sertraline (ZOLOFT) 50 MG tablet              (G47.00) Persistent insomnia    Comment: Refilled seroquel. Seems to work well if he takes 2 at least an hour before bed time.    Plan: QUEtiapine (SEROQUEL) 25 MG tablet              (L40.50) Psoriatic arthritis (H)    Comment: Refilled Mobic as he was requesting something stronger than the ibuprofen. He is willing to try this again. Re-iterated that he should schedule with rheumatology as he still hasn't scheduled with them. Re-printed referral information. He seemed willing to pursue this.     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 Last A1C still showed poor control.    Plan: HEMOGLOBIN A1C               Depression Screening Follow Up    PHQ 7/29/2020   PHQ-9 Total Score 23   Q9: Thoughts of better off dead/self-harm past 2 weeks Not at all   F/U: Thoughts of suicide or self-harm -   F/U: Safety concerns -     Last PHQ-9 7/29/2020   1.  Little interest or pleasure in doing things 3   2.  Feeling down, depressed, or hopeless 3   3.  Trouble falling or staying asleep, or sleeping too much 3   4.  Feeling tired or having little energy 3   5.  Poor appetite or overeating 3   6.  Feeling bad about yourself 3   7.  Trouble concentrating 3   8.  Moving slowly or restless 2   Q9: Thoughts of better off dead/self-harm past 2 weeks 0   PHQ-9 Total Score 23   In the past two weeks have you had thoughts of suicide or self harm? -   Do you have concerns about your personal safety or the safety of others? -       Follow Up Actions Taken  Patient counseled, no additional follow up at this time.  Started on medication. He denies thoughts of suicide and self-harm at this time. Will follow-up in 1 month.        Patient Instructions   Take 1/2 pill of Zoloft daily for 7 days. Then take 1 pill daily. Follow-up with phone or video visit in 1 month for medication check.    Follow-up with primary care provider in 6 months for medication check for sleep medication.     Follow-up with rheumatologist.       Return in about 1 month (around 8/29/2020) for Medication check- virtual- zoloft.    Daniel Hua PA-C  [facility]    Answers for HPI/ROS submitted by the patient on 7/29/2020   If you checked off any problems, how difficult have these problems made it for you to do your work, take care of things at home, or get along with other people?: Somewhat difficult  PHQ9 TOTAL SCORE: 23  NAOMI 7 TOTAL SCORE: 7

## 2020-07-29 NOTE — PATIENT INSTRUCTIONS
Take 1/2 pill of Zoloft daily for 7 days. Then take 1 pill daily. Follow-up with phone or video visit in 1 month for medication check.    Follow-up with primary care provider in 6 months for medication check for sleep medication.     Follow-up with rheumatologist.

## 2020-07-30 ASSESSMENT — PATIENT HEALTH QUESTIONNAIRE - PHQ9: SUM OF ALL RESPONSES TO PHQ QUESTIONS 1-9: 23

## 2020-07-30 ASSESSMENT — ANXIETY QUESTIONNAIRES: GAD7 TOTAL SCORE: 7

## 2020-08-12 RX ORDER — INSULIN GLARGINE 100 [IU]/ML
INJECTION, SOLUTION SUBCUTANEOUS
Qty: 15 ML | Refills: 1 | Status: SHIPPED | OUTPATIENT
Start: 2020-08-12 | End: 2020-11-10

## 2020-08-12 NOTE — TELEPHONE ENCOUNTER
Routed to LS, see CJ note below, see recent appointment, advise if you want [facility] to follow up, pt has seen them in the past  Alda Kingston RN, BSN  Message handled by CLINIC NURSE.

## 2020-08-12 NOTE — TELEPHONE ENCOUNTER
Can we forward to endocrinology since they have seen patient as well? I would prefer to have them adjust.

## 2020-08-12 NOTE — TELEPHONE ENCOUNTER
Pt is past due for a DM appt with PCP but had his A1C recently done and was 12.7.  Does pt need a dose change on insulin?    Chanel Vásquez RN, BSN

## 2020-08-14 DIAGNOSIS — F41.9 ANXIETY AND DEPRESSION: ICD-10-CM

## 2020-08-14 DIAGNOSIS — F32.A ANXIETY AND DEPRESSION: ICD-10-CM

## 2020-08-14 NOTE — TELEPHONE ENCOUNTER
AMITRIPTYLINE HCL 25 MG TAB        Last Written Prescription Date: 06/30/17  Last Fill Quantity: 60, # refills: 1  Last Office Visit with [facility], [facility] or [facility] prescribing provider: 06/30/17 Raisa Calvillo-- TIARA       BP Readings from Last 3 Encounters:   06/30/17 119/76   12/12/16 (!) 135/98   12/07/16 109/76       
Medication changed to Remeron. Rx request denied.    Apple WONG RN, BSN, PHN  [facility] RN    
impaired ability to control trunk for mobility/decreased ability to use arms for pushing/pulling/decreased ability to use legs for bridging/pushing

## 2020-08-14 NOTE — TELEPHONE ENCOUNTER
Routing refill request to provider for review/approval because:  Elevated PHQ-9    Kasie Aguirre, RN   [facility] -- Triage Nurse

## 2020-08-17 ENCOUNTER — TELEPHONE (OUTPATIENT)
Dept: FAMILY MEDICINE | Facility: CLINIC | Age: 65
End: 2020-08-17

## 2020-08-17 DIAGNOSIS — F41.9 ANXIETY: Primary | ICD-10-CM

## 2020-08-17 NOTE — TELEPHONE ENCOUNTER
Pt was started on sertraline 50mg.  He stated that he felt as if the 1 tablet was not working so he has been taking 2 tablets.  He is wondering if you can send in a refill to [facility] [address].  He is wondering if you can do the 100mg.-Shelia Henriquez, EMTB

## 2020-08-18 RX ORDER — SERTRALINE HYDROCHLORIDE 100 MG/1
100 TABLET, FILM COATED ORAL DAILY
Qty: 90 TABLET | Refills: 0 | Status: SHIPPED | OUTPATIENT
Start: 2020-08-18 | End: 2020-11-10

## 2020-08-20 DIAGNOSIS — G47.00 PERSISTENT INSOMNIA: ICD-10-CM

## 2020-08-20 DIAGNOSIS — F41.9 ANXIETY: ICD-10-CM

## 2020-08-21 RX ORDER — GABAPENTIN 800 MG/1
TABLET ORAL
Qty: 120 TABLET | Refills: 0 | Status: SHIPPED | OUTPATIENT
Start: 2020-08-21 | End: 2020-09-17

## 2020-09-05 NOTE — LETTER
September 10, 2020          Gerardo Gonzalez  [address]  [place] [address]        Dear Gerardo,     An appointment is needed to continue to refill your medications.  Please call our office at [phone] to set up that appointment.  Thank you.        Sincerely,        Raisa Calvillo PA-C/ Yana DUNAWAY RN

## 2020-09-08 RX ORDER — DULAGLUTIDE 1.5 MG/.5ML
1.5 INJECTION, SOLUTION SUBCUTANEOUS
Qty: 0.5 ML | Refills: 0 | Status: SHIPPED | OUTPATIENT
Start: 2020-09-08 | End: 2020-12-17

## 2020-09-08 RX ORDER — METFORMIN HCL 500 MG
1000 TABLET, EXTENDED RELEASE 24 HR ORAL
Qty: 30 TABLET | Refills: 0 | Status: SHIPPED | OUTPATIENT
Start: 2020-09-08 | End: 2020-09-17

## 2020-09-08 NOTE — TELEPHONE ENCOUNTER
Routing refill request to provider for review/approval because:  Barbara given x1 and patient did not follow up, please advise (metformin , has one existing refill of trulicity    Alda Kingston RN, BSN  Message handled by CLINIC NURSE.

## 2020-09-08 NOTE — TELEPHONE ENCOUNTER
Message #1 left for patient to return call to clinic, Yana RUIZ PAL  direct number left for patient to return call     Cristal Calix, Registered Nurse   [facility]

## 2020-09-08 NOTE — TELEPHONE ENCOUNTER
Patient needs follow-up visit. What can we do to help him?  He 'no shows' or cancels frequently and diabetes is not controlled. We can do video visit for this and Endocrinology does have video visits available if pt cannot come in.    Rx's filled for short term.

## 2020-09-09 NOTE — TELEPHONE ENCOUNTER
Attempt #2 LM on  to call this RN back if he should call the main clinic please forward to me [phone]  Yana Gu RN

## 2020-09-10 NOTE — TELEPHONE ENCOUNTER
Attempt #3    LM on VM to call this RN back if he should call the main clinic please forward to me [phone]  Letter Sent  Yana Gu RN

## 2020-09-17 DIAGNOSIS — G47.00 PERSISTENT INSOMNIA: ICD-10-CM

## 2020-09-17 DIAGNOSIS — F41.9 ANXIETY: ICD-10-CM

## 2020-09-17 RX ORDER — GABAPENTIN 800 MG/1
TABLET ORAL
Qty: 120 TABLET | Refills: 0 | Status: SHIPPED | OUTPATIENT
Start: 2020-09-17 | End: 2020-10-19

## 2020-09-17 RX ORDER — METFORMIN HCL 500 MG
1000 TABLET, EXTENDED RELEASE 24 HR ORAL
Qty: 30 TABLET | Refills: 0 | Status: SHIPPED | OUTPATIENT
Start: 2020-09-17 | End: 2020-10-30

## 2020-09-17 NOTE — TELEPHONE ENCOUNTER
Routing refill request to provider for review/approval because:  Drug not on the [facility] refill protocol     Kasie Aguirre RN   [facility] -- Triage Nurse

## 2020-09-22 DIAGNOSIS — E78.5 HYPERLIPIDEMIA LDL GOAL <100: ICD-10-CM

## 2020-09-22 RX ORDER — SIMVASTATIN 80 MG
TABLET ORAL
Qty: 45 TABLET | Refills: 1 | Status: SHIPPED | OUTPATIENT
Start: 2020-09-22 | End: 2021-02-08

## 2020-09-22 NOTE — TELEPHONE ENCOUNTER
Routing refill request to provider for review/approval because:  Allergy warning    Sanchez SIMON RN, BSN

## 2020-10-02 RX ORDER — DULAGLUTIDE 1.5 MG/.5ML
1.5 INJECTION, SOLUTION SUBCUTANEOUS
Refills: 0 | OUTPATIENT
Start: 2020-10-02

## 2020-10-02 NOTE — TELEPHONE ENCOUNTER
Rx denied. Multiple attempts have been made to reach pt and schedule follow-up appt.     He needs to see endocrinology.  This can be done virtually.

## 2020-10-02 NOTE — TELEPHONE ENCOUNTER
Routing refill request to provider for review/approval because:  Barbara given x1 and patient did not follow up, please advise    Kasie Aguirre, RN   [facility] -- Triage Nurse

## 2020-10-17 DIAGNOSIS — G47.00 PERSISTENT INSOMNIA: ICD-10-CM

## 2020-10-17 DIAGNOSIS — F41.9 ANXIETY: ICD-10-CM

## 2020-10-19 RX ORDER — GABAPENTIN 800 MG/1
TABLET ORAL
Qty: 120 TABLET | Refills: 0 | Status: SHIPPED | OUTPATIENT
Start: 2020-10-19 | End: 2021-02-08

## 2020-10-23 ENCOUNTER — TELEPHONE (OUTPATIENT)
Dept: FAMILY MEDICINE | Facility: CLINIC | Age: 65
End: 2020-10-23

## 2020-10-23 NOTE — TELEPHONE ENCOUNTER
Patient Returning Call    Reason for call:  Returning call    Information relayed to patient:  Yes, relayed below message from Raisa Calvillo.     Patient has additional questions:  No    What are your questions/concerns:  None    Okay to leave a detailed message?: No at Home number on file [phone] (home)    Deja Lyles-Patient Rep

## 2020-10-23 NOTE — TELEPHONE ENCOUNTER
Response faxed to [facility] at [phone].  If patient calls advise of Raisa's response.  Bozena Francisco RN

## 2020-10-23 NOTE — TELEPHONE ENCOUNTER
This should come from pt's rheumatologist or dermatologist. Pharmacy needs to send back to the specialist prescribing.

## 2020-10-23 NOTE — TELEPHONE ENCOUNTER
Fax from [facility] that patient is requesting new RX for arthritis.  Pt requests a new RX for arthritis    Methotrexate 2.5 mg tabs  Sig: N/A    Raisa- do you RX this in general?  Not on med list or med rec.      L/M to call.  Is he on this from a specialist?     Bozena Francisco RN

## 2020-10-30 RX ORDER — METFORMIN HCL 500 MG
1000 TABLET, EXTENDED RELEASE 24 HR ORAL
Qty: 30 TABLET | Refills: 0 | Status: SHIPPED | OUTPATIENT
Start: 2020-10-30 | End: 2020-11-10

## 2020-10-30 NOTE — TELEPHONE ENCOUNTER
Routing refill request to provider for review/approval because:  See below regarding different past refill request.  Still has not scheduled visit.    Bozena Francisco RN      October 2, 2020    Raisa Calvillo PA-C         12:50 PM  Note     Rx denied. Multiple attempts have been made to reach pt and schedule follow-up appt.      He needs to see endocrinology.  This can be done virtually.

## 2020-11-06 ENCOUNTER — TELEPHONE (OUTPATIENT)
Dept: FAMILY MEDICINE | Facility: CLINIC | Age: 65
End: 2020-11-06
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
## 2020-11-09 ENCOUNTER — TELEPHONE (OUTPATIENT)
Dept: FAMILY MEDICINE | Facility: CLINIC | Age: 65
End: 2020-11-09

## 2020-11-09 DIAGNOSIS — F41.9 ANXIETY: ICD-10-CM
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
## 2020-11-09 NOTE — TELEPHONE ENCOUNTER
Routing refill request to provider for review/approval because:  Barbara given x1 and patient did not follow up, please advise  Alda Kingston RN, BSN  Message handled by CLINIC NURSE.

## 2020-11-10 RX ORDER — METFORMIN HCL 500 MG
1000 TABLET, EXTENDED RELEASE 24 HR ORAL
Qty: 30 TABLET | Refills: 0 | Status: SHIPPED | OUTPATIENT
Start: 2020-11-10 | End: 2020-11-19

## 2020-11-10 RX ORDER — SERTRALINE HYDROCHLORIDE 100 MG/1
TABLET, FILM COATED ORAL
Qty: 30 TABLET | Refills: 0 | Status: SHIPPED | OUTPATIENT
Start: 2020-11-10 | End: 2020-12-07

## 2020-11-10 NOTE — TELEPHONE ENCOUNTER
No upcoming endo appt    Routing refill request to provider for review/approval because:  Labs not current:  Creatinine and A1C  Patient needs to be seen because:  Overdue for diabetic visit    Bozena Francisco RN

## 2020-11-10 NOTE — TELEPHONE ENCOUNTER
No, this is VERY concerning. Patient needs to be seen face to face. Catia Bull has openings today or tomorrow.    No appropriate for virtual.     I would like triage RN to call, please don't route to PAL 4

## 2020-11-10 NOTE — TELEPHONE ENCOUNTER
I don't think it's safe to treat, until we know what we are treating.    Patient has gabapentin on board and pt can use over the counter ibuprofen   600 mg (3 tablets) every 8 hours as needed for pain.    Patient can also take tylenol as well.

## 2020-11-10 NOTE — TELEPHONE ENCOUNTER
Routing refill request to provider for review/approval because:  Labs out of range:    PHQ 2/8/2018 3/21/2019 7/29/2020   PHQ-9 Total Score 10 9 23   Q9: Thoughts of better off dead/self-harm past 2 weeks Not at all Several days Not at all   F/U: Thoughts of suicide or self-harm - No -   F/U: Safety concerns - No -       This is for anxiety but unsure if PHQ has been addressed as it is higher than it has been in the past  Chanel Vásquez RN, BSN

## 2020-11-10 NOTE — TELEPHONE ENCOUNTER
Spoke with patient.  He states that he cannot come into the clinic until Thursday due to other appointments.  Appointment scheduled with Catia Bull on Thursday 11/12/2020 at 9:25am.      Patient requesting a message to be sent to Raisa Calvillo to see if she would give him something to help with the pain to get him through until he can be seen by Catia on Thursday.  Please review and advise.    Olya Zepeda RN

## 2020-11-11 DIAGNOSIS — N52.9 ERECTILE DYSFUNCTION, UNSPECIFIED ERECTILE DYSFUNCTION TYPE: ICD-10-CM

## 2020-11-11 DIAGNOSIS — G47.00 PERSISTENT INSOMNIA: ICD-10-CM

## 2020-11-11 RX ORDER — SILDENAFIL 100 MG/1
TABLET, FILM COATED ORAL
Qty: 6 TABLET | Refills: 3 | Status: SHIPPED | OUTPATIENT
Start: 2020-11-11 | End: 2021-02-08

## 2020-11-11 RX ORDER — INSULIN GLARGINE 100 [IU]/ML
INJECTION, SOLUTION SUBCUTANEOUS
Qty: 15 ML | Refills: 0 | Status: SHIPPED | OUTPATIENT
Start: 2020-11-11 | End: 2020-11-20

## 2020-11-11 NOTE — TELEPHONE ENCOUNTER
Routing refill request to provider for review/approval because:  Labs not current:  FLP and CBC    Bozena Francisco RN

## 2020-11-11 NOTE — TELEPHONE ENCOUNTER
Prescription approved per [facility] Refill Protocol.    Bozena Francisco RN

## 2020-11-12 RX ORDER — QUETIAPINE FUMARATE 25 MG/1
25-50 TABLET, FILM COATED ORAL
Qty: 60 TABLET | Refills: 3 | Status: SHIPPED | OUTPATIENT
Start: 2020-11-12 | End: 2021-02-08

## 2020-11-17 DIAGNOSIS — F41.9 ANXIETY: ICD-10-CM

## 2020-11-17 DIAGNOSIS — G47.00 PERSISTENT INSOMNIA: ICD-10-CM

## 2020-11-17 RX ORDER — GABAPENTIN 800 MG/1
TABLET ORAL
Qty: 120 TABLET | Refills: 0 | OUTPATIENT
Start: 2020-11-17

## 2020-11-17 NOTE — TELEPHONE ENCOUNTER
Routing refill request to provider for review/approval because:  Drug not on the [facility] refill protocol     Kasie Aguirre RN   [facility] -- Triage Nurse

## 2020-11-19 RX ORDER — METFORMIN HCL 500 MG
1000 TABLET, EXTENDED RELEASE 24 HR ORAL
Qty: 30 TABLET | Refills: 0 | Status: SHIPPED | OUTPATIENT
Start: 2020-11-19 | End: 2021-02-08

## 2020-11-19 NOTE — LETTER
[facility]  [address] [address]  [phone]          November 27, 2020    Gerardo Gonzalez                                                                                                                     [address] [address]            Dear Gerardo,  We have made several attempts to contact you.  You will need to call the clinic at [phone] to schedule an appointment prior to any further refills of your medication.       Sincerely,         Raisa Calvillo PA-C

## 2020-11-19 NOTE — TELEPHONE ENCOUNTER
Routing refill request to provider for review/approval because:  Labs out of range:  A1C  Labs not current:  cr  Patient needs to be seen because:  Past due for DM appt    Hemoglobin A1C   Date Value Ref Range Status   07/29/2020 12.7 (H) 0 - 5.6 % Final     Comment:     Normal <5.7% Prediabetes 5.7-6.4%  Diabetes 6.5% or higher - adopted from ADA   consensus guidelines.  Reviewed: OK with previous       Chanel Vásquez RN, BSN

## 2020-11-20 NOTE — TELEPHONE ENCOUNTER
I haven't even seen him since 2018. Looks like he saw Raisa one year ago - maybe he can schedule a follow up with her and she would approve a temporary supply until his appointment.  Otherwise, Dr. Chin may have openings within a week.  Erika Trammell NP  Endocrinology

## 2020-11-20 NOTE — TELEPHONE ENCOUNTER
Needs visit. Patient due for follow-up visit with endocrinology and labs.   The next Rx for this will be denied.    Is pt will to do a video or phone visit with Dr. Chin?  (Endo) Can we help him get this set up?    Raisa Calvillo PA-C    Rx filled for 15 days.

## 2020-11-20 NOTE — TELEPHONE ENCOUNTER
RX was sent 11/11/20.  No upcoming appt with any provider.      Routing refill request to provider for review/approval because:  Labs not current:  A1C and Creatinine  Overdue for diabetic visit    Bozena Francisco RN

## 2020-11-23 NOTE — TELEPHONE ENCOUNTER
See below.  Needs visit with endo per Raisa.  Please schedule and route for short term fill.  JOSEPH Zepeda Courtney N, PA-C         11/19/20 6:30 PM  Note     Needs visit. Patient due for follow-up visit with endocrinology and labs.   The next Rx for this will be denied.     Is pt will to do a video or phone visit with Dr. Chin?  (Endo) Can we help him get this set up?     Raisa Calvillo PA-C     Rx filled for 15 days.

## 2020-11-25 RX ORDER — INSULIN GLARGINE 100 [IU]/ML
INJECTION, SOLUTION SUBCUTANEOUS
Qty: 15 ML | Refills: 0 | Status: SHIPPED | OUTPATIENT
Start: 2020-11-25 | End: 2021-02-08

## 2020-11-25 NOTE — TELEPHONE ENCOUNTER
Patient Note     Needs visit. Patient due for follow-up visit with endocrinology and labs.   The next Rx for this will be denied.     Is pt will to do a video or phone visit with Dr. Chin?  (Endo) Can we help him get this set up?     Raisa Calvillo PA-C     Rx filled for 15 days.

## 2020-11-29 ENCOUNTER — HEALTH MAINTENANCE LETTER (OUTPATIENT)
Age: 65
End: 2020-11-29

## 2020-12-04 DIAGNOSIS — F41.9 ANXIETY: ICD-10-CM

## 2020-12-04 NOTE — LETTER
[facility]  [address] [address]  [phone]          December 9, 2020    Gerardo Gonzalez                                                                                                                     [address] [address] [address]            Dear Gerardo,    We have made several attempts to contact you.  You will need to call the clinic at [phone] to schedule an appointment prior to any further refills of your medication.     Sincerely,         Raisa Calvillo PA-C

## 2020-12-07 RX ORDER — SERTRALINE HYDROCHLORIDE 100 MG/1
TABLET, FILM COATED ORAL
Qty: 30 TABLET | Refills: 0 | Status: SHIPPED | OUTPATIENT
Start: 2020-12-07 | End: 2021-01-04

## 2020-12-07 NOTE — TELEPHONE ENCOUNTER
Routing refill request to provider for review/approval because:  Barbara given x1 and patient did not follow up, please advise  Elevated PHQ9    Giana Christianson RN on 12/7/2020 at 4:27 PM

## 2020-12-16 RX ORDER — DULAGLUTIDE 1.5 MG/.5ML
1.5 INJECTION, SOLUTION SUBCUTANEOUS
Refills: 0 | OUTPATIENT
Start: 2020-12-16

## 2020-12-16 NOTE — TELEPHONE ENCOUNTER
Patient was a 'no show' for his appointment with Endocrinology. He repeatedly cancels and no shows.    Raisa Calvillo PA-C

## 2020-12-17 RX ORDER — DULAGLUTIDE 1.5 MG/.5ML
1.5 INJECTION, SOLUTION SUBCUTANEOUS
Qty: 0.5 ML | Refills: 0 | Status: SHIPPED | OUTPATIENT
Start: 2020-12-17 | End: 2021-01-12

## 2020-12-17 NOTE — TELEPHONE ENCOUNTER
Patient calling back he is upset he states he is out of medication and has been for 2 weeks he would like a refill and is reschedule for his endo appointment.

## 2020-12-17 NOTE — TELEPHONE ENCOUNTER
Rx sent. Patient needs close follow-up with Endocrinology.    Please call to inform, Rx sent.    Raisa Calvillo PA-C

## 2020-12-31 DIAGNOSIS — F41.9 ANXIETY: ICD-10-CM

## 2021-01-04 RX ORDER — SERTRALINE HYDROCHLORIDE 100 MG/1
TABLET, FILM COATED ORAL
Qty: 30 TABLET | Refills: 0 | Status: SHIPPED | OUTPATIENT
Start: 2021-01-04 | End: 2021-01-28

## 2021-01-04 NOTE — TELEPHONE ENCOUNTER
Routing refill request to provider for review/approval because:  Patient needs to be seen because:  Due for 6 month appointment  Patient given 1x refill and has not followed up  PHQ-9 out of [facility] protocol range for RN to refill

## 2021-01-12 RX ORDER — DULAGLUTIDE 1.5 MG/.5ML
1.5 INJECTION, SOLUTION SUBCUTANEOUS
Qty: 0.5 ML | Refills: 0 | Status: SHIPPED | OUTPATIENT
Start: 2021-01-12 | End: 2021-02-08

## 2021-01-12 NOTE — TELEPHONE ENCOUNTER
Routing refill request to provider for review/approval because:  Barbara given x1 and patient did not follow up, please advise  Labs not current:  A1c, CR    Giana Christianson, RN on 1/12/2021 at 10:13 AM

## 2021-01-12 NOTE — TELEPHONE ENCOUNTER
1 time fill. Has to follow-up with Endocrinology.   I cannot and will not fill this anymore.    Raisa Calvillo PA-C

## 2021-01-15 ENCOUNTER — HEALTH MAINTENANCE LETTER (OUTPATIENT)
Age: 66
End: 2021-01-15

## 2021-01-18 RX ORDER — LOSARTAN POTASSIUM 50 MG/1
TABLET ORAL
Qty: 90 TABLET | Refills: 1 | OUTPATIENT
Start: 2021-01-18

## 2021-01-18 NOTE — TELEPHONE ENCOUNTER
Patient has an appointment tomorrow with primary care provider. Refills will be addressed at that time. Refusing refill request and closing encounter. Giana Christianson RN on 1/18/2021 at 9:28 AM

## 2021-01-28 DIAGNOSIS — G47.00 PERSISTENT INSOMNIA: ICD-10-CM

## 2021-01-29 RX ORDER — QUETIAPINE FUMARATE 25 MG/1
25-50 TABLET, FILM COATED ORAL
Qty: 180 TABLET | Refills: 1 | OUTPATIENT
Start: 2021-01-29

## 2021-02-04 ENCOUNTER — TELEPHONE (OUTPATIENT)
Dept: FAMILY MEDICINE | Facility: CLINIC | Age: 66
End: 2021-02-04

## 2021-02-04 DIAGNOSIS — F41.9 ANXIETY: ICD-10-CM

## 2021-02-04 RX ORDER — SERTRALINE HYDROCHLORIDE 100 MG/1
TABLET, FILM COATED ORAL
Qty: 15 TABLET | Refills: 0 | Status: SHIPPED | OUTPATIENT
Start: 2021-02-04 | End: 2021-02-08 | Stop reason: ALTCHOICE

## 2021-02-04 NOTE — TELEPHONE ENCOUNTER
Routing refill request to provider for review/approval because:  Barbara given x1 and patient did not follow up, please advise

## 2021-02-05 DIAGNOSIS — Z71.89 OTHER SPECIFIED COUNSELING: Primary | Chronic | ICD-10-CM

## 2021-02-05 NOTE — TELEPHONE ENCOUNTER
15 tablets given again. Patient needs some help. I'm not sure what else to do , as he does not follow up.     I will route the chart to RN PAL and clinic manager. I don't see clinic manager reviewed last time.     Lelo, please see note from 1/27 and the NUMEROUS NO SHOWS this patient has had with multiple providers.

## 2021-02-05 NOTE — TELEPHONE ENCOUNTER
Spoke with pt who would very much like to set up an appt with Raisa Calvillo PA-C the soonest.      Raisa Calvillo PA-C on vaction next two weeks pt would like to be seen by a provider sooner for increased pain.    S-(situation):pain getting worse medhat with the weather change.    B-(background):pt has arthritis pt is taking gabapentin, meloxicam and the occasional Aleve.    A-(assessment):pt states pain has increased with the weather down bilat legs and into feet.  His feet he describes as nerve endings firing up and burning to the point he cannot walk. This is especially troublesome at night when he is trying to sleep.    R-(recommendations): Come for evaluation in clinic.    Yana DUNAWAY RN, BSN, PAL (Patient Advocate Liaison)  [facility]   [phone]

## 2021-02-05 NOTE — TELEPHONE ENCOUNTER
LM on  to call this RN back if he should call the main clinic please forward to me [phone]  Yana Gu RN

## 2021-02-08 ENCOUNTER — PATIENT OUTREACH (OUTPATIENT)
Dept: CARE COORDINATION | Facility: CLINIC | Age: 66
End: 2021-02-08

## 2021-02-08 ENCOUNTER — OFFICE VISIT (OUTPATIENT)
Dept: FAMILY MEDICINE | Facility: CLINIC | Age: 66
End: 2021-02-08
Payer: COMMERCIAL

## 2021-02-08 VITALS
OXYGEN SATURATION: 98 % | HEIGHT: 67 IN | BODY MASS INDEX: 24.96 KG/M2 | WEIGHT: 159 LBS | HEART RATE: 117 BPM | SYSTOLIC BLOOD PRESSURE: 114 MMHG | TEMPERATURE: 98.7 F | DIASTOLIC BLOOD PRESSURE: 80 MMHG | RESPIRATION RATE: 20 BRPM

## 2021-02-08 DIAGNOSIS — N52.9 ERECTILE DYSFUNCTION, UNSPECIFIED ERECTILE DYSFUNCTION TYPE: ICD-10-CM

## 2021-02-08 DIAGNOSIS — F41.9 ANXIETY: ICD-10-CM

## 2021-02-08 DIAGNOSIS — L40.50 PSORIATIC ARTHRITIS (H): ICD-10-CM

## 2021-02-08 DIAGNOSIS — E78.5 HYPERLIPIDEMIA LDL GOAL <100: ICD-10-CM

## 2021-02-08 DIAGNOSIS — G47.00 PERSISTENT INSOMNIA: ICD-10-CM

## 2021-02-08 LAB — HBA1C MFR BLD: 15 % (ref 0–5.6)

## 2021-02-08 PROCEDURE — 82043 UR ALBUMIN QUANTITATIVE: CPT | Performed by: PHYSICIAN ASSISTANT

## 2021-02-08 PROCEDURE — 99214 OFFICE O/P EST MOD 30 MIN: CPT | Performed by: PHYSICIAN ASSISTANT

## 2021-02-08 PROCEDURE — 99207 PR FOOT EXAM NO CHARGE: CPT | Mod: 25 | Performed by: PHYSICIAN ASSISTANT

## 2021-02-08 PROCEDURE — 80061 LIPID PANEL: CPT | Performed by: PHYSICIAN ASSISTANT

## 2021-02-08 PROCEDURE — 83036 HEMOGLOBIN GLYCOSYLATED A1C: CPT | Performed by: PHYSICIAN ASSISTANT

## 2021-02-08 PROCEDURE — 36415 COLL VENOUS BLD VENIPUNCTURE: CPT | Performed by: PHYSICIAN ASSISTANT

## 2021-02-08 PROCEDURE — 80053 COMPREHEN METABOLIC PANEL: CPT | Performed by: PHYSICIAN ASSISTANT

## 2021-02-08 RX ORDER — SERTRALINE HYDROCHLORIDE 100 MG/1
100 TABLET, FILM COATED ORAL DAILY
Qty: 90 TABLET | Status: CANCELLED | OUTPATIENT
Start: 2021-02-08

## 2021-02-08 RX ORDER — IBUPROFEN 800 MG/1
TABLET, FILM COATED ORAL
COMMUNITY
Start: 2020-06-04 | End: 2021-02-16

## 2021-02-08 RX ORDER — METFORMIN HCL 500 MG
1000 TABLET, EXTENDED RELEASE 24 HR ORAL
Qty: 180 TABLET | Refills: 1 | Status: SHIPPED | OUTPATIENT
Start: 2021-02-08 | End: 2021-05-11

## 2021-02-08 RX ORDER — QUETIAPINE FUMARATE 25 MG/1
25-50 TABLET, FILM COATED ORAL
Qty: 60 TABLET | Refills: 3 | Status: SHIPPED | OUTPATIENT
Start: 2021-02-08 | End: 2021-05-11

## 2021-02-08 RX ORDER — MELOXICAM 15 MG/1
15 TABLET ORAL DAILY
Qty: 90 TABLET | Refills: 1 | Status: SHIPPED | OUTPATIENT
Start: 2021-02-08 | End: 2021-07-06

## 2021-02-08 RX ORDER — INSULIN GLARGINE 100 [IU]/ML
INJECTION, SOLUTION SUBCUTANEOUS
Qty: 15 ML | Refills: 0 | Status: SHIPPED | OUTPATIENT
Start: 2021-02-08 | End: 2021-02-10

## 2021-02-08 RX ORDER — SIMVASTATIN 80 MG
TABLET ORAL
Qty: 90 TABLET | Refills: 1 | Status: SHIPPED | OUTPATIENT
Start: 2021-02-08 | End: 2021-08-20 | Stop reason: ALTCHOICE

## 2021-02-08 RX ORDER — DULOXETIN HYDROCHLORIDE 60 MG/1
60 CAPSULE, DELAYED RELEASE ORAL DAILY
Qty: 30 CAPSULE | Refills: 5 | Status: SHIPPED | OUTPATIENT
Start: 2021-02-08 | End: 2021-05-11

## 2021-02-08 RX ORDER — GABAPENTIN 800 MG/1
TABLET ORAL
Qty: 360 TABLET | Refills: 1 | Status: SHIPPED | OUTPATIENT
Start: 2021-02-08 | End: 2021-08-29

## 2021-02-08 RX ORDER — SILDENAFIL 100 MG/1
TABLET, FILM COATED ORAL
Qty: 6 TABLET | Refills: 3 | Status: SHIPPED | OUTPATIENT
Start: 2021-02-08 | End: 2021-07-05

## 2021-02-08 RX ORDER — LOSARTAN POTASSIUM 50 MG/1
50 TABLET ORAL DAILY
Qty: 90 TABLET | Refills: 1 | Status: SHIPPED | OUTPATIENT
Start: 2021-02-08 | End: 2021-08-20

## 2021-02-08 RX ORDER — BETAMETHASONE DIPROPIONATE 0.5 MG/G
LOTION TOPICAL
Status: ON HOLD | COMMUNITY
Start: 2020-10-17 | End: 2021-05-05

## 2021-02-08 RX ORDER — DULAGLUTIDE 1.5 MG/.5ML
1.5 INJECTION, SOLUTION SUBCUTANEOUS
Qty: 6 ML | Refills: 1 | Status: SHIPPED | OUTPATIENT
Start: 2021-02-08 | End: 2021-02-10

## 2021-02-08 ASSESSMENT — ANXIETY QUESTIONNAIRES
6. BECOMING EASILY ANNOYED OR IRRITABLE: NEARLY EVERY DAY
1. FEELING NERVOUS, ANXIOUS, OR ON EDGE: SEVERAL DAYS
7. FEELING AFRAID AS IF SOMETHING AWFUL MIGHT HAPPEN: NOT AT ALL
5. BEING SO RESTLESS THAT IT IS HARD TO SIT STILL: NOT AT ALL
3. WORRYING TOO MUCH ABOUT DIFFERENT THINGS: NOT AT ALL
2. NOT BEING ABLE TO STOP OR CONTROL WORRYING: NOT AT ALL
IF YOU CHECKED OFF ANY PROBLEMS ON THIS QUESTIONNAIRE, HOW DIFFICULT HAVE THESE PROBLEMS MADE IT FOR YOU TO DO YOUR WORK, TAKE CARE OF THINGS AT HOME, OR GET ALONG WITH OTHER PEOPLE: SOMEWHAT DIFFICULT

## 2021-02-08 ASSESSMENT — MIFFLIN-ST. JEOR: SCORE: 1474.85

## 2021-02-08 ASSESSMENT — PATIENT HEALTH QUESTIONNAIRE - PHQ9: SUM OF ALL RESPONSES TO PHQ QUESTIONS 1-9: 16

## 2021-02-08 NOTE — NURSING NOTE
Future Appointments   Date Time Provider Department Center   2/8/2021 10:00 AM Catia Bull, K[facility]     Appointment Notes for this encounter:   LG-reviewed- Cronic pain getting worse    Health Maintenance Due   Topic Date Due     PREVENTIVE CARE VISIT  1955     ADVANCE CARE PLANNING  1955     DEPRESSION ACTION PLAN  1955     HIV SCREENING  04/25/1972     ZOSTER IMMUNIZATION (1 of 2) 04/25/2007     BMP  11/06/2020     CMP  11/06/2020     LIPID  11/06/2020     MICROALBUMIN  11/06/2020     DIABETIC FOOT EXAM  11/06/2020     ANNUAL REVIEW OF HM ORDERS  11/06/2020     EYE EXAM  11/18/2020     A1C  11/29/2020     NAOMI ASSESSMENT  01/29/2021     Health Maintenance addressed:  PHQ9 and GAD7    PHQ9 / NAOMI / ACT updated and Eye Exam Previously completed - pt reported / Care Everywhere / Completed BRENDA - sent to abstract    Shaji Status:  Forgot password, will reset    Salome Banuelos/ANDI  [place]

## 2021-02-08 NOTE — PROGRESS NOTES
Clinic Care Coordination Contact  [facility]/Voicemail    Referral Source: Care Team  Clinical Data: Care Coordinator Outreach  Outreach attempted x 1.  Left message on patient's voicemail with call back information and requested return call.  Plan: Care Coordinator will send care coordination introduction letter with care coordinator contact information and explanation of care coordination services via [employer]. Care Coordinator will try to reach patient again in 1-2 business days.    GRICELDA Wood   Care Coordination Team  [phone]

## 2021-02-08 NOTE — LETTER
[facility] CARE COORDINATION  [facility]   February 11, 2021    Gerardo Gonzalez  [address] [address]      Dear Gerardo,    I am a clinic care coordinator who works with Raisa Calvillo PA-C at [facility]  I have been trying to reach you recently to introduce Clinic Care Coordination and to see if there was anything I could assist you with.  Below is a description of clinic care coordination and how I can further assist you.      The clinic care coordination team is made up of a registered nurse,  and community health worker who understand the health care system. The goal of clinic care coordination is to help you manage your health and improve access to the health care system in the most efficient manner. The team can assist you in meeting your health care goals by providing education, coordinating services, strengthening the communication among your providers and supporting you with any resource needs.    Please feel free to contact me at [phone] with any questions or concerns. We are focused on providing you with the highest-quality healthcare experience possible and that all starts with you.     Sincerely,     GRICELDA Wood   Care Coordination Team  [phone]

## 2021-02-08 NOTE — PROGRESS NOTES
Assessment & Plan     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)  A1c previously 12.7. Has not been checked in 6 months.   Patient due for labs, obtained as noted below.  He would like refills on his medications which were provided as noted below.  Discussed adding Cymbalta to his medications and stopping the Zoloft. Patient in agreement. He will stop Zoloft and start Cymbalta. He is at maximum dose of the gabapentin.  Due to poorly controlled diabetes referrals to [facility] and diabetes education provided. He has not seen diabetes education in some time.  He notes he has recently had his eye exam, will obtain documentation.  Foot exam performed today.  - Comprehensive metabolic panel (BMP + Alb, Alk Phos, ALT, AST, Total. Bili, TP)  - Lipid panel reflex to direct LDL Fasting  - Albumin Random Urine Quantitative with Creat Ratio  - Hemoglobin A1c  - gabapentin (NEURONTIN) 800 MG tablet; TAKE 1 TABLET IN THE MORNING, 1 TAB IN THE AFTERNOON AND 2 TABS AT BEDTIME.  - losartan (COZAAR) 50 MG tablet; Take 1 tablet (50 mg) by mouth daily  - metFORMIN (GLUCOPHAGE-XR) 500 MG 24 hr tablet; Take 2 tablets (1,000 mg) by mouth daily (with dinner) Needs visit.  - dulaglutide (TRULICITY) 1.5 MG/0.5ML pen; Inject 1.5 mg Subcutaneous every 7 days Due for office visit. Last fill  - DULoxetine (CYMBALTA) 60 MG capsule; Take 1 capsule (60 mg) by mouth daily  - insulin glargine (BASAGLAR KWIKPEN) 100 UNIT/ML pen; INJECT 35 UNITS SUBCUTANEOUS AT BEDTIME  - AMBULATORY ADULT DIABETES EDUCATOR REFERRAL; Future  - MED THERAPY MANAGE REFERRAL  - FOOT EXAM    Anxiety  Refill provided  - gabapentin (NEURONTIN) 800 MG tablet; TAKE 1 TABLET IN THE MORNING, 1 TAB IN THE AFTERNOON AND 2 TABS AT BEDTIME.    Persistent insomnia  Refill provided.  - gabapentin (NEURONTIN) 800 MG tablet; TAKE 1 TABLET IN THE MORNING, 1 TAB IN THE AFTERNOON AND 2 TABS AT BEDTIME.  - QUEtiapine (SEROQUEL) 25 MG tablet; Take 1-2 tablets (25-50 mg) by mouth  nightly as needed (insomnia)    Psoriatic arthritis (H)  Stable. Refill provided.  - meloxicam (MOBIC) 15 MG tablet; Take 1 tablet (15 mg) by mouth daily    Erectile dysfunction, unspecified erectile dysfunction type  Refill provided.  - sildenafil (VIAGRA) 100 MG tablet; TAKE 1 TABLET (100 MG) BY MOUTH DAILY AS NEEDED (30 MINS TO 4 HOURS PRIOR TO SEXUAL ACTIVITY    Hyperlipidemia LDL goal <100  Labs obtained and refill provided.  - simvastatin (ZOCOR) 80 MG tablet; TAKE 1/2 TABLETS (40 MG) BY MOUTH AT BEDTIME    Review of the result(s) of each unique test - as noted above      33 minutes spent on the date of the encounter doing chart review, history and exam, documentation and further activities as noted above         Depression Screening Follow Up    PHQ 2/8/2021   PHQ-9 Total Score 16   Q9: Thoughts of better off dead/self-harm past 2 weeks Not at all   F/U: Thoughts of suicide or self-harm -   F/U: Safety concerns -     Last PHQ-9 2/8/2021   1.  Little interest or pleasure in doing things 2   2.  Feeling down, depressed, or hopeless 2   3.  Trouble falling or staying asleep, or sleeping too much 3   4.  Feeling tired or having little energy 3   5.  Poor appetite or overeating 3   6.  Feeling bad about yourself 0   7.  Trouble concentrating 3   8.  Moving slowly or restless 0   Q9: Thoughts of better off dead/self-harm past 2 weeks 0   PHQ-9 Total Score 16   Difficulty at work, home, or with people Very difficult   In the past two weeks have you had thoughts of suicide or self harm? -   Do you have concerns about your personal safety or the safety of others? -       Follow Up Actions Taken  Referred patient back to PCP   Cymbalta for mood/pain, stop Zoloft.    Return in about 3 months (around 5/8/2021) for Diabetes follow up , In Person, With PCP.    Catia Bull PA-C  [facility]    Rhiannon Carrillo is a 65 year old who presents to clinic today for the following health issues  "    History of Present Illness       Diabetes:   He presents for follow up of diabetes.  He is checking home blood glucose two times daily. He checks blood glucose before meals, after meals, before and after meals and at bedtime.  Blood glucose is never over 200 and never under 70. He is aware of hypoglycemia symptoms including shakiness. He is concerned about other.  He is having numbness in feet and burning in feet. The patient has had a diabetic eye exam in the last 12 months.         He eats 2-3 servings of fruits and vegetables daily.He consumes 3 sweetened beverage(s) daily.He exercises with enough effort to increase his heart rate 20 to 29 minutes per day.  He exercises with enough effort to increase his heart rate 4 days per week.   He is taking medications regularly.       Patient notes he is having \"excruitating\" pain in the bottom on his feet. He describes the pain as a \"hot\" or \"burning\" sensation. He is having trouble sleeping. At times the pain shoots up his legs.  Patient has history of neuropathy related to the diabetes.    Review of Systems   GENERAL:  No fevers  MUSCULOSKELETAL: As noted in HPI          Objective    /80 (BP Location: Right arm, Patient Position: Chair, Cuff Size: Adult Large)   Pulse 117   Temp 98.7  F (37.1  C) (Oral)   Resp 20   Ht 1.702 m (5' 7\")   Wt 72.1 kg (159 lb)   SpO2 98%   BMI 24.90 kg/m    Body mass index is 24.9 kg/m .  Physical Exam   GENERAL: No acute distress  HEENT: Normocephalic, Canals patent, bilateral TM's non-erythematous and non-bulging.  VASCULAR: 1+ DP and PT pulses bilaterally  EXTREMITIES:   Right lower extremity: No obvious deformity, no edema or ecchymosis. Sensation present with monofilament testing. No ulcers, skin breakdown or open wounds.  Left lower extremity: No obvious deformity, no edema or ecchymosis. Sensation present with monofilament testing. No ulcers, skin breakdown or open wounds.  NEURO: Alert, non-focal      Results for " orders placed or performed in visit on 02/08/21 (from the past 24 hour(s))   Hemoglobin A1c   Result Value Ref Range    Hemoglobin A1C 15.0 (H) 0 - 5.6 %

## 2021-02-09 ENCOUNTER — TELEPHONE (OUTPATIENT)
Dept: FAMILY MEDICINE | Facility: CLINIC | Age: 66
End: 2021-02-09

## 2021-02-09 ENCOUNTER — HOSPITAL ENCOUNTER (EMERGENCY)
Facility: CLINIC | Age: 66
Discharge: HOME | End: 2021-02-09
Attending: EMERGENCY MEDICINE | Admitting: EMERGENCY MEDICINE
Payer: COMMERCIAL

## 2021-02-09 VITALS
OXYGEN SATURATION: 95 % | HEART RATE: 107 BPM | SYSTOLIC BLOOD PRESSURE: 178 MMHG | BODY MASS INDEX: 25.07 KG/M2 | RESPIRATION RATE: 18 BRPM | WEIGHT: 160.05 LBS | DIASTOLIC BLOOD PRESSURE: 107 MMHG | TEMPERATURE: 97.9 F

## 2021-02-09 DIAGNOSIS — R73.9 HYPERGLYCEMIA: ICD-10-CM

## 2021-02-09 DIAGNOSIS — N30.00 ACUTE CYSTITIS WITHOUT HEMATURIA: ICD-10-CM

## 2021-02-09 DIAGNOSIS — R79.89 KETONEMIA: ICD-10-CM

## 2021-02-09 LAB
ALBUMIN SERPL-MCNC: 3.6 G/DL (ref 3.4–5)
ALBUMIN UR-MCNC: NEGATIVE MG/DL
ALP SERPL-CCNC: 151 U/L (ref 40–150)
ALT SERPL W P-5'-P-CCNC: 12 U/L (ref 0–70)
ANION GAP SERPL CALCULATED.3IONS-SCNC: 12 MMOL/L (ref 3–14)
ANION GAP SERPL CALCULATED.3IONS-SCNC: 8 MMOL/L (ref 3–14)
APPEARANCE UR: CLEAR
AST SERPL W P-5'-P-CCNC: 18 U/L (ref 0–45)
BACTERIA #/AREA URNS HPF: ABNORMAL /HPF
BASE DEFICIT BLDV-SCNC: 1.8 MMOL/L
BASOPHILS # BLD AUTO: 0.1 10E9/L (ref 0–0.2)
BASOPHILS NFR BLD AUTO: 1.2 %
BILIRUB SERPL-MCNC: 1 MG/DL (ref 0.2–1.3)
BILIRUB UR QL STRIP: NEGATIVE
BUN SERPL-MCNC: 13 MG/DL (ref 7–30)
BUN SERPL-MCNC: 14 MG/DL (ref 7–30)
CALCIUM SERPL-MCNC: 10.1 MG/DL (ref 8.5–10.1)
CALCIUM SERPL-MCNC: 9.4 MG/DL (ref 8.5–10.1)
CHLORIDE SERPL-SCNC: 92 MMOL/L (ref 94–109)
CHLORIDE SERPL-SCNC: 96 MMOL/L (ref 94–109)
CHOLEST SERPL-MCNC: 246 MG/DL
CO2 SERPL-SCNC: 22 MMOL/L (ref 20–32)
CO2 SERPL-SCNC: 26 MMOL/L (ref 20–32)
COLOR UR AUTO: ABNORMAL
CREAT SERPL-MCNC: 0.88 MG/DL (ref 0.66–1.25)
CREAT SERPL-MCNC: 0.93 MG/DL (ref 0.66–1.25)
CREAT UR-MCNC: 40 MG/DL
DIFFERENTIAL METHOD BLD: ABNORMAL
EOSINOPHIL # BLD AUTO: 0.2 10E9/L (ref 0–0.7)
EOSINOPHIL NFR BLD AUTO: 1.9 %
ERYTHROCYTE [DISTWIDTH] IN BLOOD BY AUTOMATED COUNT: 13 % (ref 10–15)
GFR SERPL CREATININE-BSD FRML MDRD: 86 ML/MIN/{1.73_M2}
GFR SERPL CREATININE-BSD FRML MDRD: >90 ML/MIN/{1.73_M2}
GLUCOSE BLDC GLUCOMTR-MCNC: 315 MG/DL (ref 70–99)
GLUCOSE BLDC GLUCOMTR-MCNC: 414 MG/DL (ref 70–99)
GLUCOSE SERPL-MCNC: 411 MG/DL (ref 70–99)
GLUCOSE SERPL-MCNC: 551 MG/DL (ref 70–99)
GLUCOSE UR STRIP-MCNC: NEGATIVE MG/DL
HCO3 BLDV-SCNC: 22 MMOL/L (ref 21–28)
HCT VFR BLD AUTO: 47.3 % (ref 40–53)
HDLC SERPL-MCNC: 39 MG/DL
HGB BLD-MCNC: 16.7 G/DL (ref 13.3–17.7)
HGB UR QL STRIP: NEGATIVE
IMM GRANULOCYTES # BLD: 0 10E9/L (ref 0–0.4)
IMM GRANULOCYTES NFR BLD: 0.3 %
KETONES BLD-SCNC: 2.3 MMOL/L (ref 0–0.6)
KETONES UR STRIP-MCNC: 60 MG/DL
LDLC SERPL CALC-MCNC: 129 MG/DL
LEUKOCYTE ESTERASE UR QL STRIP: ABNORMAL
LYMPHOCYTES # BLD AUTO: 1.9 10E9/L (ref 0.8–5.3)
LYMPHOCYTES NFR BLD AUTO: 20.8 %
MCH RBC QN AUTO: 27.8 PG (ref 26.5–33)
MCHC RBC AUTO-ENTMCNC: 35.3 G/DL (ref 31.5–36.5)
MCV RBC AUTO: 79 FL (ref 78–100)
MICROALBUMIN UR-MCNC: 83 MG/L
MICROALBUMIN/CREAT UR: 205.47 MG/G CR (ref 0–17)
MONOCYTES # BLD AUTO: 0.7 10E9/L (ref 0–1.3)
MONOCYTES NFR BLD AUTO: 7.2 %
MUCOUS THREADS #/AREA URNS LPF: PRESENT /LPF
NEUTROPHILS # BLD AUTO: 6.4 10E9/L (ref 1.6–8.3)
NEUTROPHILS NFR BLD AUTO: 68.6 %
NITRATE UR QL: NEGATIVE
NONHDLC SERPL-MCNC: 207 MG/DL
NRBC # BLD AUTO: 0 10*3/UL
NRBC BLD AUTO-RTO: 0 /100
O2/TOTAL GAS SETTING VFR VENT: 100 %
OXYHGB MFR BLDV: 95 %
PCO2 BLDV: 34 MM HG (ref 40–50)
PH BLDV: 7.42 PH (ref 7.32–7.43)
PH UR STRIP: 5.5 PH (ref 5–7)
PLATELET # BLD AUTO: 334 10E9/L (ref 150–450)
PO2 BLDV: 103 MM HG (ref 25–47)
POTASSIUM SERPL-SCNC: 3.7 MMOL/L (ref 3.4–5.3)
POTASSIUM SERPL-SCNC: 5.1 MMOL/L (ref 3.4–5.3)
PROT SERPL-MCNC: 8.2 G/DL (ref 6.8–8.8)
RBC # BLD AUTO: 6.01 10E12/L (ref 4.4–5.9)
RBC #/AREA URNS AUTO: 3 /HPF (ref 0–2)
SODIUM SERPL-SCNC: 126 MMOL/L (ref 133–144)
SODIUM SERPL-SCNC: 130 MMOL/L (ref 133–144)
SOURCE: ABNORMAL
SP GR UR STRIP: 1.03 (ref 1–1.03)
TRIGL SERPL-MCNC: 391 MG/DL
UROBILINOGEN UR STRIP-MCNC: 2 MG/DL (ref 0–2)
WBC # BLD AUTO: 9.3 10E9/L (ref 4–11)
WBC #/AREA URNS AUTO: 46 /HPF (ref 0–5)

## 2021-02-09 PROCEDURE — 81001 URINALYSIS AUTO W/SCOPE: CPT | Performed by: EMERGENCY MEDICINE

## 2021-02-09 PROCEDURE — 258N000003 HC RX IP 258 OP 636: Performed by: EMERGENCY MEDICINE

## 2021-02-09 PROCEDURE — 96372 THER/PROPH/DIAG INJ SC/IM: CPT | Performed by: EMERGENCY MEDICINE

## 2021-02-09 PROCEDURE — 85025 COMPLETE CBC W/AUTO DIFF WBC: CPT | Performed by: EMERGENCY MEDICINE

## 2021-02-09 PROCEDURE — 250N000011 HC RX IP 250 OP 636: Performed by: EMERGENCY MEDICINE

## 2021-02-09 PROCEDURE — 82805 BLOOD GASES W/O2 SATURATION: CPT | Performed by: EMERGENCY MEDICINE

## 2021-02-09 PROCEDURE — 250N000012 HC RX MED GY IP 250 OP 636 PS 637: Performed by: EMERGENCY MEDICINE

## 2021-02-09 PROCEDURE — 99284 EMERGENCY DEPT VISIT MOD MDM: CPT | Mod: 25

## 2021-02-09 PROCEDURE — 80048 BASIC METABOLIC PNL TOTAL CA: CPT | Performed by: EMERGENCY MEDICINE

## 2021-02-09 PROCEDURE — 96374 THER/PROPH/DIAG INJ IV PUSH: CPT

## 2021-02-09 PROCEDURE — 96361 HYDRATE IV INFUSION ADD-ON: CPT

## 2021-02-09 PROCEDURE — 999N001017 HC STATISTIC GLUCOSE BY METER IP

## 2021-02-09 PROCEDURE — 82010 KETONE BODYS QUAN: CPT | Performed by: EMERGENCY MEDICINE

## 2021-02-09 RX ORDER — ONDANSETRON 2 MG/ML
8 INJECTION INTRAMUSCULAR; INTRAVENOUS ONCE
Status: COMPLETED | OUTPATIENT
Start: 2021-02-09 | End: 2021-02-09

## 2021-02-09 RX ADMIN — ONDANSETRON 8 MG: 2 INJECTION INTRAMUSCULAR; INTRAVENOUS at 17:45

## 2021-02-09 RX ADMIN — INSULIN HUMAN 10 UNITS: 100 INJECTION, SOLUTION PARENTERAL at 17:41

## 2021-02-09 RX ADMIN — SODIUM CHLORIDE, POTASSIUM CHLORIDE, SODIUM LACTATE AND CALCIUM CHLORIDE 1000 ML: 600; 310; 30; 20 INJECTION, SOLUTION INTRAVENOUS at 16:05

## 2021-02-09 RX ADMIN — SODIUM CHLORIDE, POTASSIUM CHLORIDE, SODIUM LACTATE AND CALCIUM CHLORIDE 1000 ML: 600; 310; 30; 20 INJECTION, SOLUTION INTRAVENOUS at 16:20

## 2021-02-09 ASSESSMENT — ENCOUNTER SYMPTOMS
POLYDIPSIA: 1
DYSURIA: 1
FREQUENCY: 1
FEVER: 0
VOMITING: 0
NAUSEA: 1
FATIGUE: 1

## 2021-02-09 NOTE — TELEPHONE ENCOUNTER
Spoke to pt who states he will go to the ER when his wife gets home.      Explained to pt that the low sodium from the high blood sugars can now effect his heart.      Sent chart to DM ed.    Yana DUNAWAY RN, BSN, PAL (Patient Advocate Liaison)  [facility]   [phone]

## 2021-02-09 NOTE — TELEPHONE ENCOUNTER
Marleni/Ambreen-    Can you contact pt to schedule a diabetes education appt. Referral is in.    Thanks!    Fiona Serrano RN, [facility]

## 2021-02-09 NOTE — ED PROVIDER NOTES
History   Chief Complaint:  Hyperglycemia     HPI   Gerardo Gonzalez is a 65 year old male with history of type II diabetes, hyperlipidemia, and psoriatic arthropathy who presents with hyperglycemia. Patient was seen at his PCP and tiffanie blood for monitoring. They read his blood glucose in the 700's and an A1c of 15 and sent him to the emergency department for monitoring. He takes 35 units of Lantus before bed and take his Trulicity once a week as well as 1000 mg of Metformin.  He reports that he reports he has been compliant with with his medications.  He has seen endocrinology with last visit 9 months ago but this physician retired and is scheduled to see a new endocrinologist at the end of the month.  He states he has increasing burning of his feet at the bottom, fatigue, drowsiness, thirst, dysuria for a month, frequency urination, and nausea this morning. He denies recent fever or vomiting. He denies any pain today.    Review of Systems   Constitutional: Positive for fatigue. Negative for fever.        Drowsiness    Gastrointestinal: Positive for nausea. Negative for vomiting.   Endocrine: Positive for polydipsia.   Genitourinary: Positive for dysuria and frequency.   Skin:        Burning on the bottom of his feet      All other systems reviewed and are negative.      Allergies:  Ace Inhibitors  Atorvastatin Calcium    Medications:  Aspirin 81 mg   Insulin   Trulicity   Cymbalta   Gabapentin    Losartan   Meloxicam   Metformin   Seroquel   Sildenafil   Zocor    Past Medical History:    Psoriatic Arthropathy   Type II Diabetes  Persistent Insomnia   Erectile Dysfunction   Hyperlipidemia     Past Surgical History:    Knee Arthroscopy - Left     Family History:    Mother - Diabetes  Father - Diabetes   Brother - Hypertension     Social History:  Arrives with wife.     Physical Exam     Patient Vitals for the past 24 hrs:   BP Temp Temp src Pulse Resp SpO2 Weight   02/09/21 1600 (!) 162/107 -- -- 108 -- -- --    21 1531 -- -- -- -- -- -- 72.6 kg (160 lb 0.9 oz)   21 1528 (!) 146/108 97.9  F (36.6  C) Oral 113 18 96 % --       Physical Exam      HEENT:    Oropharynx is moist  Eyes:    Conjunctiva normal  Neck:    Supple, no meningismus.     CV:     Tachycardic, regular rhythm.      No murmurs, rubs or gallops.       No unilateral leg swelling.       2+ radial pulses bilateral.       No lower extremity edema.  PULM:    Clear to auscultation bilateral.       No respiratory distress.      Good air exchange.     No rales or wheezing.  ABD:    Soft, non-tender     Mild distension      No pulsatile masses.       No rebound, guarding or rigidity.  MSK:     No gross deformity to all four extremities.   LYMPH:   No cervical lymphadenopathy.  NEURO:   Alert. Good muscle tone, no atrophy.  Skin:    Warm, dry and intact.    Psych:    Mood is depressed and affect is appropriate.        Emergency Department Course     Laboratory:  CBC: WBC 9.3, HGB 16.7,   BMP: Glucose 411 (H), Sodium 130 (L), o/w WNL (Creatinine: 0.88)    Ketone Beta-Hydroxybutyrate Quantitative: 2.3 (VH)     Blood gas venous: pH Venous 7.42, PCO2 Venous 34 (L), PO2 Venous 103 (H), Bicarbonate 22, Oxyhemoglobin Venous 95, Base Deficit 1.8, 100%     Glucose by meter (Collected 152): 414 (H)   Glucose by meter (Collected 173): 315 (H)    UA with Microscopic: Pending on discharge     Emergency Department Course:    Reviewed:  I reviewed nursing notes, past medical history and care everywhere    Assessments:  1555 I obtained history and examined the patient as noted above.   1738 I rechecked the patient and explained findings.     Interventions:  1605 Lactated Ringers Bolus 1,000 mL IV  1620 Lactated Ringers Bolus 1,000 mL IV   1741 Insulin regular, 10 units, subcutaneous  1745 Zofran, 8 mg, IV injection     Disposition:  The patient was discharged to home.       Impression & Plan     Medical Decision Makin-year-old male with history of  diabetes presented to the ED with hyperglycemia.  Patient has been ruled out for DKA.  Glucose much improved with IV fluids.  Subcutaneous insulin provided.  Patient reports that he has been compliant with his medications.  After discussion with the ED pharmacist, I will increase his Lantus from 35 units to 42 units at night.  I recommend urgent follow-up with his primary care physician and close follow-up with endocrinology as previously scheduled.    Patient did have polyuria.  Urinalysis is concerning for infection.  This was initially overlooked at the time of discharge.  Patient called and message left on his phone for necessary prescription for presumed urinary tract infection.  Called in prescription for cephalexin 500 mg 3 times daily for 10 days at [facility] pharmacy in [place].      Diagnosis:    ICD-10-CM    1. Hyperglycemia  R73.9 UA with Microscopic     Glucose by meter     Glucose by meter   2. Ketonemia  R79.89    3. Acute cystitis without hematuria  N30.00      Scribe Disclosure:  I, Jesus Manuel Vazquez, am serving as a scribe at 3:47 PM on 2/9/2021 to document services personally performed by Luke Rojas MD based on my observations and the provider's statements to me.            Luke Rojas MD  02/10/21 0300

## 2021-02-09 NOTE — CONFIDENTIAL NOTE
Please call patient and let him know that his blood sugar is very high 551. His sodium is low (likely due to the high sugar). I would recommend he go to the ER to be seen. They will help him lower the blood sugar safely. If he has not already done so please help him schedule with Diabetes Education. It looks like he has a follow up with Barbara LAGOS Pharmacist in March.

## 2021-02-09 NOTE — ED TRIAGE NOTES
Pt presents to ED due to being sent by primary. Pt states he BG when they tiffanie labs was in the 700s.  A1C 15. Reports Excessive thirst, peeing a lot, burning to bottom of feet. BG here 414.

## 2021-02-09 NOTE — ED NOTES
DATE:  2/9/2021   TIME OF RECEIPT FROM LAB:  4:24 PM    LAB TEST:  Ketones   LAB VALUE:  2.3  RESULTS GIVEN WITH READ-BACK TO (PROVIDER):  Luke Rojas MD  TIME LAB VALUE REPORTED TO PROVIDER:   4:24 PM

## 2021-02-09 NOTE — CONFIDENTIAL NOTE
LM on  to call this RN back if he should call the main clinic please forward to me [phone]  Yana Gu RN

## 2021-02-09 NOTE — DISCHARGE INSTRUCTIONS
It is important that you have close follow-up with your primary care physician as well as your endocrinologist for further discussion and management of your diabetes.    I want you to increase your Lantus from 35 units to 42 units every night.  Further changes will be done by your endocrinologist or primary care doctor.    Discharge Instructions  Hyperglycemia, High Blood Sugar    Today we found your blood sugar (glucose) was high. This may mean that you have developed diabetes, or if you already know that you have diabetes, it may mean that your diabetes is not as well controlled as it should be. Sometimes blood sugar can be high temporarily and it is not diabetes. Signs of elevated blood sugar include increased thirst, frequent urination (peeing), blurred vision, fatigue, unexplained weight loss, poor wound healing, and frequent infections.    We sometimes give medicine in the Emergency Department to lower the blood sugar. We may also prescribe medicine for you to use at home, or increase the medicine that you already take. While we do not like to see your blood sugar high, it is much more dangerous to let your blood sugar get too low, so it is reasonable to take time to bring it down, or to wait and watch to see if it comes down on its own.    Generally, every Emergency Department visit should have a follow-up clinic visit with either a primary or a specialty clinic/provider. Please follow-up as instructed by your emergency provider today.     Return to the Emergency Department if you develop:  Vomiting (throwing up).  Confusion, disorientation, or being unable to wake up.  Severe weakness or illness.  Abdominal (belly) pain.    What can I do to help myself?  Check your blood sugar as instructed by your provider.  Take medications prescribed by your provider.  Follow a diabetic diet (low fat, low concentrated sweets, high fiber).  Exercise regularly.  Moderate or eliminate alcohol use.  Stop smoking.    Diabetes:  Diabetes mellitus is a disease in which the body cannot regulate the amount of sugar (glucose) in the blood. Insulin allows glucose to move out of the blood into cells throughout the body where it is used for fuel. People with diabetes do not produce enough insulin (type 1 diabetes), or cannot use insulin properly (type 2 diabetes), or both. This starves the cells that need the glucose for fuel, and also harms certain organs and tissues exposed to the high glucose levels.  Over a long period of time, uncontrolled diabetes can lead to heart and blood vessel disease, blindness, kidney failure, foot ulcers and many other problems.          About 17 million Americans (6.2% of adults) have diabetes. We think that about one third of adults with diabetes do not know they have diabetes.  The incidence of diabetes is increasing rapidly. This increase is due to many factors, but the most significant are our increasing weight and decreased activity levels.     Diabetes can be a very serious and life-threatening illness if not treated.  If you were given a prescription for medicine here today, be sure to read all of the information (including the package insert) that comes with your prescription.  This will include important information about the medicine, its side effects, and any warnings that you need to know about.  The pharmacist who fills the prescription can provide more information and answer questions you may have about the medicine.  If you have questions or concerns that the pharmacist cannot address, please call or return to the Emergency Department.   Remember that you can always come back to the Emergency Department if you are not able to see your regular provider in the amount of time listed above, if you get any new symptoms, or if there is anything that worries you.

## 2021-02-09 NOTE — CONFIDENTIAL NOTE
LM on  to call this RN back if he should call the main clinic please forward to me [phone]  Yana Gu RN

## 2021-02-10 ENCOUNTER — TELEPHONE (OUTPATIENT)
Dept: FAMILY MEDICINE | Facility: CLINIC | Age: 66
End: 2021-02-10

## 2021-02-10 RX ORDER — INSULIN GLARGINE 100 [IU]/ML
INJECTION, SOLUTION SUBCUTANEOUS
Qty: 15 ML | Refills: 0 | Status: SHIPPED | OUTPATIENT
Start: 2021-02-10 | End: 2021-03-16

## 2021-02-10 RX ORDER — DULAGLUTIDE 1.5 MG/.5ML
3 INJECTION, SOLUTION SUBCUTANEOUS
Qty: 12 ML | Refills: 0 | Status: SHIPPED | OUTPATIENT
Start: 2021-02-10 | End: 2021-02-16 | Stop reason: DRUGHIGH

## 2021-02-10 NOTE — TELEPHONE ENCOUNTER
Catia MACIAS see msg below.  Do you have recommendations for medication changes?  Would you like to see pt?    Yana Gu RN

## 2021-02-10 NOTE — TELEPHONE ENCOUNTER
Trulicity dosing increased to 3 mg weekly and insulin glargine refill with new dose (42 units at night) sent.

## 2021-02-10 NOTE — TELEPHONE ENCOUNTER
Relayed msg and Rx change below.    Yana DUNAWAY RN, BSN, PAL (Patient Advocate Liaison)  [facility]   [phone]

## 2021-02-10 NOTE — TELEPHONE ENCOUNTER
Reason for Call:  Other call back    Detailed comments: Pt called today wanting to speak with NIRANJAN Millans nurse about their recent ER visit. Pt would like to discuss their  Current Rxs and the next step of care, per PCPs request. Please contact to advise.    Phone Number Patient can be reached at: Home number on file [phone] (home)    Best Time: Any    Can we leave a detailed message on this number? YES    Call taken on 2/10/2021 at 7:56 AM by Evelina Mendez

## 2021-02-10 NOTE — TELEPHONE ENCOUNTER
Per Raisa patient has been following endocrine in the past (given how high his blood sugars and A1c are this may be reasonable). Referral to diabetes education and MTM pharmacist were made for patient when he was seen a couple days ago.    In the mean time we can increase his Trulicity if patient is agreeable.

## 2021-02-10 NOTE — TELEPHONE ENCOUNTER
ER changed his bedtime insulin from 35 to 42 I changed Rx to show that (see ER note).    He is agreeable to increasing Trulicity.      Will change his MTM appt to next week.    Scheduled endo appt with Dr Chin next week as well.    Still waiting on DM ed.    Yana DUNAWAY RN, BSN, PAL (Patient Advocate Liaison)  [facility]   [phone]

## 2021-02-10 NOTE — TELEPHONE ENCOUNTER
Diabetes Education Scheduling Outreach #1:    Call to patient to schedule. Left message with phone number to call to schedule.    Plan for 2nd outreach attempt within 1 week.    Marleni Neal  [place] [facility]  Diabetes and Nutrition Scheduling

## 2021-02-11 NOTE — PROGRESS NOTES
Clinic Care Coordination Contact  [facility]/Voicemail    Referral Source: Care Team  Clinical Data: Care Coordinator Outreach  Outreach attempted x 2.  Left message on patient's voicemail with call back information and requested return call.  Plan: Care Coordinator sent care coordination introduction letter on 2/11/21 via [employer]. Care Coordinator will try to reach patient again in 3-5 business days.      GRICELDA Wood   Care Coordination Team  [phone]

## 2021-02-16 ENCOUNTER — ALLIED HEALTH/NURSE VISIT (OUTPATIENT)
Dept: PHARMACY | Facility: CLINIC | Age: 66
End: 2021-02-16
Payer: COMMERCIAL

## 2021-02-16 DIAGNOSIS — L40.50 PSORIATIC ARTHRITIS (H): ICD-10-CM

## 2021-02-16 DIAGNOSIS — L40.50 PSORIATIC ARTHROPATHY (H): ICD-10-CM

## 2021-02-16 DIAGNOSIS — I10 BENIGN ESSENTIAL HYPERTENSION: ICD-10-CM

## 2021-02-16 DIAGNOSIS — F32.A DEPRESSION, UNSPECIFIED DEPRESSION TYPE: ICD-10-CM

## 2021-02-16 DIAGNOSIS — E78.5 HYPERLIPIDEMIA LDL GOAL <100: ICD-10-CM

## 2021-02-16 DIAGNOSIS — N52.9 ERECTILE DYSFUNCTION, UNSPECIFIED ERECTILE DYSFUNCTION TYPE: ICD-10-CM

## 2021-02-16 DIAGNOSIS — L40.9 PSORIASIS: ICD-10-CM

## 2021-02-16 DIAGNOSIS — G47.00 PERSISTENT INSOMNIA: ICD-10-CM

## 2021-02-16 PROCEDURE — 99607 MTMS BY PHARM ADDL 15 MIN: CPT | Mod: GT | Performed by: PHARMACIST

## 2021-02-16 PROCEDURE — 99605 MTMS BY PHARM NP 15 MIN: CPT | Mod: GT | Performed by: PHARMACIST

## 2021-02-16 RX ORDER — PHENOL 1.4 %
10 AEROSOL, SPRAY (ML) MUCOUS MEMBRANE
Qty: 30 TABLET | Refills: 1 | Status: ON HOLD | OUTPATIENT
Start: 2021-02-16 | End: 2021-05-05

## 2021-02-16 RX ORDER — DULAGLUTIDE 3 MG/.5ML
3 INJECTION, SOLUTION SUBCUTANEOUS WEEKLY
Qty: 2 ML | Refills: 2 | Status: SHIPPED | OUTPATIENT
Start: 2021-02-16 | End: 2021-03-16 | Stop reason: DRUGHIGH

## 2021-02-16 NOTE — PROGRESS NOTES
Medication Therapy Management (MTM) Encounter    ASSESSMENT:                            Medication Adherence/Access: No issues identified    Type 2 Diabetes: Patient is not meeting A1c goal of < 7%. Self monitoring of blood glucose is at goal of fasting  mg/dL and post prandial < 180 mg/dL. He would benefit from increasing Trulicity as prescribed and decreasing insulin dose d/t much improved blood glucose. Also discussed improving diet/exercise. Next visit may consider increasing metformin dose.     Hypertension: Last elevated blood pressure was in the ED - normally stable so continue to monitor.   Hyperlipidemia: Recommend rechecking lipid panel in 3-4 months with next A1c lab as he has improved diet and better controlling blood glucose.   Depression:  stable  Insomnia: Quetiapine may be contributing to worsened A1c and lipid panel. Recommend trialing melatonin instead.   Neuropathy: stable  Psoriasis/Arthritis: stable  ED: stable    PLAN:                            1. Increase Trulicity to 3mg weekly and decrease Basaglar to 30 units daily.   2. Try melatonin 10mg bedtime as needed (2 hours before bed) to see if helps decrease use of quetiapine.     Follow-up: 1 month    SUBJECTIVE/OBJECTIVE:                          Gerardo Gonzalez is a 65 year old male called for an initial visit. He was referred to me from Catia Bull PA-C.      Reason for visit: Referred for diabetes. Confused about Trulicity dosing.     Allergies/ADRs: Reviewed in chart  Tobacco: He reports that he has quit smoking. He smoked 0.00 packs per day for 4.00 years. He has never used smokeless tobacco.  Alcohol: not currently using  Activity: minimal now d/t cold weather and arthritis pain  Past Medical History: Reviewed in chart      Medication Adherence/Access: no issues reported    Type 2 Diabetes:  Currently taking Trulicity 1.5mg weekly, Basaglar 42 units daily, and metformin XR 1000mg daily. Patient is not experiencing side effects.  Diarrhea from metformin in the past but resolved now.   Blood sugar monitorin time(s) daily. Ranges (patient reported): post-prandial 147 or lower, Fasting- 118-121; 60 mg/dL once  Symptoms of low blood sugar? none, Frequency of lows- once recently  Symptoms of high blood sugar? Tingling, numbness, vision changes but improved now past week  Eye exam: due  Foot exam: up to date  Diet/Exercise: Not as much of an appetite lately. Used to [place] cuisine (tacos, beans, tamales, etc) and knows will need to change this now. Lately skipping lunch and smaller dinner meals.    Aspirin: Taking 81mg daily and denies side effects  Statin: Yes: simvastatin   ACEi/ARB: Yes: losartan.   Urine Albumin:   Lab Results   Component Value Date    UMALCR 205.47 (H) 2021      Lab Results   Component Value Date    A1C 15.0 2021    A1C 12.7 2020    A1C 14.7 2019    A1C 8.2 2019    A1C 8.3 10/26/2018       Hypertension: Current medications include losartan 50mg daily. Patient reports no current medication side effects. Last elevated blood pressure was in the ED, normally controlled.   BP Readings from Last 3 Encounters:   21 (!) 178/107   21 114/80   20 127/87       Hyperlipidemia: Current therapy includes simvastatin 40mg daily.  Patient reports no significant myalgias or other side effects.  The 10-year ASCVD risk score (Wilmingtonraghavendra BERNARD Jr., et al., 2013) is: 42.8%    Values used to calculate the score:      Age: 65 years      Sex: Male      Is Non- : No      Diabetic: Yes      Tobacco smoker: No      Systolic Blood Pressure: 178 mmHg      Is BP treated: No      HDL Cholesterol: 39 mg/dL      Total Cholesterol: 246 mg/dL  Recent Labs   Lab Test 21  1030 19  1555 07/06/15  0838 07/06/15  0838 13  1214   CHOL 246* 248*   < > 201* 141   HDL 39* 413   < > 46 52   * Cannot estimate LDL when triglyceride exceeds 400 mg/dL  149*   < > 85 63   TRIG  391* 522*   < > 350* 129   CHOLHDLRATIO  --   --   --  4.4 2.7    < > = values in this interval not displayed.       Depression:  Current medications include: Duloxetine 60mg once daily. Pt reports that depression symptoms are unchanged and stable.  PHQ-9 SCORE 3/21/2019 7/29/2020 2/8/2021   PHQ-9 Total Score MyChart 9 (Mild depression) 23 (Severe depression) -   PHQ-9 Total Score 9 23 16       Insomnia: Patient taking quetiapine 25-50mg bedtime as needed (out, needing, couldn't sleep last night). Has not tried anything else.     Neuropathy: Patient taking gabapentin 800mg AM, 800mg noon, and 1600mg bedtime. No issues. Pain improved now that blood glucose improved.     Psoriasis/Arthritis: Patient is on Humira every 7 days and meloxicam 15mg daily. No issues. He may go off Humira soon b/c psoriasis controlled currently.     ED: Patient using sildenafil 100mg as needed (very occasional) and reports no issues.     Today's Vitals: There were no vitals taken for this visit.  ----------------  Post Discharge Medication Reconciliation Status: discharge medications reconciled and changed, per note/orders.    I spent 30 minutes with this patient today. All changes were made via collaborative practice agreement with Raisa Calvillo. A copy of the visit note was provided to the patient's primary care provider.    The patient was sent via [employer] a summary of these recommendations.     Barbara Kc, PharmD  Medication Therapy Management Provider, [facility]  Pager: [phone]    Telemedicine Visit Details  Type of service:  Telephone visit  Start Time: 10:30 AM  End Time: 11:00 AM  Originating Location (patient location): Home  Distant Location (provider location):  [facility] MTM        Medication Therapy Recommendations  Persistent insomnia    Current Medication: Melatonin 10 MG TABS tablet   Rationale: Synergistic therapy - Needs additional medication therapy - Indication    Recommendation: Start Medication - QUEtiapine 25 MG tablet - Start melatonin 10mg at bedtime as needed to use less of quetiapine.   Status: Accepted per CPA         Type 2 diabetes mellitus without complication, with long-term current use of insulin (H)    Current Medication: Dulaglutide (TRULICITY) 3 MG/0.5ML SOPN   Rationale: Dose too low - Dosage too low - Effectiveness   Recommendation: Increase Dose - insulin glargine 100 UNIT/ML pen - Increase Trulicity to 3mg weekly and decrease Basaglar to 30 units daily.   Status: Accepted per CPA

## 2021-02-16 NOTE — PATIENT INSTRUCTIONS
Recommendations from today's MTM visit:                                                    MTM (medication therapy management) is a service provided by a clinical pharmacist designed to help you get the most of out of your medicines.   Today we reviewed what your medicines are for, how to know if they are working, that your medicines are safe and how to make your medicine regimen as easy as possible.       1. Increase Trulicity to 3mg weekly and decrease Basaglar to 30 units daily.   2. Try melatonin 10mg bedtime as needed (2 hours before bed) to see if helps decrease use of quetiapine.     It was great to speak with you today.  I value your experience and would be very thankful for your time with providing feedback on our clinic survey. You may receive a survey via email or text message in the next few days.     To schedule another MTM appointment, please call the clinic directly or you may call the MTM scheduling line at [phone] or toll-free at [phone].     My Clinical Pharmacist's contact information:                                                      It was a pleasure talking with you today!  Please feel free to contact me with any questions or concerns you have.      Barbara Kc, PharmD  Medication Therapy Management Provider, [facility]

## 2021-02-17 NOTE — TELEPHONE ENCOUNTER
Diabetes Education Scheduling Outreach #2:    Call to patient to schedule. Left message with phone number to call to schedule.      Ambreen Crooks  [place] [facility]  Diabetes and Nutrition Scheduling

## 2021-02-25 ENCOUNTER — PATIENT OUTREACH (OUTPATIENT)
Dept: CARE COORDINATION | Facility: CLINIC | Age: 66
End: 2021-02-25

## 2021-02-25 NOTE — LETTER
[facility] CARE COORDINATION  [facility]   February 25, 2021    Gerardo Gonzalez  [address] [id number]      Dear Gerardo,    I am a clinic care coordinator who works with Raisa Calvillo PA-C at [facility] . I have been trying to reach you recently to introduce Clinic Care Coordination and to see if there was anything I could assist you with.  Below is a description of clinic care coordination and how I can further assist you.      The clinic care coordination team is made up of a registered nurse,  and community health worker who understand the health care system. The goal of clinic care coordination is to help you manage your health and improve access to the health care system in the most efficient manner. The team can assist you in meeting your health care goals by providing education, coordinating services, strengthening the communication among your providers and supporting you with any resource needs.    Please feel free to contact me at [phone] with any questions or concerns. We are focused on providing you with the highest-quality healthcare experience possible and that all starts with you.     Sincerely,     GRICELDA Wood   Care Coordination Team  [phone]

## 2021-02-25 NOTE — PROGRESS NOTES
Clinic Care Coordination Contact  [facility]/Voicemail    Referral Source: Care Team  Clinical Data: Care Coordinator Outreach  Outreach attempted x 3.  Left message on patient's voicemail with call back information and requested return call.  Plan: Care Coordinator sent care coordination introduction letter on 2/25/21 via [employer]. Care Coordinator will do no further outreaches at this time.    GRICELDA Wood   Care Coordination Team  [phone]

## 2021-03-02 RX ORDER — PEN NEEDLE, DIABETIC 31 GX5/16"
NEEDLE, DISPOSABLE MISCELLANEOUS
Qty: 100 EACH | Refills: 1 | Status: SHIPPED | OUTPATIENT
Start: 2021-03-02 | End: 2022-10-21

## 2021-03-02 NOTE — TELEPHONE ENCOUNTER
Prescription approved per [facility] Refill Protocol.  Alda Kingston RN, BSN  Message handled by CLINIC NURSE.

## 2021-03-15 NOTE — PROGRESS NOTES
Medication Therapy Management (MTM) Encounter    ASSESSMENT:                            Medication Adherence/Access: No issues identified    Type 2 Diabetes: Patient is not meeting A1c goal of < 7%. Self monitoring of blood glucose is at goal of fasting  mg/dL and post prandial < 180 mg/dL. He would benefit from increasing Trulicity and decreasing insulin dose d/t much improved blood glucose. Next visit may consider increasing metformin dose.     Hypertension: Last elevated blood pressure was in the ED - normally stable so continue to monitor.   Hyperlipidemia: Recommend rechecking lipid panel with next A1c lab as he has improved diet and better controlling blood glucose.     PLAN:                            1. Increase Trulicity to 4.5mg once weekly once available at the pharmacy and decrease Basaglar to 30 units daily.     Follow-up: 2 months for labs    SUBJECTIVE/OBJECTIVE:                          Gerardo Gonzalez is a 65 year old male called for a follow-up visit. He was referred to me from Raisa Calvillo PA-C.  Today's visit is a follow-up MTM visit from .     Reason for visit: Trulicity f/up.    Tobacco: He reports that he has quit smoking. He smoked 0.00 packs per day for 4.00 years. He has never used smokeless tobacco.  Alcohol: not currently using  Activity: minimal now d/t cold weather and arthritis pain  Past Medical History: Reviewed in chart      Medication Adherence/Access: no issues reported    Type 2 Diabetes:  Currently taking Trulicity 3mg weekly, Basaglar 42 units daily, and metformin XR 1000mg daily. Patient is not experiencing side effects. Diarrhea from metformin in the past but resolved now.   Blood sugar monitorin-3 time(s) daily. Ranges (patient reported): post-prandial 120-140's after breakfast but does go over 200's once in a while after dinner, Fasting- 90's  Symptoms of low blood sugar? none, Frequency of lows- once recently  Symptoms of high blood sugar? Tingling,  numbness, vision changes but improved now past week  Eye exam: due  Foot exam: up to date  Diet/Exercise: Watches what he's eating, does exercises especially when blood glucose high at night. When he moves more his blood glucose goes down. Lately skipping breakfast, only brunch and smaller dinner meals.    Aspirin: Taking 81mg daily and denies side effects  Statin: Yes: simvastatin   ACEi/ARB: Yes: losartan.   Urine Albumin:   Lab Results   Component Value Date    UMALCR 205.47 (H) 02/08/2021      Lab Results   Component Value Date    A1C 15.0 02/08/2021    A1C 12.7 07/29/2020    A1C 14.7 11/06/2019    A1C 8.2 03/21/2019    A1C 8.3 10/26/2018       Hypertension: Current medications include losartan 50mg daily. Patient reports no current medication side effects. Last elevated blood pressure was in the ED, normally controlled.   BP Readings from Last 3 Encounters:   02/09/21 (!) 178/107   02/08/21 114/80   07/29/20 127/87       Hyperlipidemia: Current therapy includes simvastatin 40mg daily.  Patient reports no significant myalgias or other side effects.  Recent Labs   Lab Test 02/08/21  1030 11/06/19  1555 07/06/15  0838 07/06/15  0838 12/03/13  1214   CHOL 246* 248*   < > 201* 141   HDL 39* 413   < > 46 52   * Cannot estimate LDL when triglyceride exceeds 400 mg/dL  149*   < > 85 63   TRIG 391* 522*   < > 350* 129   CHOLHDLRATIO  --   --   --  4.4 2.7    < > = values in this interval not displayed.       Today's Vitals: There were no vitals taken for this visit.  ----------------    I spent 13 minutes with this patient today. All changes were made via collaborative practice agreement with Raisa Calvillo. A copy of the visit note was provided to the patient's primary care provider.    The patient was sent via [employer] a summary of these recommendations.     Barbara Kc, PharmD  Medication Therapy Management Provider, [facility]  Pager: [phone]    Telemedicine Visit  Details  Type of service:  Telephone visit  Start Time: 10:02 AM  End Time: 10:15 AM  Originating Location (patient location): Home  Distant Location (provider location):  [facility]        Medication Therapy Recommendations  Type 2 diabetes mellitus without complication, with long-term current use of insulin (H)    Current Medication: Dulaglutide (TRULICITY) 4.5 MG/0.5ML SOPN   Rationale: Dose too low - Dosage too low - Effectiveness   Recommendation: Increase Dose - insulin glargine 100 UNIT/ML pen - Increase Trulicity to 4.5 mg weekly and decrease Basaglar to 30 units daily.   Status: Accepted per CPA

## 2021-03-16 ENCOUNTER — VIRTUAL VISIT (OUTPATIENT)
Dept: PHARMACY | Facility: CLINIC | Age: 66
End: 2021-03-16
Payer: COMMERCIAL

## 2021-03-16 DIAGNOSIS — I10 BENIGN ESSENTIAL HYPERTENSION: ICD-10-CM

## 2021-03-16 DIAGNOSIS — E78.5 HYPERLIPIDEMIA LDL GOAL <100: ICD-10-CM

## 2021-03-16 PROCEDURE — 99606 MTMS BY PHARM EST 15 MIN: CPT | Mod: TEL | Performed by: PHARMACIST

## 2021-03-16 RX ORDER — INSULIN GLARGINE 100 [IU]/ML
INJECTION, SOLUTION SUBCUTANEOUS
Qty: 45 ML | Refills: 0 | Status: SHIPPED | OUTPATIENT
Start: 2021-03-16 | End: 2021-06-22

## 2021-03-16 RX ORDER — DULAGLUTIDE 4.5 MG/.5ML
4.5 INJECTION, SOLUTION SUBCUTANEOUS WEEKLY
Qty: 2 ML | Refills: 1 | Status: SHIPPED | OUTPATIENT
Start: 2021-03-16 | End: 2021-05-11

## 2021-03-16 NOTE — Clinical Note
DOREENI - blood glucose much improved and at goal, he will see us in 2 months for a co-visit for labs.

## 2021-03-16 NOTE — PATIENT INSTRUCTIONS
Recommendations from today's MTM visit:                                                         1. Increase Trulicity to 4.5mg once weekly once available at the pharmacy and decrease Basaglar to 30 units daily.     It was great to speak with you today.  I value your experience and would be very thankful for your time with providing feedback on our clinic survey. You may receive a survey via email or text message in the next few days.     To schedule another MTM appointment, please call the clinic directly or you may call the MTM scheduling line at [phone] or toll-free at [phone].     My Clinical Pharmacist's contact information:                                                      It was a pleasure talking with you today!  Please feel free to contact me with any questions or concerns you have.      Barbara Kc, PharmD  Medication Therapy Management Provider, [facility]

## 2021-05-05 ENCOUNTER — ANESTHESIA EVENT (OUTPATIENT)
Dept: SURGERY | Facility: CLINIC | Age: 66
End: 2021-05-05
Payer: COMMERCIAL

## 2021-05-05 ENCOUNTER — ANESTHESIA (OUTPATIENT)
Dept: SURGERY | Facility: CLINIC | Age: 66
End: 2021-05-05
Payer: COMMERCIAL

## 2021-05-05 ENCOUNTER — HOSPITAL ENCOUNTER (OUTPATIENT)
Facility: CLINIC | Age: 66
Setting detail: OBSERVATION
Discharge: HOME | End: 2021-05-06
Attending: PHYSICIAN ASSISTANT | Admitting: HOSPITALIST
Payer: COMMERCIAL

## 2021-05-05 ENCOUNTER — APPOINTMENT (OUTPATIENT)
Dept: GENERAL RADIOLOGY | Facility: CLINIC | Age: 66
End: 2021-05-05
Attending: EMERGENCY MEDICINE
Payer: COMMERCIAL

## 2021-05-05 ENCOUNTER — ALLIED HEALTH/NURSE VISIT (OUTPATIENT)
Dept: FAMILY MEDICINE | Facility: CLINIC | Age: 66
End: 2021-05-05
Payer: COMMERCIAL

## 2021-05-05 ENCOUNTER — APPOINTMENT (OUTPATIENT)
Dept: GENERAL RADIOLOGY | Facility: CLINIC | Age: 66
End: 2021-05-05
Attending: COLON & RECTAL SURGERY
Payer: COMMERCIAL

## 2021-05-05 DIAGNOSIS — T18.5XXA: ICD-10-CM

## 2021-05-05 DIAGNOSIS — R33.9 URINARY RETENTION: ICD-10-CM

## 2021-05-05 DIAGNOSIS — R73.9 HYPERGLYCEMIA: ICD-10-CM

## 2021-05-05 DIAGNOSIS — K62.4 RECTAL OBSTRUCTION: Primary | ICD-10-CM

## 2021-05-05 LAB
ALBUMIN UR-MCNC: NEGATIVE MG/DL
ANION GAP SERPL CALCULATED.3IONS-SCNC: 5 MMOL/L (ref 3–14)
APPEARANCE UR: CLEAR
BASOPHILS # BLD AUTO: 0.1 10E9/L (ref 0–0.2)
BASOPHILS NFR BLD AUTO: 0.5 %
BILIRUB UR QL STRIP: NEGATIVE
BUN SERPL-MCNC: 11 MG/DL (ref 7–30)
CALCIUM SERPL-MCNC: 9.3 MG/DL (ref 8.5–10.1)
CHLORIDE SERPL-SCNC: 104 MMOL/L (ref 94–109)
CO2 SERPL-SCNC: 27 MMOL/L (ref 20–32)
COLOR UR AUTO: ABNORMAL
CREAT SERPL-MCNC: 0.87 MG/DL (ref 0.66–1.25)
DIFFERENTIAL METHOD BLD: NORMAL
EOSINOPHIL # BLD AUTO: 0.1 10E9/L (ref 0–0.7)
EOSINOPHIL NFR BLD AUTO: 1.3 %
ERYTHROCYTE [DISTWIDTH] IN BLOOD BY AUTOMATED COUNT: 13.6 % (ref 10–15)
FLEXIBLE SIGMOIDOSCOPY: NORMAL
GFR SERPL CREATININE-BSD FRML MDRD: >90 ML/MIN/{1.73_M2}
GLUCOSE BLDC GLUCOMTR-MCNC: 161 MG/DL (ref 70–99)
GLUCOSE BLDC GLUCOMTR-MCNC: 162 MG/DL (ref 70–99)
GLUCOSE BLDC GLUCOMTR-MCNC: 180 MG/DL (ref 70–99)
GLUCOSE BLDC GLUCOMTR-MCNC: 184 MG/DL (ref 70–99)
GLUCOSE BLDC GLUCOMTR-MCNC: 260 MG/DL (ref 70–99)
GLUCOSE SERPL-MCNC: 240 MG/DL (ref 70–99)
GLUCOSE UR STRIP-MCNC: >1000 MG/DL
HCT VFR BLD AUTO: 41.5 % (ref 40–53)
HGB BLD-MCNC: 14.1 G/DL (ref 13.3–17.7)
HGB UR QL STRIP: NEGATIVE
IMM GRANULOCYTES # BLD: 0 10E9/L (ref 0–0.4)
IMM GRANULOCYTES NFR BLD: 0.3 %
KETONES BLD-SCNC: 0.7 MMOL/L (ref 0–0.6)
KETONES UR STRIP-MCNC: ABNORMAL MG/DL
LABORATORY COMMENT REPORT: NORMAL
LEUKOCYTE ESTERASE UR QL STRIP: NEGATIVE
LYMPHOCYTES # BLD AUTO: 1.3 10E9/L (ref 0.8–5.3)
LYMPHOCYTES NFR BLD AUTO: 12.4 %
MCH RBC QN AUTO: 29.4 PG (ref 26.5–33)
MCHC RBC AUTO-ENTMCNC: 34 G/DL (ref 31.5–36.5)
MCV RBC AUTO: 87 FL (ref 78–100)
MONOCYTES # BLD AUTO: 0.8 10E9/L (ref 0–1.3)
MONOCYTES NFR BLD AUTO: 7.7 %
NEUTROPHILS # BLD AUTO: 7.9 10E9/L (ref 1.6–8.3)
NEUTROPHILS NFR BLD AUTO: 77.8 %
NITRATE UR QL: NEGATIVE
NRBC # BLD AUTO: 0 10*3/UL
NRBC BLD AUTO-RTO: 0 /100
PH UR STRIP: 6 PH (ref 5–7)
PLATELET # BLD AUTO: 240 10E9/L (ref 150–450)
POTASSIUM SERPL-SCNC: 3.7 MMOL/L (ref 3.4–5.3)
RBC # BLD AUTO: 4.79 10E12/L (ref 4.4–5.9)
SARS-COV-2 RNA RESP QL NAA+PROBE: NEGATIVE
SODIUM SERPL-SCNC: 136 MMOL/L (ref 133–144)
SOURCE: ABNORMAL
SP GR UR STRIP: 1.02 (ref 1–1.03)
SPECIMEN SOURCE: NORMAL
UROBILINOGEN UR STRIP-MCNC: NORMAL MG/DL (ref 0–2)
WBC # BLD AUTO: 10.1 10E9/L (ref 4–11)

## 2021-05-05 PROCEDURE — 74019 RADEX ABDOMEN 2 VIEWS: CPT

## 2021-05-05 PROCEDURE — 999N001017 HC STATISTIC GLUCOSE BY METER IP

## 2021-05-05 PROCEDURE — 81003 URINALYSIS AUTO W/O SCOPE: CPT | Performed by: PHYSICIAN ASSISTANT

## 2021-05-05 PROCEDURE — G0378 HOSPITAL OBSERVATION PER HR: HCPCS

## 2021-05-05 PROCEDURE — 272N000001 HC OR GENERAL SUPPLY STERILE: Performed by: COLON & RECTAL SURGERY

## 2021-05-05 PROCEDURE — 85025 COMPLETE CBC W/AUTO DIFF WBC: CPT | Performed by: PHYSICIAN ASSISTANT

## 2021-05-05 PROCEDURE — 87635 SARS-COV-2 COVID-19 AMP PRB: CPT | Performed by: PHYSICIAN ASSISTANT

## 2021-05-05 PROCEDURE — 96375 TX/PRO/DX INJ NEW DRUG ADDON: CPT

## 2021-05-05 PROCEDURE — 250N000012 HC RX MED GY IP 250 OP 636 PS 637: Performed by: HOSPITALIST

## 2021-05-05 PROCEDURE — 258N000003 HC RX IP 258 OP 636: Performed by: HOSPITALIST

## 2021-05-05 PROCEDURE — 96372 THER/PROPH/DIAG INJ SC/IM: CPT | Performed by: HOSPITALIST

## 2021-05-05 PROCEDURE — 250N000013 HC RX MED GY IP 250 OP 250 PS 637: Performed by: HOSPITALIST

## 2021-05-05 PROCEDURE — 250N000011 HC RX IP 250 OP 636: Performed by: PHYSICIAN ASSISTANT

## 2021-05-05 PROCEDURE — 999N000065 XR ABDOMEN PORT 1 VIEWS

## 2021-05-05 PROCEDURE — 250N000011 HC RX IP 250 OP 636: Performed by: HOSPITALIST

## 2021-05-05 PROCEDURE — 370N000017 HC ANESTHESIA TECHNICAL FEE, PER MIN: Performed by: COLON & RECTAL SURGERY

## 2021-05-05 PROCEDURE — 82010 KETONE BODYS QUAN: CPT | Performed by: PHYSICIAN ASSISTANT

## 2021-05-05 PROCEDURE — C9803 HOPD COVID-19 SPEC COLLECT: HCPCS

## 2021-05-05 PROCEDURE — 96372 THER/PROPH/DIAG INJ SC/IM: CPT | Performed by: PHYSICIAN ASSISTANT

## 2021-05-05 PROCEDURE — 99207 PR NO CHARGE NURSE ONLY: CPT

## 2021-05-05 PROCEDURE — 80048 BASIC METABOLIC PNL TOTAL CA: CPT | Performed by: PHYSICIAN ASSISTANT

## 2021-05-05 PROCEDURE — 250N000012 HC RX MED GY IP 250 OP 636 PS 637: Performed by: PHYSICIAN ASSISTANT

## 2021-05-05 PROCEDURE — 96374 THER/PROPH/DIAG INJ IV PUSH: CPT | Mod: 59

## 2021-05-05 PROCEDURE — 258N000003 HC RX IP 258 OP 636: Performed by: PHYSICIAN ASSISTANT

## 2021-05-05 PROCEDURE — 250N000011 HC RX IP 250 OP 636: Performed by: ANESTHESIOLOGY

## 2021-05-05 PROCEDURE — 999N000198 HC STATISTIC WOC PT EDUCATION, 16-30 MIN

## 2021-05-05 PROCEDURE — 99220 PR INITIAL OBSERVATION CARE,LEVEL III: CPT | Performed by: HOSPITALIST

## 2021-05-05 PROCEDURE — 250N000011 HC RX IP 250 OP 636: Performed by: COLON & RECTAL SURGERY

## 2021-05-05 PROCEDURE — 88300 SURGICAL PATH GROSS: CPT | Mod: 26 | Performed by: PATHOLOGY

## 2021-05-05 PROCEDURE — 999N000141 HC STATISTIC PRE-PROCEDURE NURSING ASSESSMENT: Performed by: COLON & RECTAL SURGERY

## 2021-05-05 PROCEDURE — 360N000075 HC SURGERY LEVEL 2, PER MIN: Performed by: COLON & RECTAL SURGERY

## 2021-05-05 PROCEDURE — 250N000009 HC RX 250: Performed by: NURSE ANESTHETIST, CERTIFIED REGISTERED

## 2021-05-05 PROCEDURE — 96376 TX/PRO/DX INJ SAME DRUG ADON: CPT

## 2021-05-05 PROCEDURE — 99285 EMERGENCY DEPT VISIT HI MDM: CPT | Mod: 25

## 2021-05-05 PROCEDURE — 710N000009 HC RECOVERY PHASE 1, LEVEL 1, PER MIN: Performed by: COLON & RECTAL SURGERY

## 2021-05-05 PROCEDURE — 88300 SURGICAL PATH GROSS: CPT | Mod: TC | Performed by: COLON & RECTAL SURGERY

## 2021-05-05 PROCEDURE — 258N000003 HC RX IP 258 OP 636: Performed by: NURSE ANESTHETIST, CERTIFIED REGISTERED

## 2021-05-05 PROCEDURE — 250N000011 HC RX IP 250 OP 636: Performed by: NURSE ANESTHETIST, CERTIFIED REGISTERED

## 2021-05-05 RX ORDER — QUETIAPINE FUMARATE 25 MG/1
25-50 TABLET, FILM COATED ORAL
Status: DISCONTINUED | OUTPATIENT
Start: 2021-05-05 | End: 2021-05-06 | Stop reason: HOSPADM

## 2021-05-05 RX ORDER — HYDROMORPHONE HCL IN WATER/PF 6 MG/30 ML
0.2 PATIENT CONTROLLED ANALGESIA SYRINGE INTRAVENOUS EVERY 6 HOURS PRN
Status: DISCONTINUED | OUTPATIENT
Start: 2021-05-05 | End: 2021-05-06 | Stop reason: HOSPADM

## 2021-05-05 RX ORDER — SODIUM CHLORIDE, SODIUM LACTATE, POTASSIUM CHLORIDE, CALCIUM CHLORIDE 600; 310; 30; 20 MG/100ML; MG/100ML; MG/100ML; MG/100ML
INJECTION, SOLUTION INTRAVENOUS CONTINUOUS
Status: DISCONTINUED | OUTPATIENT
Start: 2021-05-05 | End: 2021-05-05 | Stop reason: HOSPADM

## 2021-05-05 RX ORDER — MORPHINE SULFATE 4 MG/ML
4 INJECTION, SOLUTION INTRAMUSCULAR; INTRAVENOUS ONCE
Status: COMPLETED | OUTPATIENT
Start: 2021-05-05 | End: 2021-05-05

## 2021-05-05 RX ORDER — NALOXONE HYDROCHLORIDE 0.4 MG/ML
0.2 INJECTION, SOLUTION INTRAMUSCULAR; INTRAVENOUS; SUBCUTANEOUS
Status: DISCONTINUED | OUTPATIENT
Start: 2021-05-05 | End: 2021-05-06 | Stop reason: HOSPADM

## 2021-05-05 RX ORDER — ACETAMINOPHEN 325 MG/1
650 TABLET ORAL EVERY 4 HOURS PRN
Status: DISCONTINUED | OUTPATIENT
Start: 2021-05-05 | End: 2021-05-06 | Stop reason: HOSPADM

## 2021-05-05 RX ORDER — NALOXONE HYDROCHLORIDE 0.4 MG/ML
0.4 INJECTION, SOLUTION INTRAMUSCULAR; INTRAVENOUS; SUBCUTANEOUS
Status: DISCONTINUED | OUTPATIENT
Start: 2021-05-05 | End: 2021-05-06 | Stop reason: HOSPADM

## 2021-05-05 RX ORDER — CELECOXIB 100 MG/1
200 CAPSULE ORAL 2 TIMES DAILY
Status: DISCONTINUED | OUTPATIENT
Start: 2021-05-05 | End: 2021-05-06 | Stop reason: HOSPADM

## 2021-05-05 RX ORDER — FENTANYL CITRATE 50 UG/ML
25-50 INJECTION, SOLUTION INTRAMUSCULAR; INTRAVENOUS
Status: DISCONTINUED | OUTPATIENT
Start: 2021-05-05 | End: 2021-05-05 | Stop reason: HOSPADM

## 2021-05-05 RX ORDER — FENTANYL CITRATE 50 UG/ML
50 INJECTION, SOLUTION INTRAMUSCULAR; INTRAVENOUS ONCE
Status: COMPLETED | OUTPATIENT
Start: 2021-05-05 | End: 2021-05-05

## 2021-05-05 RX ORDER — SODIUM CHLORIDE, SODIUM LACTATE, POTASSIUM CHLORIDE, CALCIUM CHLORIDE 600; 310; 30; 20 MG/100ML; MG/100ML; MG/100ML; MG/100ML
INJECTION, SOLUTION INTRAVENOUS CONTINUOUS PRN
Status: DISCONTINUED | OUTPATIENT
Start: 2021-05-05 | End: 2021-05-05

## 2021-05-05 RX ORDER — HYDROMORPHONE HYDROCHLORIDE 1 MG/ML
.3-.5 INJECTION, SOLUTION INTRAMUSCULAR; INTRAVENOUS; SUBCUTANEOUS EVERY 10 MIN PRN
Status: DISCONTINUED | OUTPATIENT
Start: 2021-05-05 | End: 2021-05-05 | Stop reason: HOSPADM

## 2021-05-05 RX ORDER — PROPOFOL 10 MG/ML
INJECTION, EMULSION INTRAVENOUS CONTINUOUS PRN
Status: DISCONTINUED | OUTPATIENT
Start: 2021-05-05 | End: 2021-05-05

## 2021-05-05 RX ORDER — ONDANSETRON 2 MG/ML
4 INJECTION INTRAMUSCULAR; INTRAVENOUS ONCE
Status: COMPLETED | OUTPATIENT
Start: 2021-05-05 | End: 2021-05-05

## 2021-05-05 RX ORDER — DEXTROSE MONOHYDRATE 25 G/50ML
25-50 INJECTION, SOLUTION INTRAVENOUS
Status: DISCONTINUED | OUTPATIENT
Start: 2021-05-05 | End: 2021-05-06 | Stop reason: HOSPADM

## 2021-05-05 RX ORDER — CEFAZOLIN SODIUM 1 G/50ML
1 INJECTION, SOLUTION INTRAVENOUS EVERY 8 HOURS
Status: DISCONTINUED | OUTPATIENT
Start: 2021-05-06 | End: 2021-05-06 | Stop reason: HOSPADM

## 2021-05-05 RX ORDER — SIMVASTATIN 40 MG
40 TABLET ORAL AT BEDTIME
Status: DISCONTINUED | OUTPATIENT
Start: 2021-05-05 | End: 2021-05-06 | Stop reason: HOSPADM

## 2021-05-05 RX ORDER — ONDANSETRON 4 MG/1
4 TABLET, ORALLY DISINTEGRATING ORAL EVERY 30 MIN PRN
Status: DISCONTINUED | OUTPATIENT
Start: 2021-05-05 | End: 2021-05-05 | Stop reason: HOSPADM

## 2021-05-05 RX ORDER — SODIUM CHLORIDE, SODIUM LACTATE, POTASSIUM CHLORIDE, CALCIUM CHLORIDE 600; 310; 30; 20 MG/100ML; MG/100ML; MG/100ML; MG/100ML
INJECTION, SOLUTION INTRAVENOUS CONTINUOUS
Status: DISCONTINUED | OUTPATIENT
Start: 2021-05-05 | End: 2021-05-06 | Stop reason: HOSPADM

## 2021-05-05 RX ORDER — ONDANSETRON 2 MG/ML
4 INJECTION INTRAMUSCULAR; INTRAVENOUS EVERY 30 MIN PRN
Status: DISCONTINUED | OUTPATIENT
Start: 2021-05-05 | End: 2021-05-05 | Stop reason: HOSPADM

## 2021-05-05 RX ORDER — HYDRALAZINE HYDROCHLORIDE 20 MG/ML
2.5-5 INJECTION INTRAMUSCULAR; INTRAVENOUS EVERY 10 MIN PRN
Status: DISCONTINUED | OUTPATIENT
Start: 2021-05-05 | End: 2021-05-05 | Stop reason: HOSPADM

## 2021-05-05 RX ORDER — INSULIN GLARGINE 100 [IU]/ML
42 INJECTION, SOLUTION SUBCUTANEOUS AT BEDTIME
Status: DISCONTINUED | OUTPATIENT
Start: 2021-05-05 | End: 2021-05-05

## 2021-05-05 RX ORDER — GABAPENTIN 400 MG/1
800 CAPSULE ORAL 2 TIMES DAILY
Status: DISCONTINUED | OUTPATIENT
Start: 2021-05-05 | End: 2021-05-06 | Stop reason: HOSPADM

## 2021-05-05 RX ORDER — NICOTINE POLACRILEX 4 MG
15-30 LOZENGE BUCCAL
Status: DISCONTINUED | OUTPATIENT
Start: 2021-05-05 | End: 2021-05-06 | Stop reason: HOSPADM

## 2021-05-05 RX ORDER — NALOXONE HYDROCHLORIDE 0.4 MG/ML
0.2 INJECTION, SOLUTION INTRAMUSCULAR; INTRAVENOUS; SUBCUTANEOUS
Status: DISCONTINUED | OUTPATIENT
Start: 2021-05-05 | End: 2021-05-05 | Stop reason: HOSPADM

## 2021-05-05 RX ORDER — KETAMINE HYDROCHLORIDE 10 MG/ML
INJECTION INTRAMUSCULAR; INTRAVENOUS PRN
Status: DISCONTINUED | OUTPATIENT
Start: 2021-05-05 | End: 2021-05-05

## 2021-05-05 RX ORDER — METFORMIN HCL 500 MG
1000 TABLET, EXTENDED RELEASE 24 HR ORAL
Status: CANCELLED | OUTPATIENT
Start: 2021-05-05

## 2021-05-05 RX ORDER — NALOXONE HYDROCHLORIDE 0.4 MG/ML
0.4 INJECTION, SOLUTION INTRAMUSCULAR; INTRAVENOUS; SUBCUTANEOUS
Status: DISCONTINUED | OUTPATIENT
Start: 2021-05-05 | End: 2021-05-05 | Stop reason: HOSPADM

## 2021-05-05 RX ORDER — MEPERIDINE HYDROCHLORIDE 25 MG/ML
12.5 INJECTION INTRAMUSCULAR; INTRAVENOUS; SUBCUTANEOUS
Status: DISCONTINUED | OUTPATIENT
Start: 2021-05-05 | End: 2021-05-05 | Stop reason: HOSPADM

## 2021-05-05 RX ORDER — LIDOCAINE HYDROCHLORIDE 10 MG/ML
INJECTION, SOLUTION INFILTRATION; PERINEURAL PRN
Status: DISCONTINUED | OUTPATIENT
Start: 2021-05-05 | End: 2021-05-05

## 2021-05-05 RX ORDER — GABAPENTIN 400 MG/1
1600 CAPSULE ORAL AT BEDTIME
Status: DISCONTINUED | OUTPATIENT
Start: 2021-05-05 | End: 2021-05-06 | Stop reason: HOSPADM

## 2021-05-05 RX ORDER — LIDOCAINE 40 MG/G
CREAM TOPICAL
Status: DISCONTINUED | OUTPATIENT
Start: 2021-05-05 | End: 2021-05-05 | Stop reason: HOSPADM

## 2021-05-05 RX ORDER — CEFAZOLIN SODIUM 1 G/3ML
1 INJECTION, POWDER, FOR SOLUTION INTRAMUSCULAR; INTRAVENOUS EVERY 8 HOURS
Status: DISCONTINUED | OUTPATIENT
Start: 2021-05-05 | End: 2021-05-05

## 2021-05-05 RX ORDER — LOSARTAN POTASSIUM 50 MG/1
50 TABLET ORAL DAILY
Status: DISCONTINUED | OUTPATIENT
Start: 2021-05-05 | End: 2021-05-06 | Stop reason: HOSPADM

## 2021-05-05 RX ORDER — FENTANYL CITRATE 50 UG/ML
INJECTION, SOLUTION INTRAMUSCULAR; INTRAVENOUS PRN
Status: DISCONTINUED | OUTPATIENT
Start: 2021-05-05 | End: 2021-05-05

## 2021-05-05 RX ORDER — LABETALOL HYDROCHLORIDE 5 MG/ML
10 INJECTION, SOLUTION INTRAVENOUS
Status: DISCONTINUED | OUTPATIENT
Start: 2021-05-05 | End: 2021-05-05 | Stop reason: HOSPADM

## 2021-05-05 RX ORDER — DULOXETIN HYDROCHLORIDE 60 MG/1
60 CAPSULE, DELAYED RELEASE ORAL DAILY
Status: DISCONTINUED | OUTPATIENT
Start: 2021-05-05 | End: 2021-05-06 | Stop reason: HOSPADM

## 2021-05-05 RX ORDER — OXYCODONE HYDROCHLORIDE 5 MG/1
5-10 TABLET ORAL
Status: DISCONTINUED | OUTPATIENT
Start: 2021-05-05 | End: 2021-05-06 | Stop reason: HOSPADM

## 2021-05-05 RX ORDER — ONDANSETRON 4 MG/1
4 TABLET, ORALLY DISINTEGRATING ORAL EVERY 6 HOURS PRN
Status: DISCONTINUED | OUTPATIENT
Start: 2021-05-05 | End: 2021-05-06 | Stop reason: HOSPADM

## 2021-05-05 RX ORDER — ONDANSETRON 2 MG/ML
4 INJECTION INTRAMUSCULAR; INTRAVENOUS EVERY 6 HOURS PRN
Status: DISCONTINUED | OUTPATIENT
Start: 2021-05-05 | End: 2021-05-06 | Stop reason: HOSPADM

## 2021-05-05 RX ADMIN — INSULIN ASPART 1 UNITS: 100 INJECTION, SOLUTION INTRAVENOUS; SUBCUTANEOUS at 22:39

## 2021-05-05 RX ADMIN — Medication 20 MG: at 19:35

## 2021-05-05 RX ADMIN — SODIUM CHLORIDE, POTASSIUM CHLORIDE, SODIUM LACTATE AND CALCIUM CHLORIDE: 600; 310; 30; 20 INJECTION, SOLUTION INTRAVENOUS at 17:14

## 2021-05-05 RX ADMIN — LOSARTAN POTASSIUM 50 MG: 50 TABLET, FILM COATED ORAL at 17:10

## 2021-05-05 RX ADMIN — GABAPENTIN 800 MG: 400 CAPSULE ORAL at 17:09

## 2021-05-05 RX ADMIN — INSULIN GLARGINE 30 UNITS: 100 INJECTION, SOLUTION SUBCUTANEOUS at 22:40

## 2021-05-05 RX ADMIN — SODIUM CHLORIDE, POTASSIUM CHLORIDE, SODIUM LACTATE AND CALCIUM CHLORIDE: 600; 310; 30; 20 INJECTION, SOLUTION INTRAVENOUS at 17:23

## 2021-05-05 RX ADMIN — DULOXETINE HYDROCHLORIDE 60 MG: 60 CAPSULE, DELAYED RELEASE ORAL at 17:09

## 2021-05-05 RX ADMIN — Medication 30 MG: at 19:31

## 2021-05-05 RX ADMIN — ONDANSETRON 4 MG: 2 INJECTION INTRAMUSCULAR; INTRAVENOUS at 12:06

## 2021-05-05 RX ADMIN — HYDROMORPHONE HYDROCHLORIDE 0.2 MG: 0.2 INJECTION, SOLUTION INTRAMUSCULAR; INTRAVENOUS; SUBCUTANEOUS at 17:09

## 2021-05-05 RX ADMIN — MORPHINE SULFATE 4 MG: 4 INJECTION INTRAVENOUS at 13:14

## 2021-05-05 RX ADMIN — METRONIDAZOLE 500 MG: 500 INJECTION, SOLUTION INTRAVENOUS at 21:32

## 2021-05-05 RX ADMIN — SODIUM CHLORIDE 1000 ML: 9 INJECTION, SOLUTION INTRAVENOUS at 13:12

## 2021-05-05 RX ADMIN — SIMVASTATIN 40 MG: 40 TABLET, FILM COATED ORAL at 22:37

## 2021-05-05 RX ADMIN — MORPHINE SULFATE 4 MG: 4 INJECTION INTRAVENOUS at 12:06

## 2021-05-05 RX ADMIN — GABAPENTIN 1600 MG: 400 CAPSULE ORAL at 22:37

## 2021-05-05 RX ADMIN — OXYCODONE HYDROCHLORIDE 5 MG: 5 TABLET ORAL at 23:33

## 2021-05-05 RX ADMIN — PROPOFOL 100 MCG/KG/MIN: 10 INJECTION, EMULSION INTRAVENOUS at 19:31

## 2021-05-05 RX ADMIN — CEFAZOLIN 1 G: 1 INJECTION, POWDER, FOR SOLUTION INTRAMUSCULAR; INTRAVENOUS at 20:14

## 2021-05-05 RX ADMIN — FENTANYL CITRATE 100 MCG: 50 INJECTION, SOLUTION INTRAMUSCULAR; INTRAVENOUS at 19:31

## 2021-05-05 RX ADMIN — LIDOCAINE HYDROCHLORIDE 30 MG: 10 INJECTION, SOLUTION INFILTRATION; PERINEURAL at 19:32

## 2021-05-05 RX ADMIN — FENTANYL CITRATE 50 MCG: 50 INJECTION, SOLUTION INTRAMUSCULAR; INTRAVENOUS at 18:34

## 2021-05-05 RX ADMIN — MIDAZOLAM 2 MG: 1 INJECTION INTRAMUSCULAR; INTRAVENOUS at 19:25

## 2021-05-05 RX ADMIN — FENTANYL CITRATE 50 MCG: 50 INJECTION, SOLUTION INTRAMUSCULAR; INTRAVENOUS at 18:16

## 2021-05-05 RX ADMIN — ACETAMINOPHEN 650 MG: 325 TABLET, FILM COATED ORAL at 23:32

## 2021-05-05 ASSESSMENT — MIFFLIN-ST. JEOR: SCORE: 1444.9

## 2021-05-05 ASSESSMENT — ENCOUNTER SYMPTOMS
VOMITING: 0
ABDOMINAL PAIN: 0
RECTAL PAIN: 1
NAUSEA: 0
FEVER: 0
CHILLS: 0
DIFFICULTY URINATING: 1
DIARRHEA: 1

## 2021-05-05 ASSESSMENT — LIFESTYLE VARIABLES: TOBACCO_USE: 1

## 2021-05-05 NOTE — PHARMACY-ADMISSION MEDICATION HISTORY
Admission medication history interview status for this patient is complete. See [facility] admission navigator for allergy information, prior to admission medications and immunization status.     Medication history interview done, indicate source(s): Patient  Medication history resources (including written lists, pill bottles, clinic record):[facility] list  Pharm[facility]: [facility]    Changes made to PTA medication list:  Added: None  Deleted: diprosone, melatonin  Changed: None    Actions taken by pharmacist (provider contacted, etc):None     Additional medication history information: Plan was to start new dose of dulaglutide 4.5mg weekly and reduce lantus to 30 units qhs  Medication reconciliation/reorder completed by provider prior to medication history?  N   (Y/N)     For patients on insulin therapy:   Do you use sliding scale insulin based on blood sugars? No  What is your pre-meal insulin coverage?  N/A  Do you typically eat three meals a day? Yes  How many times do you check your blood glucose per day? 3  How many episodes of hypoglycemia do you typically have per month? 3   Do you have a Continuous Glucose Monitor (CGM)?  No    Prior to Admission medications    Medication Sig Last Dose Taking? Auth Provider   Dulaglutide (TRULICITY) 4.5 MG/0.5ML SOPN Inject 4.5 mg Subcutaneous once a week New  Yes Raisa Calvillo PA-C   DULoxetine (CYMBALTA) 60 MG capsule Take 1 capsule (60 mg) by mouth daily 5/4/2021 at Unknown time Yes Catia Bull PA-C   gabapentin (NEURONTIN) 800 MG tablet TAKE 1 TABLET IN THE MORNING, 1 TAB IN THE AFTERNOON AND 2 TABS AT BEDTIME. 5/4/2021 at Unknown time Yes Catia Bull PA-C   HUMIRA *CF* PEN 40 MG/0.4ML pen kit Inject 40 mg Subcutaneous   Yes Reported, Patient   insulin glargine (BASAGLAR KWIKPEN) 100 UNIT/ML pen INJECT 42 UNITS SUBCUTANEOUS AT BEDTIME 5/4/2021 at Unknown time Yes Raisa Calvillo PA-C   losartan (COZAAR) 50 MG tablet Take 1 tablet (50 mg) by mouth daily 5/4/2021 at  Unknown time Yes Catia Bull PA-C   meloxicam (MOBIC) 15 MG tablet Take 1 tablet (15 mg) by mouth daily 5/4/2021 at Unknown time Yes Catia Bull PA-C   metFORMIN (GLUCOPHAGE-XR) 500 MG 24 hr tablet Take 2 tablets (1,000 mg) by mouth daily (with dinner) Needs visit. 5/4/2021 at Unknown time Yes Catia Bull PA-C   QUEtiapine (SEROQUEL) 25 MG tablet Take 1-2 tablets (25-50 mg) by mouth nightly as needed (insomnia) 5/4/2021 at Unknown time Yes Catia Bull PA-C   sildenafil (VIAGRA) 100 MG tablet TAKE 1 TABLET (100 MG) BY MOUTH DAILY AS NEEDED (30 MINS TO 4 HOURS PRIOR TO SEXUAL ACTIVITY  Yes Catia Bull PA-C   simvastatin (ZOCOR) 80 MG tablet TAKE 1/2 TABLETS (40 MG) BY MOUTH AT BEDTIME 5/4/2021 at Unknown time Yes Catia Bull PA-C   ASPIRIN 81 MG OR TABS 1 tab po QD (Once per day) More than a month at Unknown time  Balgobin, Christopher Lennox Paul, MD   B-D U/F 31G X 8 MM insulin pen needle INJECT 1 EACH SUBCUTANEOUS 2 TIMES DAILY   Raisa Calvillo PA-C   blood glucose monitoring (ONE TOUCH DELICA) lancets Use to test blood sugars 2 times daily or as directed.(delica)   Erika Trammell APRN CNP   blood glucose monitoring (ONE TOUCH ULTRASOFT) lancets 1 each 3 times daily   Raisa Calvillo PA-C   blood glucose monitoring (ONETOUCH VERIO IQ) test strip Use to test blood sugars 2 times daily or as directed.(Verio)   Erika Trammell APRN CNP   BLOOD GLUCOSE TEST STRIPS STRP PER PATIENT HEALTH PLAN   Balgobin, Christopher Lennox Paul, MD   Glucose Blood (BLOOD GLUCOSE TEST STRIPS) STRP 1 Device by In Vitro route 3 times daily   Raisa Calvillo PA-C   insulin pen needle 31G X 8 MM 1 Device daily Pharmacist may dispense brand and/or needle size per patient's preference and health care plan.   Raisa Calvillo PA-C   ONETOUCH VERIO IQ test strip USE TO TEST BLOOD SUGARS 2 TIMES DAILY OR AS DIRECTED.(VERJOSE ANTONIO)   Raisa Calvillo PA-C
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
## 2021-05-05 NOTE — PLAN OF CARE
PRIMARY DIAGNOSIS: URINARY RETENTION/RECTAL PAIN  OUTPATIENT/OBSERVATION GOALS TO BE MET BEFORE DISCHARGE:  ADLs back to baseline: Yes    Activity and level of assistance: Ambulating independently.    Pain status: Improved but still requiring IV narcotics.    Return to near baseline physical activity: Yes     Discharge Planner Nurse   Safe discharge environment identified: Yes  Barriers to discharge: Yes       Entered by: Shelli Martinez 05/05/2021      AOx4.  C/o sharp rectal pain 9/10.  PRN IV dilaudid given.  NPO except for ice chips.  .  Avila cath in place.  Colorectal surgery consulted, WOC consulted.  Will cont to monitor & provide cares.  Please review provider order for any additional goals.   Nurse to notify provider when observation goals have been met and patient is ready for discharge.
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
## 2021-05-05 NOTE — PLAN OF CARE
ROOM # 203    Living Situation: Home w/ wife  Facility name: n/a  : Wife- [place] [phone]    Activity level at baseline: ind   Activity level on admit: standby       Patient registered to observation; given Patient Bill of Rights; given the opportunity to ask questions about observation status and their plan of care.  Patient has been oriented to the observation room, bathroom and call light is in place.    Discussed discharge goals and expectations with patient/family.

## 2021-05-05 NOTE — ANESTHESIA PREPROCEDURE EVALUATION
Anesthesia Pre-Procedure Evaluation    Patient: Gerardo Gonzalez   MRN: [id number] : 1955        Preoperative Diagnosis: Retained foreign body [Z18.9]   Procedure : Procedure(s):  Exam under anesthesia, removal of foreign body from rectum     Past Medical History:   Diagnosis Date     Psoriatic arthropathy (H)      Type II or unspecified type diabetes mellitus without mention of complication, not stated as uncontrolled          Vaccination not carried out       Past Surgical History:   Procedure Laterality Date     HC KNEE SCOPE, DIAGNOSTIC      Arthroscopy, Knee- Left      Allergies   Allergen Reactions     Ace Inhibitors Cough     Atorvastatin Calcium      Body Aches      Social History     Tobacco Use     Smoking status: Former Smoker     Packs/day: 0.00     Years: 4.00     Pack years: 0.00     Smokeless tobacco: Never Used   Substance Use Topics     Alcohol use: Yes     Alcohol/week: 6.0 standard drinks     Types: 6 Cans of beer per week     Comment: occ glass of wine      Wt Readings from Last 1 Encounters:   21 68 kg (150 lb)        Anesthesia Evaluation   Pt has had prior anesthetic.         ROS/MED HX  ENT/Pulmonary:     (+) tobacco use, Past use,     Neurologic:       Cardiovascular:     (+) Dyslipidemia hypertension-----    METS/Exercise Tolerance:     Hematologic:       Musculoskeletal:       GI/Hepatic:       Renal/Genitourinary: Comment: H/o urin retentiom      Endo:     (+) type II DM,     Psychiatric/Substance Use:       Infectious Disease:       Malignancy:       Other:            Physical Exam    Airway        Mallampati: II   TM distance: > 3 FB   Neck ROM: full   Mouth opening: > 3 cm    Respiratory Devices and Support         Dental           Cardiovascular   cardiovascular exam normal          Pulmonary   pulmonary exam normal                OUTSIDE LABS:  CBC:   Lab Results   Component Value Date    WBC 10.1 2021    WBC 9.3 2021    HGB 14.1 2021    HGB 16.7  02/09/2021    HCT 41.5 05/05/2021    HCT 47.3 02/09/2021     05/05/2021     02/09/2021     BMP:   Lab Results   Component Value Date     05/05/2021     (L) 02/09/2021    POTASSIUM 3.7 05/05/2021    POTASSIUM 3.7 02/09/2021    CHLORIDE 104 05/05/2021    CHLORIDE 96 02/09/2021    CO2 27 05/05/2021    CO2 22 02/09/2021    BUN 11 05/05/2021    BUN 13 02/09/2021    CR 0.87 05/05/2021    CR 0.88 02/09/2021     (H) 05/05/2021     (H) 02/09/2021     COAGS: No results found for: PTT, INR, FIBR  POC:   Lab Results   Component Value Date     (H) 05/05/2021     HEPATIC:   Lab Results   Component Value Date    ALBUMIN 3.6 02/08/2021    PROTTOTAL 8.2 02/08/2021    ALT 12 02/08/2021    AST 18 02/08/2021    ALKPHOS 151 (H) 02/08/2021    BILITOTAL 1.0 02/08/2021     OTHER:   Lab Results   Component Value Date    PH 7.42 02/10/2006    A1C 15.0 (H) 02/08/2021    VIVIANA 9.3 05/05/2021    TSH 0.93 07/24/2018       Anesthesia Plan    ASA Status:  3   NPO Status:  NPO Appropriate    Anesthesia Type: General.     - Airway: LMA   Induction: Intravenous, Propofol.   Maintenance: Balanced.        Consents    Anesthesia Plan(s) and associated risks, benefits, and realistic alternatives discussed. Questions answered and patient/representative(s) expressed understanding.     - Discussed with:  Patient         Postoperative Care    Pain management: IV analgesics, Oral pain medications.   PONV prophylaxis: Ondansetron (or other 5HT-3), Dexamethasone or Solumedrol     Comments:                Oscar Johnson MD

## 2021-05-06 VITALS
WEIGHT: 150 LBS | RESPIRATION RATE: 16 BRPM | DIASTOLIC BLOOD PRESSURE: 59 MMHG | TEMPERATURE: 96.1 F | HEART RATE: 96 BPM | HEIGHT: 68 IN | SYSTOLIC BLOOD PRESSURE: 123 MMHG | OXYGEN SATURATION: 96 % | BODY MASS INDEX: 22.73 KG/M2

## 2021-05-06 LAB
COPATH REPORT: NORMAL
GLUCOSE BLDC GLUCOMTR-MCNC: 131 MG/DL (ref 70–99)
GLUCOSE BLDC GLUCOMTR-MCNC: 149 MG/DL (ref 70–99)
GLUCOSE BLDC GLUCOMTR-MCNC: 156 MG/DL (ref 70–99)

## 2021-05-06 PROCEDURE — 96375 TX/PRO/DX INJ NEW DRUG ADDON: CPT

## 2021-05-06 PROCEDURE — 96372 THER/PROPH/DIAG INJ SC/IM: CPT

## 2021-05-06 PROCEDURE — 999N001017 HC STATISTIC GLUCOSE BY METER IP

## 2021-05-06 PROCEDURE — 96376 TX/PRO/DX INJ SAME DRUG ADON: CPT

## 2021-05-06 PROCEDURE — 250N000011 HC RX IP 250 OP 636: Performed by: COLON & RECTAL SURGERY

## 2021-05-06 PROCEDURE — G0378 HOSPITAL OBSERVATION PER HR: HCPCS

## 2021-05-06 PROCEDURE — 250N000013 HC RX MED GY IP 250 OP 250 PS 637: Performed by: HOSPITALIST

## 2021-05-06 PROCEDURE — 99217 PR OBSERVATION CARE DISCHARGE: CPT | Performed by: HOSPITALIST

## 2021-05-06 RX ORDER — FENTANYL CITRATE 50 UG/ML
25-50 INJECTION, SOLUTION INTRAMUSCULAR; INTRAVENOUS
Status: DISCONTINUED | OUTPATIENT
Start: 2021-05-06 | End: 2021-05-06 | Stop reason: HOSPADM

## 2021-05-06 RX ORDER — TAMSULOSIN HYDROCHLORIDE 0.4 MG/1
0.4 CAPSULE ORAL DAILY
Qty: 30 CAPSULE | Refills: 0 | Status: SHIPPED | OUTPATIENT
Start: 2021-05-06 | End: 2021-05-26

## 2021-05-06 RX ORDER — FINASTERIDE 5 MG/1
5 TABLET, FILM COATED ORAL DAILY
Qty: 30 TABLET | Refills: 0 | Status: SHIPPED | OUTPATIENT
Start: 2021-05-06 | End: 2021-05-26

## 2021-05-06 RX ORDER — OXYCODONE HYDROCHLORIDE 5 MG/1
5 TABLET ORAL EVERY 6 HOURS PRN
Qty: 10 TABLET | Refills: 0 | Status: SHIPPED | OUTPATIENT
Start: 2021-05-06 | End: 2021-05-11

## 2021-05-06 RX ORDER — METRONIDAZOLE 500 MG/1
500 TABLET ORAL 3 TIMES DAILY
Qty: 15 TABLET | Refills: 0 | Status: SHIPPED | OUTPATIENT
Start: 2021-05-06 | End: 2021-05-11

## 2021-05-06 RX ORDER — CIPROFLOXACIN 500 MG/1
500 TABLET, FILM COATED ORAL 2 TIMES DAILY
Qty: 10 TABLET | Refills: 0 | Status: SHIPPED | OUTPATIENT
Start: 2021-05-06 | End: 2021-05-11

## 2021-05-06 RX ADMIN — ACETAMINOPHEN 650 MG: 325 TABLET, FILM COATED ORAL at 07:57

## 2021-05-06 RX ADMIN — CEFAZOLIN SODIUM 1 G: 1 INJECTION, SOLUTION INTRAVENOUS at 11:53

## 2021-05-06 RX ADMIN — LOSARTAN POTASSIUM 50 MG: 50 TABLET, FILM COATED ORAL at 07:54

## 2021-05-06 RX ADMIN — OXYCODONE HYDROCHLORIDE 5 MG: 5 TABLET ORAL at 05:40

## 2021-05-06 RX ADMIN — OXYCODONE HYDROCHLORIDE 10 MG: 5 TABLET ORAL at 10:41

## 2021-05-06 RX ADMIN — GABAPENTIN 800 MG: 400 CAPSULE ORAL at 13:50

## 2021-05-06 RX ADMIN — METRONIDAZOLE 500 MG: 500 INJECTION, SOLUTION INTRAVENOUS at 07:55

## 2021-05-06 RX ADMIN — CELECOXIB 200 MG: 100 CAPSULE ORAL at 07:54

## 2021-05-06 RX ADMIN — GABAPENTIN 800 MG: 400 CAPSULE ORAL at 07:54

## 2021-05-06 RX ADMIN — CEFAZOLIN SODIUM 1 G: 1 INJECTION, SOLUTION INTRAVENOUS at 04:02

## 2021-05-06 RX ADMIN — INSULIN ASPART 1 UNITS: 100 INJECTION, SOLUTION INTRAVENOUS; SUBCUTANEOUS at 11:56

## 2021-05-06 RX ADMIN — DULOXETINE HYDROCHLORIDE 60 MG: 60 CAPSULE, DELAYED RELEASE ORAL at 07:54

## 2021-05-06 NOTE — OP NOTE
See Provation Note In Chart    Procedure: Foreign body removal X 3, flexible sigmoidoscopy, anoscopy  Surgeon: Dr. Mack & Dr. Immanuel Booker  Anesthesia: MAC    Indications: Gerardo is a 66-year-old man who had several foreign bodies placed 4 days ago.  He was having progressive abdominal pain and urinary retention which prompted him to seek medical attention today.  He had a Avila catheter placed which returned 3 L of urine immediately.  He has been having some bloody mucoid drainage.  On his x-ray there was several foreign bodies with radiopaque parts.  These appear to be causing partial obstruction with significant fecal load above.  There is no obvious free air.  We discussed risk benefits and alternatives for removal, he understood and wished to proceed    Findings: 3 rectal foreign bodies most distal was large.  There was a narrow leaf-like firm foreign body pressing against the cephalad aspect of the sphincter complex.  The third firm roughly 10 cm multilobulated more proximal lesion was smooth.  There was a roughly 2 cm area in the upper rectum that had some superficial mucosal changes this was irrigated and there was no full-thickness injury.  In the very distal rectum and right at the anal rectal ring were some pressure ulcers as well these were irrigated and no clear full-thickness lesion was noted.    Procedure: After informed consent was obtained, he was taken the operating room and positioned in the lithotomy position.  He was sedated with monitored anesthesia care.  After appropriate timeout we began with a rectal exam and could palpate a firm rubbery type lesion in the distal rectum this was able to be grabbed by a Kocher clamp and delivered out.  It was an ovoid silicone appearing lesion with a aperture about 1 cm diameter.  Then palpation of the anal canal and distal rectum revealed an additional tubular structure.  This was tethered anteriorly.  This was gently freed and could be removed manually.  We  then performed a flexible sigmoidoscopy and seton could see a purple multilobulated firm plastic lesion in the upper rectum.  We attempted to snare this but it was too smooth and tapered.  We attempted to grab it with the Raptor and this too was insufficient.  At this point he was coughing and bucking which pushed the foreign body more distally where it could be grabbed and removed manually.    We then performed a flexible sigmoidoscopy again and could see there was an area of ulcerated mucosa in the upper rectum.  This was irrigated free of material and it appeared to be intact there was some bruising.  More distally just near the dentate line anterior and posterior there was some area of bruising or mucosal changes without evidence of a full-thickness lesion.    There was no bleeding.  No biopsies were taken.    Specimens were the 3 rectal foreign bodies.    We will plan for short course of antibiotics, clear liquids and bowel rest.  We will continue close observation at this time.          Ibeth Mack MD  [facility]  Office Phone # [phone]

## 2021-05-06 NOTE — PROGRESS NOTES
"PRIMARY DIAGNOSIS: Foreign body removal X 3, flexible sigmoidoscopy, anoscopy  OUTPATIENT/OBSERVATION GOALS TO BE MET BEFORE DISCHARGE:  1. Stable vital signs Yes  2. Tolerating diet:Yes  3. Pain controlled with oral pain medications:  Yes  4. Positive bowel sounds:  Yes  5. Voiding without difficulty:  Just removed Avila- Due to void.   6. Able to ambulate:  Yes  7. Provider specific discharge goals met:  Yes    Discharge Planner Nurse   Safe discharge environment identified: Yes  Barriers to discharge: Yes       Entered by: Ani Roche 05/06/2021 9:56 AM     Please review provider order for any additional goals.   Nurse to notify provider when observation goals have been met and patient is ready for discharge.    Patient is alert and oriented x4. VS WNL and documented on the FS. Lung sounds clear in all lobes and patient is on RA. Denies SOB. Active bowel sounds and per patient statement had some diarrhea this morning. Avila just removed at 0942 and patient due to void. Rectal pain being treated with Tylenol and Oxycodone. IV fluids infusing at 100 ml/hr. Regular diet. SBA when up. Plan: If he can void discharge later today.     /65 (BP Location: Right arm)   Pulse 91   Temp 96.8  F (36  C) (Oral)   Resp 12   Ht 1.727 m (5' 8\")   Wt 68 kg (150 lb)   SpO2 97%   BMI 22.81 kg/m      "
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
## 2021-05-06 NOTE — ANESTHESIA CARE TRANSFER NOTE
Patient: Gerardo Gonzalez    Procedure(s):  Exam under anesthesia, removal of foreign body from rectum with endoscopy    Diagnosis: Retained foreign body [Z18.9]  Diagnosis Additional Information: No value filed.    Anesthesia Type:   General     Note:      Level of Consciousness: awake  Oxygen Supplementation: face mask    Independent Airway: airway patency satisfactory and stable  Dentition: dentition unchanged  Vital Signs Stable: post-procedure vital signs reviewed and stable  Report to RN Given: handoff report given  Patient transferred to: PACU    Handoff Report: Identifed the Patient, Identified the Reponsible Provider, Reviewed the pertinent medical history, Discussed the surgical course, Reviewed Intra-OP anesthesia mangement and issues during anesthesia, Set expectations for post-procedure period and Allowed opportunity for questions and acknowledgement of understanding      Vitals: (Last set prior to Anesthesia Care Transfer)  CRNA VITALS  5/5/2021 1924 - 5/5/2021 2003 5/5/2021             Pulse:  101    SpO2:  (!) 88 %        Electronically Signed By: IRAM Merlos CRNA  May 5, 2021  8:03 PM

## 2021-05-06 NOTE — PROGRESS NOTES
[facility]  PROGRESS NOTE    May 6, 2021  Post-op Day # 1    SUBJECTIVE:  Feels fine.  No abdominal pain.  Passed a stool and some gas overnight.  Avila in place with 5975mL out in past 24 hours.  Tolerating clear liquids.     OBJECTIVE:  Temp:  [95.9  F (35.5  C)-99.2  F (37.3  C)] 96.8  F (36  C)  Pulse:  [] 91  Resp:  [9-24] 12  BP: (120-162)/(65-96) 120/65  SpO2:  [91 %-100 %] 97 %    Intake/Output Summary (Last 24 hours) at 5/6/2021 0902  Last data filed at 5/6/2021 0800  Gross per 24 hour   Intake 1280 ml   Output 5975 ml   Net -4695 ml       GENERAL:  Awake, alert, no acute distress, resting in bed, appears comfortable   RESPIRATORY: unlabored breathing on room air       LABS:  Lab Results   Component Value Date    WBC 10.1 05/05/2021     Lab Results   Component Value Date    HGB 14.1 05/05/2021     Lab Results   Component Value Date    HCT 41.5 05/05/2021     Lab Results   Component Value Date     05/05/2021     Last Basic Metabolic Panel:  Lab Results   Component Value Date     05/05/2021      Lab Results   Component Value Date    POTASSIUM 3.7 05/05/2021     Lab Results   Component Value Date    CHLORIDE 104 05/05/2021     Lab Results   Component Value Date    VIVIANA 9.3 05/05/2021     Lab Results   Component Value Date    CO2 27 05/05/2021     Lab Results   Component Value Date    BUN 11 05/05/2021     Lab Results   Component Value Date    CR 0.87 05/05/2021     Lab Results   Component Value Date     05/05/2021       ASSESSMENT/PLAN: 67 y/o male POD #1 s/p EUA with removal of rectal foreign body x3 and flexible sigmoidoscopy.  Stercoral ulcers noted and he was kept overnight for observation and IV antibiotics.  Afebrile  - recommend voiding trial/Avila removal  - ok for regular diet  - can likely discharge home if voiding and tolerating diet   - follow up as needed  - discussed with Dr. Mack         For questions/paging, please contact the CR[facility] office at  [phone].    Marleni Sylvester PA-C  [facility]  Phone: [phone]

## 2021-05-06 NOTE — PLAN OF CARE
"PRIMARY DIAGNOSIS: Foreign body removal X 3, flexible sigmoidoscopy, anoscopy  OUTPATIENT/OBSERVATION GOALS TO BE MET BEFORE DISCHARGE:  1. Stable vital signs Yes  2. Tolerating diet:Yes - Tolerating clears  3. Pain controlled with oral pain medications:   Denies pain - no pain meds given after procedure  4. Positive bowel sounds:  Yes  5. Voiding without difficulty:   Avila Cath in place  6. Able to ambulate:  Yes able to take few steps from cart to bed  7. Provider specific discharge goals met:  No    Discharge Planner Nurse   Safe discharge environment identified: Yes  Barriers to discharge: Yes       Entered by: Shelli Martinez 05/05/2021      Vitals: /81   Pulse 95   Temp 95.9  F (35.5  C)   Resp 21   Ht 1.727 m (5' 8\")   Wt 68 kg (150 lb)   SpO2 91%   BMI 22.81 kg/m    BMI= Body mass index is 22.81 kg/m .     AOx4.  VSS.  Denies pain.  Capno monitoring.  Received Flagyl Q12.  LR @ 100 ml/hr.   - gave 1 unit of novalog + 30 units of Lantus at HS.  Tolerating Clear liquids.  Colorectal following, WOC following.  Will cont to monitor & provide cares.      Please review provider order for any additional goals.   Nurse to notify provider when observation goals have been met and patient is ready for discharge.  "

## 2021-05-06 NOTE — PLAN OF CARE
PRIMARY DIAGNOSIS: Foreign body removal X 3, flexible sigmoidoscopy, anoscopy  OUTPATIENT/OBSERVATION GOALS TO BE MET BEFORE DISCHARGE:  1. Stable vital signs Yes  2. Tolerating diet:Yes  3. Pain controlled with oral pain medications:  Yes  4. Positive bowel sounds:  Yes  5. Voiding without difficulty:  No, Avila in place, Intact and patent.  6. Able to ambulate:  Yes  7. Provider specific discharge goals met:  No    Vitals are Temp: 98.6  F (37  C) Temp src: Oral BP: 139/79 Pulse: 98   Resp: 18 SpO2: 92 %.    Patient is Alert and Oriented x4. He is SBA with no assistive devices .  Pt is a Clear liquid diet. Denies any pain. Tylenol and Oxycodone was given for pain. Reports passing gas and small amt stool incontinent cleaned. Patient has Lactated Ringer's running at 100 mL per hour. Capno on. On continue scheduled antibiotics. Plan is to continue close observation at this time. Possible discharge today.     Discharge Planner Nurse   Safe discharge environment identified: Yes  Barriers to discharge: Yes       Entered by: Odalis Gambino 05/06/2021 5:08 AM     Please review provider order for any additional goals.   Nurse to notify provider when observation goals have been met and patient is ready for discharge.

## 2021-05-06 NOTE — PLAN OF CARE
"Patient's After Visit Summary was reviewed with patient.   Patient verbalized understanding of After Visit Summary, recommended follow up and was given an opportunity to ask questions.   Discharge medications sent home with patient/family: Flomax, Proscar, Cipro, Flagyl, and Oxycodone.    Discharged with nurse with all belongings. Patient medically cleared. Patient going home with Avila and thorough instruction went over and patient was able to ask questions. Paper education sent home with patient in case patient would forget something. Extra supplies sent with patient. Neighbor will transport patient home.   /59 (BP Location: Right arm)   Pulse 96   Temp 96.1  F (35.6  C) (Oral)   Resp 16   Ht 1.727 m (5' 8\")   Wt 68 kg (150 lb)   SpO2 96%   BMI 22.81 kg/m    OBSERVATION patient END time: 1447        "

## 2021-05-06 NOTE — PROGRESS NOTES
Care Management Follow Up    Length of Stay (days): 0    Expected Discharge Date: 05/06/21     Referrals Placed by CM/SW:  Scheduling follow up appointment  Private pay costs discussed: Not applicable    Additional Information:  Received request to assist with scheduling a urology appointment in 2-3 weeks. Patient needing an appointment for urinary retention and a voiding trial. Called [facility] [phone] to request appointment. Urology Clinic will review appointment request and will call patient to schedule. Clinic will contact patient within 1-2 business days.   AVS updated with [facility] clinic information.  [facility] [address] [place] [address]  [phone]      Ibeth Posadas, RN      bIeth Posadas RN Case Manager  Inpatient Care Coordination  [facility]   [phone]

## 2021-05-06 NOTE — PROVIDER NOTIFICATION
Patient voided 150 mls, but bladder scanned >400 mls. Patient drinking lots of fluid and ambulating the halls. Dr. Loja updated.

## 2021-05-06 NOTE — PLAN OF CARE
PRIMARY DIAGNOSIS: Foreign body removal X 3, flexible sigmoidoscopy, anoscopy  OUTPATIENT/OBSERVATION GOALS TO BE MET BEFORE DISCHARGE:  1. Stable vital signs Yes  2. Tolerating diet:Yes  3. Pain controlled with oral pain medications:  Yes  4. Positive bowel sounds:  Yes  5. Voiding without difficulty:  No, Avila in place, Intact and patent.  6. Able to ambulate:  Yes  7. Provider specific discharge goals met:  No    Vitals are Temp: 98.6  F (37  C) Temp src: Oral BP: 139/79 Pulse: 98   Resp: 18 SpO2: 92 %.    Patient is Alert and Oriented x4. He is SBA with no assistive devices .  Pt is a Clear liquid diet. She is complaining of 5/10 pain in abdomen and headache.  Tylenol and Oxycodone given for pain.  Patient has Lactated Ringer's running at 100 mL per hour. Capno on. Will continue to monitor.     Discharge Planner Nurse   Safe discharge environment identified: Yes  Barriers to discharge: Yes       Entered by: Odalis Gambino 05/06/2021 1:03 AM     Please review provider order for any additional goals.   Nurse to notify provider when observation goals have been met and patient is ready for discharge.

## 2021-05-06 NOTE — ANESTHESIA POSTPROCEDURE EVALUATION
Patient: Gerardo Gonzalez    Procedure(s):  Exam under anesthesia, removal of foreign body from rectum with endoscopy    Diagnosis:Retained foreign body [Z18.9]  Diagnosis Additional Information: No value filed.    Anesthesia Type:  General    Note:  Disposition: Inpatient   Postop Pain Control: Uneventful            Sign Out: Well controlled pain   PONV: No   Neuro/Psych: Uneventful            Sign Out: Acceptable/Baseline neuro status   Airway/Respiratory: Uneventful            Sign Out: Acceptable/Baseline resp. status   CV/Hemodynamics: Uneventful            Sign Out: Acceptable CV status; No obvious hypovolemia; No obvious fluid overload   Other NRE: NONE   DID A NON-ROUTINE EVENT OCCUR? No           Last vitals:  Vitals:    05/05/21 2025 05/05/21 2043 05/05/21 2126   BP: (!) 147/90 (!) 147/85 133/81   Pulse: 100 98 95   Resp: 12 15 21   Temp: 97.6  F (36.4  C)  95.9  F (35.5  C)   SpO2: 92% 98% 91%       Last vitals prior to Anesthesia Care Transfer:  CRNA VITALS  5/5/2021 1924 - 5/5/2021 2024 5/5/2021             Pulse:  101    SpO2:  (!) 88 %          Electronically Signed By: Johnie Giron MD  May 5, 2021  10:36 PM

## 2021-05-06 NOTE — DISCHARGE INSTRUCTIONS
The [facility] office will call you to schedule an appointment within 2-3 weeks of discharge.   If you do not hear from them in 1-2 business days, please call [phone] to schedule.  Please bring your hospital discharge instructions, insurance information, and any new medications with you to your appointment.  [facility] [place] [address] [place] [address]

## 2021-05-06 NOTE — BRIEF OP NOTE
[facility]    Brief Operative Note    Pre-operative diagnosis: Retained foreign body [Z18.9]  Post-operative diagnosis Same as pre-operative diagnosis,, with stercoral ulcers x 2    Procedure: Procedure(s):  Exam under anesthesia, removal of foreign body from rectum with endoscopy  Surgeon: Surgeon(s) and Role:     * Ibeth Mack MD - Primary  Anesthesia: Other   Estimated blood loss: Minimal  Drains: None  Specimens:   ID Type Source Tests Collected by Time Destination   A : foreign objects Other (specify in comments) Large Intestine, Rectum SURGICAL PATHOLOGY EXAM Ibeth Mack MD 5/5/2021  7:52 PM      Findings:   Three plastic foreign bodies. Two sterocal ulcers, one 15 cm from anal verge, and two at dentate line (one midline anterior, one midline posterior).  Complications: None.  Implants: * No implants in log *      Immanuel Booker MD  Colorectal Fellow  5/5/2021

## 2021-05-06 NOTE — PROGRESS NOTES
Brief Internal Medicine Progress Note - called by Dr. Mack after patient's surgery.  Three foreign bodies were removed, Two sterocal ulcers were noted.    Surgery recommends starting Ancef and Flagyl.  AXR is ordered to ensure no further retained foreign bodies.  Patient to remain on a clear liquid diet overnight.  CT abd/pelvis is recommended if patient develops any fever.  Avila to remain in overnight.  I will reduce patient's Basaglar insulin to 30 units this evening given the diet of clears only.    Leatha Rogel MS PA-C  Hospitalist Service  Pager [phone]

## 2021-05-07 LAB — INTERPRETATION ECG - MUSE: NORMAL

## 2021-05-07 NOTE — PROGRESS NOTES
Medication Therapy Management (MTM) Encounter    ASSESSMENT:                            Medication Adherence/Access: No issues identified    Type 2 Diabetes: Patient is not meeting A1c goal of < 7%. Self monitoring of blood glucose is at goal of fasting  mg/dL and post prandial < 180 mg/dL. He would benefit from increasing metformin and decreasing insulin dose d/t much improved blood glucose.    Hypertension: Stable  Hyperlipidemia: Recommend rechecking lipid panel with next A1c lab as he has improved diet and better controlling blood glucose.   BPH: Stable  Depression:  Educated on refilling duloxetine.   Insomnia: Stable, although future may consider trialing trazodone instead as quetiapine may be negatively impacting his lipid panel and A1c.   Neuropathy: Recommend refilling duloxetine and continuing to stay on it as above.   Psoriasis/Arthritis: stable  ED: stable    PLAN:                            1. Increase metformin XR to 1500mg daily for 1 week then increase to 2000mg/day if tolerated.  2. Decrease Basaglar to 25 units daily and if able to increase metformin to 2000mg/day, may decrease Basaglar to 20 units daily.   3. Refill duloxetine 60mg daily and remain on this every day.     Follow-up: 1 month, A1c this week with PCP    SUBJECTIVE/OBJECTIVE:                          Gerardo Gonzalez is a 66 year old male called for a follow-up visit. He was referred to me from Raisa Calvillo PA-C.  Today's visit is a follow-up MTM visit from 3/16.     Reason for visit: Trulicity f/up. Out of duloxetine.     Tobacco: He reports that he has quit smoking. He smoked 0.00 packs per day for 4.00 years. He has never used smokeless tobacco.  Alcohol: not currently using    Medication Adherence/Access: no issues reported    Type 2 Diabetes:  Currently taking Trulicity 4.5mg weekly, Basaglar 30 units daily, and metformin XR 1000mg daily. Patient is not experiencing side effects. Diarrhea from metformin in the past but  resolved now.   Blood sugar monitorin-3 time(s) daily. Ranges (patient reported): fasting , post-prandial 150's, bedtime 180's  Symptoms of low blood sugar? none  Symptoms of high blood sugar? Tingling, numbness  Eye exam: due  Foot exam: up to date  Diet/Exercise: Watches what he's eating, does exercises especially when blood glucose high at night. When he moves more his blood glucose goes down. Lately skipping breakfast, only brunch and smaller dinner meals.    Aspirin: Taking 81mg daily and denies side effects  Statin: Yes: simvastatin   ACEi/ARB: Yes: losartan.   Urine Albumin:   Lab Results   Component Value Date    UMALCR 205.47 (H) 2021      Lab Results   Component Value Date    A1C 15.0 2021    A1C 12.7 2020    A1C 14.7 2019    A1C 8.2 2019    A1C 8.3 10/26/2018       Hypertension: Current medications include losartan 50mg daily. Patient reports no current medication side effects.  BP Readings from Last 3 Encounters:   21 123/59   21 (!) 178/107   21 114/80       Hyperlipidemia: Current therapy includes simvastatin 40mg daily.  Patient reports no significant myalgias or other side effects.  Recent Labs   Lab Test 21  1030 19  1555 07/06/15  0838 07/06/15  0838 13  1214   CHOL 246* 248*   < > 201* 141   HDL 39* 413   < > 46 52   * Cannot estimate LDL when triglyceride exceeds 400 mg/dL  149*   < > 85 63   TRIG 391* 522*   < > 350* 129   CHOLHDLRATIO  --   --   --  4.4 2.7    < > = values in this interval not displayed.       BPH: Patient taking finasteride 5mg daily, tamsulosin 0.4mg daily. Finished antibiotics and pain medications from hospital discharge. No issues, getting catheter out tomorrow.     Depression:  Current medications include: Duloxetine 60mg once daily (states he's been out of this). Pt reports that depression symptoms are unchanged and stable but neuropathy worsened.   PHQ 3/21/2019 2020 2021   PHQ-9  Total Score 9 23 16   Q9: Thoughts of better off dead/self-harm past 2 weeks Several days Not at all Not at all   F/U: Thoughts of suicide or self-harm No - -   F/U: Safety concerns No - -       Insomnia: Patient taking quetiapine 25-50mg bedtime as needed (needing most nights). Has not tried anything else, requests refills today.     Neuropathy: Patient taking gabapentin 800mg AM, 800mg noon, and 1600mg bedtime. No issues but worsened neuropathy lately b/c out of duloxetine.    Psoriasis/Arthritis: Patient is on Humira every 7 days and meloxicam 15mg daily. No issues. He may go off Humira soon b/c psoriasis controlled currently.     ED: Patient using sildenafil 100mg as needed (very occasional) and reports no issues.     Today's Vitals: There were no vitals taken for this visit.  ----------------  Post Discharge Medication Reconciliation Status: discharge medications reconciled and changed, per note/orders.    I spent 14 minutes with this patient today. All changes were made via collaborative practice agreement with Raisa Calvillo. A copy of the visit note was provided to the patient's primary care provider.    The patient was sent via [employer] a summary of these recommendations.     Barbara Kc, PharmD  Medication Therapy Management Provider, [facility]  Pager: [phone]    Telemedicine Visit Details  Type of service:  Telephone visit  Start Time: 9:42 AM  End Time: 10:56 AM  Originating Location (patient location): Home  Distant Location (provider location):  [facility] MTM        Medication Therapy Recommendations  Depression, unspecified depression type    Current Medication: DULoxetine (CYMBALTA) 60 MG capsule   Rationale: Patient forgets to take - Adherence - Adherence   Recommendation: Provide Education - Refill duloxetine 60mg daily and remain on this every day.   Status: Patient Agreed - Adherence/Education         Type 2 diabetes mellitus without  complication, with long-term current use of insulin (H)    Current Medication: metFORMIN (GLUCOPHAGE-XR) 500 MG 24 hr tablet   Rationale: Dose too low - Dosage too low - Effectiveness   Recommendation: Increase Dose - insulin glargine 100 UNIT/ML pen - Increase metformin XR to 1500mg daily for 1 week then increase to 2000mg/day if tolerated. Decrease Basaglar to 25 units daily and next week may decrease Basaglar to 20 units daily.   Status: Accepted per CPA

## 2021-05-08 NOTE — TELEPHONE ENCOUNTER
No verbal response, does not follow command  Lying in bed and staring to L-side  Moving all extremities  On monitor        Sofy Sigala RN  05/07/21 [id number] Routing refill request to provider for review/approval because:  Labs not current:  A1C and Creatinine  Patient needs to be seen because:  Was advised 1 month visit 7/23/20    Bozena Francisco RN

## 2021-05-11 ENCOUNTER — VIRTUAL VISIT (OUTPATIENT)
Dept: PHARMACY | Facility: CLINIC | Age: 66
End: 2021-05-11
Payer: COMMERCIAL

## 2021-05-11 ENCOUNTER — NURSE TRIAGE (OUTPATIENT)
Dept: NURSING | Facility: CLINIC | Age: 66
End: 2021-05-11

## 2021-05-11 DIAGNOSIS — N52.9 ERECTILE DYSFUNCTION, UNSPECIFIED ERECTILE DYSFUNCTION TYPE: ICD-10-CM

## 2021-05-11 DIAGNOSIS — F32.A DEPRESSION, UNSPECIFIED DEPRESSION TYPE: ICD-10-CM

## 2021-05-11 DIAGNOSIS — E78.5 HYPERLIPIDEMIA LDL GOAL <100: ICD-10-CM

## 2021-05-11 DIAGNOSIS — I10 BENIGN ESSENTIAL HYPERTENSION: ICD-10-CM

## 2021-05-11 DIAGNOSIS — G47.00 PERSISTENT INSOMNIA: ICD-10-CM

## 2021-05-11 DIAGNOSIS — L40.50 PSORIATIC ARTHROPATHY (H): ICD-10-CM

## 2021-05-11 DIAGNOSIS — R33.9 URINARY RETENTION: ICD-10-CM

## 2021-05-11 DIAGNOSIS — L40.9 PSORIASIS: ICD-10-CM

## 2021-05-11 DIAGNOSIS — L40.50 PSORIATIC ARTHRITIS (H): ICD-10-CM

## 2021-05-11 PROCEDURE — 99606 MTMS BY PHARM EST 15 MIN: CPT | Performed by: PHARMACIST

## 2021-05-11 RX ORDER — QUETIAPINE FUMARATE 25 MG/1
25-50 TABLET, FILM COATED ORAL
Qty: 60 TABLET | Refills: 3 | Status: SHIPPED | OUTPATIENT
Start: 2021-05-11 | End: 2021-08-05

## 2021-05-11 RX ORDER — DULAGLUTIDE 4.5 MG/.5ML
4.5 INJECTION, SOLUTION SUBCUTANEOUS WEEKLY
Qty: 6 ML | Refills: 1 | Status: SHIPPED | OUTPATIENT
Start: 2021-05-11 | End: 2021-11-23

## 2021-05-11 RX ORDER — METFORMIN HCL 500 MG
2000 TABLET, EXTENDED RELEASE 24 HR ORAL
Qty: 360 TABLET | Refills: 1 | Status: SHIPPED | OUTPATIENT
Start: 2021-05-11 | End: 2022-07-01

## 2021-05-11 RX ORDER — DULOXETIN HYDROCHLORIDE 60 MG/1
60 CAPSULE, DELAYED RELEASE ORAL DAILY
Qty: 90 CAPSULE | Refills: 1 | Status: SHIPPED | OUTPATIENT
Start: 2021-05-11 | End: 2021-06-22

## 2021-05-11 NOTE — LETTER
My Depression Action Plan  Name: Gerardo Gonzalez   Date of Birth 1955  Date: 5/11/2021    My doctor: Raisa Calvillo   My clinic: [address] [address] [phone]          GREEN    ZONE   Good Control    What it looks like:     Things are going generally well. You have normal ups and downs. You may even feel depressed from time to time, but bad moods usually last less than a day.   What you need to do:  1. Continue to care for yourself (see self care plan)  2. Check your depression survival kit and update it as needed  3. Follow your physician s recommendations including any medication.  4. Do not stop taking medication unless you consult with your physician first.           YELLOW         ZONE Getting Worse    What it looks like:     Depression is starting to interfere with your life.     It may be hard to get out of bed; you may be starting to isolate yourself from others.    Symptoms of depression are starting to last most all day and this has happened for several days.     You may have suicidal thoughts but they are not constant.   What you need to do:     1. Call your care team. Your response to treatment will improve if you keep your care team informed of your progress. Yellow periods are signs an adjustment may need to be made.     2. Continue your self-care.  Just get dressed and ready for the day.  Don't give yourself time to talk yourself out of it.    3. Talk to someone in your support network.    4. Open up your Depression Self-Care Plan/Wellness Kit.           RED    ZONE Medical Alert - Get Help    What it looks like:     Depression is seriously interfering with your life.     You may experience these or other symptoms: You can t get out of bed most days, can t work or engage in other necessary activities, you have trouble taking care of basic hygiene, or basic responsibilities, thoughts of suicide or death that will  not go away, self-injurious behavior.     What you need to do:  1. Call your care team and request a same-day appointment. If they are not available (weekends or after hours) call your local crisis line, emergency room or 911.          Depression Self-Care Plan / Wellness Kit    Many people find that medication and therapy are helpful treatments for managing depression. In addition, making small changes to your everyday life can help to boost your mood and improve your wellbeing. Below are some tips for you to consider. Be sure to talk with your medical provider and/or behavioral health consultant if your symptoms are worsening or not improving.     Sleep   Sleep hygiene  means all of the habits that support good, restful sleep. It includes maintaining a consistent bedtime and wake time, using your bedroom only for sleeping or sex, and keeping the bedroom dark and free of distractions like a computer, smartphone, or television.     Develop a Healthy Routine  Maintain good hygiene. Get out of bed in the morning, make your bed, brush your teeth, take a shower, and get dressed. Don t spend too much time viewing media that makes you feel stressed. Find time to relax each day.    Exercise  Get some form of exercise every day. This will help reduce pain and release endorphins, the  feel good  chemicals in your brain. It can be as simple as just going for a walk or doing some gardening, anything that will get you moving.      Diet  Strive to eat healthy foods, including fruits and vegetables. Drink plenty of water. Avoid excessive sugar, caffeine, alcohol, and other mood-altering substances.     Stay Connected with Others  Stay in touch with friends and family members.    Manage Your Mood  Try deep breathing, massage therapy, biofeedback, or meditation. Take part in fun activities when you can. Try to find something to smile about each day.     Psychotherapy  Be open to working with a therapist if your provider recommends  it.     Medication  Be sure to take your medication as prescribed. Most anti-depressants need to be taken every day. It usually takes several weeks for medications to work. Not all medicines work for all people. It is important to follow-up with your provider to make sure you have a treatment plan that is working for you. Do not stop your medication abruptly without first discussing it with your provider.    Crisis Resources   These hotlines are for both adults and children. They and are open 24 hours a day, 7 days a week unless noted otherwise.      National Suicide Prevention Lifeline   2-765-873-TALK (1925)      Crisis Text Line    www.crisistextline.org  Text HOME to 841319 from anywhere in the [place], anytime, about any type of crisis. A live, trained crisis counselor will receive the text and respond quickly.      [facility] Lifeline for LGBTQ Youth  A national crisis intervention and suicide lifeline for LGBTQ youth under 25. Provides a safe place to talk without judgement. Call 1-621.663.8050; text START to 212853 or visit www.thetrevorproject.org to talk to a trained counselor.      For [facility] crisis numbers, visit the [facility] website at:  https://[place].gov/dhs/people-we-serve/adults/health-care/mental-health/resources/crisis-contacts.jsp

## 2021-05-11 NOTE — PATIENT INSTRUCTIONS
Recommendations from today's MTM visit:                                                       1. Increase metformin XR to 1500mg daily for 1 week then increase to 2000mg/day if tolerated.  2. Decrease Basaglar to 25 units daily and if able to increase metformin to 2000mg/day, may decrease Basaglar to 20 units daily.   3. Refill duloxetine 60mg daily and remain on this every day.     Follow-up: Return in about 1 month (around 6/11/2021) for Medication Therapy Management Pharmacist.    It was great to speak with you today.  I value your experience and would be very thankful for your time with providing feedback on our clinic survey. You may receive a survey via email or text message in the next few days.     To schedule another MTM appointment, please call the clinic directly or you may call the MTM scheduling line at [phone] or toll-free at [phone].     My Clinical Pharmacist's contact information:                                                      Please feel free to contact me with any questions or concerns you have.      Barbara Kc, PharmD  Medication Therapy Management Provider, [facility]

## 2021-05-12 ENCOUNTER — TELEPHONE (OUTPATIENT)
Dept: FAMILY MEDICINE | Facility: CLINIC | Age: 66
End: 2021-05-12

## 2021-05-12 NOTE — TELEPHONE ENCOUNTER
Called patient and advised of below.  Patient will go to [facility] for evaluation.  Bozena Francisco RN

## 2021-05-12 NOTE — TELEPHONE ENCOUNTER
I do not see that pt was seen in [facility] ER and checked care everywhere, no further information available.    Agree with Dr. Dalal's assessment and plan for pt at that time.    Raisa Calvillo PA-C

## 2021-05-12 NOTE — TELEPHONE ENCOUNTER
"Triage Call:  Patient is calling with 10/10 abdominal, penis and rectal pain. Passing pure blood from catheter. Patient was advised to be seen in the ED. He declined disposition stating he only wants Oxycodone to be ordered for him so he \"can sleep tonight\".     Paging On Call Dr Sunita Dalal--- Per MD Patient needs to be seen in the ED, no medication refill.     Called the patient back x3, no answer, voicemail was left for the patient to call back.     PCP or covering provider please follow up with patient.     COVID 19 Nurse Triage Plan/Patient Instructions    Please be aware that novel coronavirus (COVID-19) may be circulating in the community. If you develop symptoms such as fever, cough, or SOB or if you have concerns about the presence of another infection including coronavirus (COVID-19), please contact your health care provider or visit [email or web address].     Disposition/Instructions    ED Visit recommended. Follow protocol based instructions.     Bring Your Own Device:  Please also bring your smart device(s) (smart phones, tablets, laptops) and their charging cables for your personal use and to communicate with your care team during your visit.    Thank you for taking steps to prevent the spread of this virus.  o Limit your contact with others.  o Wear a simple mask to cover your cough.  o Wash your hands well and often.    Resources    [facility]: About COVID-19: [email or web address]    CDC: What to Do If You're Sick: www.cdc.gov/coronavirus/2019-ncov/about/steps-when-sick.html    CDC: Ending Home Isolation: www.cdc.gov/coronavirus/2019-ncov/hcp/disposition-in-home-patients.html     CDC: Caring for Someone: www.cdc.gov/coronavirus/2019-ncov/if-you-are-sick/care-for-someone.html     [facility]: Interim Guidance for Hospital Discharge to Home: www.health.[facility].[place].us/diseases/coronavirus/hcp/hospdischarge.pdf    [facility] clinical trials (COVID-19 research studies): " clinicalaffairs.[facility]-clinical-trials     Below are the COVID-19 hotlines at the [place] Department of Health ([facility]). Interpreters are available.   o For health questions: Call 594-436-2192 or 1-223.332.1881 (7 a.m. to 7 p.m.)  o For questions about schools and childcare: Call 945-123-4377 or 1-188.660.7345 (7 a.m. to 7 p.m.)     April Vaughn RN Nursing Advisor 5/11/2021 8:57 PM    Reason for Disposition    SEVERE abdominal pain    Additional Information    Negative: Shock suspected (e.g., cold/pale/clammy skin, too weak to stand, low BP, rapid pulse)    Negative: Sounds like a life-threatening emergency to the triager    Negative: [1] Catheter was accidentally pulled-out AND [2] bright red continuous bleeding    Protocols used: URINARY CATHETER SYMPTOMS AND [id number]-PQ

## 2021-05-12 NOTE — TELEPHONE ENCOUNTER
Patient calls regarding issues below,    Patient reporting continued uncontrolled levels of pain, reporting the pain is related to the rectal issue patient was admitted to observation on 5/5/2021 and also urinary symptoms. Patient reporting the blood has decreased in urine since last evening.Per chart review, patient was instructed to be evaluated in ED for pain management last night but patient does not want to go back to ED for this issue. Patient passed along PCP guidance and explained reasons for ED evaluation for pain management opposed to family practice visit, patient again resistant to ED trip. Patient reporting he does not feel well enough to go to ED.     Patient requesting phone visit today to discuss further pain management, patient requesting oxycodone as Tylenol does not help the pain at all. Again informed patient that this would be appropriate to come from the ED to resolve pain, patient was scheduled for OV with CJ for tomorrow morning by MTM yesterday.. Routing to CJ, please advise if phone visit can be made, or if in person visit should be cancelled for tomorrow morning    Darwin Bess RN

## 2021-05-12 NOTE — TELEPHONE ENCOUNTER
"Gerardo calling back requesting a refill for some pain medicine (\"something other than Tylenol\"). Patient states that he was seen at [facility] and was only prescribed a short fill of Oxycodone.     FNA advised the recommendation that was given per Dr. Dalal and that unable to refill pain medication per after hours policy. Patient replied back with \"Oh my God\" and proceeded to disconnect the call.     Margot Armstrong, RN-BSN  [facility]   "

## 2021-05-12 NOTE — TELEPHONE ENCOUNTER
Given significant pain, this needs to be evaluated in person with an exam and possible labs.   The fact pt has catheter and recent surgical is concern for infection or other issues.     Patient should go to ER now, as pain sounds out of proportion to what is to be expected.

## 2021-05-13 ENCOUNTER — OFFICE VISIT (OUTPATIENT)
Dept: FAMILY MEDICINE | Facility: CLINIC | Age: 66
End: 2021-05-13
Payer: COMMERCIAL

## 2021-05-13 VITALS
HEART RATE: 104 BPM | DIASTOLIC BLOOD PRESSURE: 76 MMHG | RESPIRATION RATE: 14 BRPM | SYSTOLIC BLOOD PRESSURE: 120 MMHG | WEIGHT: 166 LBS | OXYGEN SATURATION: 99 % | BODY MASS INDEX: 25.24 KG/M2 | TEMPERATURE: 98.1 F

## 2021-05-13 DIAGNOSIS — Z97.8 FOLEY CATHETER IN PLACE: ICD-10-CM

## 2021-05-13 DIAGNOSIS — E11.9 TYPE 2 DIABETES MELLITUS WITHOUT COMPLICATION, WITH LONG-TERM CURRENT USE OF INSULIN (H): ICD-10-CM

## 2021-05-13 DIAGNOSIS — K62.6 RECTAL ULCER: ICD-10-CM

## 2021-05-13 DIAGNOSIS — Z11.4 SCREENING FOR HIV (HUMAN IMMUNODEFICIENCY VIRUS): Primary | ICD-10-CM

## 2021-05-13 DIAGNOSIS — N20.0 KIDNEY STONE: ICD-10-CM

## 2021-05-13 DIAGNOSIS — Z79.4 TYPE 2 DIABETES MELLITUS WITHOUT COMPLICATION, WITH LONG-TERM CURRENT USE OF INSULIN (H): ICD-10-CM

## 2021-05-13 LAB
GLUCOSE SERPL-MCNC: 98 MG/DL (ref 70–99)
HBA1C MFR BLD: 7.6 % (ref 0–5.6)
HIV 1+2 AB+HIV1 P24 AG SERPL QL IA: NONREACTIVE

## 2021-05-13 PROCEDURE — 83036 HEMOGLOBIN GLYCOSYLATED A1C: CPT | Performed by: PHYSICIAN ASSISTANT

## 2021-05-13 PROCEDURE — 82947 ASSAY GLUCOSE BLOOD QUANT: CPT | Performed by: PHYSICIAN ASSISTANT

## 2021-05-13 PROCEDURE — 99214 OFFICE O/P EST MOD 30 MIN: CPT | Performed by: PHYSICIAN ASSISTANT

## 2021-05-13 PROCEDURE — 36415 COLL VENOUS BLD VENIPUNCTURE: CPT | Performed by: PHYSICIAN ASSISTANT

## 2021-05-13 PROCEDURE — 87389 HIV-1 AG W/HIV-1&-2 AB AG IA: CPT | Performed by: PHYSICIAN ASSISTANT

## 2021-05-13 RX ORDER — OXYCODONE HYDROCHLORIDE 5 MG/1
5 TABLET ORAL EVERY 6 HOURS PRN
Qty: 12 TABLET | Refills: 0 | Status: SHIPPED | OUTPATIENT
Start: 2021-05-13 | End: 2021-05-16

## 2021-05-13 ASSESSMENT — PATIENT HEALTH QUESTIONNAIRE - PHQ9
SUM OF ALL RESPONSES TO PHQ QUESTIONS 1-9: 15
SUM OF ALL RESPONSES TO PHQ QUESTIONS 1-9: 15
10. IF YOU CHECKED OFF ANY PROBLEMS, HOW DIFFICULT HAVE THESE PROBLEMS MADE IT FOR YOU TO DO YOUR WORK, TAKE CARE OF THINGS AT HOME, OR GET ALONG WITH OTHER PEOPLE: SOMEWHAT DIFFICULT
5. POOR APPETITE OR OVEREATING: MORE THAN HALF THE DAYS

## 2021-05-13 ASSESSMENT — ANXIETY QUESTIONNAIRES
7. FEELING AFRAID AS IF SOMETHING AWFUL MIGHT HAPPEN: NOT AT ALL
GAD7 TOTAL SCORE: 7
5. BEING SO RESTLESS THAT IT IS HARD TO SIT STILL: SEVERAL DAYS
6. BECOMING EASILY ANNOYED OR IRRITABLE: NOT AT ALL
2. NOT BEING ABLE TO STOP OR CONTROL WORRYING: MORE THAN HALF THE DAYS
1. FEELING NERVOUS, ANXIOUS, OR ON EDGE: NOT AT ALL
IF YOU CHECKED OFF ANY PROBLEMS ON THIS QUESTIONNAIRE, HOW DIFFICULT HAVE THESE PROBLEMS MADE IT FOR YOU TO DO YOUR WORK, TAKE CARE OF THINGS AT HOME, OR GET ALONG WITH OTHER PEOPLE: NOT DIFFICULT AT ALL
3. WORRYING TOO MUCH ABOUT DIFFERENT THINGS: MORE THAN HALF THE DAYS

## 2021-05-13 NOTE — PROGRESS NOTES
Assessment & Plan     Type 2 diabetes mellitus without complication, with long-term current use of insulin (H)  Needs to establish w/ new Endocrine provider  Await labs.   - Hemoglobin A1c  - ENDOCRINOLOGY ADULT REFERRAL  - Glucose    Screening for HIV (human immunodeficiency virus)    - HIV Antigen Antibody Combo    Kidney stone  Avila in place  Passed 2 stones yesterday.   Urine appears clear today  - oxyCODONE (ROXICODONE) 5 MG tablet; Take 1 tablet (5 mg) by mouth every 6 hours as needed for pain  - UROLOGY ADULT REFERRAL; Future    Rectal ulcer  Needs follow-up   - COLORECTAL SURGERY REFERRAL    Avila catheter in place  Has upcoming appt.   - UROLOGY ADULT REFERRAL; Future  Depression Screening Follow Up    PHQ 5/13/2021   PHQ-9 Total Score 15   Q9: Thoughts of better off dead/self-harm past 2 weeks Not at all   F/U: Thoughts of suicide or self-harm -   F/U: Safety concerns -         Follow Up Actions Taken  Crisis resource information provided in After Visit Summary  Patient declined referral.     See Patient Instructions    No follow-ups on file.    KAYE Davis [facility]    Rhiannon Carrillo is a 66 year old who presents for the following health issues     History of Present Illness       Diabetes:   He presents for follow up of diabetes.  He is checking home blood glucose three times daily. He checks blood glucose before and after meals.  Blood glucose is sometimes over 200 and sometimes over 70. He is aware of hypoglycemia symptoms including shakiness, dizziness and weakness. He has no concerns regarding his diabetes at this time.  He is having burning in feet. The patient has not had a diabetic eye exam in the last 12 months.         He eats 2-3 servings of fruits and vegetables daily.He consumes 1 sweetened beverage(s) daily.He exercises with enough effort to increase his heart rate 20 to 29 minutes per day.  He exercises with enough effort to increase his  heart rate 3 or less days per week.   He is taking medications regularly.     Answers for HPI/ROS submitted by the patient on 5/13/2021   Chronic problems general questions HPI Form  If you checked off any problems, how difficult have these problems made it for you to do your work, take care of things at home, or get along with other people?: Somewhat difficult  PHQ9 TOTAL SCORE: 15      Hospital Follow-up Visit:    Hospital/Nursing Home/IP Rehab Facility: [facility]  Date of Admission: 05/05/21  Date of Discharge: 05/06/21  Reason(s) for Admission: foreign body, urinary retention      Was your hospitalization related to COVID-19? No   Problems taking medications regularly:  None  Medication changes since discharge: None  Problems adhering to non-medication therapy:  None    Summary of hospitalization:  [facility] discharge summary reviewed  Diagnostic Tests/Treatments reviewed.  Follow up needed: labs  Other Healthcare Providers Involved in Patient s Care:         Specialist appointment - fu with urology and colorectal  Update since discharge: improved.  Post Discharge Medication Reconciliation: discharge medications reconciled, continue medications without change.  Plan of care communicated with patient  Passed 2 kidney stones yesterday, feeling better today.          Review of Systems   Constitutional, HEENT, cardiovascular, pulmonary, gi and gu systems are negative, except as otherwise noted.      Objective    /76 (BP Location: Right arm, Patient Position: Chair, Cuff Size: Adult Regular)   Pulse 104   Temp 98.1  F (36.7  C) (Oral)   Resp 14   Wt 75.3 kg (166 lb)   SpO2 99%   BMI 25.24 kg/m    Body mass index is 25.24 kg/m .  Physical Exam   GENERAL APPEARANCE: healthy, alert and no distress  RESP: lungs clear to auscultation - no rales, rhonchi or wheezes  CV: regular rates and rhythm, normal S1 S2, no S3 or S4 and no murmur, click or rub  ABDOMEN: soft, nontender, without  hepatosplenomegaly or masses and bowel sounds normal  MS: extremities normal- no gross deformities noted  PSYCH: mentation appears normal and affect normal/bright

## 2021-05-14 ASSESSMENT — ANXIETY QUESTIONNAIRES: GAD7 TOTAL SCORE: 7

## 2021-05-14 ASSESSMENT — PATIENT HEALTH QUESTIONNAIRE - PHQ9: SUM OF ALL RESPONSES TO PHQ QUESTIONS 1-9: 15

## 2021-05-26 ENCOUNTER — OFFICE VISIT (OUTPATIENT)
Dept: UROLOGY | Facility: CLINIC | Age: 66
End: 2021-05-26
Payer: COMMERCIAL

## 2021-05-26 VITALS
WEIGHT: 156 LBS | DIASTOLIC BLOOD PRESSURE: 80 MMHG | HEIGHT: 68 IN | SYSTOLIC BLOOD PRESSURE: 112 MMHG | BODY MASS INDEX: 23.64 KG/M2

## 2021-05-26 DIAGNOSIS — R33.9 URINARY RETENTION: Primary | ICD-10-CM

## 2021-05-26 DIAGNOSIS — N20.0 KIDNEY STONE: ICD-10-CM

## 2021-05-26 DIAGNOSIS — E11.42 TYPE 2 DIABETES MELLITUS WITH DIABETIC POLYNEUROPATHY, UNSPECIFIED WHETHER LONG TERM INSULIN USE (H): ICD-10-CM

## 2021-05-26 DIAGNOSIS — Z97.8 FOLEY CATHETER IN PLACE: ICD-10-CM

## 2021-05-26 DIAGNOSIS — Z12.5 SCREENING FOR PROSTATE CANCER: ICD-10-CM

## 2021-05-26 DIAGNOSIS — R31.0 GROSS HEMATURIA: ICD-10-CM

## 2021-05-26 PROCEDURE — 51700 IRRIGATION OF BLADDER: CPT | Performed by: PHYSICIAN ASSISTANT

## 2021-05-26 PROCEDURE — 99204 OFFICE O/P NEW MOD 45 MIN: CPT | Mod: 25 | Performed by: PHYSICIAN ASSISTANT

## 2021-05-26 RX ORDER — FINASTERIDE 5 MG/1
5 TABLET, FILM COATED ORAL DAILY
Qty: 30 TABLET | Refills: 11 | Status: SHIPPED | OUTPATIENT
Start: 2021-05-26 | End: 2022-03-16

## 2021-05-26 RX ORDER — TAMSULOSIN HYDROCHLORIDE 0.4 MG/1
0.4 CAPSULE ORAL DAILY
Qty: 30 CAPSULE | Refills: 11 | Status: SHIPPED | OUTPATIENT
Start: 2021-05-26 | End: 2022-03-16

## 2021-05-26 ASSESSMENT — MIFFLIN-ST. JEOR: SCORE: 1472.11

## 2021-05-26 ASSESSMENT — ENCOUNTER SYMPTOMS
POLYDIPSIA: 1
BACK PAIN: 0
EYES NEGATIVE: 1
HEMATURIA: 1
FATIGUE: 0
HEMATOLOGIC/LYMPHATIC NEGATIVE: 1
LIGHT-HEADEDNESS: 0
EYE PAIN: 0
DYSURIA: 0
FEVER: 0
SHORTNESS OF BREATH: 0
CHILLS: 0
DIFFICULTY URINATING: 1
VOMITING: 0
DIZZINESS: 0
UNEXPECTED WEIGHT CHANGE: 0
NAUSEA: 0
FREQUENCY: 1

## 2021-05-26 ASSESSMENT — PAIN SCALES - GENERAL: PAINLEVEL: NO PAIN (0)

## 2021-05-26 NOTE — PATIENT INSTRUCTIONS
Continue on Flomax and finasteride.  Refills provided.    Follow up one month with UA, AUA, and PVR.    Blood evaluation CT Urogram, cystoscopy, and urine cytology.    PSA for prostate cancer screening.

## 2021-05-26 NOTE — PROGRESS NOTES
Subjective      REQUESTING PROVIDER   Raisa Calvillo     REASON FOR CONSULT   Urinary Retention  Trial of Void    HISTORY OF PRESENT ILLNESS   Mr. Gonzalez is a very pleasant, 66 year old male seen today in the urology clinic after hospitalization for follow up of urinary retention.  Patient with hospitalized on 05/05/2021 for foreign object within the rectum.  These required surgical removal.  During that time, patient had been having difficulties with urination.  He was having small dribbles of urine for approximately 1 to 2 days.  In the emergency department, Avila catheter was placed and 3 L of urine was removed.  Patient had an attempted trial of void prior to leaving the hospital.  He was able to urinate on 150 mL, but had a postvoid residual greater than 400 cc.  Avila catheter was replaced.  Patient was started on finasteride and Flomax at the recommendation of Dr. Rhodes. He denies any adverse effects with these. Patient has been tolerating his catheter.  He did note some hematuria after he did some yard work.  He was very active.  He does take 81 mg aspirin daily.  No smoking history.  History of nephrolithiasis.    Patient had never gone into urinary retention previously.  Prior to recent retention, he denied any hematuria, dysuria, urinary tract infections, or difficulty with urination.  He did have frequency of urination, but he does endorse that he drinks a lot of water.  He is diabetic.  For a long time, his A1c was elevated.  In February 2021, his A1c was 15.0.  Most recent hemoglobin A1c is 7.6.  Once his blood sugar was under control, he was having nocturia x1.  When his blood sugar was out of control, patient noted urination every 30 minutes in the overnight hours.  He does have some neuropathy from his diabetes.  Patient denies any lower back issues.  He notes that he was having loose stools in the hospital, but they have returned to normal.  He denies any neurological conditions other than  neuropathy.  He was on a fair amount of pain medications while in the hospital.    The following portions of the patient's history were reviewed and updated as appropriate: allergies, current medications, past family history, past medical history, past social history, past surgical history and problem list.     REVIEW OF SYSTEMS   Review of Systems   Constitutional: Negative for chills, fatigue, fever and unexpected weight change.   HENT: Negative for hearing loss.    Eyes: Negative.  Negative for pain.   Respiratory: Negative for shortness of breath.    Cardiovascular: Negative for chest pain.   Gastrointestinal: Negative for nausea and vomiting.   Endocrine: Positive for polydipsia.   Genitourinary: Positive for difficulty urinating, frequency and hematuria. Negative for dysuria and urgency.   Musculoskeletal: Negative for back pain.   Neurological: Negative for dizziness and light-headedness.   Hematological: Negative.       Per HPI.     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)     Elevated liver enzymes     Hyponatremia     Type 2 diabetes mellitus without complication, with long-term current use of insulin (H)     Psoriatic arthritis (H)     Erectile dysfunction, unspecified erectile dysfunction type     Persistent insomnia     Urinary retention     Hyperglycemia     Foreign body of anus or rectum, initial encounter      Past Medical History:   Diagnosis Date     Psoriatic arthropathy (H)      Type II or unspecified type diabetes mellitus without mention of complication, not stated as uncontrolled     2006     Vaccination not carried out       Past Surgical History:   Procedure Laterality Date     EXAM UNDER ANESTHESIA RECTUM N/A 5/5/2021    Procedure: Foreign body removal x3, flexible sigmoidoscopy, anoscopy;  Surgeon:  "Ibeth Mack MD;  Location:  OR      KNEE SCOPE, DIAGNOSTIC      Arthroscopy, Knee- Left      Social History:   Patient is .   No smoking history.    Family History:   No family history of BPH or  malignancy.    Objective      PHYSICAL EXAM   /80   Ht 1.727 m (5' 8\")   Wt 70.8 kg (156 lb)   BMI 23.72 kg/m     Physical Exam  Constitutional:       Appearance: Normal appearance.   HENT:      Head: Normocephalic.      Nose: Nose normal.      Mouth/Throat:      Mouth: Mucous membranes are moist.   Eyes:      General: No scleral icterus.  Neck:      Musculoskeletal: Normal range of motion.   Cardiovascular:      Rate and Rhythm: Normal rate and regular rhythm.   Pulmonary:      Effort: Pulmonary effort is normal.   Abdominal:      General: Abdomen is flat.      Tenderness: There is no abdominal tenderness. There is no left CVA tenderness.   Genitourinary:     Comments: Avila catheter draining queenie urine to a leg bag.  Prostate: Approximately 30 g.  No palpable masses, nodules, or induration.  Rectum: Normal rectal tone.  Evidence of hemorrhoids or polyps in the rectal vault.  Nontender.  Musculoskeletal: Normal range of motion.      Right lower leg: Edema present.      Left lower leg: No edema.   Neurological:      General: No focal deficit present.      Mental Status: He is alert and oriented to person, place, and time.   Psychiatric:         Mood and Affect: Mood normal.         Behavior: Behavior normal.        LABORATORY   Recent Labs   Lab Test 05/05/21  1212 02/09/21  1706 07/14/16  1547   COLOR Light Yellow Light Yellow Yellow   APPEARANCE Clear Clear Clear   URINEGLC >1000* Negative Negative   URINEBILI Negative Negative Negative   URINEKETONE Trace* 60* Negative   SG 1.019 1.032 <=1.005   UBLD Negative Negative Trace*   URINEPH 6.0 5.5 7.0   PROTEIN Negative Negative Negative   UROBILINOGEN  --   --  0.2   NITRITE Negative Negative Negative   LEUKEST Negative Moderate* Small*   RBCU "  --  3* O - 2   WBCU  --  46* O - 2     PROCEDURE: A trial of void was performed by nursing staff today in the clinic.  The patient's Avila leg bag was disconnected and 8 cc of sterile water were infused into the patient's bladder via gravity drainage, which the patient tolerated fine.  The Avila balloon was decompressed and the catheter was then removed.  After a few minutes Mr. Gonzalez voided 190 ml of 220 mL instill.  The patient did pass today's trial of void and the catheter did not need to be replaced.      ASSESSMENT/PLAN:  Assessment & Plan    1. Urinary retention    2. Avila catheter in place    3. Gross hematuria    4. Type 2 diabetes mellitus with diabetic polyneuropathy, unspecified whether long term insulin use (H)    5. Kidney stone    6. Screening for prostate cancer      I had the pleasure today of meeting with Mr. Gonzalez, 66 year old male who developed acute urinary retention requiring Avila catheter placement.  He had a failed trial of void in the hospital, and he was started on finasteride and Flomax.  Avila catheter is in place.    We discussed that he may have been in urinary retention for some time.  His prostate does not feel particularly large on examination.  We also discussed the role of his pain, pain management medication, and diabetes with evidence of end organ damage with neuropathy.  His diabetic control has improved.  We discussed that his difficulties with urination may be multifactorial including the diabetes, BPH, or a poorly squeezing bladder.    Patient would like to go forward with a trial of void today.  We discussed that if he does not pass, we would need to consider further evaluation and possible replacement of Avila catheter or clean intermittent catheterization training.    We also discussed possible hematuria evaluation including CT urogram, cytology, and cystoscopy due to gross hematuria while he had Avila catheter in place.  Patient has also not had a recent PSA. Patient  would like to go forward with these.    The patient successfully passed a trial of void today and should continue taking Flomax and finasteride daily.  A refill was needed today.  The patient should return for a follow up office visit in 1 month for PVR, UA, and an AUA symptom score.      Should the patient continue to have voiding difficulty, the next appropriate studies would be cystoscopy to evaluate for bladder outlet obstruction.    I have enjoyed participating in the medical care of this patient.  Please do not hesitate to contact me with any questions or concerns.      Signed by:   Grecia Quiroga PA-C 5/26/2021 1:15 PM   [facility]   [phone]

## 2021-05-26 NOTE — LETTER
5/26/2021       RE: Gerardo Gonzalez  [address]  [place] [address]     Dear Colleague,    Thank you for referring your patient, Gerardo Gonzalez, to the [facility] [place] at [facility]. Please see a copy of my visit note below.    Subjective      REQUESTING PROVIDER   Raisa Calvillo     REASON FOR CONSULT   Urinary Retention  Trial of Void    HISTORY OF PRESENT ILLNESS   Mr. Gonzalez is a very pleasant, 66 year old male seen today in the urology clinic after hospitalization for follow up of urinary retention.  Patient with hospitalized on 05/05/2021 for foreign object within the rectum.  These required surgical removal.  During that time, patient had been having difficulties with urination.  He was having small dribbles of urine for approximately 1 to 2 days.  In the emergency department, Avila catheter was placed and 3 L of urine was removed.  Patient had an attempted trial of void prior to leaving the hospital.  He was able to urinate on 150 mL, but had a postvoid residual greater than 400 cc.  Avila catheter was replaced.  Patient was started on finasteride and Flomax at the recommendation of Dr. Rhodes. He denies any adverse effects with these. Patient has been tolerating his catheter.  He did note some hematuria after he did some yard work.  He was very active.  He does take 81 mg aspirin daily.  No smoking history.  History of nephrolithiasis.    Patient had never gone into urinary retention previously.  Prior to recent retention, he denied any hematuria, dysuria, urinary tract infections, or difficulty with urination.  He did have frequency of urination, but he does endorse that he drinks a lot of water.  He is diabetic.  For a long time, his A1c was elevated.  In February 2021, his A1c was 15.0.  Most recent hemoglobin A1c is 7.6.  Once his blood sugar was under control, he was having nocturia x1.  When his blood sugar was out  of control, patient noted urination every 30 minutes in the overnight hours.  He does have some neuropathy from his diabetes.  Patient denies any lower back issues.  He notes that he was having loose stools in the hospital, but they have returned to normal.  He denies any neurological conditions other than neuropathy.  He was on a fair amount of pain medications while in the hospital.    The following portions of the patient's history were reviewed and updated as appropriate: allergies, current medications, past family history, past medical history, past social history, past surgical history and problem list.     REVIEW OF SYSTEMS   Review of Systems   Constitutional: Negative for chills, fatigue, fever and unexpected weight change.   HENT: Negative for hearing loss.    Eyes: Negative.  Negative for pain.   Respiratory: Negative for shortness of breath.    Cardiovascular: Negative for chest pain.   Gastrointestinal: Negative for nausea and vomiting.   Endocrine: Positive for polydipsia.   Genitourinary: Positive for difficulty urinating, frequency and hematuria. Negative for dysuria and urgency.   Musculoskeletal: Negative for back pain.   Neurological: Negative for dizziness and light-headedness.   Hematological: Negative.       Per HPI.     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)     Elevated liver enzymes     Hyponatremia     Type 2 diabetes mellitus without complication, with long-term current use of insulin (H)     Psoriatic arthritis (H)     Erectile dysfunction, unspecified erectile dysfunction type     Persistent insomnia     Urinary retention     Hyperglycemia     Foreign body of anus or rectum, initial encounter      Past Medical History:   Diagnosis Date     Psoriatic arthropathy (H)      Type II or unspecified  "type diabetes mellitus without mention of complication, not stated as uncontrolled     2006     Vaccination not carried out       Past Surgical History:   Procedure Laterality Date     EXAM UNDER ANESTHESIA RECTUM N/A 5/5/2021    Procedure: Foreign body removal x3, flexible sigmoidoscopy, anoscopy;  Surgeon: Ibeth Mack MD;  Location: [facility] OR     [facility] KNEE SCOPE, DIAGNOSTIC      Arthroscopy, Knee- Left      Social History:   Patient is .   No smoking history.    Family History:   No family history of BPH or  malignancy.    Objective      PHYSICAL EXAM   /80   Ht 1.727 m (5' 8\")   Wt 70.8 kg (156 lb)   BMI 23.72 kg/m     Physical Exam  Constitutional:       Appearance: Normal appearance.   HENT:      Head: Normocephalic.      Nose: Nose normal.      Mouth/Throat:      Mouth: Mucous membranes are moist.   Eyes:      General: No scleral icterus.  Neck:      Musculoskeletal: Normal range of motion.   Cardiovascular:      Rate and Rhythm: Normal rate and regular rhythm.   Pulmonary:      Effort: Pulmonary effort is normal.   Abdominal:      General: Abdomen is flat.      Tenderness: There is no abdominal tenderness. There is no left CVA tenderness.   Genitourinary:     Comments: Avila catheter draining queenie urine to a leg bag.  Prostate: Approximately 30 g.  No palpable masses, nodules, or induration.  Rectum: Normal rectal tone.  Evidence of hemorrhoids or polyps in the rectal vault.  Nontender.  Musculoskeletal: Normal range of motion.      Right lower leg: Edema present.      Left lower leg: No edema.   Neurological:      General: No focal deficit present.      Mental Status: He is alert and oriented to person, place, and time.   Psychiatric:         Mood and Affect: Mood normal.         Behavior: Behavior normal.        LABORATORY   Recent Labs   Lab Test 05/05/21  1212 02/09/21  1706 07/14/16  1547   COLOR Light Yellow Light Yellow Yellow   APPEARANCE Clear Clear Clear   URINEGLC >1000* " Negative Negative   URINEBILI Negative Negative Negative   URINEKETONE Trace* 60* Negative   SG 1.019 1.032 <=1.005   UBLD Negative Negative Trace*   URINEPH 6.0 5.5 7.0   PROTEIN Negative Negative Negative   UROBILINOGEN  --   --  0.2   NITRITE Negative Negative Negative   LEUKEST Negative Moderate* Small*   RBCU  --  3* O - 2   WBCU  --  46* O - 2     PROCEDURE: A trial of void was performed by nursing staff today in the clinic.  The patient's Avila leg bag was disconnected and 8 cc of sterile water were infused into the patient's bladder via gravity drainage, which the patient tolerated fine.  The Avila balloon was decompressed and the catheter was then removed.  After a few minutes Mr. Gonzalez voided 190 ml of 220 mL instill.  The patient did pass today's trial of void and the catheter did not need to be replaced.      ASSESSMENT/PLAN:  Assessment & Plan    1. Urinary retention    2. Avila catheter in place    3. Gross hematuria    4. Type 2 diabetes mellitus with diabetic polyneuropathy, unspecified whether long term insulin use (H)    5. Kidney stone    6. Screening for prostate cancer      I had the pleasure today of meeting with Mr. Gonzalez, 66 year old male who developed acute urinary retention requiring Avila catheter placement.  He had a failed trial of void in the hospital, and he was started on finasteride and Flomax.  Avila catheter is in place.    We discussed that he may have been in urinary retention for some time.  His prostate does not feel particularly large on examination.  We also discussed the role of his pain, pain management medication, and diabetes with evidence of end organ damage with neuropathy.  His diabetic control has improved.  We discussed that his difficulties with urination may be multifactorial including the diabetes, BPH, or a poorly squeezing bladder.    Patient would like to go forward with a trial of void today.  We discussed that if he does not pass, we would need to consider  further evaluation and possible replacement of Avila catheter or clean intermittent catheterization training.    We also discussed possible hematuria evaluation including CT urogram, cytology, and cystoscopy due to gross hematuria while he had Avila catheter in place.  Patient has also not had a recent PSA. Patient would like to go forward with these.    The patient successfully passed a trial of void today and should continue taking Flomax and finasteride daily.  A refill was needed today.  The patient should return for a follow up office visit in 1 month for PVR, UA, and an AUA symptom score.      Should the patient continue to have voiding difficulty, the next appropriate studies would be cystoscopy to evaluate for bladder outlet obstruction.    I have enjoyed participating in the medical care of this patient.  Please do not hesitate to contact me with any questions or concerns.      Signed by:   Grecia Quiorga PA-C 5/26/2021 1:15 PM   [facility]   [phone]

## 2021-05-26 NOTE — NURSING NOTE
Chief Complaint   Patient presents with     Urinary Retention     Here to establish care and for TOV.   Rosey Fulton, MILINDN

## 2021-06-04 ENCOUNTER — TELEPHONE (OUTPATIENT)
Dept: FAMILY MEDICINE | Facility: CLINIC | Age: 66
End: 2021-06-04

## 2021-06-04 NOTE — TELEPHONE ENCOUNTER
S-(situation): still in pain from passed kidney stones      B-(background): 05/05/21 catheter place due to urinary retention.  05/13/2021 seen in office with Raisa MACIAS given Oxycodone for pain from the kidney stones.    05/26 pt was seen at urology and a catheter was removed for a trial of voiding.    A-(assessment): pt states it is hard to urinate.  Is having a lot of pain 8/10 R lower ab.  Denies back pain.  He has a call to urology as well.  He is requesting pain medications oxycodone.    R-(recommendations): Needs a visit.  He states he will not go to the ER. Explained that pt may get to the point where he cannot void and he needs to go to ER to be evaluated.  He got upset and states he just wants pain meds from Raisa Calvillo PA-C until he can see urology.  Will forward to urology as well. KAYE Aguilar RN, BSN, PAL (Patient Advocate Liaison)  [facility]   [phone]

## 2021-06-04 NOTE — TELEPHONE ENCOUNTER
He needs to be seen if he is not able to urinate.  He may have gone into retention again.  This can be with us, if we have availability, or he will need to go to urgent care or the ER.      He said he had a history of kidney stones, but said nothing about having one recently at his visit.  The lower abdominal pain could be due to a stone or due to retention.  If he is in retention, which it sounds like, the narcotic will make that.   worse.      He had initial catheter placed for retention when he was hospitalized for a foreign object in the rectum.

## 2021-06-07 ENCOUNTER — HOSPITAL ENCOUNTER (OUTPATIENT)
Dept: CT IMAGING | Facility: CLINIC | Age: 66
Discharge: HOME | End: 2021-06-07
Attending: PHYSICIAN ASSISTANT | Admitting: PHYSICIAN ASSISTANT
Payer: COMMERCIAL

## 2021-06-07 DIAGNOSIS — R31.0 GROSS HEMATURIA: ICD-10-CM

## 2021-06-07 LAB
CREAT BLD-MCNC: 0.9 MG/DL (ref 0.66–1.25)
GFR SERPL CREATININE-BSD FRML MDRD: 85 ML/MIN/{1.73_M2}

## 2021-06-07 PROCEDURE — 250N000011 HC RX IP 250 OP 636: Performed by: RADIOLOGY

## 2021-06-07 PROCEDURE — 82565 ASSAY OF CREATININE: CPT

## 2021-06-07 PROCEDURE — 250N000009 HC RX 250: Performed by: RADIOLOGY

## 2021-06-07 PROCEDURE — 74178 CT ABD&PLV WO CNTR FLWD CNTR: CPT

## 2021-06-07 RX ORDER — IOPAMIDOL 755 MG/ML
500 INJECTION, SOLUTION INTRAVASCULAR ONCE
Status: COMPLETED | OUTPATIENT
Start: 2021-06-07 | End: 2021-06-07

## 2021-06-07 RX ADMIN — SODIUM CHLORIDE 95 ML: 9 INJECTION, SOLUTION INTRAVENOUS at 08:52

## 2021-06-07 RX ADMIN — IOPAMIDOL 79 ML: 755 INJECTION, SOLUTION INTRAVENOUS at 08:52

## 2021-06-09 ENCOUNTER — TELEPHONE (OUTPATIENT)
Dept: UROLOGY | Facility: CLINIC | Age: 66
End: 2021-06-09

## 2021-06-09 NOTE — TELEPHONE ENCOUNTER
Called pt with the results and information as below. He expressed understanding. He denies UTI symptoms. Has appt scheduled for PVR and UA on 6/23.   DEE Hernandez RN  ________________________________________    ----- Message from Grecia Quiroga PA-C sent at 6/8/2021 10:22 AM CDT -----  Elaine Mr. Gonzalez,    Your CT scan did not show any kidney stones or masses in the kidneys.  It looks like you bladder may be inflamed or irritated.  If you have symptoms of a UTI, such as urgency, frequency, and burning with urination, I would recommend that you get a urinalysis and urine culture. You should complete the cystoscopy with Dr. Mauro.       Grecia Quiroga PA-C    Please let patient know.

## 2021-06-21 NOTE — PROGRESS NOTES
Medication Therapy Management (MTM) Encounter    ASSESSMENT:                            Medication Adherence/Access: No issues identified    Type 2 Diabetes: Patient is not meeting A1c goal of < 7% but much improved. Self monitoring of blood glucose is at goal of fasting  mg/dL and close to post prandial < 180 mg/dL. No changes, recheck A1c in 2 months.     Hypertension: Stable  Hyperlipidemia: Recommend rechecking lipid panel with next A1c lab as he has improved diet and better controlling blood glucose.   Depression:  Educated on refilling duloxetine.   Neuropathy: Recommend refilling duloxetine and continuing to stay on it as above.   Psoriasis/Arthritis: educated he has refills left of meloxicam.     PLAN:                            1. A1c and lipid panel in 2 months.   2. Refill duloxetine 60mg daily and remain on this every day.    Follow-up: 2 months    SUBJECTIVE/OBJECTIVE:                          Gerardo Gonzalez is a 66 year old male called for a follow-up visit. He was referred to me from Raisa Calvillo PA-C.  Today's visit is a follow-up MTM visit from .     Reason for visit: Increased metformin dose f/up. Still out of duloxetine.     Tobacco: He reports that he has quit smoking. He smoked 0.00 packs per day for 4.00 years. He has never used smokeless tobacco.  Alcohol: not currently using    Medication Adherence/Access: no issues reported    Type 2 Diabetes:  Currently taking Trulicity 4.5mg weekly, Basaglar 32 units daily, and metformin XR 1000mg twice daily. Patient is not experiencing side effects. Diarrhea from metformin in the past but resolved now.   Blood sugar monitorin-3 time(s) daily. Ranges (patient reported): fasting 128 avg, post-prandial low 200's, bedtime 170-180's  Symptoms of low blood sugar? none  Symptoms of high blood sugar? Tingling, numbness, polyphagia   Eye exam: due  Foot exam: up to date  Diet/Exercise: Tough time eating but he is okay with this. Doesn't need to  eat as much food. Watches what he's eating, does exercises especially when blood glucose high at night. Shredded wheat for breakfast and tries to limit carbs at dinner. No corn or potatoes.   Aspirin: Taking 81mg daily and denies side effects  Statin: Yes: simvastatin   ACEi/ARB: Yes: losartan.   Urine Albumin:   Lab Results   Component Value Date    UMALCR 205.47 (H) 02/08/2021     Lab Results   Component Value Date    A1C 7.6 05/13/2021    A1C 15.0 02/08/2021    A1C 12.7 07/29/2020    A1C 14.7 11/06/2019    A1C 8.2 03/21/2019     Wt Readings from Last 10 Encounters:   05/26/21 156 lb (70.8 kg)   05/13/21 166 lb (75.3 kg)   05/05/21 150 lb (68 kg)   02/09/21 160 lb 0.9 oz (72.6 kg)   02/08/21 159 lb (72.1 kg)   07/29/20 161 lb 1.6 oz (73.1 kg)   11/06/19 170 lb (77.1 kg)   03/21/19 165 lb (74.8 kg)   10/26/18 175 lb (79.4 kg)   07/24/18 177 lb (80.3 kg)       Hypertension: Current medications include losartan 50mg daily. Patient reports no current medication side effects.  BP Readings from Last 3 Encounters:   05/26/21 112/80   05/13/21 120/76   05/06/21 123/59       Hyperlipidemia: Current therapy includes simvastatin 40mg daily.  Patient reports no significant myalgias or other side effects.  Recent Labs   Lab Test 02/08/21  1030 11/06/19  1555 07/06/15  0838 07/06/15  0838 12/03/13  1214   CHOL 246* 248*   < > 201* 141   HDL 39* 413   < > 46 52   * Cannot estimate LDL when triglyceride exceeds 400 mg/dL  149*   < > 85 63   TRIG 391* 522*   < > 350* 129   CHOLHDLRATIO  --   --   --  4.4 2.7    < > = values in this interval not displayed.       Depression:  Current medications include: Duloxetine 60mg once daily (states he's been out of this). Pt reports that depression symptoms are unchanged and stable but neuropathy worsened.   PHQ 7/29/2020 2/8/2021 5/13/2021   PHQ-9 Total Score 23 16 15   Q9: Thoughts of better off dead/self-harm past 2 weeks Not at all Not at all Not at all   F/U: Thoughts of suicide  or self-harm - - -   F/U: Safety concerns - - -       Neuropathy: Patient taking gabapentin 800mg AM, 800mg noon, and 1600mg bedtime. No issues but worsened neuropathy lately b/c out of duloxetine.    Psoriasis/Arthritis: Patient is on Humira every 7 days and meloxicam 15mg daily (states he's out). No side effects. He may go off Humira soon b/c psoriasis controlled currently.     Today's Vitals: There were no vitals taken for this visit.  ----------------    I spent 15 minutes with this patient today. All changes were made via collaborative practice agreement with Raisa Calvillo. A copy of the visit note was provided to the patient's primary care provider.    The patient was sent via [employer] a summary of these recommendations.     Barbara Kc, PharmD  Medication Therapy Management Provider, [facility]  Pager: [phone]    Telemedicine Visit Details  Type of service:  Telephone visit  Start Time: 9:33 AM  End Time: 9:48 AM  Originating Location (patient location): Home  Distant Location (provider location):  [facility] MTM        Medication Therapy Recommendations  Depression, unspecified depression type    Current Medication: DULoxetine (CYMBALTA) 60 MG capsule   Rationale: Does not understand instructions - Adherence - Adherence   Recommendation: Provide Education - Refill duloxetine 60mg daily and remain on this every day.   Status: Resolved Med Access Issue         Hyperlipidemia LDL goal <100    Current Medication: simvastatin (ZOCOR) 80 MG tablet   Rationale: Medication requires monitoring - Needs additional monitoring - Effectiveness   Recommendation: Order Lab - lipid   Status: Accepted per CPA

## 2021-06-22 ENCOUNTER — VIRTUAL VISIT (OUTPATIENT)
Dept: PHARMACY | Facility: CLINIC | Age: 66
End: 2021-06-22
Payer: COMMERCIAL

## 2021-06-22 DIAGNOSIS — R33.9 URINARY RETENTION: Primary | ICD-10-CM

## 2021-06-22 DIAGNOSIS — I10 BENIGN ESSENTIAL HYPERTENSION: ICD-10-CM

## 2021-06-22 DIAGNOSIS — F32.A DEPRESSION, UNSPECIFIED DEPRESSION TYPE: ICD-10-CM

## 2021-06-22 DIAGNOSIS — E78.5 HYPERLIPIDEMIA LDL GOAL <100: ICD-10-CM

## 2021-06-22 DIAGNOSIS — L40.50 PSORIATIC ARTHROPATHY (H): ICD-10-CM

## 2021-06-22 DIAGNOSIS — L40.50 PSORIATIC ARTHRITIS (H): ICD-10-CM

## 2021-06-22 PROCEDURE — 99606 MTMS BY PHARM EST 15 MIN: CPT | Performed by: PHARMACIST

## 2021-06-22 RX ORDER — DULOXETIN HYDROCHLORIDE 60 MG/1
60 CAPSULE, DELAYED RELEASE ORAL DAILY
Qty: 90 CAPSULE | Refills: 1 | Status: SHIPPED | OUTPATIENT
Start: 2021-06-22 | End: 2021-12-17

## 2021-06-22 RX ORDER — INSULIN GLARGINE 100 [IU]/ML
32 INJECTION, SOLUTION SUBCUTANEOUS DAILY
Qty: 30 ML | Refills: 1 | Status: SHIPPED | OUTPATIENT
Start: 2021-06-22 | End: 2021-10-21

## 2021-06-22 NOTE — PATIENT INSTRUCTIONS
Recommendations from today's MTM visit:                                                       1. Fasting labs in 2 months.   2. Refill duloxetine 60mg daily and remain on this every day.    Follow-up: Return in about 2 months (around 8/22/2021) for Lab Work, Medication Therapy Management Pharmacist.    It was great to speak with you today.  I value your experience and would be very thankful for your time with providing feedback on our clinic survey. You may receive a survey via email or text message in the next few days.     To schedule another MTM appointment, please call the clinic directly or you may call the MTM scheduling line at [phone] or toll-free at [phone].     My Clinical Pharmacist's contact information:                                                      Please feel free to contact me with any questions or concerns you have.      Barbara Kc, PharmD  Medication Therapy Management Provider, [facility]

## 2021-06-23 DIAGNOSIS — F41.9 ANXIETY: ICD-10-CM

## 2021-06-23 RX ORDER — SERTRALINE HYDROCHLORIDE 100 MG/1
TABLET, FILM COATED ORAL
Qty: 15 TABLET | Refills: 0 | OUTPATIENT
Start: 2021-06-23

## 2021-06-23 NOTE — TELEPHONE ENCOUNTER
Medication was discontinued. Per OV notes 02/2021: 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)  A1c previously 12.7. Has not been checked in 6 months.   Patient due for labs, obtained as noted below.  He would like refills on his medications which were provided as noted below.  Discussed adding Cymbalta to his medications and stopping the Zoloft. Patient in agreement. He will stop Zoloft and start Cymbalta. Nelson HARDY RN

## 2021-07-05 ENCOUNTER — OFFICE VISIT (OUTPATIENT)
Dept: UROLOGY | Facility: CLINIC | Age: 66
End: 2021-07-05
Payer: COMMERCIAL

## 2021-07-05 VITALS
SYSTOLIC BLOOD PRESSURE: 120 MMHG | DIASTOLIC BLOOD PRESSURE: 70 MMHG | WEIGHT: 156 LBS | BODY MASS INDEX: 23.64 KG/M2 | HEIGHT: 68 IN

## 2021-07-05 DIAGNOSIS — G47.00 PERSISTENT INSOMNIA: ICD-10-CM

## 2021-07-05 DIAGNOSIS — R31.0 GROSS HEMATURIA: Primary | ICD-10-CM

## 2021-07-05 DIAGNOSIS — E11.42 TYPE 2 DIABETES MELLITUS WITH DIABETIC POLYNEUROPATHY, UNSPECIFIED WHETHER LONG TERM INSULIN USE (H): ICD-10-CM

## 2021-07-05 DIAGNOSIS — L40.50 PSORIATIC ARTHRITIS (H): ICD-10-CM

## 2021-07-05 DIAGNOSIS — R80.9 PROTEINURIA, UNSPECIFIED TYPE: ICD-10-CM

## 2021-07-05 DIAGNOSIS — Z79.2 PROPHYLACTIC ANTIBIOTIC: ICD-10-CM

## 2021-07-05 DIAGNOSIS — R33.9 URINARY RETENTION: ICD-10-CM

## 2021-07-05 DIAGNOSIS — N52.9 ERECTILE DYSFUNCTION, UNSPECIFIED ERECTILE DYSFUNCTION TYPE: ICD-10-CM

## 2021-07-05 DIAGNOSIS — Z12.5 SCREENING FOR PROSTATE CANCER: ICD-10-CM

## 2021-07-05 LAB
ALBUMIN UR-MCNC: 30 MG/DL
APPEARANCE UR: CLEAR
BILIRUB UR QL STRIP: ABNORMAL
COLOR UR AUTO: YELLOW
GLUCOSE UR STRIP-MCNC: NEGATIVE MG/DL
HGB UR QL STRIP: NEGATIVE
KETONES UR STRIP-MCNC: ABNORMAL MG/DL
LEUKOCYTE ESTERASE UR QL STRIP: ABNORMAL
NITRATE UR QL: NEGATIVE
PH UR STRIP: 5.5 PH (ref 5–7)
SOURCE: ABNORMAL
SP GR UR STRIP: 1.02 (ref 1–1.03)
UROBILINOGEN UR STRIP-ACNC: 2 EU/DL (ref 0.2–1)

## 2021-07-05 PROCEDURE — 99214 OFFICE O/P EST MOD 30 MIN: CPT | Performed by: STUDENT IN AN ORGANIZED HEALTH CARE EDUCATION/TRAINING PROGRAM

## 2021-07-05 PROCEDURE — 81003 URINALYSIS AUTO W/O SCOPE: CPT | Mod: QW | Performed by: STUDENT IN AN ORGANIZED HEALTH CARE EDUCATION/TRAINING PROGRAM

## 2021-07-05 RX ORDER — CIPROFLOXACIN 500 MG/1
500 TABLET, FILM COATED ORAL ONCE
Qty: 1 TABLET | Refills: 0 | Status: SHIPPED | OUTPATIENT
Start: 2021-07-05 | End: 2021-07-05

## 2021-07-05 RX ORDER — LIDOCAINE HYDROCHLORIDE 20 MG/ML
JELLY TOPICAL ONCE
Status: COMPLETED | OUTPATIENT
Start: 2021-07-05 | End: 2021-07-05

## 2021-07-05 RX ORDER — TADALAFIL 20 MG/1
20 TABLET ORAL DAILY PRN
Qty: 6 TABLET | Refills: 11 | Status: SHIPPED | OUTPATIENT
Start: 2021-07-05 | End: 2022-08-08

## 2021-07-05 RX ADMIN — LIDOCAINE HYDROCHLORIDE: 20 JELLY TOPICAL at 08:49

## 2021-07-05 ASSESSMENT — MIFFLIN-ST. JEOR: SCORE: 1472.11

## 2021-07-05 ASSESSMENT — PAIN SCALES - GENERAL: PAINLEVEL: NO PAIN (0)

## 2021-07-05 NOTE — PROGRESS NOTES
"CHIEF COMPLAINT   Gerardo Garner who is a 66 year old male returns today for follow-up of urinary retention, gross hematuria.      HPI   Gerardo Garner is a 66 year old male who presents with a history of DM2, urinary retention, gross hematuria    Hospitalized 5/5/2021 with foreign object in rectum, urinary retention, leiva placed with 3 L (!) out. Underwent trial of void 5/26/2021which he passed, while on 5ARI and tamsulosin.    Seems to be urinating well now, feels to be emptying completely.    DM control is much improved from earlier in the year. Hba1c was 15 now down to the 7.6 range.     Now having worsening erectile function for the past several months. Notably has diabetic neuropathy in feet and hands. Has tried sildenafil 100 mg and did not seem to work.    PHYSICAL EXAM  Patient is a 66 year old  male   Vitals: Blood pressure 120/70, height 1.727 m (5' 8\"), weight 70.8 kg (156 lb).  Body mass index is 23.72 kg/m .  General Appearance Adult:   Alert, no acute distress, oriented  HENT: throat/mouth:normal, good dentition  Lungs: no respiratory distress, or pursed lip breathing  Heart: No obvious jugular venous distension present  Abdomen: soft, nontender, no organomegaly or masses  Musculoskeltal: extremities normal, no peripheral edema  Skin: no suspicious lesions or rashes  Neuro: Alert, oriented, speech and mentation normal  Psych: affect and mood normal  Gait: Normal    Results for GERARDO GARNER (MRN [id number]) as of 7/5/2021 09:17   Ref. Range 7/5/2021 08:33   Color Urine Unknown Yellow   Appearance Urine Unknown Clear   Glucose Urine Latest Ref Range: NEG^Negative mg/dL Negative   Bilirubin Urine Latest Ref Range: NEG^Negative  Small (A)   Ketones Urine Latest Ref Range: NEG^Negative mg/dL Trace (A)   Specific Gravity Urine Latest Ref Range: 1.003 - 1.035  1.025   pH Urine Latest Ref Range: 5.0 - 7.0 pH 5.5   Protein Albumin Urine Latest Ref Range: NEG^Negative mg/dL 30 (A)   Urobilinogen Urine Latest " Ref Range: 0.2 - 1.0 EU/dL 2.0 (H)   Nitrite Urine Latest Ref Range: NEG^Negative  Negative   Blood Urine Latest Ref Range: NEG^Negative  Negative   Leukocyte Esterase Urine Latest Ref Range: NEG^Negative  Moderate (A)   Source Unknown Midstream Urine         All pertinent imaging reviewed:    CT urogram 6/7/2021  IMPRESSION:   1. No urinary calculi or urinary tract masses are identified.  2. Mild prostatic enlargement.   3. Mild diffuse bladder wall thickening is nonspecific, but could be  related to cystitis. Please clinically correlate.  4. Mild patchy groundglass opacity in the left lower lobe of the lung  could be infectious in etiology. Clinical correlation is again  requested.  5. Cholelithiasis.  6. Mild splenomegaly.             ~51 gram prostate by ellipsoid estimate    PRE-PROCEDURE DIAGNOSIS: gross hematuria    POST-PROCEDURE DIAGNOSIS: gross hematuria    PROCEDURE: Cystoscopy      DESCRIPTION OF PROCEDURE: After informed consent was obtained, the patient was brought to the procedure room where he was placed in the supine position with all pressure points well padded.  The penis was prepped and draped in sterile fashion. A flexible cystoscope was introduced through a well-lubricated urethra.     Anterior urethra normal  Prostatic urethra 4 cm with obstructive bilobar hypertrophy  Bladder moderate trabeculation without cellules or diverticuli  No bladder stones or concerning urothelial lesions    The flexible cystoscope was removed and the findings were described to the patient.         ASSESSMENT and PLAN  66 year old male who presents with a history of DM2, urinary retention, gross hematuria.    Gross hematuria: no concerning course found on Ct or cysto  Proteinuria: advised patient to adhere to his DM regimen as this could indicate diabetic nephropathy  Continue tamsulosin and finasteride for BPH with retention  Erectile dysfunction refractory to sildenafil - trial tadalafil 20 mg prn instead. Likely  ED is due to h/o poorly controlled DM2 with neuropathy Discussed s/e including headache, vision changes; counseled on no nitrates for chest pain  Return in 2-3 months with PSA prior    Pavel Mauro MD   [facility]  [facility] Phone: [phone]

## 2021-07-05 NOTE — PATIENT INSTRUCTIONS

## 2021-07-05 NOTE — NURSING NOTE
Chief Complaint   Patient presents with     Hematuria     Cystoscopy     Urinary Retention     Prior to the start of the procedure and with procedural staff participation, I verbally confirmed the patient s identity using two indicators, relevant allergies, that the procedure was appropriate and matched the consent or emergent situation, and that the correct equipment/implants were available. Immediately prior to starting the procedure I conducted the Time Out with the procedural staff and re-confirmed the patient s name, procedure, and site/side. I have wiped the patient off with the povidone-Iodine solution, draped them, and used Lidocaine hydrochloride jelly. (The Joint Commission universal protocol was followed.)  Yes    Sedation (Moderate or Deep): None    5mL 2% lidocaine hydrochloride Urojet instilled into urethra.    NDC# [address]-1  Lot #: [id number]   Expiration Date:  12/22    Eri Melendez EMT

## 2021-07-06 RX ORDER — QUETIAPINE FUMARATE 25 MG/1
25-50 TABLET, FILM COATED ORAL
Qty: 180 TABLET | Refills: 1 | OUTPATIENT
Start: 2021-07-06

## 2021-07-06 RX ORDER — MELOXICAM 15 MG/1
TABLET ORAL
Qty: 90 TABLET | Refills: 0 | Status: SHIPPED | OUTPATIENT
Start: 2021-07-06 | End: 2022-07-01

## 2021-07-10 DIAGNOSIS — F41.9 ANXIETY: ICD-10-CM

## 2021-07-12 RX ORDER — SERTRALINE HYDROCHLORIDE 100 MG/1
TABLET, FILM COATED ORAL
Qty: 30 TABLET | Refills: 3 | OUTPATIENT
Start: 2021-07-12

## 2021-07-12 NOTE — TELEPHONE ENCOUNTER
Routing refill request to provider for review/approval because:  Drug not active on patient's medication list    OZ BaileyN, RN  [facility]

## 2021-07-18 DIAGNOSIS — F41.9 ANXIETY: ICD-10-CM

## 2021-07-19 RX ORDER — SERTRALINE HYDROCHLORIDE 100 MG/1
TABLET, FILM COATED ORAL
Qty: 15 TABLET | Refills: 0 | OUTPATIENT
Start: 2021-07-19

## 2021-08-20 ENCOUNTER — LAB (OUTPATIENT)
Dept: LAB | Facility: CLINIC | Age: 66
End: 2021-08-20
Payer: COMMERCIAL

## 2021-08-20 ENCOUNTER — ALLIED HEALTH/NURSE VISIT (OUTPATIENT)
Dept: FAMILY MEDICINE | Facility: CLINIC | Age: 66
End: 2021-08-20
Payer: COMMERCIAL

## 2021-08-20 ENCOUNTER — OFFICE VISIT (OUTPATIENT)
Dept: PHARMACY | Facility: CLINIC | Age: 66
End: 2021-08-20
Payer: COMMERCIAL

## 2021-08-20 VITALS — DIASTOLIC BLOOD PRESSURE: 68 MMHG | SYSTOLIC BLOOD PRESSURE: 108 MMHG

## 2021-08-20 DIAGNOSIS — G47.00 PERSISTENT INSOMNIA: ICD-10-CM

## 2021-08-20 DIAGNOSIS — E78.5 HYPERLIPIDEMIA LDL GOAL <100: ICD-10-CM

## 2021-08-20 DIAGNOSIS — I10 BENIGN ESSENTIAL HYPERTENSION: ICD-10-CM

## 2021-08-20 DIAGNOSIS — Z92.29 UP-TO-DATE WITH IMMUNIZATIONS: Primary | ICD-10-CM

## 2021-08-20 DIAGNOSIS — F32.A DEPRESSION, UNSPECIFIED DEPRESSION TYPE: ICD-10-CM

## 2021-08-20 LAB
CHOLEST SERPL-MCNC: 212 MG/DL
FASTING STATUS PATIENT QL REPORTED: YES
HBA1C MFR BLD: 7.2 % (ref 0–5.6)
HDLC SERPL-MCNC: 44 MG/DL
LDLC SERPL CALC-MCNC: 118 MG/DL
NONHDLC SERPL-MCNC: 168 MG/DL
TRIGL SERPL-MCNC: 250 MG/DL

## 2021-08-20 PROCEDURE — 99606 MTMS BY PHARM EST 15 MIN: CPT | Performed by: PHARMACIST

## 2021-08-20 PROCEDURE — 83036 HEMOGLOBIN GLYCOSYLATED A1C: CPT

## 2021-08-20 PROCEDURE — 36415 COLL VENOUS BLD VENIPUNCTURE: CPT

## 2021-08-20 PROCEDURE — 99207 PR NO CHARGE NURSE ONLY: CPT

## 2021-08-20 PROCEDURE — 99607 MTMS BY PHARM ADDL 15 MIN: CPT | Performed by: PHARMACIST

## 2021-08-20 PROCEDURE — 91301 PR COVID VAC MODERNA 100 MCG/0.5 ML IM: CPT

## 2021-08-20 PROCEDURE — 80061 LIPID PANEL: CPT

## 2021-08-20 PROCEDURE — 0011A PR COVID VAC MODERNA 100 MCG/0.5 ML IM: CPT

## 2021-08-20 RX ORDER — FLASH GLUCOSE SENSOR
1 KIT MISCELLANEOUS
Qty: 2 EACH | Refills: 11 | Status: SHIPPED | OUTPATIENT
Start: 2021-08-20 | End: 2022-07-01

## 2021-08-20 RX ORDER — ROSUVASTATIN CALCIUM 20 MG/1
20 TABLET, COATED ORAL DAILY
Qty: 90 TABLET | Refills: 1 | Status: SHIPPED | OUTPATIENT
Start: 2021-08-20 | End: 2022-03-29

## 2021-08-20 RX ORDER — LOSARTAN POTASSIUM 25 MG/1
25 TABLET ORAL DAILY
Qty: 90 TABLET | Refills: 1 | Status: SHIPPED | OUTPATIENT
Start: 2021-08-20 | End: 2022-07-01

## 2021-08-20 RX ORDER — QUETIAPINE FUMARATE 25 MG/1
25-50 TABLET, FILM COATED ORAL
Qty: 180 TABLET | Refills: 1 | Status: SHIPPED | OUTPATIENT
Start: 2021-08-20 | End: 2021-10-27

## 2021-08-20 NOTE — PROGRESS NOTES
Medication Therapy Management (MTM) Encounter    ASSESSMENT:                            Medication Adherence/Access: No issues identified now, stressed importance of adherence to medications.    Type 2 Diabetes: Patient is not meeting A1c goal of < 7% but very close and much improved. Self monitoring of blood glucose is mostly at goal of fasting  mg/dL and post prandial < 180 mg/dL. No changes today. Ordered Freestyle Hugo CGM.     Hypertension: Due to hypotension, recommend decreasing losartan dose.     Hyperlipidemia: rechecked lipid panel today    Depression/Insomnia:  stable  PLAN:                            1. Decrease losartan to 25 mg daily.    Follow-up: Return in about 6 months (around 2022) for Medication Therapy Management Pharmacist, Lab Work.      SUBJECTIVE/OBJECTIVE:                          Gerardo Gonzalez is a 66 year old male coming in for a follow-up visit. He was referred to me from Raisa Calvillo PA-C.  Today's visit is a follow-up MTM visit from .     Reason for visit: A1c today.    Tobacco: He reports that he has quit smoking. He smoked 0.00 packs per day for 4.00 years. He has never used smokeless tobacco.  Alcohol: not currently using    Medication Adherence/Access: no issues reported now, did report stopped all his medications last month for 2 weeks b/c he was tired of taking all his pills but felt so tired so has restarted everything and states he'll remain on his medications now    Type 2 Diabetes:  Currently taking Trulicity 4.5mg weekly, Basaglar 32 units daily, and metformin XR 1000mg twice daily. Patient is not experiencing side effects. Diarrhea from metformin in the past but resolved now. Tired of checking blood glucose, interested in CGM.   Blood sugar monitorin-3 time(s) daily. Ranges (patient reported): fasting 130's, post-prandial 157-220's  Symptoms of low blood sugar? none  Symptoms of high blood sugar? Tingling, numbness, polyphagia   Eye exam: due  Foot  exam: up to date  Diet/Exercise: Really has watched what he eats. Doesn't finish meals, eats half a sandwich. Diet curbed.   Aspirin: Taking 81mg daily and denies side effects  Statin: Yes: simvastatin   ACEi/ARB: Yes: losartan.   Urine Albumin:   Lab Results   Component Value Date    UMALCR 205.47 (H) 02/08/2021     Lab Results   Component Value Date    A1C 7.2 08/20/2021    A1C 7.6 05/13/2021    A1C 15.0 02/08/2021    A1C 12.7 07/29/2020    A1C 14.7 11/06/2019    A1C 8.2 03/21/2019       Hypertension: Current medications include losartan 50mg daily. Patient reports dizziness.   BP Readings from Last 3 Encounters:   08/20/21 108/68   07/05/21 120/70   05/26/21 112/80       Hyperlipidemia: Current therapy includes simvastatin 40mg daily.  Patient reports no significant myalgias or other side effects.  Recent Labs   Lab Test 02/08/21  1030 11/06/19  1555 07/06/15  0838 07/06/15  0838 12/03/13  1214   CHOL 246* 248*   < > 201* 141   HDL 39* 413   < > 46 52   * Cannot estimate LDL when triglyceride exceeds 400 mg/dL  149*   < > 85 63   TRIG 391* 522*   < > 350* 129   CHOLHDLRATIO  --   --   --  4.4 2.7    < > = values in this interval not displayed.       Depression/Insomnia:  Current medications include: Duloxetine 60mg once daily and quetiapine 25-50 mg bedtime. Pt reports that depression symptoms are unchanged and stable. Side effects of ED.   PHQ 7/29/2020 2/8/2021 5/13/2021   PHQ-9 Total Score 23 16 15   Q9: Thoughts of better off dead/self-harm past 2 weeks Not at all Not at all Not at all   F/U: Thoughts of suicide or self-harm - - -   F/U: Safety concerns - - -       Neuropathy: Patient taking gabapentin 800mg AM, 800mg noon, and 1600mg bedtime. No issues reported today, neuropathy does travel sometimes. Sometimes in hands and legs.     Today's Vitals: /68   ----------------      I spent 30 minutes with this patient today. All changes were made via collaborative practice agreement with Raisa SCHMIDT  KAYE Calvillo. A copy of the visit note was provided to the patient's primary care provider.    The patient was given a summary of these recommendations.     Barbara Kc, PharmD  Medication Therapy Management Provider, [facility]  Pager: [phone]     Medication Therapy Recommendations  Benign essential hypertension    Current Medication: losartan (COZAAR) 50 MG tablet (Discontinued)   Rationale: Dose too high - Dosage too high - Safety   Recommendation: Decrease Dose   Status: Accepted per CPA

## 2021-08-20 NOTE — PATIENT INSTRUCTIONS
"Recommendations from today's MTM visit:                                                       1. Decrease losartan to 25 mg daily.   2. Learn how use your sensors by watching these videos: https://www.[employer]/us-en/support.html  3. Freestyle Hugo - pay no more than $75/month for your sensors - Ph# [phone].   4. How to share your data with me: Go to the Menu, then \"Connected Apps\" then \"[employer]View\" then choose \"connect to a practice.\" In the Enter practice ID, you can enter this number to connect with me: [id number] and connect.      Follow-up: Return in about 6 months (around 2/20/2022) for Medication Therapy Management Pharmacist, Lab Work.    It was great to speak with you today.  I value your experience and would be very thankful for your time with providing feedback on our clinic survey. You may receive a survey via email or text message in the next few days.     To schedule another MTM appointment, please call the clinic directly or you may call the MTM scheduling line at [phone] or toll-free at [phone].     My Clinical Pharmacist's contact information:                                                      Please feel free to contact me with any questions or concerns you have.      Barbara Kc, PharmD  Medication Therapy Management Provider, [facility]  "

## 2021-08-27 DIAGNOSIS — F41.9 ANXIETY: ICD-10-CM

## 2021-08-27 DIAGNOSIS — G47.00 PERSISTENT INSOMNIA: ICD-10-CM

## 2021-08-29 RX ORDER — GABAPENTIN 800 MG/1
TABLET ORAL
Qty: 360 TABLET | Refills: 1 | Status: SHIPPED | OUTPATIENT
Start: 2021-08-29 | End: 2022-07-01

## 2021-09-20 ENCOUNTER — DOCUMENTATION ONLY (OUTPATIENT)
Dept: LAB | Facility: CLINIC | Age: 66
End: 2021-09-20

## 2021-09-20 ENCOUNTER — OFFICE VISIT (OUTPATIENT)
Dept: PHARMACY | Facility: CLINIC | Age: 66
End: 2021-09-20
Payer: COMMERCIAL

## 2021-09-20 ENCOUNTER — IMMUNIZATION (OUTPATIENT)
Dept: FAMILY MEDICINE | Facility: CLINIC | Age: 66
End: 2021-09-20
Attending: FAMILY MEDICINE
Payer: COMMERCIAL

## 2021-09-20 VITALS — DIASTOLIC BLOOD PRESSURE: 72 MMHG | SYSTOLIC BLOOD PRESSURE: 134 MMHG

## 2021-09-20 DIAGNOSIS — I10 BENIGN ESSENTIAL HYPERTENSION: ICD-10-CM

## 2021-09-20 DIAGNOSIS — E78.5 HYPERLIPIDEMIA LDL GOAL <100: ICD-10-CM

## 2021-09-20 DIAGNOSIS — N40.0 BPH (BENIGN PROSTATIC HYPERPLASIA): Primary | ICD-10-CM

## 2021-09-20 PROCEDURE — 99607 MTMS BY PHARM ADDL 15 MIN: CPT | Performed by: PHARMACIST

## 2021-09-20 PROCEDURE — 91301 PR COVID VAC MODERNA 100 MCG/0.5 ML IM: CPT

## 2021-09-20 PROCEDURE — 0012A PR COVID VAC MODERNA 100 MCG/0.5 ML IM: CPT

## 2021-09-20 PROCEDURE — 99606 MTMS BY PHARM EST 15 MIN: CPT | Performed by: PHARMACIST

## 2021-09-20 RX ORDER — FLASH GLUCOSE SCANNING READER
1 EACH MISCELLANEOUS ONCE
Qty: 1 EACH | Refills: 0 | Status: SHIPPED | OUTPATIENT
Start: 2021-09-20 | End: 2021-09-20

## 2021-09-20 NOTE — PATIENT INSTRUCTIONS
Recommendations from today's MTM visit:                                                       1. No changes, scan your new sensor at least 4x daily.     Follow-up: Return in about 5 months (around 2/20/2022) for Medication Therapy Management Pharmacist, Lab Work.    It was great to speak with you today.  I value your experience and would be very thankful for your time with providing feedback on our clinic survey. You may receive a survey via email or text message in the next few days.     To schedule another MTM appointment, please call the clinic directly or you may call the MTM scheduling line at [phone] or toll-free at [phone].     My Clinical Pharmacist's contact information:                                                      Please feel free to contact me with any questions or concerns you have.      Barbara Kc, PharmD  Medication Therapy Management Provider, [facility]

## 2021-09-20 NOTE — PROGRESS NOTES
Medication Therapy Management (MTM) Encounter    ASSESSMENT:                            Medication Adherence/Access: No issues identified now, stressed importance of adherence to medications.    Type 2 Diabetes: Patient is not meeting A1c goal of < 7% but very close and much improved. CGM sensor application today.     Hypertension: stable    Hyperlipidemia: Stable, on rosuvastatin now. Recheck lipid panel in 3-6 months.     PLAN:                            1. No changes. Education on CGM sensor application and scan at least 4x daily.     Follow-up: Return in about 5 months (around 2/20/2022) for Medication Therapy Management Pharmacist, Lab Work.      SUBJECTIVE/OBJECTIVE:                          Gerardo Gonzalez is a 66 year old male coming in for a follow-up visit. He was referred to me from Raisa Calvillo PA-C.  Today's visit is a follow-up MTM visit from 8/20.     Reason for visit: CGM sensor education.    Tobacco: He reports that he has quit smoking. He smoked 0.00 packs per day for 4.00 years. He has never used smokeless tobacco.  Alcohol: not currently using    Medication Adherence/Access: no issues reported now    Type 2 Diabetes:  Currently taking Trulicity 4.5mg weekly, Basaglar 32 units daily, and metformin XR 1000mg twice daily. Patient is not experiencing side effects. Diarrhea from metformin in the past but resolved now. Wondering how to put on CGM sensor.   Blood sugar monitoring: CGM application today  Symptoms of low blood sugar? none  Symptoms of high blood sugar? Tingling, numbness, polyphagia   Eye exam: due  Foot exam: up to date  Diet/Exercise: Really has watched what he eats. Doesn't finish meals, eats half a sandwich. Diet curbed. No changes.   Aspirin: Taking 81mg daily and denies side effects  Statin: Yes: simvastatin   ACEi/ARB: Yes: losartan.   Urine Albumin:   Lab Results   Component Value Date    UMALCR 205.47 (H) 02/08/2021     Lab Results   Component Value Date    A1C 7.2 08/20/2021     A1C 7.6 05/13/2021    A1C 15.0 02/08/2021    A1C 12.7 07/29/2020    A1C 14.7 11/06/2019    A1C 8.2 03/21/2019       Hypertension: Current medications include losartan 25 mg daily. Patient reports no issues.   BP Readings from Last 3 Encounters:   09/20/21 134/72   08/20/21 108/68   07/05/21 120/70       Hyperlipidemia: Current therapy includes rosuvastatin 20mg daily (switched from simvastatin).  Patient reports no significant myalgias or other side effects.  The 10-year ASCVD risk score (Kaydenraghavendra BERNARD Jr., et al., 2013) is: 29.6%    Values used to calculate the score:      Age: 66 years      Sex: Male      Is Non- : No      Diabetic: Yes      Tobacco smoker: No      Systolic Blood Pressure: 134 mmHg      Is BP treated: Yes      HDL Cholesterol: 44 mg/dL      Total Cholesterol: 212 mg/dL  Recent Labs   Lab Test 08/20/21  0849 02/08/21  1030 07/14/16  1547 07/06/15  0838 12/03/13  1214 12/03/13  1214   CHOL 212* 246*   < > 201*   < > 141   HDL 44 39*   < > 46   < > 52   * 129*   < > 85   < > 63   TRIG 250* 391*   < > 350*   < > 129   CHOLHDLRATIO  --   --   --  4.4  --  2.7    < > = values in this interval not displayed.       Today's Vitals: /72   ----------------      I spent 30 minutes with this patient today. All changes were made via collaborative practice agreement with Raisa Calvillo PA-C. A copy of the visit note was provided to the patient's primary care provider.    The patient was given a summary of these recommendations.     Barbara Kc, PharmD  Medication Therapy Management Provider, [facility]  Pager: [phone]     Medication Therapy Recommendations  No medication therapy recommendations to display

## 2021-09-25 ENCOUNTER — HEALTH MAINTENANCE LETTER (OUTPATIENT)
Age: 66
End: 2021-09-25

## 2021-09-28 ENCOUNTER — APPOINTMENT (OUTPATIENT)
Dept: GENERAL RADIOLOGY | Facility: CLINIC | Age: 66
End: 2021-09-28
Attending: EMERGENCY MEDICINE
Payer: COMMERCIAL

## 2021-09-28 ENCOUNTER — HOSPITAL ENCOUNTER (EMERGENCY)
Facility: CLINIC | Age: 66
Discharge: HOME | End: 2021-09-28
Attending: EMERGENCY MEDICINE | Admitting: EMERGENCY MEDICINE
Payer: COMMERCIAL

## 2021-09-28 VITALS
TEMPERATURE: 98.2 F | HEIGHT: 67 IN | OXYGEN SATURATION: 97 % | HEART RATE: 98 BPM | DIASTOLIC BLOOD PRESSURE: 89 MMHG | BODY MASS INDEX: 24.48 KG/M2 | SYSTOLIC BLOOD PRESSURE: 133 MMHG | RESPIRATION RATE: 18 BRPM | WEIGHT: 156 LBS

## 2021-09-28 DIAGNOSIS — T18.5XXA RECTAL FOREIGN BODY, INITIAL ENCOUNTER: ICD-10-CM

## 2021-09-28 DIAGNOSIS — R33.9 URINARY RETENTION: ICD-10-CM

## 2021-09-28 LAB
ALBUMIN UR-MCNC: 10 MG/DL
ANION GAP SERPL CALCULATED.3IONS-SCNC: 9 MMOL/L (ref 3–14)
APPEARANCE UR: CLEAR
BACTERIA #/AREA URNS HPF: ABNORMAL /HPF
BASOPHILS # BLD AUTO: 0.1 10E3/UL (ref 0–0.2)
BASOPHILS NFR BLD AUTO: 1 %
BILIRUB UR QL STRIP: NEGATIVE
BUN SERPL-MCNC: 14 MG/DL (ref 7–30)
CALCIUM SERPL-MCNC: 9.4 MG/DL (ref 8.5–10.1)
CHLORIDE BLD-SCNC: 105 MMOL/L (ref 94–109)
CO2 SERPL-SCNC: 23 MMOL/L (ref 20–32)
COLOR UR AUTO: ABNORMAL
CREAT SERPL-MCNC: 1.18 MG/DL (ref 0.66–1.25)
EOSINOPHIL # BLD AUTO: 0.1 10E3/UL (ref 0–0.7)
EOSINOPHIL NFR BLD AUTO: 1 %
ERYTHROCYTE [DISTWIDTH] IN BLOOD BY AUTOMATED COUNT: 12.8 % (ref 10–15)
GFR SERPL CREATININE-BSD FRML MDRD: 65 ML/MIN/1.73M2
GLUCOSE BLD-MCNC: 167 MG/DL (ref 70–99)
GLUCOSE UR STRIP-MCNC: 150 MG/DL
HCT VFR BLD AUTO: 39 % (ref 40–53)
HGB BLD-MCNC: 13.7 G/DL (ref 13.3–17.7)
HGB UR QL STRIP: NEGATIVE
HYALINE CASTS: 1 /LPF
IMM GRANULOCYTES # BLD: 0.1 10E3/UL
IMM GRANULOCYTES NFR BLD: 1 %
KETONES UR STRIP-MCNC: NEGATIVE MG/DL
LEUKOCYTE ESTERASE UR QL STRIP: NEGATIVE
LYMPHOCYTES # BLD AUTO: 1.5 10E3/UL (ref 0.8–5.3)
LYMPHOCYTES NFR BLD AUTO: 10 %
MCH RBC QN AUTO: 29.8 PG (ref 26.5–33)
MCHC RBC AUTO-ENTMCNC: 35.1 G/DL (ref 31.5–36.5)
MCV RBC AUTO: 85 FL (ref 78–100)
MONOCYTES # BLD AUTO: 1 10E3/UL (ref 0–1.3)
MONOCYTES NFR BLD AUTO: 6 %
NEUTROPHILS # BLD AUTO: 12.4 10E3/UL (ref 1.6–8.3)
NEUTROPHILS NFR BLD AUTO: 81 %
NITRATE UR QL: NEGATIVE
NRBC # BLD AUTO: 0 10E3/UL
NRBC BLD AUTO-RTO: 0 /100
PH UR STRIP: 5.5 [PH] (ref 5–7)
PLATELET # BLD AUTO: 262 10E3/UL (ref 150–450)
POTASSIUM BLD-SCNC: 4 MMOL/L (ref 3.4–5.3)
RBC # BLD AUTO: 4.6 10E6/UL (ref 4.4–5.9)
RBC URINE: 1 /HPF
SODIUM SERPL-SCNC: 137 MMOL/L (ref 133–144)
SP GR UR STRIP: 1.01 (ref 1–1.03)
UROBILINOGEN UR STRIP-MCNC: NORMAL MG/DL
WBC # BLD AUTO: 15.1 10E3/UL (ref 4–11)
WBC URINE: 2 /HPF

## 2021-09-28 PROCEDURE — 93005 ELECTROCARDIOGRAM TRACING: CPT

## 2021-09-28 PROCEDURE — 10120 INC&RMVL FB SUBQ TISS SMPL: CPT

## 2021-09-28 PROCEDURE — 99285 EMERGENCY DEPT VISIT HI MDM: CPT | Mod: 25

## 2021-09-28 PROCEDURE — 74019 RADEX ABDOMEN 2 VIEWS: CPT

## 2021-09-28 PROCEDURE — 81001 URINALYSIS AUTO W/SCOPE: CPT | Performed by: EMERGENCY MEDICINE

## 2021-09-28 PROCEDURE — 51702 INSERT TEMP BLADDER CATH: CPT

## 2021-09-28 PROCEDURE — 80048 BASIC METABOLIC PNL TOTAL CA: CPT | Performed by: EMERGENCY MEDICINE

## 2021-09-28 PROCEDURE — 85025 COMPLETE CBC W/AUTO DIFF WBC: CPT | Performed by: EMERGENCY MEDICINE

## 2021-09-28 PROCEDURE — 999N000055 HC STATISTIC END TITIAL CO2 MONITORING

## 2021-09-28 PROCEDURE — 99152 MOD SED SAME PHYS/QHP 5/>YRS: CPT

## 2021-09-28 PROCEDURE — 250N000011 HC RX IP 250 OP 636: Performed by: EMERGENCY MEDICINE

## 2021-09-28 PROCEDURE — 36415 COLL VENOUS BLD VENIPUNCTURE: CPT | Performed by: EMERGENCY MEDICINE

## 2021-09-28 RX ORDER — PROPOFOL 10 MG/ML
INJECTION, EMULSION INTRAVENOUS DAILY PRN
Status: COMPLETED | OUTPATIENT
Start: 2021-09-28 | End: 2021-09-28

## 2021-09-28 RX ORDER — PROPOFOL 10 MG/ML
80 INJECTION, EMULSION INTRAVENOUS ONCE
Status: DISCONTINUED | OUTPATIENT
Start: 2021-09-28 | End: 2021-09-29 | Stop reason: HOSPADM

## 2021-09-28 RX ORDER — SENNA AND DOCUSATE SODIUM 50; 8.6 MG/1; MG/1
1 TABLET, FILM COATED ORAL AT BEDTIME
Qty: 30 TABLET | Refills: 0 | Status: SHIPPED | OUTPATIENT
Start: 2021-09-28 | End: 2022-08-08

## 2021-09-28 RX ADMIN — PROPOFOL 70 MG: 10 INJECTION, EMULSION INTRAVENOUS at 22:28

## 2021-09-28 RX ADMIN — PROPOFOL 30 MG: 10 INJECTION, EMULSION INTRAVENOUS at 22:30

## 2021-09-28 ASSESSMENT — MIFFLIN-ST. JEOR: SCORE: 1456.24

## 2021-09-28 ASSESSMENT — ENCOUNTER SYMPTOMS
CONSTIPATION: 1
DIFFICULTY URINATING: 1

## 2021-09-29 NOTE — ED NOTES
First contact with patient post sedation. Patient tolerated water PO. Patient denies any pain, shortness of breath. Vital signs stable. Respirations even/unlabored.

## 2021-09-29 NOTE — ED PROVIDER NOTES
History   Chief Complaint:  Urinary Retention and Constipation       HPI   Gerardo Gonzalez is a 66 year old male with history of psoriatic arthritis, type 2 diabetes, urinary retention, hyperglycemia, hyperlipidemia, hypertension and anxiety who presents with constipation and inability to urinate since last night. Denies known prostate problems.  Patient reports he believes that there may be a foreign body in his rectum.  He is unsure how this occurred.  He notes significant pressure and fullness.  Denies any abdominal pain    Review of Systems   Gastrointestinal: Positive for constipation.   Genitourinary: Positive for difficulty urinating.   All other systems reviewed and are negative.    Allergies:  Ace Inhibitors  Fentanyl  Lipitor [Atorvastatin Calcium]    Medications:  Trulicity  Cymbalta  Proscar  Neurontin  Basaglar Kwikpen  Cozaar  Mobic  Glucophage XR  Seroquel  Crestor  Cialis  Flomax  Humira    Past Medical History:    Psoriatic arthropathy  Diabetes mellitus, type 2  Urinary retention  Hyperglycemia  Foreign body of anus or rectum  Persistent insomnia  Erectile dysfunction  Hyponatremia  Myalgia  Psoriasis  Anxiety  Hyperlipidemia  Psoriatic arthritis  Hypertension     Past Surgical History:    Cystoscopy  Exam under anesthesia, rectum  Arthroscopy knee, left    Family History:    Mother: diabetes  Father: diabetes  Brother: hypertension    Social History:  The patient presents to the ED alone    Physical Exam     Patient Vitals for the past 24 hrs:   BP Temp Temp src Pulse Resp SpO2 Height Weight   09/28/21 2300 -- -- -- 98 -- 97 % -- --   09/28/21 2246 -- -- -- 97 18 98 % -- --   09/28/21 2245 133/89 -- -- 98 -- 96 % -- --   09/28/21 2243 118/74 -- -- 97 16 -- -- --   09/28/21 2240 114/74 -- -- 101 12 95 % -- --   09/28/21 2236 -- -- -- 102 16 -- -- --   09/28/21 2234 106/68 -- -- 99 16 95 % -- --   09/28/21 2231 108/73 -- -- 100 12 94 % -- --   09/28/21 2230 108/73 -- -- 107 -- 99 % -- --   09/28/21  "2229 -- -- -- -- 16 -- -- --   09/28/21 2229 -- -- -- 106 16 99 % -- --   09/28/21 2217 137/84 98.2  F (36.8  C) Oral 103 18 99 % -- --   09/28/21 2215 137/84 -- -- 108 -- 100 % 1.702 m (5' 7\") 70.8 kg (156 lb)   09/28/21 2200 129/83 -- -- 106 -- 100 % -- --   09/28/21 2145 -- -- -- -- -- 93 % -- --   09/28/21 2130 124/81 -- -- 105 -- 95 % -- --   09/28/21 2017 125/86 98  F (36.7  C) -- 116 16 99 % -- --       Physical Exam  General: Patient is alert, awake and interactive when I enter the room  Head: The scalp, face, and head appear normal  Eyes: Conjunctivae are normal  ENT: The nose is normal, Pinnae are normal, External acoustic canals are normal  Neck: Trachea midline  CV: Pulses are normal.   GI: no abdominal tenderness   Resp: No respiratory distress   Musc: Normal muscular tone, moving all extremities.  Skin: No rash or lesions noted  Neuro: Speech is normal and fluent. Face is symmetric.   Psych: Normal affect.  Appropriate interactions.      Emergency Department Course     Imaging:  XR Abdomen 2 Views:  An electronic foreign body is noted within the rectum, measuring approximately 14.7 x 3.2 cm. No convincing radiographic evidence for bowel obstruction. There is a moderate amount of gas and stool within the ascending and transverse colon. No    free intraperitoneal air is identified.  As per radiology.    Laboratory:  CBC: WBC 15.1(H), HGB 13.7,      BMP: glucose: 167(H) o/w WNL (Creatinine 1.18)     UA with microscopic: glucose: 150(A), protein albumin: 10(A), bacteria: few(A) o/w WNL     [facility]    Procedure: Sedation    Date/Time: 9/28/2021 10:41 PM  Performed by: Christiano Hancock MD  Authorized by: Christiano Hancock MD     UNIVERSAL PROTOCOL   Site Marked: Yes  Prior Images Obtained and Reviewed:  Yes  Required items: Required blood products, implants, devices and special equipment available    Patient identity confirmed:  Verbally with patient, " arm band and hospital-assigned identification number  Patient was reevaluated immediately before administering moderate or deep sedation or anesthesia  Confirmation Checklist:  Patient's identity using two indicators, relevant allergies, procedure was appropriate and matched the consent or emergent situation and correct equipment/implants were available  Time out: Immediately prior to the procedure a time out was called    Universal Protocol: the Joint Commission Universal Protocol was followed    Preparation: Patient was prepped and draped in usual sterile fashion    ESBL (mL):  5        SEDATION    Patient Sedated: Yes    Sedation Type:  Moderate (conscious) sedation  Sedation:  See MAR for details and propofol  Vital signs: Vital signs monitored during sedation    PROCEDURE   Patient Tolerance:  Patient tolerated the procedure well with no immediate complications  Describe Procedure: Sedation was maintained until the procedure was complete. The patient was monitored by staff until recovered.  Length of time physician/provider present for 1:1 monitoring during sedation: 10       Foreign Body Removal     LOCATION:  rectum    ANESTHESIA:  Procedural sedation with propofol.    PROCEDURE: Rectal exam was performed under anesthesia.  I am able to palpate the foreign object approximately 2-3 inches within the rectum.  A significant amount of lubricant was applied and a small pelvic speculum was utilized for improved visualization.  Dr. Millan assisted in grasping the object with a forceps and we were able to remove a sizable mechanical vibrator.  Patient tolerated well, no immediate complications.    Emergency Department Course:    Reviewed:  I reviewed nursing notes, vitals, past medical history and care everywhere    Assessments:  2131 I obtained history and examined the patient as noted above.   2141 The patient signed the procedural consent form. All questions were answered.   2229 I rechecked the patient and  performed procedure as above.     Interventions:  2228 Diprivan 70mg IV  2230 Diprivan 30mg IV  2230 Diprivan 30mg IV  2309 Diprivan 80mg IV    Disposition:  The patient was discharged to home.       Impression & Plan     Medical Decision Making:  Patient is a 66-year-old gentleman who presents to the emergency department with urinary retention and rectal foreign body.  Patient reports that he has been having difficulty urinating for the past 24 hours.  Also notes a significant fullness sensation within his rectum.  X-ray reveals rectal foreign body.  Even after numerous interviews and evaluation the patient is not forthcoming on how this became lodged in his rectum.  Avila catheter was established due to ongoing urinary retention.  After discussing the risks and benefits we elected to proceed with a exam under anesthesia and likely foreign body removal.  I discussed risks, benefits and procedure with the patient.  He is amenable and we proceeded.  Details of sedation and procedure can be seen above.  We successfully removed a large mechanical vibrator without immediate complication.  Patient recovered from his sedation well and on reevaluation did not have any significant abdominal pain and feels significantly improved.  We will leave the Avila catheter in place and have him follow-up with urology and return to the emergency department with any new or worsening symptoms.    Diagnosis:    ICD-10-CM    1. Rectal foreign body, initial encounter  T18.5XXA    2. Urinary retention  R33.9        Discharge Medications:  Discharge Medication List as of 9/28/2021 11:22 PM      START taking these medications    Details   SENNA-docusate sodium (SENNA S) 8.6-50 MG tablet Take 1 tablet by mouth At Bedtime, Disp-30 tablet, R-0, Local Print             Scribe Disclosure:  I, Vilma Morel, am serving as a scribe at 9:31 PM on 9/28/2021 to document services personally performed by Christiano Hancock MD based on my  observations and the provider's statements to me.          Christiano Hancock MD  09/29/21 5928

## 2021-09-29 NOTE — PROGRESS NOTES
Placed pt on 2 Lpm NC and ETCO2 for conscious  Sedation. Pt's ETCO2 ranged from 35 to 20. Increased Fio2 to 4lpm. Pt is alert and awake. Weaned Fio2 back to 2LPM nc. Pt tolerated well.    Ghislaine Lerma, RT

## 2021-09-29 NOTE — ED TRIAGE NOTES
Pt presents with constipation and unable to urinate since last night, denies known prostate problems. Pt alert, oriented x3 ABCs intact

## 2021-10-20 ENCOUNTER — TELEPHONE (OUTPATIENT)
Dept: FAMILY MEDICINE | Facility: CLINIC | Age: 66
End: 2021-10-20

## 2021-10-20 DIAGNOSIS — F41.9 ANXIETY: ICD-10-CM

## 2021-10-20 DIAGNOSIS — G47.00 PERSISTENT INSOMNIA: ICD-10-CM

## 2021-10-20 NOTE — CONFIDENTIAL NOTE
Spoke to patient. Informed he has refills left for CGM sensors. Too soon for 3rd covid vaccine. He is due for flu shot and scheduled for flu vaccine tomorrow.    Barbara Kc, PharmD  Medication Therapy Management Provider, [facility]  Pager: [phone]

## 2021-10-20 NOTE — TELEPHONE ENCOUNTER
General Call:     Who is calling:  Gerardo - asking to speak with Barbara Kc - please return his call    Reason for Call:  1. Patient requests a refill of diabetic sensors.                                 2. Patient wonders if he should get flu or covid booster shot           janeen to leave a detailed message:Yes at Cell number on file:    Telephone Information:   Mobile [phone]

## 2021-10-21 ENCOUNTER — ALLIED HEALTH/NURSE VISIT (OUTPATIENT)
Dept: FAMILY MEDICINE | Facility: CLINIC | Age: 66
End: 2021-10-21
Payer: COMMERCIAL

## 2021-10-21 DIAGNOSIS — Z23 FLU VACCINE NEED: Primary | ICD-10-CM

## 2021-10-21 PROCEDURE — 90471 IMMUNIZATION ADMIN: CPT

## 2021-10-21 PROCEDURE — 90682 RIV4 VACC RECOMBINANT DNA IM: CPT

## 2021-10-21 PROCEDURE — 99207 PR NO CHARGE NURSE ONLY: CPT

## 2021-10-21 RX ORDER — SERTRALINE HYDROCHLORIDE 100 MG/1
TABLET, FILM COATED ORAL
Qty: 15 TABLET | Refills: 0 | OUTPATIENT
Start: 2021-10-21

## 2021-10-21 NOTE — TELEPHONE ENCOUNTER
Attempt #1 to reach patient regarding message below. Left voicemail to return phone call to clinic.       OZ BaileyN, RN  [facility]

## 2021-10-21 NOTE — TELEPHONE ENCOUNTER
In review Zoloft was stopped. Please call patient to clarify if he is still taking it.    Also prescription for glargine states 42 units but our documentation states 32 units. I cannot see when or where it was changed or if this is a mistake. Please confirm with patient how he is taking the glargine.

## 2021-10-21 NOTE — TELEPHONE ENCOUNTER
Routing refill request to provider for review/approval because:  Drug not active on patient's medication list    The original prescription of Sertraline was discontinued on 2/8/2021 by Catia Bull PA-C for the following reason: Alternate therapy. Renewing this prescription may not be appropriate.    OZ BaileyN, RN  [facility]

## 2021-10-22 NOTE — TELEPHONE ENCOUNTER
meloxicam (MOBIC) 15 MG tablet 90 tablet 0 7/6/2021  No   Sig: TAKE 1 TABLET BY MOUTH EVERY DAY   Sent to pharmacy as: Meloxicam 15 MG Oral Tablet (MOBIC)   Class: E-Prescribe   Order: [id number]   E-Prescribing Status: Receipt confirmed by pharmacy (7/6/2021 11:13 AM CDT)       Printout Tracking    External Result Report     Pharmacy    [facility]/PHARMACY #[id number] - [place], [place] - [address]     Meloxicam     Dispensed Days Supply Quantity Provider Pharmacy   MELOXICAM 15 MG TABLET 07/04/2021 90 90 Units YOLANDA RAI [facility]/pharmacy #[id number] - A...   MELOXICAM 15 MG TABLET 04/27/2021 90 90 Units LIYAH BANDA [facility]/pharmacy #[id number] - A...   MELOXICAM 15 MG TABLET 02/08/2021 90 90 Units YOLANDA RAI [facility]/pharmacy #1995 - A...        Pharmacy faxing, seeking refill of meloxicam.  Per pharmacy & dispensing report, last filled 7-4-2021 #90.  Last Rx was 7-6-2021.    7-6-2021 Rx should be in profile for patient.    Fax sent back to pharmacy, check records for Rx.    RT Xiomara (R)  [facility]

## 2021-10-25 RX ORDER — DULOXETIN HYDROCHLORIDE 60 MG/1
CAPSULE, DELAYED RELEASE ORAL
Qty: 90 CAPSULE | Refills: 1 | OUTPATIENT
Start: 2021-10-25

## 2021-10-25 NOTE — TELEPHONE ENCOUNTER
This request is also for Duloxetine.  Should however have enough til 12/22/21.  Note to pharmacy.  Called patient.  L/M to call.  Need to clarify if taking Sertraline still and dose of glargine.  Bozena Francisco RN

## 2021-10-26 NOTE — TELEPHONE ENCOUNTER
Pt is taking Glargine 32 units daily  Pt is not taking Sertraline.    Pt is ok with Duloxetine.  Pt would also like a refill of Sergwynquleticia Gu. RN, BSN, PAL (Patient Advocate Liaison)  [facility]   [phone]

## 2021-10-27 RX ORDER — QUETIAPINE FUMARATE 25 MG/1
25-50 TABLET, FILM COATED ORAL
Qty: 180 TABLET | Refills: 1 | Status: SHIPPED | OUTPATIENT
Start: 2021-10-27 | End: 2022-05-17

## 2021-10-27 RX ORDER — INSULIN GLARGINE 100 [IU]/ML
32 INJECTION, SOLUTION SUBCUTANEOUS AT BEDTIME
Qty: 9 ML | Refills: 1 | Status: SHIPPED | OUTPATIENT
Start: 2021-10-27 | End: 2022-02-04

## 2021-11-05 RX ORDER — INSULIN GLARGINE 100 [IU]/ML
INJECTION, SOLUTION SUBCUTANEOUS
Refills: 1 | OUTPATIENT
Start: 2021-11-05

## 2021-11-05 NOTE — TELEPHONE ENCOUNTER
Prescription refilled on 10/27/21. Refusing refill request and closing encounter.     Giana Christianson RN on 11/5/2021 at 12:01 PM

## 2021-11-23 RX ORDER — DULAGLUTIDE 4.5 MG/.5ML
4.5 INJECTION, SOLUTION SUBCUTANEOUS WEEKLY
Qty: 6 ML | Refills: 1 | Status: SHIPPED | OUTPATIENT
Start: 2021-11-23 | End: 2022-07-01

## 2021-11-23 NOTE — TELEPHONE ENCOUNTER
Patient had a visit with MTM regarding DMII, 9/20/2021.  Prescription approved per [facility] Refill Protocol.    OZ BaileyN, RN  [facility]

## 2021-12-02 ENCOUNTER — TELEPHONE (OUTPATIENT)
Dept: FAMILY MEDICINE | Facility: CLINIC | Age: 66
End: 2021-12-02
Payer: COMMERCIAL

## 2021-12-17 RX ORDER — DULOXETIN HYDROCHLORIDE 60 MG/1
CAPSULE, DELAYED RELEASE ORAL
Qty: 90 CAPSULE | Refills: 1 | Status: SHIPPED | OUTPATIENT
Start: 2021-12-17 | End: 2022-06-07

## 2022-01-15 ENCOUNTER — HEALTH MAINTENANCE LETTER (OUTPATIENT)
Age: 67
End: 2022-01-15

## 2022-01-27 ENCOUNTER — IMMUNIZATION (OUTPATIENT)
Dept: FAMILY MEDICINE | Facility: CLINIC | Age: 67
End: 2022-01-27
Payer: COMMERCIAL

## 2022-01-27 DIAGNOSIS — Z23 NEED FOR COVID-19 VACCINE: Primary | ICD-10-CM

## 2022-01-27 PROCEDURE — 91301 COVID-19,PF,MODERNA (18+ YRS PRIMARY SERIES .5ML): CPT

## 2022-01-27 PROCEDURE — 0013A COVID-19,PF,MODERNA (18+ YRS PRIMARY SERIES .5ML): CPT

## 2022-01-27 PROCEDURE — 99207 PR NO CHARGE NURSE ONLY: CPT

## 2022-02-04 RX ORDER — INSULIN GLARGINE 100 [IU]/ML
INJECTION, SOLUTION SUBCUTANEOUS
Qty: 15 ML | Refills: 0 | Status: SHIPPED | OUTPATIENT
Start: 2022-02-04 | End: 2022-04-27

## 2022-02-04 NOTE — TELEPHONE ENCOUNTER
Medication is being filled for 1 time refill only due to:  Patient needs to be seen because DM f/u visit due.  Prescription approved per [facility] Refill Protocol.  Routed to  for scheduling  Alda Kingston RN, BSN  [facility]

## 2022-02-07 NOTE — TELEPHONE ENCOUNTER
Patient called back I let him know message below he stated that he understands and that he will call back to schedule an appt.    Sarah Henson

## 2022-03-16 DIAGNOSIS — R33.9 URINARY RETENTION: ICD-10-CM

## 2022-03-16 DIAGNOSIS — N40.0 BPH (BENIGN PROSTATIC HYPERPLASIA): Primary | ICD-10-CM

## 2022-03-16 RX ORDER — TAMSULOSIN HYDROCHLORIDE 0.4 MG/1
0.4 CAPSULE ORAL DAILY
Qty: 90 CAPSULE | Refills: 0 | Status: SHIPPED | OUTPATIENT
Start: 2022-03-16 | End: 2022-06-09

## 2022-03-16 RX ORDER — FINASTERIDE 5 MG/1
5 TABLET, FILM COATED ORAL DAILY
Qty: 90 TABLET | Refills: 0 | Status: SHIPPED | OUTPATIENT
Start: 2022-03-16 | End: 2022-06-09

## 2022-03-18 DIAGNOSIS — F51.02 ADJUSTMENT INSOMNIA: Primary | ICD-10-CM

## 2022-03-18 NOTE — TELEPHONE ENCOUNTER
Patient requests new RX for MIRTAZAPINE 30 MG TABLET   send to [id number]  [address] [address].

## 2022-03-18 NOTE — TELEPHONE ENCOUNTER
Routing refill request to provider for review/approval because:  Drug not active on patient's medication list    Nelson HARDY RN

## 2022-03-21 RX ORDER — MIRTAZAPINE 30 MG/1
TABLET, FILM COATED ORAL
Qty: 90 TABLET | Refills: 1 | Status: SHIPPED | OUTPATIENT
Start: 2022-03-21 | End: 2022-10-03

## 2022-04-27 RX ORDER — INSULIN GLARGINE 100 [IU]/ML
INJECTION, SOLUTION SUBCUTANEOUS
Qty: 15 ML | Refills: 0 | Status: SHIPPED | OUTPATIENT
Start: 2022-04-27 | End: 2022-06-15

## 2022-04-27 NOTE — TELEPHONE ENCOUNTER
Patient has an upcoming appointment.  Will give jean-claude refill to get him through so he does not have to go without his medication.    Appointments in Next Year    May 11, 2022 10:00 AM  (Arrive by 9:40 AM)  Provider Visit with Raisa Calvillo PA-C  [facility] ([facility] ) [phone]        Prescription approved per [facility] Refill Protocol.  Olya Zepeda RN

## 2022-05-11 ENCOUNTER — TELEPHONE (OUTPATIENT)
Dept: FAMILY MEDICINE | Facility: CLINIC | Age: 67
End: 2022-05-11

## 2022-05-11 NOTE — TELEPHONE ENCOUNTER
Pt calls,    S-(situation) and B-(background): c/o left swollen cheek x 2 days, also has cracked tooth left bottom, in [place] now, wants pain killers and antibiotic, plans on flying back Friday night, also informs dentist retired, plans on finding new dentist when back, discussed urgent care, since out of town does not qualify for virtual visit, pt wants  to review and advise plan    A-(assessment): broken tooth    R-(recommendations): routed to , please review and advise, route to inform pt of plan    Telephone Information:   Mobile [phone]     Alda Kingston RN, BSN  [facility]

## 2022-05-11 NOTE — TELEPHONE ENCOUNTER
Patient should go to urgent care in [place] or [place] when he gets back. He needs face to face care within the next 24 hours.     Raisa Calvillo PA-C

## 2022-05-14 DIAGNOSIS — G47.00 PERSISTENT INSOMNIA: ICD-10-CM

## 2022-05-17 RX ORDER — QUETIAPINE FUMARATE 25 MG/1
25-50 TABLET, FILM COATED ORAL
Qty: 180 TABLET | Refills: 0 | Status: SHIPPED | OUTPATIENT
Start: 2022-05-17 | End: 2022-07-01

## 2022-05-17 NOTE — TELEPHONE ENCOUNTER
Patient has upcoming appointment 7/1/22. Prescription approved per [facility] Refill Protocol.  Nelson HARDY RN

## 2022-06-06 DIAGNOSIS — G47.00 PERSISTENT INSOMNIA: ICD-10-CM

## 2022-06-07 RX ORDER — QUETIAPINE FUMARATE 25 MG/1
25-50 TABLET, FILM COATED ORAL
Qty: 180 TABLET | Refills: 0 | OUTPATIENT
Start: 2022-06-07

## 2022-06-07 RX ORDER — DULOXETIN HYDROCHLORIDE 60 MG/1
CAPSULE, DELAYED RELEASE ORAL
Qty: 90 CAPSULE | Refills: 0 | Status: SHIPPED | OUTPATIENT
Start: 2022-06-07 | End: 2022-07-01

## 2022-06-07 NOTE — TELEPHONE ENCOUNTER
Seroquel- has current RX.    Duloxetine- Routing refill request to provider for review/approval because:  Drug interaction warning    Bozena Francisco RN

## 2022-06-08 DIAGNOSIS — E78.5 HYPERLIPIDEMIA LDL GOAL <100: ICD-10-CM

## 2022-06-08 RX ORDER — ROSUVASTATIN CALCIUM 20 MG/1
20 TABLET, COATED ORAL DAILY
Qty: 90 TABLET | Refills: 0 | Status: SHIPPED | OUTPATIENT
Start: 2022-06-08 | End: 2022-07-01

## 2022-06-08 NOTE — TELEPHONE ENCOUNTER
Patient has upcoming appointment 7/1/22. Prescription approved per [facility] Refill Protocol.  Nelson HARDY RN

## 2022-06-09 DIAGNOSIS — R33.9 URINARY RETENTION: ICD-10-CM

## 2022-06-09 DIAGNOSIS — N40.0 BPH (BENIGN PROSTATIC HYPERPLASIA): ICD-10-CM

## 2022-06-09 RX ORDER — TAMSULOSIN HYDROCHLORIDE 0.4 MG/1
0.4 CAPSULE ORAL DAILY
Qty: 90 CAPSULE | Refills: 1 | Status: SHIPPED | OUTPATIENT
Start: 2022-06-09 | End: 2022-12-21

## 2022-06-09 RX ORDER — FINASTERIDE 5 MG/1
5 TABLET, FILM COATED ORAL DAILY
Qty: 90 TABLET | Refills: 1 | Status: SHIPPED | OUTPATIENT
Start: 2022-06-09 | End: 2022-12-21

## 2022-06-13 DIAGNOSIS — G47.00 PERSISTENT INSOMNIA: ICD-10-CM

## 2022-06-15 RX ORDER — DULOXETIN HYDROCHLORIDE 60 MG/1
CAPSULE, DELAYED RELEASE ORAL
Qty: 90 CAPSULE | Refills: 0 | OUTPATIENT
Start: 2022-06-15

## 2022-06-15 RX ORDER — INSULIN GLARGINE 100 [IU]/ML
INJECTION, SOLUTION SUBCUTANEOUS
Qty: 15 ML | Refills: 0 | Status: SHIPPED | OUTPATIENT
Start: 2022-06-15 | End: 2022-07-08

## 2022-06-15 RX ORDER — QUETIAPINE FUMARATE 25 MG/1
25-50 TABLET, FILM COATED ORAL
Qty: 180 TABLET | Refills: 0 | OUTPATIENT
Start: 2022-06-15

## 2022-06-15 NOTE — TELEPHONE ENCOUNTER
Duloxetine sent 6/47/22 for #90 with NO additional refills.  Quetiapine sent 5/17/2022 for #180 with NO additional refills.    Olya Zepeda RN

## 2022-06-15 NOTE — TELEPHONE ENCOUNTER
Patient has an upcoming appointment.  Will give jean-claude refill to get him through so he does not have to go without his medication.    Appointments in Next Year    Jul 01, 2022  8:00 AM  (Arrive by 7:40 AM)  Provider Visit with Raisa Calvillo PA-C  [facility] ([facility] ) [phone]        Prescription approved per [facility] Refill Protocol.  Olya Zepeda RN

## 2022-07-01 ENCOUNTER — OFFICE VISIT (OUTPATIENT)
Dept: FAMILY MEDICINE | Facility: CLINIC | Age: 67
End: 2022-07-01
Payer: COMMERCIAL

## 2022-07-01 VITALS
DIASTOLIC BLOOD PRESSURE: 70 MMHG | TEMPERATURE: 98.2 F | SYSTOLIC BLOOD PRESSURE: 99 MMHG | WEIGHT: 169 LBS | RESPIRATION RATE: 20 BRPM | HEART RATE: 98 BPM | OXYGEN SATURATION: 97 % | HEIGHT: 68 IN | BODY MASS INDEX: 25.61 KG/M2

## 2022-07-01 DIAGNOSIS — Z79.4 TYPE 2 DIABETES MELLITUS WITH DIABETIC NEPHROPATHY, WITH LONG-TERM CURRENT USE OF INSULIN (H): Primary | ICD-10-CM

## 2022-07-01 DIAGNOSIS — G47.00 PERSISTENT INSOMNIA: ICD-10-CM

## 2022-07-01 DIAGNOSIS — Z23 HIGH PRIORITY FOR 2019-NCOV VACCINE: ICD-10-CM

## 2022-07-01 DIAGNOSIS — F41.9 ANXIETY: ICD-10-CM

## 2022-07-01 DIAGNOSIS — Z23 NEED FOR PNEUMOCOCCAL VACCINATION: ICD-10-CM

## 2022-07-01 DIAGNOSIS — E78.5 HYPERLIPIDEMIA LDL GOAL <100: ICD-10-CM

## 2022-07-01 DIAGNOSIS — B35.3 TINEA PEDIS OF LEFT FOOT: ICD-10-CM

## 2022-07-01 DIAGNOSIS — L40.50 PSORIATIC ARTHRITIS (H): ICD-10-CM

## 2022-07-01 DIAGNOSIS — K21.00 GASTROESOPHAGEAL REFLUX DISEASE WITH ESOPHAGITIS WITHOUT HEMORRHAGE: ICD-10-CM

## 2022-07-01 DIAGNOSIS — E11.21 TYPE 2 DIABETES MELLITUS WITH DIABETIC NEPHROPATHY, WITH LONG-TERM CURRENT USE OF INSULIN (H): Primary | ICD-10-CM

## 2022-07-01 LAB — HBA1C MFR BLD: 8.2 % (ref 0–5.6)

## 2022-07-01 PROCEDURE — 83036 HEMOGLOBIN GLYCOSYLATED A1C: CPT | Performed by: PHYSICIAN ASSISTANT

## 2022-07-01 PROCEDURE — 0064A COVID-19,PF,MODERNA (18+ YRS BOOSTER .25ML): CPT | Performed by: PHYSICIAN ASSISTANT

## 2022-07-01 PROCEDURE — 82043 UR ALBUMIN QUANTITATIVE: CPT | Performed by: PHYSICIAN ASSISTANT

## 2022-07-01 PROCEDURE — 90677 PCV20 VACCINE IM: CPT | Performed by: PHYSICIAN ASSISTANT

## 2022-07-01 PROCEDURE — 36415 COLL VENOUS BLD VENIPUNCTURE: CPT | Performed by: PHYSICIAN ASSISTANT

## 2022-07-01 PROCEDURE — 99207 PR FOOT EXAM NO CHARGE: CPT | Mod: 25 | Performed by: PHYSICIAN ASSISTANT

## 2022-07-01 PROCEDURE — 80053 COMPREHEN METABOLIC PANEL: CPT | Performed by: PHYSICIAN ASSISTANT

## 2022-07-01 PROCEDURE — 80061 LIPID PANEL: CPT | Performed by: PHYSICIAN ASSISTANT

## 2022-07-01 PROCEDURE — 90471 IMMUNIZATION ADMIN: CPT | Performed by: PHYSICIAN ASSISTANT

## 2022-07-01 PROCEDURE — 91306 COVID-19,PF,MODERNA (18+ YRS BOOSTER .25ML): CPT | Performed by: PHYSICIAN ASSISTANT

## 2022-07-01 PROCEDURE — 99214 OFFICE O/P EST MOD 30 MIN: CPT | Mod: 25 | Performed by: PHYSICIAN ASSISTANT

## 2022-07-01 RX ORDER — ROSUVASTATIN CALCIUM 20 MG/1
20 TABLET, COATED ORAL DAILY
Qty: 90 TABLET | Refills: 3 | Status: SHIPPED | OUTPATIENT
Start: 2022-07-01 | End: 2023-07-13

## 2022-07-01 RX ORDER — QUETIAPINE FUMARATE 25 MG/1
25-50 TABLET, FILM COATED ORAL
Qty: 180 TABLET | Refills: 1 | Status: SHIPPED | OUTPATIENT
Start: 2022-07-01 | End: 2022-12-21

## 2022-07-01 RX ORDER — PANTOPRAZOLE SODIUM 20 MG/1
20 TABLET, DELAYED RELEASE ORAL DAILY
Qty: 90 TABLET | Refills: 1 | Status: SHIPPED | OUTPATIENT
Start: 2022-07-01 | End: 2022-10-04

## 2022-07-01 RX ORDER — KETOCONAZOLE 20 MG/G
CREAM TOPICAL DAILY
Qty: 60 G | Refills: 1 | Status: SHIPPED | OUTPATIENT
Start: 2022-07-01 | End: 2023-01-24

## 2022-07-01 RX ORDER — BLOOD SUGAR DIAGNOSTIC
STRIP MISCELLANEOUS
Qty: 200 STRIP | Refills: 1 | Status: SHIPPED | OUTPATIENT
Start: 2022-07-01 | End: 2022-10-25

## 2022-07-01 RX ORDER — LOSARTAN POTASSIUM 25 MG/1
25 TABLET ORAL DAILY
Qty: 90 TABLET | Refills: 1 | Status: SHIPPED | OUTPATIENT
Start: 2022-07-01 | End: 2022-07-08

## 2022-07-01 RX ORDER — GABAPENTIN 800 MG/1
TABLET ORAL
Qty: 360 TABLET | Refills: 3 | Status: SHIPPED | OUTPATIENT
Start: 2022-07-01 | End: 2022-12-21

## 2022-07-01 RX ORDER — DULOXETIN HYDROCHLORIDE 60 MG/1
CAPSULE, DELAYED RELEASE ORAL
Qty: 90 CAPSULE | Refills: 3 | Status: SHIPPED | OUTPATIENT
Start: 2022-07-01 | End: 2022-12-21

## 2022-07-01 RX ORDER — DULAGLUTIDE 4.5 MG/.5ML
4.5 INJECTION, SOLUTION SUBCUTANEOUS WEEKLY
Qty: 6 ML | Refills: 1 | Status: SHIPPED | OUTPATIENT
Start: 2022-07-01 | End: 2022-07-08 | Stop reason: ALTCHOICE

## 2022-07-01 RX ORDER — METFORMIN HCL 500 MG
2000 TABLET, EXTENDED RELEASE 24 HR ORAL
Qty: 360 TABLET | Refills: 1 | Status: SHIPPED | OUTPATIENT
Start: 2022-07-01 | End: 2022-12-21

## 2022-07-01 RX ORDER — MELOXICAM 15 MG/1
15 TABLET ORAL DAILY
Qty: 90 TABLET | Refills: 0 | Status: SHIPPED | OUTPATIENT
Start: 2022-07-01 | End: 2022-09-27

## 2022-07-01 ASSESSMENT — PATIENT HEALTH QUESTIONNAIRE - PHQ9
SUM OF ALL RESPONSES TO PHQ QUESTIONS 1-9: 2
10. IF YOU CHECKED OFF ANY PROBLEMS, HOW DIFFICULT HAVE THESE PROBLEMS MADE IT FOR YOU TO DO YOUR WORK, TAKE CARE OF THINGS AT HOME, OR GET ALONG WITH OTHER PEOPLE: NOT DIFFICULT AT ALL
SUM OF ALL RESPONSES TO PHQ QUESTIONS 1-9: 2

## 2022-07-01 ASSESSMENT — ANXIETY QUESTIONNAIRES
6. BECOMING EASILY ANNOYED OR IRRITABLE: NOT AT ALL
8. IF YOU CHECKED OFF ANY PROBLEMS, HOW DIFFICULT HAVE THESE MADE IT FOR YOU TO DO YOUR WORK, TAKE CARE OF THINGS AT HOME, OR GET ALONG WITH OTHER PEOPLE?: NOT DIFFICULT AT ALL
2. NOT BEING ABLE TO STOP OR CONTROL WORRYING: NOT AT ALL
7. FEELING AFRAID AS IF SOMETHING AWFUL MIGHT HAPPEN: NOT AT ALL
1. FEELING NERVOUS, ANXIOUS, OR ON EDGE: NOT AT ALL
GAD7 TOTAL SCORE: 0
GAD7 TOTAL SCORE: 0
4. TROUBLE RELAXING: NOT AT ALL
7. FEELING AFRAID AS IF SOMETHING AWFUL MIGHT HAPPEN: NOT AT ALL
5. BEING SO RESTLESS THAT IT IS HARD TO SIT STILL: NOT AT ALL
3. WORRYING TOO MUCH ABOUT DIFFERENT THINGS: NOT AT ALL
GAD7 TOTAL SCORE: 0

## 2022-07-01 NOTE — LETTER
[facility]  [address] [place], [address]  [phone]        July 5, 2022    Gerardo Gonzalez                                                                                                                                                       [address] [address]        Gerardo,     Your cholesterol, electrolytes, kidney function and liver enzymes were normal. Urine albumin was slightly elevated at 30, but much improved from last year. Your hemoglobin A1c (average blood sugars over 3 months) has increased to 8.2%.     Take Care,   Raisa Calvillo PA-C

## 2022-07-01 NOTE — PROGRESS NOTES
"  Assessment & Plan     Type 2 diabetes mellitus with diabetic nephropathy, with long-term current use of insulin (H)  Await labs. Follow-up with MTM in 1 week.  Could not figure out freestyle dmitri-removed from med list.   - HEMOGLOBIN A1C; Future  - COMPREHENSIVE METABOLIC PANEL; Future  - Albumin Random Urine Quantitative with Creat Ratio; Future  - FOOT EXAM  - blood glucose (ONETOUCH VERIO IQ) test strip; USE TO TEST BLOOD SUGARS 2 TIMES DAILY OR AS DIRECTED.(VERIO)  - Lipid panel reflex to direct LDL Fasting; Future  - HEMOGLOBIN A1C  - COMPREHENSIVE METABOLIC PANEL  - Albumin Random Urine Quantitative with Creat Ratio  - Lipid panel reflex to direct LDL Fasting    Psoriatic arthritis (H)  Stable.   - meloxicam (MOBIC) 15 MG tablet; Take 1 tablet (15 mg) by mouth daily    Persistent insomnia  Stable.   - QUEtiapine (SEROQUEL) 25 MG tablet; Take 1-2 tablets (25-50 mg) by mouth nightly as needed (insomnia)  - gabapentin (NEURONTIN) 800 MG tablet; TAKE 1 TABLET IN THE MORNING, 1 TAB IN THE AFTERNOON AND 2 TABS AT BEDTIME.    Hyperlipidemia LDL goal <100  Await labs.   - rosuvastatin (CRESTOR) 20 MG tablet; Take 1 tablet (20 mg) by mouth daily    Anxiety    - gabapentin (NEURONTIN) 800 MG tablet; TAKE 1 TABLET IN THE MORNING, 1 TAB IN THE AFTERNOON AND 2 TABS AT BEDTIME.    Gastroesophageal reflux disease with esophagitis without hemorrhage  Having some reflux, will add PPI  - pantoprazole (PROTONIX) 20 MG EC tablet; Take 1 tablet (20 mg) by mouth daily    Tinea pedis of left foot    - ketoconazole (NIZORAL) 2 % external cream; Apply topically daily    High priority for 2019-nCoV vaccine    - COVID-19,PF,MODERNA (18+ Yrs BOOSTER .25mL)    Need for pneumococcal vaccination    - Pneumococcal 20 Valent Conjugate (PCV20)             BMI:   Estimated body mass index is 25.7 kg/m  as calculated from the following:    Height as of this encounter: 1.727 m (5' 8\").    Weight as of this encounter: 76.7 kg (169 lb). "       Regular exercise    Return in about 1 week (around 7/8/2022) for MTM.    KAYE Davis [facility] ZEYNEP Carrillo is a 67 year old, presenting for the following health issues:  Diabetes      History of Present Illness       Reason for visit:  Routine    He eats 2-3 servings of fruits and vegetables daily.He consumes 1 sweetened beverage(s) daily.He exercises with enough effort to increase his heart rate 20 to 29 minutes per day.  He exercises with enough effort to increase his heart rate 3 or less days per week.   He is taking medications regularly.    Today's PHQ-9         PHQ-9 Total Score: 2    PHQ-9 Q9 Thoughts of better off dead/self-harm past 2 weeks :   Not at all    How difficult have these problems made it for you to do your work, take care of things at home, or get along with other people: Not difficult at all  Today's NAOMI-7 Score: 0       Diabetes Follow-up    How often are you checking your blood sugar? Three times daily  Blood sugar testing frequency justification:  Patient modifying lifestyle changes (diet, exercise) with blood sugars  What time of day are you checking your blood sugars (select all that apply)?  Before and after meals  Have you had any blood sugars above 200?  No  Have you had any blood sugars below 70?  Yes occasionally     What symptoms do you notice when your blood sugar is low?  Shaky    What concerns do you have today about your diabetes? None     Do you have any of these symptoms? (Select all that apply)  No numbness or tingling in feet.  No redness, sores or blisters on feet.  No complaints of excessive thirst.  No reports of blurry vision.  No significant changes to weight.    Have you had a diabetic eye exam in the last 12 months? No        BP Readings from Last 2 Encounters:   07/01/22 99/70   09/28/21 133/89     Hemoglobin A1C POCT (%)   Date Value   05/13/2021 7.6 (H)   02/08/2021 15.0 (H)     Hemoglobin A1C (%)   Date Value  "  08/20/2021 7.2 (H)     LDL Cholesterol Calculated (mg/dL)   Date Value   08/20/2021 118 (H)   02/08/2021 129 (H)   11/06/2019     Cannot estimate LDL when triglyceride exceeds 400 mg/dL     LDL Cholesterol Direct (mg/dL)   Date Value   11/06/2019 149 (H)        joints still hurt, but tolerable.    Having reflux several times per week.    Left foot skin is peeling and nails are thick.        Review of Systems   Constitutional, HEENT, cardiovascular, pulmonary, gi and gu systems are negative, except as otherwise noted.      Objective    BP 99/70 (BP Location: Right arm, Patient Position: Sitting, Cuff Size: Adult Large)   Pulse 98   Temp 98.2  F (36.8  C) (Oral)   Resp 20   Ht 1.727 m (5' 8\")   Wt 76.7 kg (169 lb)   SpO2 97%   BMI 25.70 kg/m    Body mass index is 25.7 kg/m .  Physical Exam   GENERAL APPEARANCE: healthy, alert and no distress  RESP: lungs clear to auscultation - no rales, rhonchi or wheezes  CV: regular rates and rhythm, normal S1 S2, no S3 or S4 and no murmur, click or rub  DIABETIC FOOT EXAM: normal DP and PT pulses, no trophic changes or ulcerative lesions, tinea pedis and onychomycosis  PSYCH: mentation appears normal and affect normal/bright                    .  ..  "

## 2022-07-01 NOTE — LETTER
July 5, 2022      Gerardo Gonzalez  [address] [place] [address]        Dear ,    We are writing to inform you of your test results.    Your test results fall within the expected range(s) or remain unchanged from previous results.  Please continue with current treatment plan.    Resulted Orders   HEMOGLOBIN A1C   Result Value Ref Range    Hemoglobin A1C 8.2 (H) 0.0 - 5.6 %      Comment:      Normal <5.7%   Prediabetes 5.7-6.4%    Diabetes 6.5% or higher     Note: Adopted from ADA consensus guidelines.   COMPREHENSIVE METABOLIC PANEL   Result Value Ref Range    Sodium 137 133 - 144 mmol/L    Potassium 4.1 3.4 - 5.3 mmol/L    Chloride 106 94 - 109 mmol/L    Carbon Dioxide (CO2) 21 20 - 32 mmol/L    Anion Gap 10 3 - 14 mmol/L    Urea Nitrogen 10 7 - 30 mg/dL    Creatinine 0.97 0.66 - 1.25 mg/dL    Calcium 9.8 8.5 - 10.1 mg/dL    Glucose 147 (H) 70 - 99 mg/dL    Alkaline Phosphatase 65 40 - 150 U/L    AST 22 0 - 45 U/L    ALT 17 0 - 70 U/L    Protein Total 7.5 6.8 - 8.8 g/dL    Albumin 3.9 3.4 - 5.0 g/dL    Bilirubin Total 0.5 0.2 - 1.3 mg/dL    GFR Estimate 87 >60 mL/min/1.73m2      Comment:      Effective December 21, 2021 eGFRcr in adults is calculated using the 2021 CKD-EPI creatinine equation which includes age and gender (Darby et al., NEJ, DOI: 10.1056/SBJRsk6200949)   Albumin Random Urine Quantitative with Creat Ratio   Result Value Ref Range    Creatinine Urine mg/dL 145 mg/dL    Albumin Urine mg/L 44 mg/L    Albumin Urine mg/g Cr 30.34 (H) 0.00 - 17.00 mg/g Cr   Lipid panel reflex to direct LDL Fasting   Result Value Ref Range    Cholesterol 132 <200 mg/dL    Triglycerides 128 <150 mg/dL    Direct Measure HDL 51 >=40 mg/dL    LDL Cholesterol Calculated 55 <=100 mg/dL    Non HDL Cholesterol 81 <130 mg/dL    Patient Fasting > 8hrs? Yes     Narrative    Cholesterol  Desirable:  <200 mg/dL    Triglycerides  Normal:  Less than 150 mg/dL  Borderline High:  150-199 mg/dL  High:  200-499  mg/dL  Very High:  Greater than or equal to 500 mg/dL    Direct Measure HDL  Female:  Greater than or equal to 50 mg/dL   Male:  Greater than or equal to 40 mg/dL    LDL Cholesterol  Desirable:  <100mg/dL  Above Desirable:  100-129 mg/dL   Borderline High:  130-159 mg/dL   High:  160-189 mg/dL   Very High:  >= 190 mg/dL    Non HDL Cholesterol  Desirable:  130 mg/dL  Above Desirable:  130-159 mg/dL  Borderline High:  160-189 mg/dL  High:  190-219 mg/dL  Very High:  Greater than or equal to 220 mg/dL     If you have any questions or concerns, please call the clinic at the number listed above.     Sincerely,      Raisa Calvillo PA-C

## 2022-07-02 ENCOUNTER — HEALTH MAINTENANCE LETTER (OUTPATIENT)
Age: 67
End: 2022-07-02

## 2022-07-02 LAB
ALBUMIN SERPL-MCNC: 3.9 G/DL (ref 3.4–5)
ALP SERPL-CCNC: 65 U/L (ref 40–150)
ALT SERPL W P-5'-P-CCNC: 17 U/L (ref 0–70)
ANION GAP SERPL CALCULATED.3IONS-SCNC: 10 MMOL/L (ref 3–14)
AST SERPL W P-5'-P-CCNC: 22 U/L (ref 0–45)
BILIRUB SERPL-MCNC: 0.5 MG/DL (ref 0.2–1.3)
BUN SERPL-MCNC: 10 MG/DL (ref 7–30)
CALCIUM SERPL-MCNC: 9.8 MG/DL (ref 8.5–10.1)
CHLORIDE BLD-SCNC: 106 MMOL/L (ref 94–109)
CHOLEST SERPL-MCNC: 132 MG/DL
CO2 SERPL-SCNC: 21 MMOL/L (ref 20–32)
CREAT SERPL-MCNC: 0.97 MG/DL (ref 0.66–1.25)
CREAT UR-MCNC: 145 MG/DL
FASTING STATUS PATIENT QL REPORTED: YES
GFR SERPL CREATININE-BSD FRML MDRD: 87 ML/MIN/1.73M2
GLUCOSE BLD-MCNC: 147 MG/DL (ref 70–99)
HDLC SERPL-MCNC: 51 MG/DL
LDLC SERPL CALC-MCNC: 55 MG/DL
MICROALBUMIN UR-MCNC: 44 MG/L
MICROALBUMIN/CREAT UR: 30.34 MG/G CR (ref 0–17)
NONHDLC SERPL-MCNC: 81 MG/DL
POTASSIUM BLD-SCNC: 4.1 MMOL/L (ref 3.4–5.3)
PROT SERPL-MCNC: 7.5 G/DL (ref 6.8–8.8)
SODIUM SERPL-SCNC: 137 MMOL/L (ref 133–144)
TRIGL SERPL-MCNC: 128 MG/DL

## 2022-07-05 ENCOUNTER — TELEPHONE (OUTPATIENT)
Dept: FAMILY MEDICINE | Facility: CLINIC | Age: 67
End: 2022-07-05

## 2022-07-05 NOTE — TELEPHONE ENCOUNTER
Called patient and left voicemail to call back and ask to speak to any triage nurse.    Mari Jones RN

## 2022-07-05 NOTE — TELEPHONE ENCOUNTER
Patient calling back.  Advised of result note.  States he was tired, no energy, dizzy for a month.  States he just laid in bed for 2 weeks.  States he had not taken losartan for 3 days and feels better and has more energy.  Please advise.  Bozena Francisco RN

## 2022-07-05 NOTE — TELEPHONE ENCOUNTER
"Left message asking patient to return the call.     Result note:    \"Your cholesterol, electrolytes, kidney function and liver enzymes were normal. Urine albumin was slightly elevated at 30, but much improved from last year. Your hemoglobin A1c (average blood sugars over 3 months) has increased to 8.2%.\"    Letter sent today.    Marleni Lyles RN   "

## 2022-07-05 NOTE — TELEPHONE ENCOUNTER
Pt returning call. Relayed provider message below. Patient was given an opportunity to ask questions, verbalized understanding of plan, and is agreeable.    Mari Jones RN

## 2022-07-07 NOTE — PROGRESS NOTES
Medication Therapy Management (MTM) Encounter    ASSESSMENT:                            Medication Adherence/Access: he would not qualify for rx assistance program, he would like to continue paying for GLP-1 agonist at this time instead of switching therapy    Type 2 Diabetes: Patient is not meeting A1c goal of < 8% but recent self monitoring of blood glucose is at goal of fasting  mg/dL and post prandial < 180 mg/dL. Will switch Trulicity to Ozempic for improved blood glucose lowering and provided free voucher today. He states he will continue to pay for Ozempic or Trulicity despite cost. Future if cost becomes an issue, can switch to glipizide and/or pioglitazone. He prefers to stay on GLP-1 agonist at this time. New CGM sensor also placed today.     Hypertension: Low blood pressure so will decrease losartan dose.    Hyperlipidemia: stable    Depression/Insomnia:  Stable, future could consider decreasing mirtazapine or quetiapine doses if fatigue continues.    Neuropathy: Recommend trial decreasing gabapentin frequency to decrease possible fatigue side effects.     Psoriatic Arthritis: New dermatology referral.    PLAN:                            1. Decrease gabapentin to 800 mg AM and 1600 mg at bedtime.   2. Change Trulicity to Ozempic 0.5 mg weekly. Free voucher provided and educated on use and side effects.   3. Try cutting losartan in half for 12.5 mg daily.    Follow-up: Return in about 1 month (around 8/8/2022) for Medication Therapy Management Pharmacist.    SUBJECTIVE/OBJECTIVE:                          Gerardo Gonzalez is a 67 year old male coming in for a follow-up visit.  Today's visit is a follow-up MTM visit from 9/20.     Reason for visit: blood glucose review, Hugo sensors.    Tobacco: He reports that he has quit smoking. He smoked 0.00 packs per day for 4.00 years. He has never used smokeless tobacco.  Alcohol: not currently using    Medication Adherence/Access: Cost of Trulicity now switching  to Medicare insurance.     Type 2 Diabetes:  Currently taking Trulicity 4.5mg weekly, Basaglar 32 units daily, and metformin XR 1000mg twice daily. Patient is not experiencing side effects. Issues with CGM sensor lately so hasn't been using.  Blood sugar monitorinx daily or CGM Ranges (Patient reported): fasting , post-prandial 150-180's  Symptoms of low blood sugar? none  Symptoms of high blood sugar? Tingling, numbness, polyphagia   Eye exam: due  Foot exam: up to date  Diet/Exercise: Really has watched what he eats - improved diet compared to 2 months ago when seeing higher readings. Diet curbed. No changes.   Aspirin: Taking 81mg daily and denies side effects  Statin: Yes: simvastatin   ACEi/ARB: Yes: losartan.   Urine Albumin:   Lab Results   Component Value Date    UMALCR 30.34 (H) 2022     Lab Results   Component Value Date    A1C 8.2 2022    A1C 7.2 2021    A1C 7.6 2021    A1C 15.0 2021    A1C 12.7 2020    A1C 14.7 2019    A1C 8.2 2019     Wt Readings from Last 3 Encounters:   22 167 lb 8 oz (76 kg)   22 169 lb (76.7 kg)   21 156 lb (70.8 kg)       Hypertension: Current medications include losartan 25 mg daily. Patient reports no issues. Dizziness and lightheaded mornings and tired all the time. Stopped losartan for 3 days and then had more energy after stopping. Back on now. Blood pressure at home yesterday 70/90.  BP Readings from Last 3 Encounters:   22 104/70   22 99/70   21 133/89       Hyperlipidemia: Current therapy includes rosuvastatin 20mg daily.  Patient reports no significant myalgias or other side effects.  Recent Labs   Lab Test 22  0848 21  0849 16  1547 07/06/15  0838   CHOL 132 212*   < > 201*   HDL 51 44   < > 46   LDL 55 118*   < > 85   TRIG 128 250*   < > 350*   CHOLHDLRATIO  --   --   --  4.4    < > = values in this interval not displayed.       Depression/Insomnia:  Current  medications include: Duloxetine 60mg once daily, mirtazapine 30 mg bedtime, and quetiapine 25 mg bedtime as needed (needing more lately, wasn't taking for 3-4 months before). Pt reports that depression symptoms are unchanged and stable. Side effects of ED. No other side effects.  PHQ 2/8/2021 5/13/2021 7/1/2022   PHQ-9 Total Score 16 15 2   Q9: Thoughts of better off dead/self-harm past 2 weeks Not at all Not at all Not at all   F/U: Thoughts of suicide or self-harm - - -   F/U: Safety concerns - - -       Neuropathy: Patient taking gabapentin 800mg AM, 800mg noon, and 1600mg bedtime. No issues reported today, neuropathy if he doesn't take gabapentin. Sometimes in hands and legs. Tired all day, not sure if from gabapentin.     Psoriatic Arthritis: Patient not taking any medications except occasional creams. Been out of Humira for past 6 months b/c no longer seeing his dermatologist and would like referral for new dermatologist.     Other medications/conditions were not reviewed in detail today due to time constraints, but were reviewed for medication reconciliation purposes.    Today's Vitals: /70   Wt 167 lb 8 oz (76 kg)   BMI 25.47 kg/m    ----------------      I spent 30 minutes with this patient today. All changes were made via collaborative practice agreement with Raisa Calvillo PA-C. A copy of the visit note was provided to the patient's provider(s).    The patient was given a summary of these recommendations.     Barbara Kc, PharmD  Medication Therapy Management Provider, [facility]  Pager: [phone]     Medication Therapy Recommendations  Benign essential hypertension    Current Medication: losartan (COZAAR) 25 MG tablet   Rationale: Dose too high - Dosage too high - Safety   Recommendation: Decrease Dose   Status: Accepted per CPA         Diabetic polyneuropathy associated with type 2 diabetes mellitus (H)    Current Medication: gabapentin (NEURONTIN) 800 MG  tablet   Rationale: Undesirable effect - Adverse medication event - Safety   Recommendation: Decrease Frequency   Status: Accepted per CPA         Type 2 diabetes mellitus without complication, with long-term current use of insulin (H)    Current Medication: Dulaglutide (TRULICITY) 4.5 MG/0.5ML SOPN (Discontinued)   Rationale: More effective medication available - Ineffective medication - Effectiveness   Recommendation: Change Medication - Ozempic (0.25 or 0.5 MG/DOSE) 2 MG/1.5ML Sopn   Status: Accepted per CPA

## 2022-07-08 ENCOUNTER — TELEPHONE (OUTPATIENT)
Dept: FAMILY MEDICINE | Facility: CLINIC | Age: 67
End: 2022-07-08

## 2022-07-08 ENCOUNTER — OFFICE VISIT (OUTPATIENT)
Dept: PHARMACY | Facility: CLINIC | Age: 67
End: 2022-07-08
Payer: COMMERCIAL

## 2022-07-08 VITALS — WEIGHT: 167.5 LBS | BODY MASS INDEX: 25.47 KG/M2 | DIASTOLIC BLOOD PRESSURE: 70 MMHG | SYSTOLIC BLOOD PRESSURE: 104 MMHG

## 2022-07-08 DIAGNOSIS — L40.50 PSORIATIC ARTHROPATHY (H): ICD-10-CM

## 2022-07-08 DIAGNOSIS — I10 BENIGN ESSENTIAL HYPERTENSION: ICD-10-CM

## 2022-07-08 DIAGNOSIS — L40.9 PSORIASIS: Primary | ICD-10-CM

## 2022-07-08 DIAGNOSIS — F41.9 ANXIETY: ICD-10-CM

## 2022-07-08 DIAGNOSIS — L40.50 PSORIATIC ARTHRITIS (H): ICD-10-CM

## 2022-07-08 DIAGNOSIS — G47.00 PERSISTENT INSOMNIA: ICD-10-CM

## 2022-07-08 DIAGNOSIS — E78.5 HYPERLIPIDEMIA LDL GOAL <100: ICD-10-CM

## 2022-07-08 DIAGNOSIS — F32.A DEPRESSION, UNSPECIFIED DEPRESSION TYPE: ICD-10-CM

## 2022-07-08 DIAGNOSIS — E11.42 DIABETIC POLYNEUROPATHY ASSOCIATED WITH TYPE 2 DIABETES MELLITUS (H): ICD-10-CM

## 2022-07-08 PROCEDURE — 99605 MTMS BY PHARM NP 15 MIN: CPT | Performed by: PHARMACIST

## 2022-07-08 PROCEDURE — 99607 MTMS BY PHARM ADDL 15 MIN: CPT | Performed by: PHARMACIST

## 2022-07-08 RX ORDER — LOSARTAN POTASSIUM 25 MG/1
12.5 TABLET ORAL DAILY
Qty: 45 TABLET | Refills: 1 | Status: SHIPPED | OUTPATIENT
Start: 2022-07-08 | End: 2022-08-08

## 2022-07-08 RX ORDER — SEMAGLUTIDE 1.34 MG/ML
0.5 INJECTION, SOLUTION SUBCUTANEOUS WEEKLY
Qty: 1.5 ML | Refills: 1 | Status: SHIPPED | OUTPATIENT
Start: 2022-07-08 | End: 2022-10-03

## 2022-07-08 RX ORDER — INSULIN GLARGINE 100 [IU]/ML
INJECTION, SOLUTION SUBCUTANEOUS
Qty: 30 ML | Refills: 1 | Status: SHIPPED | OUTPATIENT
Start: 2022-07-08 | End: 2022-12-05

## 2022-07-08 NOTE — TELEPHONE ENCOUNTER
MTM messaged me and let me know pt is not seeing his dermatologist anymore.     I have placed a new referral for pt. To [facility]. Please give pt info and advise him to call.

## 2022-07-08 NOTE — LETTER
July 13, 2022      Gerardo Gonzalez  [address]  [place] [address]              Dear Gerardo,    We have been trying to reach you to inform you that Raisa placed a referral for you to see dermatology. Please call to schedule. Below is their information:    [facility]    [address] [place] [address]   Phone: [phone]         Sincerely,    Aliza AMADOR RN, BSN, PHN - Patient Advocate Liaison - PAL RN  [facility]  [phone]

## 2022-07-08 NOTE — PATIENT INSTRUCTIONS
"Recommendations from today's MTM visit:                                                       Decrease gabapentin to 800 mg AM and 1600 mg at bedtime.   Change Trulicity to Ozempic 0.5 mg weekly.   Try cutting losartan in half for 12.5 mg daily.      It was great speaking with you today.  I value your experience and would be very thankful for your time in providing feedback in our clinic survey. In the next few days, you may receive an email or text message from [facility] with a link to a survey related to your  clinical pharmacist.\"     To schedule another MTM appointment, please call the clinic directly or you may call the MTM scheduling line at [phone] or toll-free at [phone].     My Clinical Pharmacist's contact information:                                                      Please feel free to contact me with any questions or concerns you have.      Barbara Kc, PharmD  Medication Therapy Management Provider, [facility]  "

## 2022-07-08 NOTE — Clinical Note
Destin Carrillo stopped seeing his dermatologist so is no longer getting Humira from there. He would like a referral to a new dermatologist. Is there one you'd recommend and can we place a new referral for him? Thanks!

## 2022-07-11 NOTE — TELEPHONE ENCOUNTER
Message left for patient to return call to this RN PAL - please transfer if available     Direct number left for this RN PAL on message for return call     Cristal Calix Registered Nurse PAL (patient advocate liaison)   [facility] [phone]

## 2022-07-12 NOTE — TELEPHONE ENCOUNTER
Message #3 left for patient to return call to this RN PAL - please transfer if available     Direct number left for this RN PAL on message for return call     [facility]    [address]   [place] [address]   Phone: [phone]     My chart message sent with information and asked patient to send message back if gets this to ensure information was received     Cristal Calix Registered Nurse PAL (patient advocate liaison)   [facility] [phone]

## 2022-07-13 ENCOUNTER — PATIENT OUTREACH (OUTPATIENT)
Dept: FAMILY MEDICINE | Facility: CLINIC | Age: 67
End: 2022-07-13

## 2022-07-13 ENCOUNTER — TELEPHONE (OUTPATIENT)
Dept: PHARMACY | Facility: CLINIC | Age: 67
End: 2022-07-13

## 2022-07-13 NOTE — TELEPHONE ENCOUNTER
Patient returned call     Discussed dermatology referral, he is thankful for the referral     Advised that letter was mailed with details and asked if patient wanted to write it down? And he opted to wait for letter in the mail     Cristal Calix, Registered Nurse, PAL (Patient Advocate Liason)   [facility]   [phone]

## 2022-07-13 NOTE — LETTER
Gerardo Gonzalez  [address]  [place] [address]    Thank you for choosing [facility] today for your health care needs.     [facility] is transforming primary care  At [facility], we re dedicated to constantly improve how we serve the health care needs of our patients and communities. We re currently making changes to the way we deliver care.     Changes you ll notice include:    An emphasis on building a relationship with a primary care provider    Access to a PAL (personal advocate and liaison) to help guide you with your care needs    Appointment lengths tailored to your specific needs and greater access to a care team to help you and your provider improve and maintain your health and well-being    Improved online access to your care team    Benefits of a primary care provider  If you don t have a designated primary care provider, we encourage you to get to know our care team online and find a provider you d like to see. Most of our providers have a short video on their online provider page. Visit [email or web address] to explore our providers and locations.    Benefits of having a primary care provider include:      They get to know you - your health history, family history and goals, making it easier to make a health plan together.     You get to know them - making health-related conversations and decisions easier      Primary care doctors help you when you re sick or hurt - but also focus on keeping you healthy with preventive care and screenings.      A doctor who sees you regularly is more likely to notice changes in your health.     You ll be connected to a broad care team who partners with your provider to support you.    Patient Advocate Liaison (PAL)   To help make sure you get the right care, at the right time, we include PALs, or Patient Advocate Liaisons, as part of your care team. Your PAL will be your first line of contact. They ll advocate for your needs and help you navigate  our services, connecting you with care team members and community resources to ensure your care is well coordinated. You ll be introduced to a PAL in an upcoming visit.     Expanded care team access with tailored appointment lengths  Depending on your health care needs, you may have longer or shorter appointments and see additional care team providers - including Medication Therapy Management (MTM) pharmacists, diabetes educators, behavioral health clinicians, or social workers. At times, they may be included in your visit with your provider, or you may see them individually.     Online access to your health care records and care team  [employer] is our online tool that makes it easy to see your health care information and communicate with your care team.     [employer] allows you to:     View your health maintenance plan so you know when you re due for a preventive screening    Send secure messages to your care team    View your health history and visit summaries     Schedule appointments     Complete questionnaires and eCheck-in before appointments      Get care from your provider with an e-visit      View and pay your bill     Sign up at [email or web address]. Once you have an account, you also can download the mobile sue.     Connecting to fast and convenient care  When you need fast, convenient care - consider one of the following options:       Video Visit: A convenient care option for visiting with your provider out of the comfort of your own home. Most of the things you come to the clinic to address with your provider can now be done virtually through a video. This includes your chronic medication follow up, questions or concerns you may have, and even your annual Medicare Wellness Visit.       Phone Visit: Another convenient option for follow up of common problems that may require a more in-depth discussion with your provider.       E-visit: When you need acute care quickly, or have a quick question about your  medication, an E-visit is completed through [employer] and your provider will respond within one business day.      Aliza AMADOR RN, BSN, PHN - Patient Advocate Liaison - PAL RN  [facility]  [phone]

## 2022-07-13 NOTE — TELEPHONE ENCOUNTER
Larisa message not viewed by patient, letter mailed with referral information.     Aliza AMADOR RN, BSN, PHN - Patient Advocate Liaison - PAL RN  [facility]  [phone]

## 2022-07-15 RX ORDER — SEMAGLUTIDE 1.34 MG/ML
0.5 INJECTION, SOLUTION SUBCUTANEOUS WEEKLY
Refills: 1 | OUTPATIENT
Start: 2022-07-15

## 2022-07-15 NOTE — TELEPHONE ENCOUNTER
Routing refill request to provider for review/approval because:  Pharmacy requesting a PA or alternate medication to be prescribed.    Giana Christianson RN on 7/15/2022 at 10:49 AM

## 2022-07-15 NOTE — TELEPHONE ENCOUNTER
Patient has free voucher for Ozempic for 1 month from [facility].  Please call pharamcy to inform.

## 2022-07-22 ENCOUNTER — PATIENT OUTREACH (OUTPATIENT)
Dept: FAMILY MEDICINE | Facility: CLINIC | Age: 67
End: 2022-07-22

## 2022-07-22 NOTE — TELEPHONE ENCOUNTER
Message left to return call to this RN PAL. Please transfer if available.    Direct number left for this PAL RN on message for return call.     CDE registry  HbA1c 8.2    Aliza AMADOR RN, BSN, PHN - Patient Advocate Liaison - PAL RN  [facility]  [phone]

## 2022-07-27 NOTE — TELEPHONE ENCOUNTER
Message #2 left to return call to this RN PAL. Please transfer if available.    Direct number left for this PAL RN on message for return call.     Aliza AMADOR RN, BSN, PHN - Patient Advocate Liaison - PAL RN  [facility]  [phone]

## 2022-08-04 NOTE — PROGRESS NOTES
Medication Therapy Management (MTM) Encounter    ASSESSMENT:                            Medication Adherence/Access: he would not qualify for rx assistance program, he would like to continue paying for GLP-1 agonist at this time instead of switching therapy    Type 2 Diabetes: Patient is not meeting A1c goal of < 8% but recent self monitoring of blood glucose is at goal of fasting  mg/dL and post prandial < 180 mg/dL. With recent hypoglycemia, will decrease insulin dose and plan in place to switch to Ozempic.     Hypertension: Low blood pressure still so will discontinue losartan.     Hyperlipidemia: stable    Depression/Insomnia:  Stable, future could consider decreasing mirtazapine or quetiapine doses if fatigue continues.    Neuropathy: improved   GERD: stable  BPH: stable    PLAN:                            1. Discontinue losartan.  2. Decrease Basaglar to 30 units daily and next week, if your still having lows then decrease to 28 units daily.     Follow-up: Return in about 2 months (around 10/8/2022) for Medication Therapy Management Pharmacist, Lab Work.    SUBJECTIVE/OBJECTIVE:                          Gerardo Gonzalez is a 67 year old male coming in for a follow-up visit.  Today's visit is a follow-up MTM visit from .     Reason for visit: blood glucose review, Hugo sensors.    Tobacco: He reports that he has quit smoking. He smoked 0.00 packs per day for 4.00 years. He has never used smokeless tobacco.  Alcohol: not currently using    Medication Adherence/Access: Cost of Trulicity now switching to Medicare insurance.     Type 2 Diabetes:  Currently taking Trulicity 4.5 mg daily (will switch to Ozempic 0.5 mg weekly next week), Basaglar 32 units daily, and metformin XR 1000mg twice daily. Patient is not experiencing side effects. Here for further education on CGM use.   Blood sugar monitorinx daily or CGM    Symptoms of low blood sugar? none  Symptoms of high blood sugar? Tingling, numbness,  polyphagia   Eye exam: due  Foot exam: up to date  Diet/Exercise: Really has watched what he eats - improved diet compared to 2 months ago when seeing higher readings. Diet curbed. Keto now with wife.   Aspirin: not taking per his preference   Statin: Yes: rosuvastatin   ACEi/ARB: Yes: losartan.   Urine Albumin:   Lab Results   Component Value Date    UMALCR 30.34 (H) 07/01/2022     Lab Results   Component Value Date    A1C 8.2 07/01/2022    A1C 7.2 08/20/2021    A1C 7.6 05/13/2021    A1C 15.0 02/08/2021    A1C 12.7 07/29/2020    A1C 14.7 11/06/2019    A1C 8.2 03/21/2019     Wt Readings from Last 3 Encounters:   08/08/22 166 lb (75.3 kg)   07/08/22 167 lb 8 oz (76 kg)   07/01/22 169 lb (76.7 kg)       Hypertension: Current medications include losartan 12.5 mg daily. Dizziness and lightheaded mornings.  BP Readings from Last 3 Encounters:   08/08/22 110/74   07/08/22 104/70   07/01/22 99/70       Hyperlipidemia: Current therapy includes rosuvastatin 20mg daily.  Patient reports no significant myalgias or other side effects.  The 10-year ASCVD risk score (Jacksonraghavendra BERNARD Jr., et al., 2013) is: 15.7%    Values used to calculate the score:      Age: 67 years      Sex: Male      Is Non- : No      Diabetic: Yes      Tobacco smoker: No      Systolic Blood Pressure: 110 mmHg      Is BP treated: Yes      HDL Cholesterol: 51 mg/dL      Total Cholesterol: 132 mg/dL  Recent Labs   Lab Test 07/01/22  0848 08/20/21  0849 07/14/16  1547 07/06/15  0838   CHOL 132 212*   < > 201*   HDL 51 44   < > 46   LDL 55 118*   < > 85   TRIG 128 250*   < > 350*   CHOLHDLRATIO  --   --   --  4.4    < > = values in this interval not displayed.       Depression/Insomnia:  Current medications include: Duloxetine 60mg once daily, mirtazapine 30 mg bedtime, and quetiapine 25 mg bedtime as needed. Pt reports that depression symptoms are unchanged and stable. Side effects of ED. No other side effects.  PHQ 2/8/2021 5/13/2021 7/1/2022    PHQ-9 Total Score 16 15 2   Q9: Thoughts of better off dead/self-harm past 2 weeks Not at all Not at all Not at all   F/U: Thoughts of suicide or self-harm - - -   F/U: Safety concerns - - -       Neuropathy: Patient taking gabapentin 800mg AM and 1600mg bedtime (no longer noon dose, sometimes only needing total 1600 mg/day). No issues reported today, neuropathy if he doesn't take gabapentin. Sometimes in hands and legs. Fatigue improved now with decreasing gabapentin dose. Has not picked up and started mobic yet.     GERD: Current medications include: Protonix (pantoprazole) 20 mg once daily. Patient reports no current symptoms.  Patient feels that current regimen is effective.    BPH: Patient taking finasteride 5 mg daily and tamsulosin 0.4 mg daily. Reports no concerns. Follows urologist.     Today's Vitals: /74   Wt 166 lb (75.3 kg)   BMI 25.24 kg/m    ----------------      I spent 20 minutes with this patient today. All changes were made via collaborative practice agreement with Raisa Calvillo PA-C. A copy of the visit note was provided to the patient's provider(s).    The patient was given a summary of these recommendations.     Barbara Kc, PharmD  Medication Therapy Management Provider, [facility]  Pager: [phone]     Medication Therapy Recommendations  Benign essential hypertension    Current Medication: losartan (COZAAR) 25 MG tablet   Rationale: Undesirable effect - Adverse medication event - Safety   Recommendation: Discontinue Medication   Status: Accepted per CPA         Type 2 diabetes mellitus without complication, with long-term current use of insulin (H)    Current Medication: insulin glargine (BASAGLAR KWIKPEN) 100 UNIT/ML pen   Rationale: Dose too high - Dosage too high - Safety   Recommendation: Decrease Dose   Status: Accepted per CPA

## 2022-08-08 ENCOUNTER — OFFICE VISIT (OUTPATIENT)
Dept: PHARMACY | Facility: CLINIC | Age: 67
End: 2022-08-08
Payer: COMMERCIAL

## 2022-08-08 VITALS — SYSTOLIC BLOOD PRESSURE: 110 MMHG | BODY MASS INDEX: 25.24 KG/M2 | WEIGHT: 166 LBS | DIASTOLIC BLOOD PRESSURE: 74 MMHG

## 2022-08-08 DIAGNOSIS — G47.00 PERSISTENT INSOMNIA: ICD-10-CM

## 2022-08-08 DIAGNOSIS — E11.42 DIABETIC POLYNEUROPATHY ASSOCIATED WITH TYPE 2 DIABETES MELLITUS (H): ICD-10-CM

## 2022-08-08 DIAGNOSIS — F41.9 ANXIETY: ICD-10-CM

## 2022-08-08 DIAGNOSIS — E78.5 HYPERLIPIDEMIA LDL GOAL <100: ICD-10-CM

## 2022-08-08 DIAGNOSIS — I10 BENIGN ESSENTIAL HYPERTENSION: ICD-10-CM

## 2022-08-08 DIAGNOSIS — R33.9 URINARY RETENTION: ICD-10-CM

## 2022-08-08 DIAGNOSIS — R12 HEARTBURN: ICD-10-CM

## 2022-08-08 DIAGNOSIS — F32.A DEPRESSION, UNSPECIFIED DEPRESSION TYPE: ICD-10-CM

## 2022-08-08 PROCEDURE — 99607 MTMS BY PHARM ADDL 15 MIN: CPT | Performed by: PHARMACIST

## 2022-08-08 PROCEDURE — 99606 MTMS BY PHARM EST 15 MIN: CPT | Performed by: PHARMACIST

## 2022-08-08 NOTE — PATIENT INSTRUCTIONS
"Recommendations from today's MTM visit:                                                         Freestyle Hugo - pay no more than $75/month for your sensors Ph# [phone]. For replacement sensors, call [phone].  Decrease Basaglar to 30 units daily and next week, if your still having lows then decrease to 28 units daily.   Stop losartan.     It was great speaking with you today.  I value your experience and would be very thankful for your time in providing feedback in our clinic survey. In the next few days, you may receive an email or text message from [facility] with a link to a survey related to your  clinical pharmacist.\"     To schedule another MTM appointment, please call the clinic directly or you may call the MTM scheduling line at [phone] or toll-free at [phone].     My Clinical Pharmacist's contact information:                                                      Please feel free to contact me with any questions or concerns you have.      Barbara Kc, PharmD, BCACP  Medication Therapy Management Provider, [facility]    "

## 2022-08-09 ENCOUNTER — MYC MEDICAL ADVICE (OUTPATIENT)
Dept: FAMILY MEDICINE | Facility: CLINIC | Age: 67
End: 2022-08-09

## 2022-08-09 NOTE — TELEPHONE ENCOUNTER
Message #3 left to return call to this RN PAL and [employer] message sent. Please transfer if available.    Direct number left for this PAL RN on message for return call.     Aliza AMADOR RN, BSN, PHN - Patient Advocate Liaison - PAL RN  [facility]  [phone]

## 2022-08-09 NOTE — TELEPHONE ENCOUNTER
Raisa Calvillo PA-C-    -Referral pended for CDE  -Patient wanted to let you know that appt with Barbara Kc went very well and reports his A1C was down to 6.6.     Patient received mychart but was unable to open. Scheduled helped him to reset password.   Informed of CDE resource and scheduled for visit     Hemoglobin A1C   Date Value Ref Range Status   07/01/2022 8.2 (H) 0.0 - 5.6 % Final     Comment:     Normal <5.7%   Prediabetes 5.7-6.4%    Diabetes 6.5% or higher     Note: Adopted from ADA consensus guidelines.   05/13/2021 7.6 (H) 0 - 5.6 % Final     Comment:     Results confirmed by repeat test         Aliza AMADOR RN, BSN, PHN - Patient Advocate Liaison - PAL RN  [facility]  [phone]

## 2022-08-24 ENCOUNTER — ALLIED HEALTH/NURSE VISIT (OUTPATIENT)
Dept: EDUCATION SERVICES | Facility: CLINIC | Age: 67
End: 2022-08-24
Payer: COMMERCIAL

## 2022-08-24 PROCEDURE — G0108 DIAB MANAGE TRN  PER INDIV: HCPCS

## 2022-08-24 NOTE — PATIENT INSTRUCTIONS
Care Plan:  Meal Plan Recommendation: eat 3 meals a day, have small snacks between meals, if needed, use portion control, and use plate planning method. Refer to the meal plans and snack ideas.   - limit your fruit to 1 serving at a time ( tennis ball size), dried fruit is 2 TBSP  - limit your trail mix to 1/2 cup at a time    Exercise / activity plan: continue with your activity- Great job!    Recommend decrease to insulin - 26 units daily    Treat low blood sugars with 15 grams of fast acting carbohydrate.   -refer to the handout    Follow up:  Follow-up for annual diabetes education review in 1 year or sooner, if needed.     Bring blood glucose meter and logbook with you to all doctor and follow-up appointments.     [facility]:  For Your Diabetes or Nutrition Education Appointments Call:  [phone]   For Diabetes or Nutrition Related Questions:   [phone]  Send [employer] Message   If you need a medication refill please contact your pharmacy. Please allow 3 business days for your refills to be completed.

## 2022-08-24 NOTE — LETTER
8/24/2022         RE: Gerardo Gonzalez  [address]  [place] [address]        Dear Colleague,    Thank you for referring your patient, Gerardo Gonzalez, to the [facility]. Please see a copy of my visit note below.    Diabetes Self-Management Education & Support    Presents for: Individual review    CDE VISIT MODE: In Person    Assessment Type:   REPORTS:                Pt verbalized understanding of concepts discussed and recommendations provided today.       ASSESSMENT:  Review of CGM report. Glucose overall average 150mg/dl,  in target 68%, above target 27% and below target 5% of the time.   Pattern of overnight hypoglycemia      Patient would benefit from decrease to insulin dose, continue work on healthy eating.     PLAN:  Decrease insulin - 26 units daily  Meal Plan Recommendation: eat 3 meals a day, have small snacks between meals, if needed, use portion control, and use plate planning method. Refer to the meal plans and snack ideas.   - limit your fruit to 1 serving at a time ( tennis ball size), dried fruit is 2 TBSP  - limit your trail mix to 1/2 cup at a time  Continue with Activity  Treat low blood sugars with 15 grams of fast acting carbohydrate--refer to the handout    Follow up:  Follow up with MTM as scheduled.   Follow-up for annual diabetes education review in 1 year or sooner, if needed.    See Care Plan for co-developed, patient-state behavior change goals.  AVS provided for patient today.    Education Materials Provided:  [facility] Understanding Diabetes Booklet, Hypoglycemia Signs and Symptoms and My Plate Planner      SUBJECTIVE/OBJECTIVE:  Presents for: Individual review  Accompanied by: Self  Diabetes education in the past 24mo: No  Focus of Visit: Patient Unsure  Diabetes type: Type 2  Disease course: Improving  How confident are you filling out medical forms by yourself:: Extremely  Transportation concerns: No  Difficulty affording diabetes  "medication?: No  Difficulty affording diabetes testing supplies?: No  Other concerns:: None  Cultural Influences/Ethnic Background:  Choose not to answer    Diabetes Symptoms & Complications:  Fatigue: Sometimes  Neuropathy: Yes  Polydipsia: Yes  Polyphagia: No  Polyuria: No  Visual change: No  Slow healing wounds: No  Complications assessed today?: Yes  Autonomic neuropathy: No  CVA: No  Heart disease: No  Nephropathy: No  Peripheral neuropathy: Yes  Peripheral Vascular Disease: No  Retinopathy: No  Sexual dysfunction: Yes    Patient Problem List and Family Medical History reviewed for relevant medical history, current medical status, and diabetes risk factors.    Vitals:  There were no vitals taken for this visit.  Estimated body mass index is 25.24 kg/m  as calculated from the following:    Height as of 7/1/22: 1.727 m (5' 8\").    Weight as of 8/8/22: 75.3 kg (166 lb).   Last 3 BP:   BP Readings from Last 3 Encounters:   08/08/22 110/74   07/08/22 104/70   07/01/22 99/70       History   Smoking Status     Former Smoker     Packs/day: 0.00     Years: 4.00   Smokeless Tobacco     Never Used       Labs:  Lab Results   Component Value Date    A1C 8.2 07/01/2022    A1C 7.6 05/13/2021     Lab Results   Component Value Date     07/01/2022    GLC 98 05/13/2021     Lab Results   Component Value Date    LDL 55 07/01/2022     02/08/2021     HDL Cholesterol   Date Value Ref Range Status   02/08/2021 39 (L) >39 mg/dL Final     Direct Measure HDL   Date Value Ref Range Status   07/01/2022 51 >=40 mg/dL Final   ]  GFR Estimate   Date Value Ref Range Status   07/01/2022 87 >60 mL/min/1.73m2 Final     Comment:     Effective December 21, 2021 eGFRcr in adults is calculated using the 2021 CKD-EPI creatinine equation which includes age and gender (Darby seals al., [place], DOI: 10.1056/GIPNbg9933028)   06/07/2021 85 >60 mL/min/[1.73_m2] Final     GFR Estimate If Black   Date Value Ref Range Status   06/07/2021 >90 >60 " mL/min/[1.73_m2] Final     Lab Results   Component Value Date    CR 0.97 07/01/2022    CR 0.87 05/05/2021     No results found for: MICROALBUMIN    Healthy Eating:  Healthy Eating Assessed Today: Yes  Cultural/Restoration diet restrictions?: No  Meal planning/habits: Avoiding sweets, Heart healthy, Smaller portions  How many times a week on average do you eat food made away from home (restaurant/take-out)?: 0  Meals include: Breakfast, Lunch, Dinner  Breakfast: coffee with milk and sweet and low, 3 eggs, 2 toast  Or shredded wheat/corn flakes with milk, coffee  Lunch: meat, potato, salad  Dinner: salad, vegetable, chicken/steak/hamburger, pasta  Snacks: granola/energy bar, fruit, nuts  Beverages: Water, Coffee, Milk, Diet soda, Coffee drinks    Being Active:  Being Active Assessed Today: Yes  Exercise:: Yes (walking new puppy)  Days per week of moderate to strenuous exercise (like a brisk walk): 7  On average, minutes per day of exercise at this level: 60  How intense was your typical exercise? : Light (like stretching or slow walking)  Exercise Minutes per Week: 420  Barrier to exercise: Other    Monitoring:  Monitoring Assessed Today: Yes  Did patient bring glucose meter to appointment? : Yes  Blood Glucose Meter: FreeStyle  Times checking blood sugar at home (number): 5+  Times checking blood sugar at home (per): Day  Blood glucose trend: Decreasing    Taking Medications:  Diabetes Medication(s)     Biguanides       metFORMIN (GLUCOPHAGE XR) 500 MG 24 hr tablet    Take 4 tablets (2,000 mg) by mouth daily (with dinner)    Insulin       insulin glargine (BASAGLAR KWIKPEN) 100 UNIT/ML pen    INJECT 32 UNITS SUBCUTANEOUS AT BEDTIME.     Patient taking differently: Inject 28-30 Units Subcutaneous At Bedtime INJECT 32 UNITS SUBCUTANEOUS AT BEDTIME.    Incretin Mimetic Agents (GLP-1 Receptor Agonists)       semaglutide (OZEMPIC, 0.25 OR 0.5 MG/DOSE,) 2 MG/1.5ML SOPN pen    Inject 0.5 mg Subcutaneous once a week           Taking Medication Assessed Today: Yes  Current Treatments: Insulin Injections  Dose schedule: At bedtime  Given by: Patient  Injection/Infusion sites: Abdomen  Problems taking diabetes medications regularly?: No  Diabetes medication side effects?: No    Problem Solving:  Problem Solving Assessed Today: Yes  Is the patient at risk for hypoglycemia?: Yes  Hypoglycemia Frequency: Weekly  Hypoglycemia Treatment: Juice    Hypoglycemia symptoms  Confusion: No  Dizziness or Light-Headedness: Yes  Headaches: No  Hunger: No  Mood changes: No  Nervousness/Anxiety: No  Sleepiness: No  Speech difficulty: No  Sweats: No  Feeling shaky: Yes    Hypoglycemia Complications  Blackouts: No  Hospitalization: No  Nocturnal hypoglycemia: Yes  Required assistance: No  Required glucagon injection: No  Seizures: No    Reducing Risks:  Reducing Risks Assessed Today: Yes  Diabetes Risks: Age over 45 years, Family History, Hyperlipidemia  CAD Risks: No known risk factors, Sedentary lifestyle, Diabetes Mellitus, Male sex  Has dilated eye exam at least once a year?: No  Sees dentist every 6 months?: Yes  Feet checked by healthcare provider in the last year?: Yes    Healthy Coping:  Healthy Coping Assessed Today: Yes  Emotional response to diabetes: Ready to learn  Informal Support system:: Lucie based, Spouse  Stage of change: ACTION (Actively working towards change)  Patient Activation Measure Survey Score:  No flowsheet data found.      Care Plan and Education Provided:  Care Plan: Diabetes   Updates made by Natalie Harding RN since 8/25/2022 12:00 AM      Problem: Diabetes Self-Management Education Needed to Optimize Self-Care Behaviors Resolved 8/25/2022          Natalie SEQUEIRA, RN, [facility]     Time Spent: 60 minutes  Encounter Type: Individual    Any diabetes medication dose changes were made via the CDE Protocol per the patient's referring provider. A copy of this encounter was shared with the provider.

## 2022-08-24 NOTE — PROGRESS NOTES
Diabetes Self-Management Education & Support    Presents for: Individual review    CDE VISIT MODE: In Person    Assessment Type:   REPORTS:                Pt verbalized understanding of concepts discussed and recommendations provided today.       ASSESSMENT:  Review of CGM report. Glucose overall average 150mg/dl,  in target 68%, above target 27% and below target 5% of the time.   Pattern of overnight hypoglycemia      Patient would benefit from decrease to insulin dose, continue work on healthy eating.     PLAN:  Decrease insulin - 26 units daily  Meal Plan Recommendation: eat 3 meals a day, have small snacks between meals, if needed, use portion control, and use plate planning method. Refer to the meal plans and snack ideas.   - limit your fruit to 1 serving at a time ( tennis ball size), dried fruit is 2 TBSP  - limit your trail mix to 1/2 cup at a time  Continue with Activity  Treat low blood sugars with 15 grams of fast acting carbohydrate--refer to the handout    Follow up:  Follow up with MTM as scheduled.   Follow-up for annual diabetes education review in 1 year or sooner, if needed.    See Care Plan for co-developed, patient-state behavior change goals.  AVS provided for patient today.    Education Materials Provided:  [facility] Understanding Diabetes Booklet, Hypoglycemia Signs and Symptoms and My Plate Planner      SUBJECTIVE/OBJECTIVE:  Presents for: Individual review  Accompanied by: Self  Diabetes education in the past 24mo: No  Focus of Visit: Patient Unsure  Diabetes type: Type 2  Disease course: Improving  How confident are you filling out medical forms by yourself:: Extremely  Transportation concerns: No  Difficulty affording diabetes medication?: No  Difficulty affording diabetes testing supplies?: No  Other concerns:: None  Cultural Influences/Ethnic Background:  Choose not to answer    Diabetes Symptoms & Complications:  Fatigue: Sometimes  Neuropathy: Yes  Polydipsia: Yes  Polyphagia:  "No  Polyuria: No  Visual change: No  Slow healing wounds: No  Complications assessed today?: Yes  Autonomic neuropathy: No  CVA: No  Heart disease: No  Nephropathy: No  Peripheral neuropathy: Yes  Peripheral Vascular Disease: No  Retinopathy: No  Sexual dysfunction: Yes    Patient Problem List and Family Medical History reviewed for relevant medical history, current medical status, and diabetes risk factors.    Vitals:  There were no vitals taken for this visit.  Estimated body mass index is 25.24 kg/m  as calculated from the following:    Height as of 7/1/22: 1.727 m (5' 8\").    Weight as of 8/8/22: 75.3 kg (166 lb).   Last 3 BP:   BP Readings from Last 3 Encounters:   08/08/22 110/74   07/08/22 104/70   07/01/22 99/70       History   Smoking Status     Former Smoker     Packs/day: 0.00     Years: 4.00   Smokeless Tobacco     Never Used       Labs:  Lab Results   Component Value Date    A1C 8.2 07/01/2022    A1C 7.6 05/13/2021     Lab Results   Component Value Date     07/01/2022    GLC 98 05/13/2021     Lab Results   Component Value Date    LDL 55 07/01/2022     02/08/2021     HDL Cholesterol   Date Value Ref Range Status   02/08/2021 39 (L) >39 mg/dL Final     Direct Measure HDL   Date Value Ref Range Status   07/01/2022 51 >=40 mg/dL Final   ]  GFR Estimate   Date Value Ref Range Status   07/01/2022 87 >60 mL/min/1.73m2 Final     Comment:     Effective December 21, 2021 eGFRcr in adults is calculated using the 2021 CKD-EPI creatinine equation which includes age and gender (Darby seals al., NEJ, DOI: 10.1056/NNRCcu0777131)   06/07/2021 85 >60 mL/min/[1.73_m2] Final     GFR Estimate If Black   Date Value Ref Range Status   06/07/2021 >90 >60 mL/min/[1.73_m2] Final     Lab Results   Component Value Date    CR 0.97 07/01/2022    CR 0.87 05/05/2021     No results found for: MICROALBUMIN    Healthy Eating:  Healthy Eating Assessed Today: Yes  Cultural/Catholic diet restrictions?: No  Meal planning/habits: " Avoiding sweets, Heart healthy, Smaller portions  How many times a week on average do you eat food made away from home (restaurant/take-out)?: 0  Meals include: Breakfast, Lunch, Dinner  Breakfast: coffee with milk and sweet and low, 3 eggs, 2 toast  Or shredded wheat/corn flakes with milk, coffee  Lunch: meat, potato, salad  Dinner: salad, vegetable, chicken/steak/hamburger, pasta  Snacks: granola/energy bar, fruit, nuts  Beverages: Water, Coffee, Milk, Diet soda, Coffee drinks    Being Active:  Being Active Assessed Today: Yes  Exercise:: Yes (walking new puppy)  Days per week of moderate to strenuous exercise (like a brisk walk): 7  On average, minutes per day of exercise at this level: 60  How intense was your typical exercise? : Light (like stretching or slow walking)  Exercise Minutes per Week: 420  Barrier to exercise: Other    Monitoring:  Monitoring Assessed Today: Yes  Did patient bring glucose meter to appointment? : Yes  Blood Glucose Meter: FreeStyle  Times checking blood sugar at home (number): 5+  Times checking blood sugar at home (per): Day  Blood glucose trend: Decreasing    Taking Medications:  Diabetes Medication(s)     Biguanides       metFORMIN (GLUCOPHAGE XR) 500 MG 24 hr tablet    Take 4 tablets (2,000 mg) by mouth daily (with dinner)    Insulin       insulin glargine (BASAGLAR KWIKPEN) 100 UNIT/ML pen    INJECT 32 UNITS SUBCUTANEOUS AT BEDTIME.     Patient taking differently: Inject 28-30 Units Subcutaneous At Bedtime INJECT 32 UNITS SUBCUTANEOUS AT BEDTIME.    Incretin Mimetic Agents (GLP-1 Receptor Agonists)       semaglutide (OZEMPIC, 0.25 OR 0.5 MG/DOSE,) 2 MG/1.5ML SOPN pen    Inject 0.5 mg Subcutaneous once a week          Taking Medication Assessed Today: Yes  Current Treatments: Insulin Injections  Dose schedule: At bedtime  Given by: Patient  Injection/Infusion sites: Abdomen  Problems taking diabetes medications regularly?: No  Diabetes medication side effects?: No    Problem  Solving:  Problem Solving Assessed Today: Yes  Is the patient at risk for hypoglycemia?: Yes  Hypoglycemia Frequency: Weekly  Hypoglycemia Treatment: Juice    Hypoglycemia symptoms  Confusion: No  Dizziness or Light-Headedness: Yes  Headaches: No  Hunger: No  Mood changes: No  Nervousness/Anxiety: No  Sleepiness: No  Speech difficulty: No  Sweats: No  Feeling shaky: Yes    Hypoglycemia Complications  Blackouts: No  Hospitalization: No  Nocturnal hypoglycemia: Yes  Required assistance: No  Required glucagon injection: No  Seizures: No    Reducing Risks:  Reducing Risks Assessed Today: Yes  Diabetes Risks: Age over 45 years, Family History, Hyperlipidemia  CAD Risks: No known risk factors, Sedentary lifestyle, Diabetes Mellitus, Male sex  Has dilated eye exam at least once a year?: No  Sees dentist every 6 months?: Yes  Feet checked by healthcare provider in the last year?: Yes    Healthy Coping:  Healthy Coping Assessed Today: Yes  Emotional response to diabetes: Ready to learn  Informal Support system:: Lucie based, Spouse  Stage of change: ACTION (Actively working towards change)  Patient Activation Measure Survey Score:  No flowsheet data found.      Care Plan and Education Provided:  Care Plan: Diabetes   Updates made by Natalie Harding RN since 8/25/2022 12:00 AM      Problem: Diabetes Self-Management Education Needed to Optimize Self-Care Behaviors Resolved 8/25/2022          Natalie SEQUEIRA, RN, [facility]     Time Spent: 60 minutes  Encounter Type: Individual    Any diabetes medication dose changes were made via the CDE Protocol per the patient's referring provider. A copy of this encounter was shared with the provider.

## 2022-08-26 ENCOUNTER — TELEPHONE (OUTPATIENT)
Dept: FAMILY MEDICINE | Facility: CLINIC | Age: 67
End: 2022-08-26

## 2022-08-26 NOTE — TELEPHONE ENCOUNTER
Patient Quality Outreach    Patient is due for the following:   Diabetes -  Eye Exam  Physical Annual Wellness Visit      Topic Date Due     Zoster (Shingles) Vaccine (1 of 2) Never done     Flu Vaccine (1) 09/01/2022       Next Steps:   Schedule a Annual Wellness Visit    Type of outreach:    Sent letter.    Next Steps:  Reach out within 90 days via Phone.    Max number of attempts reached: No. Will try again in 90 days if patient still on fail list.    Questions for provider review:    None     Deborah Cat MA

## 2022-09-26 DIAGNOSIS — L40.50 PSORIATIC ARTHRITIS (H): ICD-10-CM

## 2022-09-27 RX ORDER — MELOXICAM 15 MG/1
TABLET ORAL
Qty: 90 TABLET | Refills: 0 | Status: SHIPPED | OUTPATIENT
Start: 2022-09-27 | End: 2022-10-24

## 2022-09-27 NOTE — TELEPHONE ENCOUNTER
Routing refill request to provider for review/approval because:  NSAID Medications Failed 09/26/2022 12:07 AM   Protocol Details  Patient is age 6-64 years    Normal CBC on file in past 12 months     Mari Jones RN

## 2022-10-01 DIAGNOSIS — E11.21 TYPE 2 DIABETES MELLITUS WITH DIABETIC NEPHROPATHY (H): ICD-10-CM

## 2022-10-01 DIAGNOSIS — K21.00 GASTROESOPHAGEAL REFLUX DISEASE WITH ESOPHAGITIS WITHOUT HEMORRHAGE: ICD-10-CM

## 2022-10-03 ENCOUNTER — OFFICE VISIT (OUTPATIENT)
Dept: PHARMACY | Facility: CLINIC | Age: 67
End: 2022-10-03
Payer: COMMERCIAL

## 2022-10-03 ENCOUNTER — LAB (OUTPATIENT)
Dept: LAB | Facility: CLINIC | Age: 67
End: 2022-10-03
Payer: COMMERCIAL

## 2022-10-03 ENCOUNTER — ALLIED HEALTH/NURSE VISIT (OUTPATIENT)
Dept: FAMILY MEDICINE | Facility: CLINIC | Age: 67
End: 2022-10-03
Payer: COMMERCIAL

## 2022-10-03 VITALS — DIASTOLIC BLOOD PRESSURE: 70 MMHG | SYSTOLIC BLOOD PRESSURE: 102 MMHG | WEIGHT: 163.8 LBS | BODY MASS INDEX: 24.91 KG/M2

## 2022-10-03 DIAGNOSIS — Z79.4 TYPE 2 DIABETES MELLITUS WITHOUT COMPLICATION, WITH LONG-TERM CURRENT USE OF INSULIN (H): Primary | ICD-10-CM

## 2022-10-03 DIAGNOSIS — F51.02 ADJUSTMENT INSOMNIA: ICD-10-CM

## 2022-10-03 DIAGNOSIS — E11.9 TYPE 2 DIABETES MELLITUS WITHOUT COMPLICATION, WITH LONG-TERM CURRENT USE OF INSULIN (H): Primary | ICD-10-CM

## 2022-10-03 DIAGNOSIS — E78.5 HYPERLIPIDEMIA LDL GOAL <100: ICD-10-CM

## 2022-10-03 DIAGNOSIS — Z23 ENCOUNTER FOR IMMUNIZATION: ICD-10-CM

## 2022-10-03 DIAGNOSIS — R33.9 URINARY RETENTION: ICD-10-CM

## 2022-10-03 DIAGNOSIS — E11.21 TYPE 2 DIABETES MELLITUS WITH DIABETIC NEPHROPATHY (H): ICD-10-CM

## 2022-10-03 DIAGNOSIS — R12 HEARTBURN: ICD-10-CM

## 2022-10-03 DIAGNOSIS — E11.42 DIABETIC POLYNEUROPATHY ASSOCIATED WITH TYPE 2 DIABETES MELLITUS (H): ICD-10-CM

## 2022-10-03 DIAGNOSIS — F32.A DEPRESSION, UNSPECIFIED DEPRESSION TYPE: ICD-10-CM

## 2022-10-03 DIAGNOSIS — G47.00 PERSISTENT INSOMNIA: ICD-10-CM

## 2022-10-03 DIAGNOSIS — Z23 HIGH PRIORITY FOR 2019-NCOV VACCINE: Primary | ICD-10-CM

## 2022-10-03 LAB — HBA1C MFR BLD: 6.6 % (ref 0–5.6)

## 2022-10-03 PROCEDURE — 90471 IMMUNIZATION ADMIN: CPT

## 2022-10-03 PROCEDURE — 90662 IIV NO PRSV INCREASED AG IM: CPT

## 2022-10-03 PROCEDURE — 0124A COVID-19,PF,PFIZER BOOSTER BIVALENT: CPT

## 2022-10-03 PROCEDURE — 99606 MTMS BY PHARM EST 15 MIN: CPT | Performed by: PHARMACIST

## 2022-10-03 PROCEDURE — 91312 COVID-19,PF,PFIZER BOOSTER BIVALENT: CPT

## 2022-10-03 PROCEDURE — 83036 HEMOGLOBIN GLYCOSYLATED A1C: CPT

## 2022-10-03 PROCEDURE — 99207 PR NO CHARGE NURSE ONLY: CPT

## 2022-10-03 PROCEDURE — 36416 COLLJ CAPILLARY BLOOD SPEC: CPT

## 2022-10-03 PROCEDURE — 99607 MTMS BY PHARM ADDL 15 MIN: CPT | Performed by: PHARMACIST

## 2022-10-03 RX ORDER — SEMAGLUTIDE 1.34 MG/ML
INJECTION, SOLUTION SUBCUTANEOUS
Qty: 4.5 ML | Refills: 1 | Status: SHIPPED | OUTPATIENT
Start: 2022-10-03 | End: 2022-12-21

## 2022-10-03 RX ORDER — MIRTAZAPINE 30 MG/1
TABLET, FILM COATED ORAL
Qty: 90 TABLET | Refills: 1 | Status: SHIPPED | OUTPATIENT
Start: 2022-10-03 | End: 2023-03-29

## 2022-10-03 NOTE — PROGRESS NOTES
Medication Therapy Management (MTM) Encounter    ASSESSMENT:                            Medication Adherence/Access: No issues identified    Type 2 Diabetes: Patient is meeting A1c goal of < 8%. Patient is meeting goal of >70% time in target with continuous glucose monitoring. Recommend decrease basal insulin dose when he restarts GLP-1 agonist to avoid hypoglycemia.     Hyperlipidemia: stable  Depression/Insomnia:  stable  Neuropathy: stable  GERD: stable  BPH: stable    Immunizations: Covid and flu vaccines today.     PLAN:                            1. Start Ozempic 0.25 mg weekly for 4 weeks then increase to 0.5 mg weekly. When you start Ozempic, decrease Basaglar to 22 units daily.     Follow-up: Return in about 6 months (around 4/3/2023) for Medication Therapy Management Pharmacist.    SUBJECTIVE/OBJECTIVE:                          Gerardo Gonzalez is a 67 year old male coming in for a follow-up visit.  Today's visit is a follow-up MTM visit from .     Reason for visit: A1c recheck.    Tobacco: He reports that he has quit smoking. He smoked 0.00 packs per day for 4.00 years. He has never used smokeless tobacco.  Alcohol: not currently using    Medication Adherence/Access: Out of [facility] pharmacy didn't have Ozempic rx for him to switch to? Also stating he needs refill of his capsules (tamsulosin and duloxetine).     Type 2 Diabetes:  Currently taking Ozempic 0.5 mg weekly, Basaglar 28 units daily, and metformin XR 1000mg twice daily. Patient is not experiencing side effects.  Blood sugar monitorinx daily or CGM    Symptoms of low blood sugar? none  Symptoms of high blood sugar? Tingling, numbness, polyphagia   Eye exam: due  Foot exam: up to date  Diet/Exercise: Really has watched what he eats - improved diet compared to 3 months ago. Diet curbed. Keto now with wife. More exercise, walking when in [place] during hunting.   Aspirin: not taking per his preference   Statin: Yes: rosuvastatin    ACEi/ARB: No  Urine Albumin:   Lab Results   Component Value Date    UMALCR 30.34 (H) 07/01/2022     Lab Results   Component Value Date    A1C 8.2 07/01/2022    A1C 7.2 08/20/2021    A1C 7.6 05/13/2021    A1C 15.0 02/08/2021    A1C 12.7 07/29/2020    A1C 14.7 11/06/2019    A1C 8.2 03/21/2019     Wt Readings from Last 3 Encounters:   10/03/22 163 lb 12.8 oz (74.3 kg)   08/08/22 166 lb (75.3 kg)   07/08/22 167 lb 8 oz (76 kg)       Hyperlipidemia: Current therapy includes rosuvastatin 20mg daily.  Patient reports no significant myalgias or other side effects.  Recent Labs   Lab Test 07/01/22  0848 08/20/21  0849 07/14/16  1547 07/06/15  0838   CHOL 132 212*   < > 201*   HDL 51 44   < > 46   LDL 55 118*   < > 85   TRIG 128 250*   < > 350*   CHOLHDLRATIO  --   --   --  4.4    < > = values in this interval not displayed.       Depression/Insomnia:  Current medications include: Duloxetine 60mg once daily, mirtazapine 30 mg bedtime, and quetiapine 25 mg bedtime as needed (every night). Pt reports that depression symptoms are unchanged and stable. Side effects of ED. No other side effects.  PHQ 2/8/2021 5/13/2021 7/1/2022   PHQ-9 Total Score 16 15 2   Q9: Thoughts of better off dead/self-harm past 2 weeks Not at all Not at all Not at all   F/U: Thoughts of suicide or self-harm - - -   F/U: Safety concerns - - -       Neuropathy: Patient taking gabapentin 800mg AM and 1600mg bedtime (no longer noon dose) and meloxicam 15 mg as needed (once weekly). No issues reported today, neuropathy if he doesn't take gabapentin. Sometimes in hands and legs.     GERD: Current medications include: Protonix (pantoprazole) 20 mg once daily. Patient reports no current symptoms.  Patient feels that current regimen is effective.    BPH: Patient taking finasteride 5 mg daily and tamsulosin 0.4 mg daily. Reports no concerns. Follows urologist.     Immunizations: Covid and flu vaccines due.  Most Recent Immunizations   Administered Date(s)  Administered     COVID-19,PF,Moderna 01/27/2022     COVID-19,PF,Moderna Booster 07/01/2022     COVID-19,PF,Pfizer 12+ YRS BIVALENT Booster 10/03/2022     Influenza (IIV3) PF 10/24/2013     Influenza Quad, Recombinant, pf(RIV4) (Flublok) 10/21/2021     Influenza Vaccine IM > 6 months Valent IIV4 (Alfuria,Fluzone) 08/15/2020     Influenza Vaccine, 6+MO IM (QUADRIVALENT W/PRESERVATIVES) 12/07/2016     Influenza, Quad, High Dose, Pf, 65yr+ (Fluzone HD) 10/03/2022     Pneumo Conj 13-V (2010&after) 03/11/2016     Pneumococcal 20 valent Conjugate (Prevnar 20) 07/01/2022     Pneumococcal 23 valent 06/26/2015     TDAP Vaccine (Adacel) 02/08/2018         Today's Vitals: /70   Wt 163 lb 12.8 oz (74.3 kg)   BMI 24.91 kg/m    ----------------      I spent 30 minutes with this patient today. All changes were made via collaborative practice agreement with Raisa Calvillo PA-C. A copy of the visit note was provided to the patient's provider(s).    The patient was given a summary of these recommendations.     Barbara Kc, PharmD, [facility]  Medication Therapy Management Provider, [facility]  Pager: [phone]     Medication Therapy Recommendations  Encounter for immunization    Rationale: Preventive therapy - Needs additional medication therapy - Indication   Recommendation: Order Vaccine - INFLUENZA VAC HIGH-DOSE QUAD - flu and covid vaccines   Status: Accepted per CPA         Type 2 diabetes mellitus without complication, with long-term current use of insulin (H)    Current Medication: semaglutide (OZEMPIC, 0.25 OR 0.5 MG/DOSE,) 2 MG/1.5ML SOPN pen   Rationale: Synergistic therapy - Needs additional medication therapy - Indication   Recommendation: Start Medication - insulin glargine 100 UNIT/ML pen   Status: Accepted per CPA

## 2022-10-03 NOTE — TELEPHONE ENCOUNTER
Routing refill request to provider for review/approval because:  Drug not active on patient's medication list    Giana Christianson RN on 10/3/2022 at 11:09 AM

## 2022-10-03 NOTE — PATIENT INSTRUCTIONS
"Recommendations from today's MTM visit:                                                         [facility] and duloxetine.   Start Ozempic 0.25 mg weekly for 4 weeks then increase to 0.5 mg weekly. When you start Ozempic, decrease Basaglar to 22 units daily.     Follow-up: Return in about 6 months (around 4/3/2023) for Medication Therapy Management Pharmacist.    It was great speaking with you today.  I value your experience and would be very thankful for your time in providing feedback in our clinic survey. In the next few days, you may receive an email or text message from [facility] [employer] with a link to a survey related to your  clinical pharmacist.\"     To schedule another MTM appointment, please call the clinic directly or you may call the MTM scheduling line at [phone] or toll-free at [phone].     My Clinical Pharmacist's contact information:                                                      Please feel free to contact me with any questions or concerns you have.      Barbara Kc, PharmD, BCACP  Medication Therapy Management Provider, [facility]  "

## 2022-10-04 RX ORDER — LOSARTAN POTASSIUM 25 MG/1
TABLET ORAL
Qty: 90 TABLET | Refills: 1 | Status: SHIPPED | OUTPATIENT
Start: 2022-10-04 | End: 2023-03-29

## 2022-10-04 RX ORDER — PANTOPRAZOLE SODIUM 20 MG/1
TABLET, DELAYED RELEASE ORAL
Qty: 90 TABLET | Refills: 1 | Status: SHIPPED | OUTPATIENT
Start: 2022-10-04 | End: 2022-12-21

## 2022-10-14 DIAGNOSIS — L40.50 PSORIATIC ARTHRITIS (H): ICD-10-CM

## 2022-10-18 RX ORDER — MELOXICAM 15 MG/1
TABLET ORAL
Qty: 90 TABLET | Refills: 0 | OUTPATIENT
Start: 2022-10-18

## 2022-10-18 NOTE — TELEPHONE ENCOUNTER
This is a duplicate refill request for meloxicam.  Refills were sent to the same pharmacy on 9/27/22.

## 2022-10-20 DIAGNOSIS — E11.21 TYPE 2 DIABETES MELLITUS WITH DIABETIC NEPHROPATHY (H): ICD-10-CM

## 2022-10-20 DIAGNOSIS — L40.50 PSORIATIC ARTHRITIS (H): ICD-10-CM

## 2022-10-21 RX ORDER — PEN NEEDLE, DIABETIC 31 GX5/16"
NEEDLE, DISPOSABLE MISCELLANEOUS
Qty: 200 EACH | Refills: 1 | Status: SHIPPED | OUTPATIENT
Start: 2022-10-21 | End: 2023-04-20

## 2022-10-21 RX ORDER — MELOXICAM 15 MG/1
TABLET ORAL
Qty: 90 TABLET | Refills: 0 | OUTPATIENT
Start: 2022-10-21

## 2022-10-21 NOTE — TELEPHONE ENCOUNTER
Prescription approved per [facility] Refill Protocol.    Signed Prescriptions:                        Disp   Refills    B-D U/F 31G X 8 MM insulin pen needle      200 ea*1        Sig: INJECT 1 EACH SUBCUTANEOUS 2 TIMES DAILY  Authorizing Provider: MADELYN NORTH  Ordering User: JASSI CALIX    Refused Prescriptions:                       Disp   Refills    meloxicam (MOBIC) 15 MG tablet [Pharmacy M*90 tab*0        Sig: TAKE 1 TABLET BY MOUTH EVERY DAY  Refused By: JASSI CALIX  Reason for Refusal: Should already have refills on file  Reason for Refusal Comment: sent on 9/27/2022    Meloxicam refused - refills on file, sent on 9/27/2022    Jassi Calix Registered Nurse  [facility]

## 2022-10-24 DIAGNOSIS — Z79.4 TYPE 2 DIABETES MELLITUS WITH DIABETIC NEPHROPATHY, WITH LONG-TERM CURRENT USE OF INSULIN (H): ICD-10-CM

## 2022-10-24 DIAGNOSIS — E11.21 TYPE 2 DIABETES MELLITUS WITH DIABETIC NEPHROPATHY, WITH LONG-TERM CURRENT USE OF INSULIN (H): ICD-10-CM

## 2022-10-24 DIAGNOSIS — L40.50 PSORIATIC ARTHRITIS (H): ICD-10-CM

## 2022-10-24 RX ORDER — MELOXICAM 15 MG/1
15 TABLET ORAL DAILY
Qty: 90 TABLET | Refills: 0 | Status: SHIPPED | OUTPATIENT
Start: 2022-10-24 | End: 2023-02-28

## 2022-10-24 NOTE — TELEPHONE ENCOUNTER
Patient calling and requesting meloxicam.  He states he was out of town for month of September and did not  and pharmacy put it back.  Discussed if he did not  his pharmacy should have that RX to fill.  He states that is not what he was told and that they told him it needed provider approval.  Routing to refill pool to process.  Bozena Francisco RN

## 2022-10-24 NOTE — TELEPHONE ENCOUNTER
Resent with pharmacy message  Prescription approved per [facility] Refill Protocol.  Alda Kingston RN, BSN  [facility]

## 2022-10-25 RX ORDER — BLOOD SUGAR DIAGNOSTIC
STRIP MISCELLANEOUS
Qty: 200 STRIP | Refills: 1 | Status: SHIPPED | OUTPATIENT
Start: 2022-10-25

## 2022-11-30 ENCOUNTER — PATIENT OUTREACH (OUTPATIENT)
Dept: FAMILY MEDICINE | Facility: CLINIC | Age: 67
End: 2022-11-30

## 2022-11-30 NOTE — TELEPHONE ENCOUNTER
Patient due for the following:  Health Maintenance Due   Topic Date Due     ADVANCE CARE PLANNING  Never done     ZOSTER IMMUNIZATION (1 of 2) Never done     LUNG CANCER SCREENING  Never done     EYE EXAM  11/18/2020     MEDICARE ANNUAL WELLNESS VISIT  Never done     Message left to return call to this RN PAL. Please transfer if available.    Direct number left for this RN PAL on message for return call.     Aliza PHILLIPS RN, BSN, PHN, PAL (patient advocate liaison)   [facility]  [phone]

## 2022-12-01 NOTE — TELEPHONE ENCOUNTER
RN received call from patient     Scheduled for [place] to Medicare visit     Next 5 appointments (look out 90 days)    Dec 21, 2022  1:00 PM  (Arrive by 12:40 PM)  Welcome to Medicare Visit with Raisa Calvillo PA-C  [facility] ([facility] ) [address] [place] [phone]        Cristal Calix Registered Nurse, PAL (Patient Advocate Liason)   [facility]   [phone]

## 2022-12-01 NOTE — TELEPHONE ENCOUNTER
Message left for patient to return call to this RN PAL - please transfer if available     Direct number left for this RN PAL on message for return call     Cristal Calix Registered Nurse PAL (patient advocate liaison)   [facility] [phone]

## 2022-12-03 DIAGNOSIS — E11.21 TYPE 2 DIABETES MELLITUS WITH DIABETIC NEPHROPATHY (H): ICD-10-CM

## 2022-12-05 RX ORDER — INSULIN GLARGINE 100 [IU]/ML
22-28 INJECTION, SOLUTION SUBCUTANEOUS AT BEDTIME
Qty: 15 ML | Refills: 0 | Status: SHIPPED | OUTPATIENT
Start: 2022-12-05 | End: 2023-02-28

## 2022-12-05 NOTE — TELEPHONE ENCOUNTER
Patient has upcoming appointment 12/21/22. Prescription approved per [facility] Refill Protocol.  Nelson HARDY RN 12/5/2022 at 2:41 PM

## 2022-12-21 ENCOUNTER — TELEPHONE (OUTPATIENT)
Dept: FAMILY MEDICINE | Facility: CLINIC | Age: 67
End: 2022-12-21

## 2022-12-21 ENCOUNTER — OFFICE VISIT (OUTPATIENT)
Dept: FAMILY MEDICINE | Facility: CLINIC | Age: 67
End: 2022-12-21
Payer: COMMERCIAL

## 2022-12-21 VITALS
SYSTOLIC BLOOD PRESSURE: 128 MMHG | BODY MASS INDEX: 25.27 KG/M2 | RESPIRATION RATE: 14 BRPM | OXYGEN SATURATION: 96 % | TEMPERATURE: 98.1 F | HEART RATE: 90 BPM | DIASTOLIC BLOOD PRESSURE: 74 MMHG | WEIGHT: 170.6 LBS | HEIGHT: 69 IN

## 2022-12-21 DIAGNOSIS — N40.1 BENIGN PROSTATIC HYPERPLASIA WITH NOCTURIA: ICD-10-CM

## 2022-12-21 DIAGNOSIS — Z00.00 MEDICARE ANNUAL WELLNESS VISIT, INITIAL: Primary | ICD-10-CM

## 2022-12-21 DIAGNOSIS — R33.9 URINARY RETENTION: ICD-10-CM

## 2022-12-21 DIAGNOSIS — F41.9 ANXIETY: ICD-10-CM

## 2022-12-21 DIAGNOSIS — M62.838 MUSCLE SPASM: ICD-10-CM

## 2022-12-21 DIAGNOSIS — K21.00 GASTROESOPHAGEAL REFLUX DISEASE WITH ESOPHAGITIS WITHOUT HEMORRHAGE: ICD-10-CM

## 2022-12-21 DIAGNOSIS — Z79.4 TYPE 2 DIABETES MELLITUS WITHOUT COMPLICATION, WITH LONG-TERM CURRENT USE OF INSULIN (H): ICD-10-CM

## 2022-12-21 DIAGNOSIS — R35.1 BENIGN PROSTATIC HYPERPLASIA WITH NOCTURIA: ICD-10-CM

## 2022-12-21 DIAGNOSIS — E11.9 TYPE 2 DIABETES MELLITUS WITHOUT COMPLICATION, WITH LONG-TERM CURRENT USE OF INSULIN (H): ICD-10-CM

## 2022-12-21 DIAGNOSIS — G47.00 PERSISTENT INSOMNIA: ICD-10-CM

## 2022-12-21 LAB — HBA1C MFR BLD: 6.5 % (ref 0–5.6)

## 2022-12-21 PROCEDURE — G0402 INITIAL PREVENTIVE EXAM: HCPCS | Performed by: PHYSICIAN ASSISTANT

## 2022-12-21 PROCEDURE — 83036 HEMOGLOBIN GLYCOSYLATED A1C: CPT | Performed by: PHYSICIAN ASSISTANT

## 2022-12-21 PROCEDURE — 36415 COLL VENOUS BLD VENIPUNCTURE: CPT | Performed by: PHYSICIAN ASSISTANT

## 2022-12-21 RX ORDER — DULOXETIN HYDROCHLORIDE 30 MG/1
30 CAPSULE, DELAYED RELEASE ORAL DAILY
Qty: 90 CAPSULE | Refills: 3 | Status: SHIPPED | OUTPATIENT
Start: 2022-12-21 | End: 2023-04-03

## 2022-12-21 RX ORDER — PANTOPRAZOLE SODIUM 20 MG/1
20 TABLET, DELAYED RELEASE ORAL DAILY
Qty: 90 TABLET | Refills: 1 | Status: SHIPPED | OUTPATIENT
Start: 2022-12-21 | End: 2023-10-26

## 2022-12-21 RX ORDER — GABAPENTIN 800 MG/1
TABLET ORAL
Qty: 360 TABLET | Refills: 3 | Status: SHIPPED | OUTPATIENT
Start: 2022-12-21 | End: 2023-07-10

## 2022-12-21 RX ORDER — SEMAGLUTIDE 1.34 MG/ML
0.5 INJECTION, SOLUTION SUBCUTANEOUS
Qty: 4.5 ML | Refills: 1 | Status: SHIPPED | OUTPATIENT
Start: 2022-12-21 | End: 2023-03-21

## 2022-12-21 RX ORDER — QUETIAPINE FUMARATE 25 MG/1
25-50 TABLET, FILM COATED ORAL
Qty: 180 TABLET | Refills: 1 | Status: SHIPPED | OUTPATIENT
Start: 2022-12-21 | End: 2023-05-05

## 2022-12-21 RX ORDER — ADALIMUMAB 40MG/0.4ML
40 KIT SUBCUTANEOUS
Status: CANCELLED | OUTPATIENT
Start: 2022-12-21

## 2022-12-21 RX ORDER — TAMSULOSIN HYDROCHLORIDE 0.4 MG/1
0.4 CAPSULE ORAL DAILY
Qty: 90 CAPSULE | Refills: 1 | Status: SHIPPED | OUTPATIENT
Start: 2022-12-21 | End: 2023-05-05

## 2022-12-21 RX ORDER — FINASTERIDE 5 MG/1
5 TABLET, FILM COATED ORAL DAILY
Qty: 90 TABLET | Refills: 1 | Status: SHIPPED | OUTPATIENT
Start: 2022-12-21 | End: 2023-05-05

## 2022-12-21 RX ORDER — DULOXETIN HYDROCHLORIDE 60 MG/1
CAPSULE, DELAYED RELEASE ORAL
Qty: 90 CAPSULE | Refills: 3 | Status: SHIPPED | OUTPATIENT
Start: 2022-12-21 | End: 2023-07-10

## 2022-12-21 RX ORDER — METFORMIN HCL 500 MG
2000 TABLET, EXTENDED RELEASE 24 HR ORAL
Qty: 360 TABLET | Refills: 1 | Status: SHIPPED | OUTPATIENT
Start: 2022-12-21 | End: 2023-05-05

## 2022-12-21 SDOH — ECONOMIC STABILITY: TRANSPORTATION INSECURITY
IN THE PAST 12 MONTHS, HAS LACK OF TRANSPORTATION KEPT YOU FROM MEETINGS, WORK, OR FROM GETTING THINGS NEEDED FOR DAILY LIVING?: NO

## 2022-12-21 SDOH — ECONOMIC STABILITY: INCOME INSECURITY: HOW HARD IS IT FOR YOU TO PAY FOR THE VERY BASICS LIKE FOOD, HOUSING, MEDICAL CARE, AND HEATING?: NOT HARD AT ALL

## 2022-12-21 SDOH — HEALTH STABILITY: PHYSICAL HEALTH: ON AVERAGE, HOW MANY DAYS PER WEEK DO YOU ENGAGE IN MODERATE TO STRENUOUS EXERCISE (LIKE A BRISK WALK)?: 2 DAYS

## 2022-12-21 SDOH — ECONOMIC STABILITY: FOOD INSECURITY: WITHIN THE PAST 12 MONTHS, THE FOOD YOU BOUGHT JUST DIDN'T LAST AND YOU DIDN'T HAVE MONEY TO GET MORE.: NEVER TRUE

## 2022-12-21 SDOH — ECONOMIC STABILITY: TRANSPORTATION INSECURITY
IN THE PAST 12 MONTHS, HAS THE LACK OF TRANSPORTATION KEPT YOU FROM MEDICAL APPOINTMENTS OR FROM GETTING MEDICATIONS?: NO

## 2022-12-21 SDOH — ECONOMIC STABILITY: FOOD INSECURITY: WITHIN THE PAST 12 MONTHS, YOU WORRIED THAT YOUR FOOD WOULD RUN OUT BEFORE YOU GOT MONEY TO BUY MORE.: NEVER TRUE

## 2022-12-21 SDOH — ECONOMIC STABILITY: INCOME INSECURITY: IN THE LAST 12 MONTHS, WAS THERE A TIME WHEN YOU WERE NOT ABLE TO PAY THE MORTGAGE OR RENT ON TIME?: NO

## 2022-12-21 ASSESSMENT — SOCIAL DETERMINANTS OF HEALTH (SDOH)
DO YOU BELONG TO ANY CLUBS OR ORGANIZATIONS SUCH AS CHURCH GROUPS UNIONS, FRATERNAL OR ATHLETIC GROUPS, OR SCHOOL GROUPS?: NO
HOW OFTEN DO YOU GET TOGETHER WITH FRIENDS OR RELATIVES?: NEVER
HOW OFTEN DO YOU ATTEND CHURCH OR RELIGIOUS SERVICES?: PATIENT DECLINED
IN A TYPICAL WEEK, HOW MANY TIMES DO YOU TALK ON THE PHONE WITH FAMILY, FRIENDS, OR NEIGHBORS?: ONCE A WEEK

## 2022-12-21 ASSESSMENT — ANXIETY QUESTIONNAIRES
1. FEELING NERVOUS, ANXIOUS, OR ON EDGE: NOT AT ALL
6. BECOMING EASILY ANNOYED OR IRRITABLE: NOT AT ALL
GAD7 TOTAL SCORE: 2
8. IF YOU CHECKED OFF ANY PROBLEMS, HOW DIFFICULT HAVE THESE MADE IT FOR YOU TO DO YOUR WORK, TAKE CARE OF THINGS AT HOME, OR GET ALONG WITH OTHER PEOPLE?: NOT DIFFICULT AT ALL
3. WORRYING TOO MUCH ABOUT DIFFERENT THINGS: SEVERAL DAYS
7. FEELING AFRAID AS IF SOMETHING AWFUL MIGHT HAPPEN: NOT AT ALL
5. BEING SO RESTLESS THAT IT IS HARD TO SIT STILL: NOT AT ALL
GAD7 TOTAL SCORE: 2
2. NOT BEING ABLE TO STOP OR CONTROL WORRYING: NOT AT ALL
GAD7 TOTAL SCORE: 2
4. TROUBLE RELAXING: SEVERAL DAYS
7. FEELING AFRAID AS IF SOMETHING AWFUL MIGHT HAPPEN: NOT AT ALL
IF YOU CHECKED OFF ANY PROBLEMS ON THIS QUESTIONNAIRE, HOW DIFFICULT HAVE THESE PROBLEMS MADE IT FOR YOU TO DO YOUR WORK, TAKE CARE OF THINGS AT HOME, OR GET ALONG WITH OTHER PEOPLE: NOT DIFFICULT AT ALL

## 2022-12-21 ASSESSMENT — ENCOUNTER SYMPTOMS
HEMATURIA: 0
HEMATOCHEZIA: 0
ABDOMINAL PAIN: 0

## 2022-12-21 ASSESSMENT — LIFESTYLE VARIABLES
HOW OFTEN DO YOU HAVE SIX OR MORE DRINKS ON ONE OCCASION: NEVER
AUDIT-C TOTAL SCORE: 2
HOW MANY STANDARD DRINKS CONTAINING ALCOHOL DO YOU HAVE ON A TYPICAL DAY: 1 OR 2
HOW OFTEN DO YOU HAVE A DRINK CONTAINING ALCOHOL: 2-4 TIMES A MONTH
SKIP TO QUESTIONS 9-10: 1

## 2022-12-21 ASSESSMENT — PATIENT HEALTH QUESTIONNAIRE - PHQ9
SUM OF ALL RESPONSES TO PHQ QUESTIONS 1-9: 0
10. IF YOU CHECKED OFF ANY PROBLEMS, HOW DIFFICULT HAVE THESE PROBLEMS MADE IT FOR YOU TO DO YOUR WORK, TAKE CARE OF THINGS AT HOME, OR GET ALONG WITH OTHER PEOPLE: NOT DIFFICULT AT ALL
SUM OF ALL RESPONSES TO PHQ QUESTIONS 1-9: 0

## 2022-12-21 ASSESSMENT — ACTIVITIES OF DAILY LIVING (ADL): CURRENT_FUNCTION: NO ASSISTANCE NEEDED

## 2022-12-21 NOTE — TELEPHONE ENCOUNTER
LMOM for pt to return call to [place].  ANDI Scott PA-C   12/21/2022  2:40 PM CST       Please call pt.      Gerardo,      Your A1c looked great at 6.5%.     Raisa Calvillo PA-C

## 2022-12-21 NOTE — PROGRESS NOTES
"SUBJECTIVE:   Gerardo is a 67 year old who presents for Preventive Visit.  Patient has been advised of split billing requirements and indicates understanding: Yes  Are you in the first 12 months of your Medicare coverage?  Yes,  Visual Acuity:  Right Eye: 10/80   Left Eye: 10/80  Both Eyes: 10/80    Healthy Habits:     In general, how would you rate your overall health?  Good    Frequency of exercise:  2-3 days/week    Duration of exercise:  15-30 minutes    Do you usually eat at least 4 servings of fruit and vegetables a day, include whole grains    & fiber and avoid regularly eating high fat or \"junk\" foods?  Yes    Taking medications regularly:  Yes    Medication side effects:  None    Ability to successfully perform activities of daily living:  No assistance needed    Home Safety:  No safety concerns identified    Hearing Impairment:  No hearing concerns    In the past 6 months, have you been bothered by leaking of urine?  No    In general, how would you rate your overall mental or emotional health?  Good      PHQ-2 Total Score: 0    Additional concerns today:  Yes      Have you ever done Advance Care Planning? (For example, a Health Directive, POLST, or a discussion with a medical provider or your loved ones about your wishes): No, advance care planning information given to patient to review.  Patient declined advance care planning discussion at this time.      Fall risk  Fallen 2 or more times in the past year?: No  Any fall with injury in the past year?: No    Cognitive Screening   1) Repeat 3 items (Leader, Season, Table)    2) Clock draw: NORMAL  3) 3 item recall: Recalls 2 objects   Results: NORMAL clock, 1-2 items recalled: COGNITIVE IMPAIRMENT LESS LIKELY    Mini-CogTM Copyright PERRY Baez. Licensed by the author for use in [facility]; reprinted with permission (gricel@[email or web address]). All rights reserved.      Do you have sleep apnea, excessive snoring or daytime drowsiness?: no    Reviewed and updated " as needed this visit by clinical staff   Tobacco  Allergies               Reviewed and updated as needed this visit by Provider                 Social History     Tobacco Use     Smoking status: Former     Packs/day: 0.00     Years: 4.00     Pack years: 0.00     Types: Cigarettes     Smokeless tobacco: Never   Substance Use Topics     Alcohol use: Yes     Alcohol/week: 6.0 standard drinks     Types: 6 Cans of beer per week     Comment: occ glass of wine     If you drink alcohol do you typically have >3 drinks per day or >7 drinks per week? No    Alcohol Use 12/21/2022   Prescreen: >3 drinks/day or >7 drinks/week? No         Current providers sharing in care for this patient include:   Patient Care Team:  Raisa Calvillo PA-C as PCP - General (Physician Assistant)  Raisa Calvillo PA-C as Assigned PCP  Barbara Kc RPH as Pharmacist (Pharmacist)  Pavel Mauro MD as Assigned Surgical Provider  Aliza Young RN as Personal Advocate & Liaison (PAL) (Family Medicine)  Natalie Harding RN as Diabetes Educator (Diabetes Education)  Barbara Kc RPH as Assigned [facility] Pharmacist    The following health maintenance items are reviewed in Epic and correct as of today:  Health Maintenance   Topic Date Due     ADVANCE CARE PLANNING  Never done     ZOSTER IMMUNIZATION (1 of 2) Never done     LUNG CANCER SCREENING  Never done     EYE EXAM  11/18/2020     MEDICARE ANNUAL WELLNESS VISIT  Never done     A1C  02/03/2023     NAOMI ASSESSMENT  06/21/2023     PHQ-9  06/21/2023     BMP  07/01/2023     CMP  07/01/2023     LIPID  07/01/2023     MICROALBUMIN  07/01/2023     DIABETIC FOOT EXAM  07/01/2023     ANNUAL REVIEW OF HM ORDERS  07/01/2023     FALL RISK ASSESSMENT  12/21/2023     COLORECTAL CANCER SCREENING  05/05/2026     DTAP/TDAP/TD IMMUNIZATION (3 - Td or Tdap) 02/08/2028     HEPATITIS C SCREENING  Completed     HIV SCREENING  Completed     INFLUENZA VACCINE  Completed     Pneumococcal Vaccine: 65+ Years   "Completed     AORTIC ANEURYSM SCREENING (SYSTEM ASSIGNED)  Completed     COVID-19 Vaccine  Completed     IPV IMMUNIZATION  Aged Out     MENINGITIS IMMUNIZATION  Aged Out     Lab work is in process  Pneumonia Vaccine:For adults 65 years or older who do not have an immunocompromising condition, cerebrospinal fluid leak, or cochlear implant and want to receive PPSV23 ONLY: Administer 1 dose of PPSV23. Anyone who received any doses of PPSV23 before age 65 should receive 1 final dose of the vaccine at age 65 or older. Administer this last dose at least 5 years after the prior PPSV23 dose.        Review of Systems   Constitutional: Negative.    HENT: Negative.    Respiratory: Negative.    Cardiovascular: Negative.    Gastrointestinal: Negative for abdominal pain and hematochezia.   Genitourinary: Negative for hematuria.   Psychiatric/Behavioral: Negative.          OBJECTIVE:   /74 (BP Location: Right arm, Patient Position: Sitting, Cuff Size: Adult Regular)   Pulse 90   Temp 98.1  F (36.7  C) (Oral)   Resp 14   Ht 1.753 m (5' 9\")   Wt 77.4 kg (170 lb 9.6 oz)   SpO2 96%   BMI 25.19 kg/m   Estimated body mass index is 25.19 kg/m  as calculated from the following:    Height as of this encounter: 1.753 m (5' 9\").    Weight as of this encounter: 77.4 kg (170 lb 9.6 oz).  Physical Exam  GENERAL APPEARANCE: healthy, alert and no distress  RESP: lungs clear to auscultation - no rales, rhonchi or wheezes  CV: regular rates and rhythm, normal S1 S2, no S3 or S4 and no murmur, click or rub  MS: extremities normal- no gross deformities noted  PSYCH: mentation appears normal and affect normal/bright        ASSESSMENT / PLAN:   (Z00.00) Medicare annual wellness visit, initial  (primary encounter diagnosis)  Comment:   Plan:     (E11.9,  Z79.4) Type 2 diabetes mellitus without complication, with long-term current use of insulin (H)  Comment: follow-up with MTM. Stable.   Plan: semaglutide (OZEMPIC, 0.25 OR 0.5 MG/DOSE,) 2 " "        MG/1.5ML SOPN pen, Hemoglobin A1c, metFORMIN         (GLUCOPHAGE XR) 500 MG 24 hr tablet            (K21.00) Gastroesophageal reflux disease with esophagitis without hemorrhage  Comment: continue  Plan: pantoprazole (PROTONIX) 20 MG EC tablet            (R33.9) Urinary retention  Comment:   Plan: tamsulosin (FLOMAX) 0.4 MG capsule, finasteride        (PROSCAR) 5 MG tablet            (N40.1,  R35.1) Benign prostatic hyperplasia with nocturia  Comment:   Plan: tamsulosin (FLOMAX) 0.4 MG capsule, finasteride        (PROSCAR) 5 MG tablet            (G47.00) Persistent insomnia  Comment: continue  Plan: gabapentin (NEURONTIN) 800 MG tablet,         QUEtiapine (SEROQUEL) 25 MG tablet            (F41.9) Anxiety  Comment: continue  Plan: gabapentin (NEURONTIN) 800 MG tablet,         DULoxetine (CYMBALTA) 60 MG capsule, DULoxetine        (CYMBALTA) 30 MG capsule            (M62.838) Muscle spasm  Comment:   Plan: tiZANidine (ZANAFLEX) 4 MG tablet              Patient has been advised of split billing requirements and indicates understanding: No      COUNSELING:  Reviewed preventive health counseling, as reflected in patient instructions       Regular exercise       Healthy diet/nutrition       Vision screening       Hearing screening       Dental care       Bladder control      BMI:   Estimated body mass index is 25.19 kg/m  as calculated from the following:    Height as of this encounter: 1.753 m (5' 9\").    Weight as of this encounter: 77.4 kg (170 lb 9.6 oz).   Weight management plan: Discussed healthy diet and exercise guidelines      He reports that he has quit smoking. He has never used smokeless tobacco.      Appropriate preventive services were discussed with this patient, including applicable screening as appropriate for cardiovascular disease, diabetes, osteopenia/osteoporosis, and glaucoma.  As appropriate for age/gender, discussed screening for colorectal cancer, prostate cancer, breast cancer, and " cervical cancer. Checklist reviewing preventive services available has been given to the patient.    Reviewed patients plan of care and provided an AVS. The Intermediate Care Plan ( asthma action plan, low back pain action plan, and migraine action plan) for Gerardo meets the Care Plan requirement. This Care Plan has been established and reviewed with the Patient.      Raisa Calvillo PA-C  [facility]    Identified Health Risks:  Answers for HPI/ROS submitted by the patient on 12/21/2022  If you checked off any problems, how difficult have these problems made it for you to do your work, take care of things at home, or get along with other people?: Not difficult at all  PHQ9 TOTAL SCORE: 0  NAOMI 7 TOTAL SCORE: 2

## 2022-12-22 ASSESSMENT — ENCOUNTER SYMPTOMS
CONSTITUTIONAL NEGATIVE: 1
RESPIRATORY NEGATIVE: 1
PSYCHIATRIC NEGATIVE: 1
CARDIOVASCULAR NEGATIVE: 1

## 2023-01-22 DIAGNOSIS — B35.3 TINEA PEDIS OF LEFT FOOT: ICD-10-CM

## 2023-01-24 RX ORDER — KETOCONAZOLE 20 MG/G
CREAM TOPICAL
Qty: 60 G | Refills: 1 | Status: SHIPPED | OUTPATIENT
Start: 2023-01-24

## 2023-01-24 NOTE — TELEPHONE ENCOUNTER
Prescription approved per [facility] Refill Protocol.      Marleni Lyles RN

## 2023-02-14 DIAGNOSIS — M62.838 MUSCLE SPASM: ICD-10-CM

## 2023-02-26 DIAGNOSIS — M62.838 MUSCLE SPASM: ICD-10-CM

## 2023-03-13 DIAGNOSIS — L40.50 PSORIATIC ARTHRITIS (H): ICD-10-CM

## 2023-03-14 RX ORDER — MELOXICAM 15 MG/1
TABLET ORAL
Qty: 90 TABLET | Refills: 0 | Status: SHIPPED | OUTPATIENT
Start: 2023-03-14 | End: 2023-04-03

## 2023-03-28 DIAGNOSIS — N40.1 BENIGN PROSTATIC HYPERPLASIA WITH NOCTURIA: ICD-10-CM

## 2023-03-28 DIAGNOSIS — F51.02 ADJUSTMENT INSOMNIA: ICD-10-CM

## 2023-03-28 DIAGNOSIS — R35.1 BENIGN PROSTATIC HYPERPLASIA WITH NOCTURIA: ICD-10-CM

## 2023-03-28 DIAGNOSIS — E11.21 TYPE 2 DIABETES MELLITUS WITH DIABETIC NEPHROPATHY (H): ICD-10-CM

## 2023-03-28 DIAGNOSIS — G47.00 PERSISTENT INSOMNIA: ICD-10-CM

## 2023-03-28 DIAGNOSIS — R33.9 URINARY RETENTION: ICD-10-CM

## 2023-03-28 DIAGNOSIS — M62.838 MUSCLE SPASM: ICD-10-CM

## 2023-03-28 NOTE — TELEPHONE ENCOUNTER
Also received fax from pharmacy requesting RX for Omeprazole 10mg or Lansoprazole 15mg, neither are active in patient's chart and there's no record of either med on med list

## 2023-03-29 RX ORDER — MIRTAZAPINE 30 MG/1
TABLET, FILM COATED ORAL
Qty: 90 TABLET | Refills: 1 | Status: SHIPPED | OUTPATIENT
Start: 2023-03-29 | End: 2023-07-10 | Stop reason: DRUGHIGH

## 2023-03-29 RX ORDER — FINASTERIDE 5 MG/1
5 TABLET, FILM COATED ORAL DAILY
Qty: 90 TABLET | Refills: 1 | OUTPATIENT
Start: 2023-03-29

## 2023-03-29 RX ORDER — QUETIAPINE FUMARATE 25 MG/1
25-50 TABLET, FILM COATED ORAL
Qty: 180 TABLET | Refills: 1 | OUTPATIENT
Start: 2023-03-29

## 2023-03-29 RX ORDER — LOSARTAN POTASSIUM 25 MG/1
TABLET ORAL
Qty: 90 TABLET | Refills: 1 | Status: SHIPPED | OUTPATIENT
Start: 2023-03-29 | End: 2023-11-09

## 2023-03-29 RX ORDER — INSULIN GLARGINE 100 [IU]/ML
INJECTION, SOLUTION SUBCUTANEOUS
OUTPATIENT
Start: 2023-03-29

## 2023-03-29 NOTE — TELEPHONE ENCOUNTER
Losartan- Prescription approved per [facility] Refill Protocol.    Mirtazipine- Routing refill request to provider for review/approval because:  Drug interaction warning    Others have refills and are too early.    Bozena Francisco RN

## 2023-03-30 DIAGNOSIS — F41.9 ANXIETY: Primary | ICD-10-CM

## 2023-03-31 RX ORDER — SERTRALINE HYDROCHLORIDE 100 MG/1
TABLET, FILM COATED ORAL
Qty: 90 TABLET | Refills: 1 | OUTPATIENT
Start: 2023-03-31

## 2023-03-31 NOTE — TELEPHONE ENCOUNTER
Routing refill request to provider for review/approval because:  Drug not active on patient's medication list    Aliza PHILLIPS RN, BSN, PHN  [facility]  [phone]

## 2023-04-03 ENCOUNTER — LAB (OUTPATIENT)
Dept: LAB | Facility: CLINIC | Age: 68
End: 2023-04-03
Payer: COMMERCIAL

## 2023-04-03 ENCOUNTER — OFFICE VISIT (OUTPATIENT)
Dept: PHARMACY | Facility: CLINIC | Age: 68
End: 2023-04-03

## 2023-04-03 ENCOUNTER — TELEPHONE (OUTPATIENT)
Dept: FAMILY MEDICINE | Facility: CLINIC | Age: 68
End: 2023-04-03

## 2023-04-03 VITALS — SYSTOLIC BLOOD PRESSURE: 100 MMHG | WEIGHT: 178 LBS | BODY MASS INDEX: 26.29 KG/M2 | DIASTOLIC BLOOD PRESSURE: 68 MMHG

## 2023-04-03 DIAGNOSIS — E78.5 HYPERLIPIDEMIA LDL GOAL <100: ICD-10-CM

## 2023-04-03 DIAGNOSIS — N40.1 BENIGN PROSTATIC HYPERPLASIA WITH NOCTURIA: ICD-10-CM

## 2023-04-03 DIAGNOSIS — R94.4 DECREASED CREATININE CLEARANCE: ICD-10-CM

## 2023-04-03 DIAGNOSIS — Z79.4 TYPE 2 DIABETES MELLITUS WITH DIABETIC NEPHROPATHY, WITH LONG-TERM CURRENT USE OF INSULIN (H): ICD-10-CM

## 2023-04-03 DIAGNOSIS — E55.9 VITAMIN D DEFICIENCY: Primary | ICD-10-CM

## 2023-04-03 DIAGNOSIS — E11.21 TYPE 2 DIABETES MELLITUS WITH DIABETIC NEPHROPATHY, WITH LONG-TERM CURRENT USE OF INSULIN (H): ICD-10-CM

## 2023-04-03 DIAGNOSIS — G47.00 PERSISTENT INSOMNIA: ICD-10-CM

## 2023-04-03 DIAGNOSIS — Z79.4 TYPE 2 DIABETES MELLITUS WITH DIABETIC NEPHROPATHY, WITH LONG-TERM CURRENT USE OF INSULIN (H): Primary | ICD-10-CM

## 2023-04-03 DIAGNOSIS — R79.89 HIGH SERUM VITAMIN B12: ICD-10-CM

## 2023-04-03 DIAGNOSIS — I10 BENIGN ESSENTIAL HYPERTENSION: ICD-10-CM

## 2023-04-03 DIAGNOSIS — E11.42 DIABETIC POLYNEUROPATHY ASSOCIATED WITH TYPE 2 DIABETES MELLITUS (H): ICD-10-CM

## 2023-04-03 DIAGNOSIS — R35.1 BENIGN PROSTATIC HYPERPLASIA WITH NOCTURIA: ICD-10-CM

## 2023-04-03 DIAGNOSIS — E11.21 TYPE 2 DIABETES MELLITUS WITH DIABETIC NEPHROPATHY, WITH LONG-TERM CURRENT USE OF INSULIN (H): Primary | ICD-10-CM

## 2023-04-03 DIAGNOSIS — F32.A DEPRESSION, UNSPECIFIED DEPRESSION TYPE: ICD-10-CM

## 2023-04-03 DIAGNOSIS — L40.50 PSORIATIC ARTHRITIS (H): ICD-10-CM

## 2023-04-03 DIAGNOSIS — Z53.20 DECLINED DENTAL FLUORIDE TREATMENT: ICD-10-CM

## 2023-04-03 DIAGNOSIS — R79.0 LOW FERRITIN: ICD-10-CM

## 2023-04-03 DIAGNOSIS — R12 HEARTBURN: ICD-10-CM

## 2023-04-03 LAB
ANION GAP SERPL CALCULATED.3IONS-SCNC: 13 MMOL/L (ref 7–15)
BUN SERPL-MCNC: 19.4 MG/DL (ref 8–23)
CALCIUM SERPL-MCNC: 9.7 MG/DL (ref 8.8–10.2)
CHLORIDE SERPL-SCNC: 105 MMOL/L (ref 98–107)
CREAT SERPL-MCNC: 1.27 MG/DL (ref 0.67–1.17)
DEPRECATED CALCIDIOL+CALCIFEROL SERPL-MC: 7 UG/L (ref 20–75)
DEPRECATED HCO3 PLAS-SCNC: 21 MMOL/L (ref 22–29)
FERRITIN SERPL-MCNC: 25 NG/ML (ref 31–409)
GFR SERPL CREATININE-BSD FRML MDRD: 63 ML/MIN/1.73M2
GLUCOSE SERPL-MCNC: 148 MG/DL (ref 70–99)
HBA1C MFR BLD: 7.4 % (ref 0–5.6)
HGB BLD-MCNC: 12.4 G/DL (ref 13.3–17.7)
HOLD SPECIMEN: NORMAL
IRON BINDING CAPACITY (ROCHE): 397 UG/DL (ref 240–430)
IRON SATN MFR SERPL: 12 % (ref 15–46)
IRON SERPL-MCNC: 47 UG/DL (ref 61–157)
POTASSIUM SERPL-SCNC: 4.9 MMOL/L (ref 3.4–5.3)
SODIUM SERPL-SCNC: 139 MMOL/L (ref 136–145)
VIT B12 SERPL-MCNC: 3227 PG/ML (ref 232–1245)

## 2023-04-03 PROCEDURE — 36415 COLL VENOUS BLD VENIPUNCTURE: CPT

## 2023-04-03 PROCEDURE — 82306 VITAMIN D 25 HYDROXY: CPT

## 2023-04-03 PROCEDURE — 83540 ASSAY OF IRON: CPT

## 2023-04-03 PROCEDURE — 80048 BASIC METABOLIC PNL TOTAL CA: CPT

## 2023-04-03 PROCEDURE — 83036 HEMOGLOBIN GLYCOSYLATED A1C: CPT

## 2023-04-03 PROCEDURE — 85018 HEMOGLOBIN: CPT

## 2023-04-03 PROCEDURE — 82728 ASSAY OF FERRITIN: CPT

## 2023-04-03 PROCEDURE — 83550 IRON BINDING TEST: CPT

## 2023-04-03 PROCEDURE — 82607 VITAMIN B-12: CPT

## 2023-04-03 PROCEDURE — 99207 PR NO CHARGE LOS: CPT | Performed by: PHARMACIST

## 2023-04-03 RX ORDER — BLOOD-GLUCOSE SENSOR
1 EACH MISCELLANEOUS
Qty: 2 EACH | Refills: 11 | Status: SHIPPED | OUTPATIENT
Start: 2023-04-03 | End: 2023-12-29

## 2023-04-03 RX ORDER — MELOXICAM 15 MG/1
15 TABLET ORAL DAILY
Qty: 90 TABLET | Refills: 0 | Status: SHIPPED | OUTPATIENT
Start: 2023-04-03 | End: 2023-12-29

## 2023-04-03 RX ORDER — SEMAGLUTIDE 1.34 MG/ML
1 INJECTION, SOLUTION SUBCUTANEOUS
Qty: 9 ML | Refills: 3 | Status: SHIPPED | OUTPATIENT
Start: 2023-04-03 | End: 2023-07-10 | Stop reason: DRUGHIGH

## 2023-04-03 NOTE — PROGRESS NOTES
Medication Therapy Management (MTM) Encounter    ASSESSMENT:                            Medication Adherence/Access: No issues identified    Type 2 Diabetes: Patient is not meeting A1c goal of < 7%. Self monitoring of blood glucose is not at goal of > 70% time in target with continuous glucose monitoring. Will increase Ozempic dose and decrease insulin to avoid hypoglycemia. Checked vitamin B12 and D labs per patient request to monitor if any B12 deficiency from metformin use. Also checked hemoglobin and iron studies d/t fatigue symptoms lately.     Hypertension: stable  Hyperlipidemia: stable    Depression/Insomnia: stable, educated mirtazapine and quetiapine can contribute to fatigue symptoms. Future could consider decreasing dose.    Neuropathy/Arthritis: Educated he may  [facility]. Educated can make him more fatigued and to monitor fatigue with gabapentin use.     GERD: stable  BPH: stable    PLAN:                            1. Increase Ozempic to 1 mg weekly.  2. Decrease Basaglar insulin to 18 units daily. If you continue to have low blood glucose levels, you can decrease further by 2 units.    Follow-up: Return in about 3 months (around 7/3/2023) for Lab Work, Medication Therapy Management Pharmacist.    SUBJECTIVE/OBJECTIVE:                          Gerardo Gonzalez is a 67 year old male coming in for a follow-up visit.  Today's visit is a follow-up MTM visit from 10/3.     Reason for visit: blood glucose review.    Tobacco: He reports that he has quit smoking. He has never used smokeless tobacco.  Alcohol: not currently using    Medication Adherence/Access: Out of [facility] pharmacy didn't have Ozempic rx for him to switch to? Also stating he needs refill of his capsules (tamsulosin and duloxetine).     Type 2 Diabetes:  Currently taking Ozempic 0.5 mg weekly, Basaglar 20 units daily, and metformin XR 1000mg twice daily. Patient is not experiencing side effects. He  wonders about B12 deficiency from metformin. Has been more fatigued lately.   Blood sugar monitoring: CGM    Symptoms of low blood sugar? Once in a while overnight or morning  Symptoms of high blood sugar? Tingling, numbness, polyphagia   Eye exam: due  Foot exam: up to date  Diet/Exercise: An apple, banana and orange every day. Doesn't eat a lot but states it could be what he's eating he thinks - potatoes. Worse diet over holidays. Not as much walking with winter.   Aspirin: not taking per his preference   Statin: Yes: rosuvastatin   ACEi/ARB: yes losartan  Urine Albumin:   Lab Results   Component Value Date    UMALCR 30.34 (H) 07/01/2022     Lab Results   Component Value Date    A1C 7.4 04/03/2023    A1C 6.5 12/21/2022    A1C 6.6 10/03/2022    A1C 8.2 07/01/2022    A1C 7.2 08/20/2021    A1C 7.6 05/13/2021    A1C 15.0 02/08/2021    A1C 12.7 07/29/2020    A1C 14.7 11/06/2019    A1C 8.2 03/21/2019     Wt Readings from Last 3 Encounters:   04/03/23 178 lb (80.7 kg)   12/21/22 170 lb 9.6 oz (77.4 kg)   10/03/22 163 lb 12.8 oz (74.3 kg)       Hypertension: Current medications include losartan 25 mg daily.  Patient does not self-monitor blood pressure.  Patient reports no current medication side effects.  BP Readings from Last 3 Encounters:   04/03/23 100/68   12/21/22 128/74   10/03/22 102/70       Hyperlipidemia: Current therapy includes rosuvastatin 20mg daily.  Patient reports no significant myalgias or other side effects.  Recent Labs   Lab Test 07/01/22  0848 08/20/21  0849 07/14/16  1547 07/06/15  0838   CHOL 132 212*   < > 201*   HDL 51 44   < > 46   LDL 55 118*   < > 85   TRIG 128 250*   < > 350*   CHOLHDLRATIO  --   --   --  4.4    < > = values in this interval not displayed.       Depression/Insomnia:  Current medications include: Duloxetine 60mg once daily (for neuropathy), mirtazapine 30 mg bedtime, and quetiapine 25 mg bedtime as needed (some nights). Pt reports that depression symptoms are unchanged and  stable. Side effects of ED. No other side effects.      5/13/2021     6:50 AM 7/1/2022     7:47 AM 12/21/2022    12:28 PM   PHQ   PHQ-9 Total Score 15 2 0   Q9: Thoughts of better off dead/self-harm past 2 weeks Not at all Not at all Not at all       Neuropathy/Arthritis: Patient taking duloxetine 60 mg daily, gabapentin 800mg AM, 800 mg afternoon, and 1600mg bedtime (he thinks this is helpful), and meloxicam 15 mg as needed (some days, he lost his bottle so would like another refill). No issues reported today, neuropathy if he doesn't take gabapentin. Sometimes in hands and legs. Wonders about his psoriatic arthritis in hands, stiff in the mornings. Doesn't have tizanidine at home and would like this. Wonders about his kidneys and liver health.     GERD: Current medications include: Protonix (pantoprazole) 20 mg once daily. Patient reports no current symptoms.  Patient feels that current regimen is effective.    BPH: Patient taking finasteride 5 mg daily and tamsulosin 0.4 mg daily. Reports no concerns. Follows urologist.     Today's Vitals: /68   Wt 178 lb (80.7 kg)   BMI 26.29 kg/m    ----------------      I spent 40 minutes with this patient today. All changes were made via collaborative practice agreement with Raisa Calvillo PA-C. A copy of the visit note was provided to the patient's provider(s).    A summary of these recommendations was given to the patient.    Barbara Kc, PharmD, BCACP  Medication Therapy Management Provider, [facility]  Pager: [phone]     Medication Therapy Recommendations  Type 2 diabetes mellitus without complication, with long-term current use of insulin (H)    Current Medication: Semaglutide, 1 MG/DOSE, (OZEMPIC, 1 MG/DOSE,) 4 MG/3ML pen   Rationale: Dose too low - Dosage too low - Effectiveness   Recommendation: Increase Dose - insulin glargine 100 UNIT/ML pen   Status: Accepted per CPA

## 2023-04-03 NOTE — TELEPHONE ENCOUNTER
PA Initiation    Medication: FreeStyle Hugo 3 Sensor  Insurance Company: [employer] - Phone [phone] Fax [phone]  Pharmacy Filling the Rx:    Filling Pharmacy Phone:    Filling Pharmacy Fax:    Start Date: 4/3/2023

## 2023-04-03 NOTE — TELEPHONE ENCOUNTER
PRIOR AUTHORIZATION DENIED    Medication: FreeStyle Hugo 3 Sensor - PA Denied    Denial Date: 4/3/2023    Denial Rational: Plan Exclusion. Must be billed medically.        Appeal Information:                 Thank you,     Simon Mckinney [facility]  [facility]  [email or web address]   Phone: [phone]  Fax: [phone]

## 2023-04-04 NOTE — TELEPHONE ENCOUNTER
I believe he's paying out of pocket for Hugo sensors ($75/mo) so this shouldn't be an issue. Will inform him.    Barbara Kc, PharmD, [facility]  Medication Therapy Management Provider, [facility]  Pager: [phone]

## 2023-04-04 NOTE — TELEPHONE ENCOUNTER
Routed to [facility], see PA note  Alda Kingston, RN, BSN  [facility]

## 2023-04-04 NOTE — TELEPHONE ENCOUNTER
Released to prescription to pharmacy. Closing encounter.     Thank you,     Simon Mckinney CPhT  Pharmacy Clinic [facility]  [email or web address]   Phone: [phone]  Fax: [phone]

## 2023-04-04 NOTE — TELEPHONE ENCOUNTER
See my note below. I thought I routed to [facility], but not sure if I sent it correctly. Can RN re-route, please?

## 2023-04-10 ENCOUNTER — TELEPHONE (OUTPATIENT)
Dept: FAMILY MEDICINE | Facility: CLINIC | Age: 68
End: 2023-04-10
Payer: COMMERCIAL

## 2023-04-10 NOTE — TELEPHONE ENCOUNTER
Message left to return call to this RN PAL. Please transfer if available.    Direct number left for this RN PAL on message for return call.     Aliza PHILLIPS RN, BSN, PHN, PAL (patient advocate liaison)   [facility]  [phone]

## 2023-04-10 NOTE — TELEPHONE ENCOUNTER
Please call pt. He has not read the following [facility] messages.    Gerardo,     The results of your recent Vitamin D test were VERY low at 7, we prefer this to be closer to 50.  I have sent a prescription for weekly Vitamin D3 over to the pharmacy for you.  B12 was high, please cut back on your B12 supplements. Ferritin (iron storage) was low, likely partly due to D level being low. Your creatinine from your kidneys was up too.      Please make a LAB ONLY appointment in 8 weeks to have your levels rechecked.      Take Care,      KAYE Moreira,     Please start the high dose weekly vitamin D Raisa sent for you and schedule for a lab only in 8 weeks to recheck blood work. Please stay hydrated. Your iron labs were a little low too so if they're not better when we recheck, we should start a daily iron supplement. This could be why you're feeling tired lately.     Barbara Kc, PharmD, BCACP  Medication Therapy Management Provider, [facility]

## 2023-04-10 NOTE — LETTER
April 12, 2023      Gerardo Gonzalez  [address]  [place] [address]        Dear Gerardo,       We have been trying to reach you with a message from Raisa Calvillo PA-C      Please return call to either Aliza SHEARER [phone] or myself number at bottom of letter         Sincerely,    Cristal Calix, Registered Nurse, PAL (Patient Advocate Liaison)   [facility]   [phone]

## 2023-04-11 NOTE — TELEPHONE ENCOUNTER
Message #2 left to return call to this RN PAL. Please transfer if available.    Direct number left for this RN PAL on message for return call.     Aliza PHILLIPS RN, BSN, PHN, PAL (patient advocate liaison)   [facility]  [phone]

## 2023-04-12 NOTE — TELEPHONE ENCOUNTER
Message #3 left for patient to return call to this RN PAL - please transfer if available     Direct number left for this RN PAL on message for return call     Letter mailed asking for return call to RN PAL     Postponing to ensure follow up completed     Cristal Calix Registered Nurse PAL (patient advocate liaison)   [facility] [phone]

## 2023-04-14 NOTE — TELEPHONE ENCOUNTER
[employer] message sent. Will postpone to next week to review if patient viewed message.     Aliza PHILLIPS RN, BSN, PHN - PAL (patient advocate liaison)  [facility]  [phone]

## 2023-04-18 RX ORDER — SERTRALINE HYDROCHLORIDE 100 MG/1
TABLET, FILM COATED ORAL
Qty: 15 TABLET | OUTPATIENT
Start: 2023-04-18

## 2023-04-18 NOTE — TELEPHONE ENCOUNTER
Routing refill request to provider for review/approval because:  Drug not active on patient's medication list    Barbara MUHAMMAD RN   [facility]

## 2023-04-18 NOTE — TELEPHONE ENCOUNTER
Patient left voicemail for this RN returning call.     Message left to return call to this RN PAL. Please transfer if available.    Direct number left for this RN PAL on message for return call.     Aliza PHILLIPS RN, BSN, PHN, PAL (patient advocate liaison)   [facility]  [phone]

## 2023-04-19 DIAGNOSIS — E11.21 TYPE 2 DIABETES MELLITUS WITH DIABETIC NEPHROPATHY (H): ICD-10-CM

## 2023-04-19 NOTE — TELEPHONE ENCOUNTER
Message #2 left to return call to this RN PAL. Please transfer if available.    Direct number left for this RN PAL on message for return call.     Aliza PHILLIPS RN, BSN, PHN, PAL (patient advocate liaison)   [facility]  [phone]

## 2023-04-20 RX ORDER — PEN NEEDLE, DIABETIC 31 GX5/16"
NEEDLE, DISPOSABLE MISCELLANEOUS
Qty: 200 EACH | Refills: 1 | Status: SHIPPED | OUTPATIENT
Start: 2023-04-20

## 2023-05-05 DIAGNOSIS — E55.9 VITAMIN D DEFICIENCY: ICD-10-CM

## 2023-05-05 DIAGNOSIS — E11.9 TYPE 2 DIABETES MELLITUS WITHOUT COMPLICATION, WITH LONG-TERM CURRENT USE OF INSULIN (H): ICD-10-CM

## 2023-05-05 DIAGNOSIS — R33.9 URINARY RETENTION: ICD-10-CM

## 2023-05-05 DIAGNOSIS — E11.21 TYPE 2 DIABETES MELLITUS WITH DIABETIC NEPHROPATHY (H): ICD-10-CM

## 2023-05-05 DIAGNOSIS — R35.1 BENIGN PROSTATIC HYPERPLASIA WITH NOCTURIA: ICD-10-CM

## 2023-05-05 DIAGNOSIS — Z79.4 TYPE 2 DIABETES MELLITUS WITHOUT COMPLICATION, WITH LONG-TERM CURRENT USE OF INSULIN (H): ICD-10-CM

## 2023-05-05 DIAGNOSIS — G47.00 PERSISTENT INSOMNIA: ICD-10-CM

## 2023-05-05 DIAGNOSIS — N40.1 BENIGN PROSTATIC HYPERPLASIA WITH NOCTURIA: ICD-10-CM

## 2023-05-05 DIAGNOSIS — M62.838 MUSCLE SPASM: ICD-10-CM

## 2023-05-05 RX ORDER — QUETIAPINE FUMARATE 25 MG/1
25-50 TABLET, FILM COATED ORAL
Qty: 180 TABLET | Refills: 0 | Status: SHIPPED | OUTPATIENT
Start: 2023-05-05 | End: 2023-07-13

## 2023-05-05 RX ORDER — METFORMIN HCL 500 MG
TABLET, EXTENDED RELEASE 24 HR ORAL
Qty: 360 TABLET | Refills: 0 | Status: SHIPPED | OUTPATIENT
Start: 2023-05-05 | End: 2023-07-26

## 2023-05-05 RX ORDER — CHOLECALCIFEROL (VITAMIN D3) 1250 MCG
CAPSULE ORAL
Qty: 8 CAPSULE | Refills: 0 | Status: SHIPPED | OUTPATIENT
Start: 2023-05-05 | End: 2023-07-10

## 2023-05-05 RX ORDER — FINASTERIDE 5 MG/1
5 TABLET, FILM COATED ORAL DAILY
Qty: 90 TABLET | Refills: 0 | Status: SHIPPED | OUTPATIENT
Start: 2023-05-05 | End: 2023-07-13

## 2023-05-05 RX ORDER — INSULIN GLARGINE 100 [IU]/ML
18 INJECTION, SOLUTION SUBCUTANEOUS DAILY
Qty: 30 ML | Refills: 1 | Status: SHIPPED | OUTPATIENT
Start: 2023-05-05 | End: 2023-07-10

## 2023-05-05 RX ORDER — TAMSULOSIN HYDROCHLORIDE 0.4 MG/1
0.4 CAPSULE ORAL DAILY
Qty: 90 CAPSULE | Refills: 0 | Status: SHIPPED | OUTPATIENT
Start: 2023-05-05 | End: 2023-07-13

## 2023-05-05 NOTE — TELEPHONE ENCOUNTER
Routing refill request to provider for review/approval because:  Drug not on the [facility] refill protocol   Vitamin Supplements (Adult) Protocol Failed 05/05/2023 09:02 AM   Protocol Details  High dose Vitamin D not ordered   Verify insulin dose    Bozena Francisco RN

## 2023-06-13 ENCOUNTER — TELEPHONE (OUTPATIENT)
Dept: FAMILY MEDICINE | Facility: CLINIC | Age: 68
End: 2023-06-13
Payer: COMMERCIAL

## 2023-06-13 DIAGNOSIS — E11.21 TYPE 2 DIABETES MELLITUS WITH DIABETIC NEPHROPATHY (H): Primary | ICD-10-CM

## 2023-06-13 NOTE — LETTER
June 19, 2023      Gerardo Gonzalez  [id number]  [place] [address]        Dear Gerardo,      We received a notification from your pharmacy stating that your insurance will no longer cover the Freestyle Hugo 3.     I spoke with the pharmacy staff who informs that if you would like to continue using the Freestyle Hugo 3 your estimated out of pocket cost will be $86.96.     Please let us know if you would like to proceed with paying out of pocket at this time.     Thank you,     Aliza PHILLIPS RN, BSN, PHN - PAL (patient advocate liaison)  [facility]  [phone]

## 2023-06-13 NOTE — TELEPHONE ENCOUNTER
"Routed to SB3 PAL, see note below and earlier MTM visit note, can you follow up with pt or inform [facility] not to send thru insurance?    \"I believe he's paying out of pocket for Hugo sensors ($75/mo) so this shouldn't be an issue. Will inform him.     Barbara Kc, PharmD, BCACP  Medication Therapy Management Provider, [facility]  Pager: [phone]\"    Alda Kingston RN, BSN  [facility]    "

## 2023-06-13 NOTE — TELEPHONE ENCOUNTER
Message left to return call to this RN PAL. Please transfer if available.    Direct number left for this RN PAL on message for return call.     Aliza PHILLIPS RN, BSN, PHN, PAL (patient advocate liaison)   [facility]  [phone]

## 2023-06-13 NOTE — TELEPHONE ENCOUNTER
Fax received from pharmacy stating that patient's insurance no longer covers Freestyle Hugo 3, pharmacy is requesting RX for Hugo 2 Sensors

## 2023-06-14 NOTE — TELEPHONE ENCOUNTER
Spoke to pharmacy staff who informs $75/month is with patients insurance. Without insurance out of pocket cost increases to $86.96.  -Pharmacy will process without insurance and inform patient when it is ready. RN requested if patient has concerns regarding price to have patient reach out to PCP. Pharmacy placed a note on patients account.     Message #2 left for patient to return call to this RN PAL. Please transfer if available.    Direct number left for this RN PAL on message for return call.     Aliza PHILLIPS RN, BSN, PHN, PAL (patient advocate liaison)   [facility]  [phone]

## 2023-06-16 NOTE — TELEPHONE ENCOUNTER
Larisa johnson.     Aliza PHILLIPS RN, BSN, PHN - PAL (patient advocate liaison)  [facility]  [phone]

## 2023-06-19 NOTE — TELEPHONE ENCOUNTER
Letter sent. Will close encounter    Aliza PHILLIPS RN, BSN, PHN - PAL (patient advocate liaison)  [facility]  [phone]

## 2023-07-09 NOTE — PROGRESS NOTES
Medication Therapy Management (MTM) Encounter    ASSESSMENT:                            Medication Adherence/Access: No issues identified    Type 2 Diabetes: Patient is not meeting A1c goal of < 7%. Unable to fully assess self monitoring of blood glucose but does report some hypoglycemia. Will increase Ozempic dose and reduce insulin. Decrease portion size of fruit. Helped connect Freestyle to clinic.     Hypertension: blood pressure is well below goal <130/80. Will have patient hold losartan and recheck blood pressure in 4 weeks. May not need pharmacotherapy right now although history of proteinuria. Repeat pending. May re-start at 12.5 mg once daily at next visit.     Hyperlipidemia: stable    Depression/anxiety/Insomnia: lower doses of mirtazapine can be more effective for sleep. Will reduce mirtazapine dose and see if he needs quetiapine less. Try taking quetiapine 25 mg as needed instead of 50 mg as needed.    Neuropathy/Arthritis/muscle spasms: try taking tizanidine at night as needed to reduce daytime fatigue     Vitamin D deficiency: would benefit from maintenance vitamin D supplement     PLAN:                            1. Increase Ozempic to 2 mg weekly.  2. Decrease Basaglar insulin to 16 units daily. If you continue to have low blood glucose levels, you can decrease further by 2 units.   3. Decrease mirtazapine dose to 15 mg at bedtime, try using quetiapine 25 mg instead of 50 mg as needed for sleep   4. Take tizanidine at bedtime as needed for muscle spasms   5. Start taking vitamin D 2000 units once daily at bedtime   6. Hold losartan until next visit    Follow-up: No follow-ups on file.    SUBJECTIVE/OBJECTIVE:                          Greardo Gonzalez is a 67 year old male coming in for a follow-up visit.  Today's visit is a follow-up MTM visit from 4/3.     Reason for visit: blood glucose review.    Tobacco: He reports that he has quit smoking. He has never used smokeless tobacco.  Alcohol: not currently  using    Medication Adherence/Access: no reported issues today    Type 2 Diabetes:  Currently taking Ozempic 1 mg weekly, Basaglar 22 units daily (was instructed to reduce to 18 units last visit), and metformin XR 1000mg twice daily. Patient is not experiencing side effects.   Blood sugar monitoring: CGM - didn't bring Hugo into clinic.  Symptoms of low blood sugar?  Reports hypos around 56 about 4 times in the last month. They occur in the morning on nights he skips dinner due to reduce appetite.   Symptoms of high blood sugar? Tingling, numbness, polyphagia   Eye exam: due  Foot exam: up to date  Diet/Exercise: protein every meal. Salads. Diet coke if he has a soda. Trying to reduce carbs. Sometimes half a donut. Likes fruit, will have multiple at a time.  Aspirin: not taking per his preference   Statin: Yes: rosuvastatin   ACEi/ARB: yes losartan    Urine Albumin:   Lab Results   Component Value Date    UMALCR 30.34 (H) 07/01/2022      Lab Results   Component Value Date    A1C 7.8 (H) 07/10/2023     Wt Readings from Last 3 Encounters:   07/10/23 179 lb 4.8 oz (81.3 kg)   04/03/23 178 lb (80.7 kg)   12/21/22 170 lb 9.6 oz (77.4 kg)     Hypertension: Current medications include losartan 25 mg daily.  Patient does not self-monitor blood pressure.  Patient reports no current medication side effects.  BP Readings from Last 3 Encounters:   07/10/23 97/66   04/03/23 100/68   12/21/22 128/74     Hyperlipidemia: Current therapy includes rosuvastatin 20mg daily.  Patient reports no significant myalgias or other side effects.  Recent Labs   Lab Test 07/01/22  0848 08/20/21  0849 07/14/16  1547 07/06/15  0838   CHOL 132 212*   < > 201*   HDL 51 44   < > 46   LDL 55 118*   < > 85   TRIG 128 250*   < > 350*   CHOLHDLRATIO  --   --   --  4.4    < > = values in this interval not displayed.     Depression/anxiety/Insomnia:  Current medications include: Duloxetine 90 mg once daily (for neuropathy), mirtazapine 30 mg bedtime, and  quetiapine 50 mg bedtime as needed (2-3 times per week). Pt reports that depression symptoms are unchanged and stable. Side effects of dry mouth and ED. Sleeping about 6 hours per night. Feels fatigued the next day, although thinks this is improving a bit.       5/13/2021     6:50 AM 7/1/2022     7:47 AM 12/21/2022    12:28 PM   PHQ   PHQ-9 Total Score 15 2 0   Q9: Thoughts of better off dead/self-harm past 2 weeks Not at all Not at all Not at all         5/13/2021     8:20 AM 7/1/2022     7:48 AM 12/21/2022    12:29 PM   NAOMI-7 SCORE   Total Score  0 (minimal anxiety) 2 (minimal anxiety)   Total Score 7 0 2     Neuropathy/Arthritis/muscle spasms:  Patient taking duloxetine 90 mg daily, gabapentin 800mg AM, 800 mg afternoon, and 800mg bedtime, tizanidine 4 mg as needed for muscle spasms (takes in the mornings sometimes) and meloxicam 15 mg as needed. No issues reported today. Thinks duloxetine has been helping neuropathy. Feels tired during the day.     Vitamin D deficiency: completed 8 week course of ergocalciferol.   Component      Latest Ref Rng 4/3/2023  8:25 AM   Vitamin D Deficiency screening      20 - 75 ug/L 7 (L)       Today's Vitals: BP 97/66   Pulse 87   Wt 179 lb 4.8 oz (81.3 kg)   BMI 26.48 kg/m    ----------------    I spent 50 minutes with this patient today. All changes were made via collaborative practice agreement with Raisa Calvillo PA-C. A copy of the visit note was provided to the patient's provider(s).    A summary of these recommendations was given to the patient.    Bisi Howard, PharmD, AAHIVP  Medication Therapy Management Pharmacist   July 10, 2023     Medication Therapy Recommendations  Adjustment insomnia    Current Medication: mirtazapine (REMERON) 30 MG tablet (Discontinued)   Rationale: Dose too high - Dosage too high - Safety   Recommendation: Decrease Dose - mirtazapine 15 MG tablet   Status: Accepted per CPA         Benign essential hypertension    Current Medication:  losartan (COZAAR) 25 MG tablet   Rationale: Medication requires monitoring - Needs additional monitoring   Recommendation: Self-Monitoring   Status: Accepted per CPA         Muscle spasm    Current Medication: tiZANidine (ZANAFLEX) 4 MG tablet   Rationale: Undesirable effect - Adverse medication event - Safety   Recommendation: Change Administration Time   Status: Patient Agreed - Adherence/Education         Type 2 diabetes mellitus without complication, with long-term current use of insulin (H)    Current Medication: Semaglutide, 1 MG/DOSE, (OZEMPIC, 1 MG/DOSE,) 4 MG/3ML pen (Discontinued)   Rationale: Dose too low - Dosage too low - Effectiveness   Recommendation: Increase Dose - Ozempic (1 MG/DOSE) 2 MG/1.5ML Sopn   Status: Accepted per CPA          Current Medication: insulin glargine (BASAGLAR KWIKPEN) 100 UNIT/ML pen   Rationale: Dose too high - Dosage too high - Safety   Recommendation: Decrease Dose   Status: Accepted per CPA         Vitamin D deficiency    Rationale: Untreated condition - Needs additional medication therapy - Indication   Recommendation: Start Medication - vitamin D 50 MCG (2000 UT) Caps   Status: Accepted per CPA

## 2023-07-10 ENCOUNTER — OFFICE VISIT (OUTPATIENT)
Dept: PHARMACY | Facility: CLINIC | Age: 68
End: 2023-07-10
Payer: COMMERCIAL

## 2023-07-10 ENCOUNTER — LAB (OUTPATIENT)
Dept: LAB | Facility: CLINIC | Age: 68
End: 2023-07-10
Payer: COMMERCIAL

## 2023-07-10 VITALS
DIASTOLIC BLOOD PRESSURE: 66 MMHG | BODY MASS INDEX: 26.48 KG/M2 | WEIGHT: 179.3 LBS | SYSTOLIC BLOOD PRESSURE: 97 MMHG | HEART RATE: 87 BPM

## 2023-07-10 DIAGNOSIS — F51.02 ADJUSTMENT INSOMNIA: ICD-10-CM

## 2023-07-10 DIAGNOSIS — I10 BENIGN ESSENTIAL HYPERTENSION: ICD-10-CM

## 2023-07-10 DIAGNOSIS — R94.4 DECREASED CREATININE CLEARANCE: ICD-10-CM

## 2023-07-10 DIAGNOSIS — E11.42 DIABETIC POLYNEUROPATHY ASSOCIATED WITH TYPE 2 DIABETES MELLITUS (H): ICD-10-CM

## 2023-07-10 DIAGNOSIS — F32.A DEPRESSION, UNSPECIFIED DEPRESSION TYPE: ICD-10-CM

## 2023-07-10 DIAGNOSIS — R79.89 HIGH SERUM VITAMIN B12: ICD-10-CM

## 2023-07-10 DIAGNOSIS — Z79.4 TYPE 2 DIABETES MELLITUS WITH DIABETIC NEPHROPATHY, WITH LONG-TERM CURRENT USE OF INSULIN (H): ICD-10-CM

## 2023-07-10 DIAGNOSIS — M62.838 MUSCLE SPASM: ICD-10-CM

## 2023-07-10 DIAGNOSIS — F41.9 ANXIETY: ICD-10-CM

## 2023-07-10 DIAGNOSIS — E55.9 VITAMIN D DEFICIENCY: ICD-10-CM

## 2023-07-10 DIAGNOSIS — E78.5 HYPERLIPIDEMIA LDL GOAL <100: ICD-10-CM

## 2023-07-10 DIAGNOSIS — E11.21 TYPE 2 DIABETES MELLITUS WITH DIABETIC NEPHROPATHY, WITH LONG-TERM CURRENT USE OF INSULIN (H): ICD-10-CM

## 2023-07-10 DIAGNOSIS — R79.0 LOW FERRITIN: ICD-10-CM

## 2023-07-10 DIAGNOSIS — E11.21 TYPE 2 DIABETES MELLITUS WITH DIABETIC NEPHROPATHY (H): Primary | ICD-10-CM

## 2023-07-10 DIAGNOSIS — G47.00 PERSISTENT INSOMNIA: ICD-10-CM

## 2023-07-10 LAB
CHOLEST SERPL-MCNC: 140 MG/DL
CREAT SERPL-MCNC: 1.28 MG/DL (ref 0.67–1.17)
CREAT UR-MCNC: 228 MG/DL
DEPRECATED CALCIDIOL+CALCIFEROL SERPL-MC: 29 UG/L (ref 20–75)
FERRITIN SERPL-MCNC: 22 NG/ML (ref 31–409)
GFR SERPL CREATININE-BSD FRML MDRD: 62 ML/MIN/1.73M2
HBA1C MFR BLD: 7.8 % (ref 0–5.6)
HDLC SERPL-MCNC: 47 MG/DL
LDLC SERPL CALC-MCNC: 61 MG/DL
MICROALBUMIN UR-MCNC: 53.7 MG/L
MICROALBUMIN/CREAT UR: 23.55 MG/G CR (ref 0–17)
NONHDLC SERPL-MCNC: 93 MG/DL
TRIGL SERPL-MCNC: 162 MG/DL
VIT B12 SERPL-MCNC: 1509 PG/ML (ref 232–1245)

## 2023-07-10 PROCEDURE — 82565 ASSAY OF CREATININE: CPT

## 2023-07-10 PROCEDURE — 36415 COLL VENOUS BLD VENIPUNCTURE: CPT

## 2023-07-10 PROCEDURE — 99207 PR NO CHARGE LOS: CPT | Performed by: PHARMACIST

## 2023-07-10 PROCEDURE — 83036 HEMOGLOBIN GLYCOSYLATED A1C: CPT

## 2023-07-10 PROCEDURE — 82306 VITAMIN D 25 HYDROXY: CPT

## 2023-07-10 PROCEDURE — 80061 LIPID PANEL: CPT

## 2023-07-10 PROCEDURE — 82570 ASSAY OF URINE CREATININE: CPT

## 2023-07-10 PROCEDURE — 82607 VITAMIN B-12: CPT

## 2023-07-10 PROCEDURE — 82728 ASSAY OF FERRITIN: CPT

## 2023-07-10 PROCEDURE — 82043 UR ALBUMIN QUANTITATIVE: CPT

## 2023-07-10 RX ORDER — CHOLECALCIFEROL (VITAMIN D3) 50 MCG
1 TABLET ORAL DAILY
Qty: 90 TABLET | Refills: 3 | Status: SHIPPED | OUTPATIENT
Start: 2023-07-10 | End: 2024-07-17

## 2023-07-10 RX ORDER — GABAPENTIN 800 MG/1
800 TABLET ORAL 3 TIMES DAILY
Qty: 270 TABLET | Refills: 3 | Status: SHIPPED | OUTPATIENT
Start: 2023-07-10 | End: 2023-12-29

## 2023-07-10 RX ORDER — INSULIN GLARGINE 100 [IU]/ML
16 INJECTION, SOLUTION SUBCUTANEOUS DAILY
Qty: 15 ML | Refills: 1 | Status: SHIPPED | OUTPATIENT
Start: 2023-07-10 | End: 2023-08-11

## 2023-07-10 RX ORDER — DULOXETIN HYDROCHLORIDE 60 MG/1
CAPSULE, DELAYED RELEASE ORAL
Qty: 90 CAPSULE | Refills: 3 | Status: SHIPPED | OUTPATIENT
Start: 2023-07-10 | End: 2023-11-21

## 2023-07-10 RX ORDER — DULOXETIN HYDROCHLORIDE 30 MG/1
30 CAPSULE, DELAYED RELEASE ORAL DAILY
Qty: 90 CAPSULE | Refills: 3 | Status: SHIPPED | OUTPATIENT
Start: 2023-07-10 | End: 2023-11-21

## 2023-07-10 RX ORDER — MIRTAZAPINE 15 MG/1
15 TABLET, FILM COATED ORAL AT BEDTIME
Qty: 90 TABLET | Refills: 1 | Status: SHIPPED | OUTPATIENT
Start: 2023-07-10 | End: 2023-11-09

## 2023-07-10 RX ORDER — DULOXETIN HYDROCHLORIDE 30 MG/1
CAPSULE, DELAYED RELEASE ORAL
COMMUNITY
Start: 2023-06-13 | End: 2023-07-10

## 2023-07-10 NOTE — PATIENT INSTRUCTIONS
"Recommendations from today's MTM visit:                                                      Increase Ozempic to 2 mg weekly.  Decrease Basaglar insulin to 16 units daily. If you continue to have low blood glucose levels, you can decrease further by 2 units.   Decrease mirtazapine dose to 15 mg at bedtime, try using quetiapine 25 mg as needed for sleep   Take tizanidine at bedtime as needed for muscle spasms   Start taking vitamin D 2000 units once daily at bedtime   Hold losartan until next visit    Follow-up: No follow-ups on file.    It was great speaking with you today.  I value your experience and would be very thankful for your time in providing feedback in our clinic survey. In the next few days, you may receive an email or text message from [employer] with a link to a survey related to your  clinical pharmacist.\"     To schedule another MTM appointment, please call the clinic directly or you may call the MTM scheduling line at [phone] or toll-free at [phone].     My Clinical Pharmacist's contact information:                                                      Please feel free to contact me with any questions or concerns you have.      Bisi Howard, PharmD, AAHIVP  Medication Therapy Management Pharmacist   July 10, 2023  [phone]    "

## 2023-07-10 NOTE — Clinical Note
I had him hold losartan due to blood pressure 97/66 in clinic today, although denies hypotension sx and has history of proteinuria (repeat pending). May have him re-start losartan 12.5 mg once daily in 4 weeks. Please let me know if any thoughts. Thanks!- Bisi Howard, CierraD

## 2023-07-11 DIAGNOSIS — R33.9 URINARY RETENTION: ICD-10-CM

## 2023-07-11 DIAGNOSIS — N40.1 BENIGN PROSTATIC HYPERPLASIA WITH NOCTURIA: ICD-10-CM

## 2023-07-11 DIAGNOSIS — E78.5 HYPERLIPIDEMIA LDL GOAL <100: ICD-10-CM

## 2023-07-11 DIAGNOSIS — G47.00 PERSISTENT INSOMNIA: ICD-10-CM

## 2023-07-11 DIAGNOSIS — R35.1 BENIGN PROSTATIC HYPERPLASIA WITH NOCTURIA: ICD-10-CM

## 2023-07-11 DIAGNOSIS — M62.838 MUSCLE SPASM: ICD-10-CM

## 2023-07-13 RX ORDER — TAMSULOSIN HYDROCHLORIDE 0.4 MG/1
CAPSULE ORAL
Qty: 90 CAPSULE | Refills: 0 | Status: SHIPPED | OUTPATIENT
Start: 2023-07-13 | End: 2023-12-29

## 2023-07-13 RX ORDER — ROSUVASTATIN CALCIUM 20 MG/1
TABLET, COATED ORAL
Qty: 90 TABLET | Refills: 0 | Status: SHIPPED | OUTPATIENT
Start: 2023-07-13 | End: 2023-09-01

## 2023-07-13 RX ORDER — QUETIAPINE FUMARATE 25 MG/1
TABLET, FILM COATED ORAL
Qty: 180 TABLET | Refills: 0 | Status: SHIPPED | OUTPATIENT
Start: 2023-07-13 | End: 2023-11-09

## 2023-07-13 RX ORDER — FINASTERIDE 5 MG/1
TABLET, FILM COATED ORAL
Qty: 90 TABLET | Refills: 0 | Status: SHIPPED | OUTPATIENT
Start: 2023-07-13 | End: 2023-10-17

## 2023-07-13 NOTE — TELEPHONE ENCOUNTER
Most 3 weeks early    Routing refill request to provider for review/approval because:  Drug not on the [facility] refill protocol   Drug interaction/allergy warning  Patient needs to be seen because:  Failing visit    Bozena Francisco RN

## 2023-07-22 DIAGNOSIS — Z79.4 TYPE 2 DIABETES MELLITUS WITHOUT COMPLICATION, WITH LONG-TERM CURRENT USE OF INSULIN (H): ICD-10-CM

## 2023-07-22 DIAGNOSIS — E11.9 TYPE 2 DIABETES MELLITUS WITHOUT COMPLICATION, WITH LONG-TERM CURRENT USE OF INSULIN (H): ICD-10-CM

## 2023-07-26 RX ORDER — METFORMIN HCL 500 MG
TABLET, EXTENDED RELEASE 24 HR ORAL
Qty: 360 TABLET | Refills: 0 | Status: SHIPPED | OUTPATIENT
Start: 2023-07-26 | End: 2023-12-29

## 2023-07-26 NOTE — TELEPHONE ENCOUNTER
Barbara refill x 1. Needs office visit to address further refills.     Prescription approved per [facility] Refill Protocol.    Ibeth PHILLIPS RN   PAL (Patient Advocate Liaison)  [facility]  [phone]

## 2023-08-10 ENCOUNTER — TELEPHONE (OUTPATIENT)
Dept: FAMILY MEDICINE | Facility: CLINIC | Age: 68
End: 2023-08-10
Payer: COMMERCIAL

## 2023-08-10 DIAGNOSIS — E11.21 TYPE 2 DIABETES MELLITUS WITH DIABETIC NEPHROPATHY, WITH LONG-TERM CURRENT USE OF INSULIN (H): ICD-10-CM

## 2023-08-10 DIAGNOSIS — Z79.4 TYPE 2 DIABETES MELLITUS WITH DIABETIC NEPHROPATHY, WITH LONG-TERM CURRENT USE OF INSULIN (H): ICD-10-CM

## 2023-08-10 NOTE — TELEPHONE ENCOUNTER
Attempted to reach pt multiple times but it just goes to voicemail. Voicemail already left with message to return call to triage nurse.    Routing to pt PAL  Mari PIHLLIPS RN

## 2023-08-10 NOTE — TELEPHONE ENCOUNTER
"Pt calls, informs he just left his pharmacy ([facility] in [place]) and was unable to get refill of his insulin glargine. Pt should have 1 refill on file (see rx below)    insulin glargine (BASAGLAR KWIKPEN) 100 UNIT/ML pen Inject 16 Units Subcutaneous daily 15 mL 1 ordered 07/10/2023       Called over to [facility] pharmacy on pt's behalf and spoke with the pharmacist. Pharmacist informs that pt's 15 mL sent July 10th is a 90 day supply and that pt's refill is too soon to fill. She also states \"he told me he is taking three times that amount of insulin and that is why he has run out early\"    Called pt back but it went to voicemail. Left V/M for pt to return call to any triage nurse so we can figure out why he is taking three times the amount of insulin prescribed.    Mari PHILLIPS RN    "

## 2023-08-11 RX ORDER — INSULIN GLARGINE 100 [IU]/ML
20 INJECTION, SOLUTION SUBCUTANEOUS DAILY
Qty: 15 ML | Refills: 1 | Status: SHIPPED | OUTPATIENT
Start: 2023-08-11 | End: 2023-10-20

## 2023-08-11 NOTE — TELEPHONE ENCOUNTER
RN attempted to call patient, call forwarded to voicemail    Message left to return call to this RN PAL. Please transfer if available.    Direct number left for this RN PAL on message for return call.     RN huddled with Raisa Calvillo PA-C who will send in rx for insulin glargine & advise patient take 20 units daily. RN will confirm what pharmacy patient would like Rx sent to and route to PCP to review/sign. Medication pended.     Aliza PHILLIPS RN, BSN, PHN, PAL (patient advocate liaison)   [facility]  [phone]

## 2023-08-11 NOTE — TELEPHONE ENCOUNTER
Raisa Calvillo, PAMartyC    Please advise: Recommend sending insulin to [facility] as patient is out of medication?  RN unable to reach patient via phone x3, no C2C on file for spouse. Recommend to send insulin to [facility] so patient has access to medication over the weekend if unable to make it to [facility]?   -Medication pended as discussed previously.     RN will send patient mychart to inform of medications sent, provider recommendations.     Aliza PHILLIPS RN, BSN, PHN - PAL (patient advocate liaison)  [facility]  [phone]

## 2023-08-11 NOTE — TELEPHONE ENCOUNTER
RN MANFRED will contact patient after 8 AM to discuss how he is taking diabetic medications     Per MTM note on 7/10/2023 - see plan below   Increase Ozempic to 2 mg weekly.  Decrease Basaglar insulin to 16 units daily. If you continue to have low blood glucose levels, you can decrease further by 2 units.     Cristal Calix, Registered Nurse, PAL (Patient Advocate Liaison)   [facility]   [phone]

## 2023-08-11 NOTE — TELEPHONE ENCOUNTER
Mychart sent to inform patient of medications sent and provider recommendations.     Aliza PHILLIPS RN, BSN, PHN - PAL (patient advocate liaison)  [facility]  [phone]

## 2023-08-11 NOTE — TELEPHONE ENCOUNTER
Patient returning phone call from clinic. Informed patient that message was also sent to [employer]. Reviewed [employer] message with recommendations from Raisa Calvillo regarding medications. Patient verbalizes understanding and agrees with plan.     Carla Tran RN  [facility]

## 2023-08-11 NOTE — TELEPHONE ENCOUNTER
Symptoms are c/w hyperglycemia.   Recommend pt  1 mg ozempic, but use 2 doses to equal 2 mg. --[facility] pharmacy has this in stock and is open until 6 pm today.--sent there.    Keep hydrated--sugar free gatorade, increase protein intake and decrease carbs.    If glucose over 350 with meds, increased symptoms of thirst, nausea, vomiting, etc. Then MANDO Calvillo PA-C

## 2023-08-11 NOTE — TELEPHONE ENCOUNTER
"Raisa Calvillo PA-C-     Please advise of medication recommendations.     -Patient ran out of insulin glargine early & is requesting a refill. Patient has been doubling dose of insulin as he has been having elevate BG.  -Patient has been unable to  Ozempic. Reports last Ozempic dose was 2 months ago.   -Reports average blood sugar 250, increases to 300 with meals  -Reports fatigue, increased hunger and thirst, increased urinary output. Intermittent nausea & headaches.   -Denies blurred vision, vomiting, confusion.   -[facility] and they are out of stock of Ozempic 2 MG. Have 0.25 MG/0.5 MG, & 1 MG in stock.       Patient calls to report he is unable to  insulin glargine (basaglar kwikpen).     insulin glargine (BASAGLAR KWIKPEN) 100 UNIT/ML pen Inject 16 Units Subcutaneous daily 15 mL 1 ordered 07/10/2023       Spoke to patient and informed the medication picked up on 7/10/23 should have been a 90 day supply. RN asked patient how he is taking insulin.  Patient reports \"she took me down to 18 units\" and pharmacy has not had Ozempic so patient reports taking 36 units daily because he has been unable to get his blood sugars under control.  RN advised patient to take medications as prescribed and to call and report if he is unable to  medications from the pharmacy, noticing symptoms, or has questions or concerns. Patient verbalized understanding.     Patient reports average blood sugar 251. Patient reports when he has breakfast, lunch or dinner it goes up to 300.   -Patient reports last dose of Ozempic was two months ago. Discussed at last visit with MTM & dose was increased, patient still unable to get the medication.     Patient has been experiencing fatigue, reports increased hunger and thirst, increased urinary output, headaches, nausea. Denies blurred vision, confusion, vomiting.   -Patient reports headache yesterday was really bad & felt like vomiting. Patient denies headache " currently.   -RN will route to provider for recommendation & return call to patient. Advised patient call with new or worsening symptoms. Patient agreeable to plan.     Patient agreeable to going to alternative pharmacy for medication.   RN spoke to [facility] and they are out of stock of Ozempic 2 MG. Have 0.25 MG/0.5 MG, & 1 MG in stock.     Aliza PHILLIPS RN, BSN, PHN - PAL (patient advocate liaison)  [facility]  [phone]

## 2023-08-24 DIAGNOSIS — E78.5 HYPERLIPIDEMIA LDL GOAL <100: ICD-10-CM

## 2023-08-24 DIAGNOSIS — M62.838 MUSCLE SPASM: ICD-10-CM

## 2023-08-25 RX ORDER — ROSUVASTATIN CALCIUM 20 MG/1
TABLET, COATED ORAL
Qty: 90 TABLET | Refills: 0 | OUTPATIENT
Start: 2023-08-25

## 2023-08-29 DIAGNOSIS — M62.838 MUSCLE SPASM: ICD-10-CM

## 2023-08-29 DIAGNOSIS — E78.5 HYPERLIPIDEMIA LDL GOAL <100: ICD-10-CM

## 2023-08-30 NOTE — LETTER
Order/Referral Request    Who is requesting: Patient & Insurance    Orders being requested: Cardio referral- already placed 3/6/23    Reason service is needed/diagnosis: Insurance is not covering services for [facility], due to no referral. But referral was placed 3/6/23, Patient is asking for referral to be re sent to insurance so they can cover services/back pay.    When are orders needed by: asap    Has this been discussed with Provider: Yes    Does patient have a preference on a Group/Provider/Facility? [facility]    Does patient have an appointment scheduled?: No    Where to send orders: Fax [phone]    Could we send this information to you in [employer] or would you prefer to receive a phone call?:   Patient would like to be contacted via [employer]    March 15, 2019          Gerardo Gonzalez  [address]  [place] [address]        Dear Gerardo,     We sent a one month refill of fluoxetine to your pharmacy on March 12. This is a reminder from Raisa to schedule an e-visit, an office visit, or a telephone visit as soon as possible and before further refills.    Please let us know if you have any questions about this reminder.     Sincerely,     JOSEPH Fitch - Care Team of Raisa Calvillo PA-C

## 2023-09-01 RX ORDER — ROSUVASTATIN CALCIUM 20 MG/1
TABLET, COATED ORAL
Qty: 90 TABLET | Refills: 0 | Status: SHIPPED | OUTPATIENT
Start: 2023-09-01 | End: 2023-12-29

## 2023-09-01 NOTE — TELEPHONE ENCOUNTER
Routing refill request to provider for review/approval because:  Drug not on the [facility] refill protocol     Cristal Calix Registered Nurse  [facility] [place] [facility]
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
## 2023-10-02 ENCOUNTER — ALLIED HEALTH/NURSE VISIT (OUTPATIENT)
Dept: FAMILY MEDICINE | Facility: CLINIC | Age: 68
End: 2023-10-02
Payer: COMMERCIAL

## 2023-10-02 DIAGNOSIS — Z23 NEED FOR PROPHYLACTIC VACCINATION AND INOCULATION AGAINST INFLUENZA: Primary | ICD-10-CM

## 2023-10-02 DIAGNOSIS — Z23 HIGH PRIORITY FOR 2019-NCOV VACCINE: ICD-10-CM

## 2023-10-02 PROCEDURE — 91320 SARSCV2 VAC 30MCG TRS-SUC IM: CPT

## 2023-10-02 PROCEDURE — 90662 IIV NO PRSV INCREASED AG IM: CPT

## 2023-10-02 PROCEDURE — G0008 ADMIN INFLUENZA VIRUS VAC: HCPCS

## 2023-10-02 PROCEDURE — 99207 PR NO CHARGE NURSE ONLY: CPT

## 2023-10-02 PROCEDURE — 90480 ADMN SARSCOV2 VAC 1/ONLY CMP: CPT

## 2023-10-14 DIAGNOSIS — N40.1 BENIGN PROSTATIC HYPERPLASIA WITH NOCTURIA: ICD-10-CM

## 2023-10-14 DIAGNOSIS — R33.9 URINARY RETENTION: ICD-10-CM

## 2023-10-14 DIAGNOSIS — R35.1 BENIGN PROSTATIC HYPERPLASIA WITH NOCTURIA: ICD-10-CM

## 2023-10-17 RX ORDER — FINASTERIDE 5 MG/1
TABLET, FILM COATED ORAL
Qty: 90 TABLET | Refills: 0 | Status: SHIPPED | OUTPATIENT
Start: 2023-10-17 | End: 2023-11-28

## 2023-10-20 DIAGNOSIS — E11.21 TYPE 2 DIABETES MELLITUS WITH DIABETIC NEPHROPATHY, WITH LONG-TERM CURRENT USE OF INSULIN (H): ICD-10-CM

## 2023-10-20 DIAGNOSIS — Z79.4 TYPE 2 DIABETES MELLITUS WITH DIABETIC NEPHROPATHY, WITH LONG-TERM CURRENT USE OF INSULIN (H): ICD-10-CM

## 2023-10-20 RX ORDER — INSULIN GLARGINE 100 [IU]/ML
32 INJECTION, SOLUTION SUBCUTANEOUS AT BEDTIME
Qty: 30 ML | Refills: 0 | Status: SHIPPED | OUTPATIENT
Start: 2023-10-20 | End: 2023-12-29

## 2023-10-26 DIAGNOSIS — K21.00 GASTROESOPHAGEAL REFLUX DISEASE WITH ESOPHAGITIS WITHOUT HEMORRHAGE: ICD-10-CM

## 2023-10-26 RX ORDER — PANTOPRAZOLE SODIUM 20 MG/1
20 TABLET, DELAYED RELEASE ORAL DAILY
Qty: 90 TABLET | Refills: 0 | Status: SHIPPED | OUTPATIENT
Start: 2023-10-26 | End: 2023-11-10

## 2023-11-09 DIAGNOSIS — G47.00 PERSISTENT INSOMNIA: ICD-10-CM

## 2023-11-09 DIAGNOSIS — F51.02 ADJUSTMENT INSOMNIA: ICD-10-CM

## 2023-11-09 DIAGNOSIS — E11.21 TYPE 2 DIABETES MELLITUS WITH DIABETIC NEPHROPATHY (H): ICD-10-CM

## 2023-11-09 RX ORDER — MIRTAZAPINE 15 MG/1
15 TABLET, FILM COATED ORAL AT BEDTIME
Qty: 30 TABLET | Refills: 0 | Status: SHIPPED | OUTPATIENT
Start: 2023-11-09 | End: 2023-12-29

## 2023-11-09 RX ORDER — QUETIAPINE FUMARATE 25 MG/1
TABLET, FILM COATED ORAL
Qty: 60 TABLET | Refills: 0 | Status: SHIPPED | OUTPATIENT
Start: 2023-11-09 | End: 2023-11-28

## 2023-11-09 RX ORDER — LOSARTAN POTASSIUM 25 MG/1
TABLET ORAL
Qty: 30 TABLET | Refills: 0 | Status: SHIPPED | OUTPATIENT
Start: 2023-11-09 | End: 2023-11-28

## 2023-11-10 DIAGNOSIS — K21.00 GASTROESOPHAGEAL REFLUX DISEASE WITH ESOPHAGITIS WITHOUT HEMORRHAGE: ICD-10-CM

## 2023-11-10 RX ORDER — MECOBALAMIN 5000 MCG
15 TABLET,DISINTEGRATING ORAL DAILY
Qty: 90 CAPSULE | Refills: 3 | Status: SHIPPED | OUTPATIENT
Start: 2023-11-10 | End: 2023-11-21

## 2023-11-21 ENCOUNTER — OFFICE VISIT (OUTPATIENT)
Dept: FAMILY MEDICINE | Facility: CLINIC | Age: 68
End: 2023-11-21
Payer: COMMERCIAL

## 2023-11-21 VITALS
DIASTOLIC BLOOD PRESSURE: 74 MMHG | SYSTOLIC BLOOD PRESSURE: 112 MMHG | BODY MASS INDEX: 26.62 KG/M2 | WEIGHT: 169.6 LBS | RESPIRATION RATE: 24 BRPM | TEMPERATURE: 98.3 F | HEART RATE: 100 BPM | HEIGHT: 67 IN | OXYGEN SATURATION: 96 %

## 2023-11-21 DIAGNOSIS — M19.041 OSTEOARTHRITIS OF BOTH HANDS, UNSPECIFIED OSTEOARTHRITIS TYPE: Primary | ICD-10-CM

## 2023-11-21 DIAGNOSIS — F41.9 ANXIETY: ICD-10-CM

## 2023-11-21 DIAGNOSIS — M19.042 OSTEOARTHRITIS OF BOTH HANDS, UNSPECIFIED OSTEOARTHRITIS TYPE: Primary | ICD-10-CM

## 2023-11-21 DIAGNOSIS — K21.00 GASTROESOPHAGEAL REFLUX DISEASE WITH ESOPHAGITIS WITHOUT HEMORRHAGE: ICD-10-CM

## 2023-11-21 PROCEDURE — 99214 OFFICE O/P EST MOD 30 MIN: CPT

## 2023-11-21 RX ORDER — DULOXETIN HYDROCHLORIDE 30 MG/1
30 CAPSULE, DELAYED RELEASE ORAL DAILY
Qty: 90 CAPSULE | Refills: 0 | Status: SHIPPED | OUTPATIENT
Start: 2023-11-21 | End: 2023-12-04

## 2023-11-21 RX ORDER — MECOBALAMIN 5000 MCG
15 TABLET,DISINTEGRATING ORAL DAILY
Qty: 90 CAPSULE | Refills: 0 | Status: SHIPPED | OUTPATIENT
Start: 2023-11-21 | End: 2023-12-29

## 2023-11-21 RX ORDER — DULOXETIN HYDROCHLORIDE 60 MG/1
CAPSULE, DELAYED RELEASE ORAL
Qty: 90 CAPSULE | Refills: 0 | Status: SHIPPED | OUTPATIENT
Start: 2023-11-21 | End: 2023-12-29

## 2023-11-21 SDOH — HEALTH STABILITY: PHYSICAL HEALTH: ON AVERAGE, HOW MANY DAYS PER WEEK DO YOU ENGAGE IN MODERATE TO STRENUOUS EXERCISE (LIKE A BRISK WALK)?: 1 DAY

## 2023-11-21 ASSESSMENT — ENCOUNTER SYMPTOMS
SORE THROAT: 0
HEARTBURN: 0
ARTHRALGIAS: 1
WEAKNESS: 0
DYSURIA: 0
EYE PAIN: 0
HEMATOCHEZIA: 0
MYALGIAS: 0
CONSTIPATION: 0
PARESTHESIAS: 0
NERVOUS/ANXIOUS: 0
COUGH: 0
DIARRHEA: 0
CHILLS: 0
DIZZINESS: 0
JOINT SWELLING: 1
ABDOMINAL PAIN: 0
NAUSEA: 0
PALPITATIONS: 0
HEADACHES: 1
FREQUENCY: 1
SHORTNESS OF BREATH: 0
HEMATURIA: 0
FEVER: 0

## 2023-11-21 ASSESSMENT — ANXIETY QUESTIONNAIRES
2. NOT BEING ABLE TO STOP OR CONTROL WORRYING: NOT AT ALL
1. FEELING NERVOUS, ANXIOUS, OR ON EDGE: NOT AT ALL
GAD7 TOTAL SCORE: 1
IF YOU CHECKED OFF ANY PROBLEMS ON THIS QUESTIONNAIRE, HOW DIFFICULT HAVE THESE PROBLEMS MADE IT FOR YOU TO DO YOUR WORK, TAKE CARE OF THINGS AT HOME, OR GET ALONG WITH OTHER PEOPLE: NOT DIFFICULT AT ALL
6. BECOMING EASILY ANNOYED OR IRRITABLE: SEVERAL DAYS
5. BEING SO RESTLESS THAT IT IS HARD TO SIT STILL: NOT AT ALL
3. WORRYING TOO MUCH ABOUT DIFFERENT THINGS: NOT AT ALL
7. FEELING AFRAID AS IF SOMETHING AWFUL MIGHT HAPPEN: NOT AT ALL
4. TROUBLE RELAXING: NOT AT ALL
GAD7 TOTAL SCORE: 1

## 2023-11-21 ASSESSMENT — SOCIAL DETERMINANTS OF HEALTH (SDOH)
DO YOU BELONG TO ANY CLUBS OR ORGANIZATIONS SUCH AS CHURCH GROUPS UNIONS, FRATERNAL OR ATHLETIC GROUPS, OR SCHOOL GROUPS?: NO
IN A TYPICAL WEEK, HOW MANY TIMES DO YOU TALK ON THE PHONE WITH FAMILY, FRIENDS, OR NEIGHBORS?: ONCE A WEEK
HOW OFTEN DO YOU ATTENT MEETINGS OF THE CLUB OR ORGANIZATION YOU BELONG TO?: NEVER

## 2023-11-21 ASSESSMENT — LIFESTYLE VARIABLES: HOW OFTEN DO YOU HAVE A DRINK CONTAINING ALCOHOL: NEVER

## 2023-11-21 ASSESSMENT — PATIENT HEALTH QUESTIONNAIRE - PHQ9
10. IF YOU CHECKED OFF ANY PROBLEMS, HOW DIFFICULT HAVE THESE PROBLEMS MADE IT FOR YOU TO DO YOUR WORK, TAKE CARE OF THINGS AT HOME, OR GET ALONG WITH OTHER PEOPLE: SOMEWHAT DIFFICULT
SUM OF ALL RESPONSES TO PHQ QUESTIONS 1-9: 1
SUM OF ALL RESPONSES TO PHQ QUESTIONS 1-9: 1

## 2023-11-21 ASSESSMENT — ACTIVITIES OF DAILY LIVING (ADL): CURRENT_FUNCTION: NO ASSISTANCE NEEDED

## 2023-11-21 NOTE — PROGRESS NOTES
"He is at risk for falling and has been provided with information to reduce the risk of falling at home.  SUBJECTIVE:   Gerardo is a 68 year old, presenting for the following:  Wellness Visit (C/O both middle fingers lock up at night&early morning X 6 mo, Hx arthritis)        11/21/2023     8:17 AM   Additional Questions   Roomed by Radha   Accompanied by Self       Are you in the first 12 months of your Medicare coverage?  No    Healthy Habits:     In general, how would you rate your overall health?  Good    Frequency of exercise:  1 day/week    Duration of exercise:  Less than 15 minutes    Do you usually eat at least 4 servings of fruit and vegetables a day, include whole grains    & fiber and avoid regularly eating high fat or \"junk\" foods?  Yes    Taking medications regularly:  Yes    Ability to successfully perform activities of daily living:  No assistance needed    Home Safety:  Lack of grab bars in the bathroom    Hearing Impairment:  No hearing concerns    In the past 6 months, have you been bothered by leaking of urine?  No    In general, how would you rate your overall mental or emotional health?  Good    Additional concerns today:  No      Today's PHQ-9 Score:       11/21/2023     7:51 AM   PHQ-9 SCORE   PHQ-9 Total Score MyChart 1 (Minimal depression)   PHQ-9 Total Score 1           Have you ever done Advance Care Planning? (For example, a Health Directive, POLST, or a discussion with a medical provider or your loved ones about your wishes): { :935299}    {Hearing Test Done (Optional):091691}   Fall risk  Fallen 2 or more times in the past year?: Yes  Any fall with injury in the past year?: No    Cognitive Screening   1) Repeat 3 items (Leader, Season, Table)    2) Clock draw: NORMAL  3) 3 item recall: Recalls 3 objects  Results: NORMAL clock, 1-2 items recalled: COGNITIVE IMPAIRMENT LESS LIKELY    Mini-CogTM Copyright S Nestor. Licensed by the author for use in [facility]; reprinted with " permission ([email or web address]). All rights reserved.      {Do you have sleep apnea, excessive snoring or daytime drowsiness? (Optional):565507}    Reviewed and updated as needed this visit by clinical staff    Allergies  Meds              Reviewed and updated as needed this visit by Provider                 Social History     Tobacco Use    Smoking status: Former     Packs/day: 0.00     Years: 4.00     Additional pack years: 0.00     Total pack years: 0.00     Types: Cigarettes    Smokeless tobacco: Never   Substance Use Topics    Alcohol use: Yes     Alcohol/week: 6.0 standard drinks of alcohol     Types: 6 Cans of beer per week     Comment: occ glass of wine     {Rooming staff  Click this link to complete the Prescreen if response below is not for today's visit  Alcohol Use Prescreen >3 drinks/day or > 7 drinks/week.  If the prescreen question answer is YES, complete the full AUDIT  :463933}        11/21/2023     7:55 AM   Alcohol Use   Prescreen: >3 drinks/day or >7 drinks/week? No   {add AUDIT responses (Optional) (A score of 7 for adult men is an indication of hazardous drinking; a score of 8 or more is an indication of an alcohol use disorder.  A score of 7 or more for adult women is an indication of hazardous drinking or an alchohol use disorder):447829}  Do you have a current opioid prescription? No  Do you use any other controlled substances or medications that are not prescribed by a provider? None  {Provider  If there are gaps in the social history shown above, please follow the link and refresh the note Link to Social and Substance History :578645}    {Outside tests to abstract? (Optional):448736}    {additional problems to add (Optional):936180}    Current providers sharing in care for this patient include: {Rooming staff:  Please update Care Team from storyboard, refresh this note and then delete this statement}  Patient Care Team:  Raisa Calvillo PA-C as PCP - General (Physician Assistant)  Rajinder  Raisa DEL VALLE PA-C as Assigned PCP  Barbara Kc RPH as Pharmacist (Pharmacist)  Aliza Young, RN as Personal Advocate & Liaison (PAL) (Family Medicine)  Natalie Harding, RN as Diabetes Educator (Diabetes Education)  Barbara Kc RPH as Assigned MT Pharmacist    The following health maintenance items are reviewed in Epic and correct as of today:  Health Maintenance   Topic Date Due    ZOSTER IMMUNIZATION (1 of 2) Never done    LUNG CANCER SCREENING  Never done    RSV VACCINE (Pregnancy & 60+) (1 - 1-dose 60+ series) Never done    EYE EXAM  11/18/2020    CMP  07/01/2023    DIABETIC FOOT EXAM  07/01/2023    ANNUAL REVIEW OF HM ORDERS  07/01/2023    A1C  11/10/2023    MEDICARE ANNUAL WELLNESS VISIT  12/21/2023    BMP  04/03/2024    NAOMI ASSESSMENT  05/21/2024    PHQ-9  05/21/2024    LIPID  07/10/2024    MICROALBUMIN  07/10/2024    FALL RISK ASSESSMENT  11/21/2024    COLORECTAL CANCER SCREENING  05/05/2026    ADVANCE CARE PLANNING  12/22/2027    DTAP/TDAP/TD IMMUNIZATION (3 - Td or Tdap) 02/08/2028    HEPATITIS C SCREENING  Completed    INFLUENZA VACCINE  Completed    Pneumococcal Vaccine: 65+ Years  Completed    AORTIC ANEURYSM SCREENING (SYSTEM ASSIGNED)  Completed    COVID-19 Vaccine  Completed    IPV IMMUNIZATION  Aged Out    HPV IMMUNIZATION  Aged Out    MENINGITIS IMMUNIZATION  Aged Out    RSV MONOCLONAL ANTIBODY  Aged Out     {Chronicprobdata (optional):901580}  {Decision Support (Optional):277917}        Review of Systems   Constitutional:  Negative for chills and fever.   HENT:  Negative for congestion, ear pain, hearing loss and sore throat.    Eyes:  Negative for pain and visual disturbance.   Respiratory:  Negative for cough and shortness of breath.    Cardiovascular:  Negative for chest pain, palpitations and peripheral edema.   Gastrointestinal:  Negative for abdominal pain, constipation, diarrhea, heartburn, hematochezia and nausea.   Genitourinary:  Positive for frequency and impotence.  "Negative for dysuria, genital sores, hematuria, penile discharge and urgency.   Musculoskeletal:  Positive for arthralgias and joint swelling. Negative for myalgias.   Skin:  Negative for rash.   Neurological:  Positive for headaches. Negative for dizziness, weakness and paresthesias.   Psychiatric/Behavioral:  Negative for mood changes. The patient is not nervous/anxious.      {ROS COMP (Optional):668587}    OBJECTIVE:   /74 (BP Location: Right arm, Patient Position: Sitting, Cuff Size: Adult Regular)   Pulse 100   Temp 98.3  F (36.8  C) (Oral)   Resp 24   Ht 1.702 m (5' 7\")   Wt 76.9 kg (169 lb 9.6 oz)   SpO2 96%   BMI 26.56 kg/m   Estimated body mass index is 26.56 kg/m  as calculated from the following:    Height as of this encounter: 1.702 m (5' 7\").    Weight as of this encounter: 76.9 kg (169 lb 9.6 oz).  Physical Exam  {Exam (Optional) :782910}    {Diagnostic Test Results (Optional):864577}    ASSESSMENT / PLAN:   {Diag Picklist:542147}    {Patient advised of split billing (Optional):909433}      COUNSELING:  {Medicare Counselin}      BMI:   Estimated body mass index is 26.56 kg/m  as calculated from the following:    Height as of this encounter: 1.702 m (5' 7\").    Weight as of this encounter: 76.9 kg (169 lb 9.6 oz).   {Weight Management Plan needed for ACO:550428}      He reports that he has quit smoking. He has never used smokeless tobacco.      Appropriate preventive services were discussed with this patient, including applicable screening as appropriate for fall prevention, nutrition, physical activity, Tobacco-use cessation, weight loss and cognition.  Checklist reviewing preventive services available has been given to the patient.    Reviewed patients plan of care and provided an AVS. The {CarePlan:919135} for Gerardo meets the Care Plan requirement. This Care Plan has been established and reviewed with the {PATIENT, FAMILY MEMBER, CAREGIVER:616296}.    {Counseling Resources  [place] " Preventive Services Task Force  Cholesterol Screening  Health diet/nutrition  Pooled Cohorts Equation Calculator  [employer]'s MyPlate  ASA Prophylaxis  Lung CA Screening  Osteoporosis prevention/bone health :649821}  {Prostate Cancer Screening  Consider for men 55-69 per guidance from USPSTF :243431}    Malaika Jimenez PA-C  [facility]    Identified Health Risks:  {Medicare required documentation of substance and opioid use disorders screening :298175}  Answers submitted by the patient for this visit:  Patient Health Questionnaire (Submitted on 11/21/2023)  If you checked off any problems, how difficult have these problems made it for you to do your work, take care of things at home, or get along with other people?: Somewhat difficult  PHQ9 TOTAL SCORE: 1  NAOMI-7 (Submitted on 11/21/2023)  NAOMI 7 TOTAL SCORE: 1

## 2023-11-21 NOTE — PROGRESS NOTES
"  Assessment & Plan     (M19.041,  M19.042) Osteoarthritis of both hands, unspecified osteoarthritis type  (primary encounter diagnosis)  Having what sounds like trigger finger involving both middle digits. Has not been seen for this previously. Now becoming more painful and bothersome. Discussed with him that there may be treatment options such as injections, etc but he would need to see a hand specialist for this. Referral placed today. Follow up as needed.  Plan: Orthopedic  Referral          (F41.9) Anxiety  Well controlled with use of Duloxetine. No new side effects of the medication. Refill provided.  Plan: DULoxetine (CYMBALTA) 30 MG capsule, DULoxetine        (CYMBALTA) 60 MG capsule    (K21.00) Gastroesophageal reflux disease with esophagitis without hemorrhage  Well controlled with use of lansoprazole. No new side effects of the medication. Refill provided.  Plan: LANsoprazole (PREVACID) 15 MG DR capsule    BMI:   Estimated body mass index is 26.56 kg/m  as calculated from the following:    Height as of this encounter: 1.702 m (5' 7\").    Weight as of this encounter: 76.9 kg (169 lb 9.6 oz).     Follow up in 1 month for Medicare Annual Wellness; sooner with any acute concerns.    Malaika Jimenez PA-C  [facility]    Rhiannon Carrillo is a 68 year old, presenting for the following health issues:  RECHECK (C/O both middle fingers lock up at night&early morning X 6 mo, Hx arthritis)      11/21/2023     8:17 AM   Additional Questions   Roomed by Radha   Accompanied by Self       Healthy Habits:     In general, how would you rate your overall health?  Good    Frequency of exercise:  1 day/week    Duration of exercise:  Less than 15 minutes    Do you usually eat at least 4 servings of fruit and vegetables a day, include whole grains    & fiber and avoid regularly eating high fat or \"junk\" foods?  Yes    Taking medications regularly:  Yes    Ability to successfully perform activities " "of daily living:  No assistance needed    Home Safety:  Lack of grab bars in the bathroom    Hearing Impairment:  No hearing concerns    In the past 6 months, have you been bothered by leaking of urine?  No    In general, how would you rate your overall mental or emotional health?  Good    Additional concerns today:  No       Bilateral Hand/Finger Pain: For the past several weeks to months has been having increased pain in his hands and also has noticed      Review of Systems   Constitutional:  Negative for chills and fever.   HENT:  Negative for congestion, ear pain, hearing loss and sore throat.    Eyes:  Negative for pain and visual disturbance.   Respiratory:  Negative for cough and shortness of breath.    Cardiovascular:  Negative for chest pain, palpitations and peripheral edema.   Gastrointestinal:  Negative for abdominal pain, constipation, diarrhea, heartburn, hematochezia and nausea.   Genitourinary:  Positive for frequency and impotence. Negative for dysuria, genital sores, hematuria, penile discharge and urgency.   Musculoskeletal:  Positive for arthralgias and joint swelling. Negative for myalgias.   Skin:  Negative for rash.   Neurological:  Positive for headaches. Negative for dizziness, weakness and paresthesias.   Psychiatric/Behavioral:  Negative for mood changes. The patient is not nervous/anxious.            Objective    /74 (BP Location: Right arm, Patient Position: Sitting, Cuff Size: Adult Regular)   Pulse 100   Temp 98.3  F (36.8  C) (Oral)   Resp 24   Ht 1.702 m (5' 7\")   Wt 76.9 kg (169 lb 9.6 oz)   SpO2 96%   BMI 26.56 kg/m    Body mass index is 26.56 kg/m .  Physical Exam   GENERAL: healthy, alert and no distress  RESP: lungs clear to auscultation - no rales, rhonchi.  CV: regular rate and rhythm, normal S1 S2, no S3 or S4, no murmur, click or rub  MS: no gross musculoskeletal defects noted, no edema          "

## 2023-11-24 DIAGNOSIS — M62.838 MUSCLE SPASM: ICD-10-CM

## 2023-11-24 DIAGNOSIS — N40.1 BENIGN PROSTATIC HYPERPLASIA WITH NOCTURIA: ICD-10-CM

## 2023-11-24 DIAGNOSIS — G47.00 PERSISTENT INSOMNIA: ICD-10-CM

## 2023-11-24 DIAGNOSIS — E11.21 TYPE 2 DIABETES MELLITUS WITH DIABETIC NEPHROPATHY (H): ICD-10-CM

## 2023-11-24 DIAGNOSIS — R33.9 URINARY RETENTION: ICD-10-CM

## 2023-11-24 DIAGNOSIS — K21.00 GASTROESOPHAGEAL REFLUX DISEASE WITH ESOPHAGITIS WITHOUT HEMORRHAGE: ICD-10-CM

## 2023-11-24 DIAGNOSIS — R35.1 BENIGN PROSTATIC HYPERPLASIA WITH NOCTURIA: ICD-10-CM

## 2023-11-26 ENCOUNTER — HEALTH MAINTENANCE LETTER (OUTPATIENT)
Age: 68
End: 2023-11-26

## 2023-11-28 DIAGNOSIS — K21.00 GASTROESOPHAGEAL REFLUX DISEASE WITH ESOPHAGITIS WITHOUT HEMORRHAGE: ICD-10-CM

## 2023-11-28 RX ORDER — ESOMEPRAZOLE MAGNESIUM 10 MG/1
GRANULE, FOR SUSPENSION, EXTENDED RELEASE ORAL
Refills: 0 | OUTPATIENT
Start: 2023-11-28

## 2023-11-28 RX ORDER — QUETIAPINE FUMARATE 25 MG/1
TABLET, FILM COATED ORAL
Qty: 180 TABLET | Refills: 0 | Status: SHIPPED | OUTPATIENT
Start: 2023-11-28 | End: 2023-12-29

## 2023-11-28 RX ORDER — PANTOPRAZOLE SODIUM 20 MG/1
TABLET, DELAYED RELEASE ORAL
Qty: 90 TABLET | Refills: 1 | Status: SHIPPED | OUTPATIENT
Start: 2023-11-28 | End: 2023-12-29

## 2023-11-28 RX ORDER — LOSARTAN POTASSIUM 25 MG/1
TABLET ORAL
Qty: 90 TABLET | Refills: 0 | Status: SHIPPED | OUTPATIENT
Start: 2023-11-28 | End: 2023-12-29

## 2023-11-28 RX ORDER — FINASTERIDE 5 MG/1
TABLET, FILM COATED ORAL
Qty: 90 TABLET | Refills: 0 | Status: SHIPPED | OUTPATIENT
Start: 2023-11-28 | End: 2023-12-29

## 2023-12-04 DIAGNOSIS — Z76.0 ENCOUNTER FOR MEDICATION REFILL: Primary | ICD-10-CM

## 2023-12-04 DIAGNOSIS — F41.9 ANXIETY: ICD-10-CM

## 2023-12-05 RX ORDER — DULOXETIN HYDROCHLORIDE 30 MG/1
30 CAPSULE, DELAYED RELEASE ORAL DAILY
Qty: 90 CAPSULE | Refills: 0 | Status: SHIPPED | OUTPATIENT
Start: 2023-12-05 | End: 2023-12-29

## 2023-12-29 ENCOUNTER — OFFICE VISIT (OUTPATIENT)
Dept: FAMILY MEDICINE | Facility: CLINIC | Age: 68
End: 2023-12-29
Payer: COMMERCIAL

## 2023-12-29 VITALS
DIASTOLIC BLOOD PRESSURE: 60 MMHG | WEIGHT: 167.9 LBS | HEART RATE: 108 BPM | HEIGHT: 67 IN | SYSTOLIC BLOOD PRESSURE: 110 MMHG | TEMPERATURE: 97.8 F | RESPIRATION RATE: 14 BRPM | OXYGEN SATURATION: 97 % | BODY MASS INDEX: 26.35 KG/M2

## 2023-12-29 DIAGNOSIS — F41.9 ANXIETY: ICD-10-CM

## 2023-12-29 DIAGNOSIS — Z79.4 TYPE 2 DIABETES MELLITUS WITH DIABETIC POLYNEUROPATHY, WITH LONG-TERM CURRENT USE OF INSULIN (H): ICD-10-CM

## 2023-12-29 DIAGNOSIS — Z12.5 SCREENING FOR PROSTATE CANCER: ICD-10-CM

## 2023-12-29 DIAGNOSIS — H91.93 DECREASED HEARING OF BOTH EARS: ICD-10-CM

## 2023-12-29 DIAGNOSIS — G47.00 PERSISTENT INSOMNIA: ICD-10-CM

## 2023-12-29 DIAGNOSIS — E78.5 HYPERLIPIDEMIA LDL GOAL <70: ICD-10-CM

## 2023-12-29 DIAGNOSIS — R35.1 BENIGN PROSTATIC HYPERPLASIA WITH NOCTURIA: ICD-10-CM

## 2023-12-29 DIAGNOSIS — Z00.00 ENCOUNTER FOR MEDICARE ANNUAL WELLNESS EXAM: Primary | ICD-10-CM

## 2023-12-29 DIAGNOSIS — M62.838 MUSCLE SPASM: ICD-10-CM

## 2023-12-29 DIAGNOSIS — M65.331 TRIGGER FINGER, RIGHT MIDDLE FINGER: ICD-10-CM

## 2023-12-29 DIAGNOSIS — E11.42 TYPE 2 DIABETES MELLITUS WITH DIABETIC POLYNEUROPATHY, WITH LONG-TERM CURRENT USE OF INSULIN (H): ICD-10-CM

## 2023-12-29 DIAGNOSIS — N40.1 BENIGN PROSTATIC HYPERPLASIA WITH NOCTURIA: ICD-10-CM

## 2023-12-29 DIAGNOSIS — M65.332 TRIGGER FINGER, LEFT MIDDLE FINGER: ICD-10-CM

## 2023-12-29 DIAGNOSIS — N18.31 CHRONIC KIDNEY DISEASE, STAGE 3A (H): ICD-10-CM

## 2023-12-29 DIAGNOSIS — K21.00 GASTROESOPHAGEAL REFLUX DISEASE WITH ESOPHAGITIS WITHOUT HEMORRHAGE: ICD-10-CM

## 2023-12-29 PROBLEM — F51.02 ADJUSTMENT INSOMNIA: Status: ACTIVE | Noted: 2023-12-29

## 2023-12-29 LAB
ALBUMIN SERPL BCG-MCNC: 4.5 G/DL (ref 3.5–5.2)
ALP SERPL-CCNC: 69 U/L (ref 40–150)
ALT SERPL W P-5'-P-CCNC: 17 U/L (ref 0–70)
ANION GAP SERPL CALCULATED.3IONS-SCNC: 12 MMOL/L (ref 7–15)
AST SERPL W P-5'-P-CCNC: 25 U/L (ref 0–45)
BILIRUB SERPL-MCNC: 0.4 MG/DL
BUN SERPL-MCNC: 12.7 MG/DL (ref 8–23)
CALCIUM SERPL-MCNC: 9.8 MG/DL (ref 8.8–10.2)
CHLORIDE SERPL-SCNC: 100 MMOL/L (ref 98–107)
CHOLEST SERPL-MCNC: 118 MG/DL
CREAT SERPL-MCNC: 1.2 MG/DL (ref 0.67–1.17)
DEPRECATED HCO3 PLAS-SCNC: 23 MMOL/L (ref 22–29)
EGFRCR SERPLBLD CKD-EPI 2021: 67 ML/MIN/1.73M2
ERYTHROCYTE [DISTWIDTH] IN BLOOD BY AUTOMATED COUNT: 14.7 % (ref 10–15)
FASTING STATUS PATIENT QL REPORTED: YES
GLUCOSE SERPL-MCNC: 159 MG/DL (ref 70–99)
HBA1C MFR BLD: 8.2 % (ref 0–5.6)
HCT VFR BLD AUTO: 41.3 % (ref 40–53)
HDLC SERPL-MCNC: 50 MG/DL
HGB BLD-MCNC: 13.7 G/DL (ref 13.3–17.7)
LDLC SERPL CALC-MCNC: 49 MG/DL
MCH RBC QN AUTO: 25.8 PG (ref 26.5–33)
MCHC RBC AUTO-ENTMCNC: 33.2 G/DL (ref 31.5–36.5)
MCV RBC AUTO: 78 FL (ref 78–100)
NONHDLC SERPL-MCNC: 68 MG/DL
PLATELET # BLD AUTO: 281 10E3/UL (ref 150–450)
POTASSIUM SERPL-SCNC: 4.3 MMOL/L (ref 3.4–5.3)
PROT SERPL-MCNC: 7.6 G/DL (ref 6.4–8.3)
PSA SERPL DL<=0.01 NG/ML-MCNC: 1.36 NG/ML (ref 0–4.5)
RBC # BLD AUTO: 5.32 10E6/UL (ref 4.4–5.9)
SODIUM SERPL-SCNC: 135 MMOL/L (ref 135–145)
TRIGL SERPL-MCNC: 95 MG/DL
WBC # BLD AUTO: 9.4 10E3/UL (ref 4–11)

## 2023-12-29 PROCEDURE — 80053 COMPREHEN METABOLIC PANEL: CPT | Performed by: PHYSICIAN ASSISTANT

## 2023-12-29 PROCEDURE — 99207 PR FOOT EXAM NO CHARGE: CPT | Mod: 25 | Performed by: PHYSICIAN ASSISTANT

## 2023-12-29 PROCEDURE — 80061 LIPID PANEL: CPT | Performed by: PHYSICIAN ASSISTANT

## 2023-12-29 PROCEDURE — 83036 HEMOGLOBIN GLYCOSYLATED A1C: CPT | Performed by: PHYSICIAN ASSISTANT

## 2023-12-29 PROCEDURE — 85027 COMPLETE CBC AUTOMATED: CPT | Performed by: PHYSICIAN ASSISTANT

## 2023-12-29 PROCEDURE — G0438 PPPS, INITIAL VISIT: HCPCS | Performed by: PHYSICIAN ASSISTANT

## 2023-12-29 PROCEDURE — G0103 PSA SCREENING: HCPCS | Performed by: PHYSICIAN ASSISTANT

## 2023-12-29 PROCEDURE — 36415 COLL VENOUS BLD VENIPUNCTURE: CPT | Performed by: PHYSICIAN ASSISTANT

## 2023-12-29 PROCEDURE — 99214 OFFICE O/P EST MOD 30 MIN: CPT | Mod: 25 | Performed by: PHYSICIAN ASSISTANT

## 2023-12-29 RX ORDER — BLOOD-GLUCOSE SENSOR
1 EACH MISCELLANEOUS
Qty: 2 EACH | Refills: 11 | Status: SHIPPED | OUTPATIENT
Start: 2023-12-29

## 2023-12-29 RX ORDER — INSULIN GLARGINE 100 [IU]/ML
32 INJECTION, SOLUTION SUBCUTANEOUS AT BEDTIME
Qty: 30 ML | Refills: 1 | Status: SHIPPED | OUTPATIENT
Start: 2023-12-29

## 2023-12-29 RX ORDER — RESPIRATORY SYNCYTIAL VIRUS VACCINE 120MCG/0.5
0.5 KIT INTRAMUSCULAR ONCE
Qty: 1 EACH | Refills: 0 | Status: CANCELLED | OUTPATIENT
Start: 2023-12-29 | End: 2023-12-29

## 2023-12-29 RX ORDER — GABAPENTIN 800 MG/1
800 TABLET ORAL 3 TIMES DAILY
Qty: 270 TABLET | Refills: 3 | Status: SHIPPED | OUTPATIENT
Start: 2023-12-29 | End: 2023-12-31

## 2023-12-29 RX ORDER — PANTOPRAZOLE SODIUM 20 MG/1
20 TABLET, DELAYED RELEASE ORAL DAILY
Qty: 90 TABLET | Refills: 1 | Status: SHIPPED | OUTPATIENT
Start: 2023-12-29 | End: 2024-01-05

## 2023-12-29 RX ORDER — METFORMIN HCL 500 MG
2000 TABLET, EXTENDED RELEASE 24 HR ORAL
Qty: 360 TABLET | Refills: 1 | Status: SHIPPED | OUTPATIENT
Start: 2023-12-29 | End: 2024-05-29

## 2023-12-29 RX ORDER — TAMSULOSIN HYDROCHLORIDE 0.4 MG/1
CAPSULE ORAL
Qty: 90 CAPSULE | Refills: 1 | Status: SHIPPED | OUTPATIENT
Start: 2023-12-29 | End: 2024-05-29

## 2023-12-29 RX ORDER — DULOXETIN HYDROCHLORIDE 60 MG/1
CAPSULE, DELAYED RELEASE ORAL
Qty: 90 CAPSULE | Refills: 1 | Status: SHIPPED | OUTPATIENT
Start: 2023-12-29 | End: 2024-05-29

## 2023-12-29 RX ORDER — DULOXETIN HYDROCHLORIDE 30 MG/1
30 CAPSULE, DELAYED RELEASE ORAL DAILY
Qty: 90 CAPSULE | Refills: 1 | Status: SHIPPED | OUTPATIENT
Start: 2023-12-29 | End: 2024-05-29

## 2023-12-29 RX ORDER — ROSUVASTATIN CALCIUM 20 MG/1
20 TABLET, COATED ORAL DAILY
Qty: 90 TABLET | Refills: 1 | Status: SHIPPED | OUTPATIENT
Start: 2023-12-29 | End: 2024-05-29

## 2023-12-29 RX ORDER — FINASTERIDE 5 MG/1
TABLET, FILM COATED ORAL
Qty: 90 TABLET | Refills: 1 | Status: SHIPPED | OUTPATIENT
Start: 2023-12-29 | End: 2024-05-29

## 2023-12-29 RX ORDER — MIRTAZAPINE 15 MG/1
15 TABLET, FILM COATED ORAL AT BEDTIME
Qty: 90 TABLET | Refills: 1 | Status: SHIPPED | OUTPATIENT
Start: 2023-12-29 | End: 2024-05-29

## 2023-12-29 RX ORDER — QUETIAPINE FUMARATE 25 MG/1
TABLET, FILM COATED ORAL
Qty: 180 TABLET | Refills: 1 | Status: SHIPPED | OUTPATIENT
Start: 2023-12-29 | End: 2024-05-29

## 2023-12-29 RX ORDER — LOSARTAN POTASSIUM 25 MG/1
25 TABLET ORAL DAILY
Qty: 90 TABLET | Refills: 1 | Status: SHIPPED | OUTPATIENT
Start: 2023-12-29 | End: 2024-05-29

## 2023-12-29 ASSESSMENT — ANXIETY QUESTIONNAIRES
GAD7 TOTAL SCORE: 2
4. TROUBLE RELAXING: SEVERAL DAYS
IF YOU CHECKED OFF ANY PROBLEMS ON THIS QUESTIONNAIRE, HOW DIFFICULT HAVE THESE PROBLEMS MADE IT FOR YOU TO DO YOUR WORK, TAKE CARE OF THINGS AT HOME, OR GET ALONG WITH OTHER PEOPLE: NOT DIFFICULT AT ALL
3. WORRYING TOO MUCH ABOUT DIFFERENT THINGS: NOT AT ALL
2. NOT BEING ABLE TO STOP OR CONTROL WORRYING: NOT AT ALL
6. BECOMING EASILY ANNOYED OR IRRITABLE: SEVERAL DAYS
5. BEING SO RESTLESS THAT IT IS HARD TO SIT STILL: NOT AT ALL
7. FEELING AFRAID AS IF SOMETHING AWFUL MIGHT HAPPEN: NOT AT ALL
1. FEELING NERVOUS, ANXIOUS, OR ON EDGE: NOT AT ALL
GAD7 TOTAL SCORE: 2

## 2023-12-29 ASSESSMENT — ENCOUNTER SYMPTOMS
ARTHRALGIAS: 1
FEVER: 0
DIZZINESS: 0
HEMATURIA: 0
SHORTNESS OF BREATH: 0
NERVOUS/ANXIOUS: 0
COUGH: 0
SORE THROAT: 0
HEARTBURN: 1
ABDOMINAL PAIN: 0
MYALGIAS: 0
EYE PAIN: 0
WEAKNESS: 0
NAUSEA: 0
CONSTIPATION: 0
JOINT SWELLING: 0
FREQUENCY: 1
DYSURIA: 0
HEADACHES: 1
CHILLS: 0
PALPITATIONS: 0
HEMATOCHEZIA: 0
PARESTHESIAS: 0
DIARRHEA: 0

## 2023-12-29 ASSESSMENT — PATIENT HEALTH QUESTIONNAIRE - PHQ9
SUM OF ALL RESPONSES TO PHQ QUESTIONS 1-9: 4
SUM OF ALL RESPONSES TO PHQ QUESTIONS 1-9: 4
10. IF YOU CHECKED OFF ANY PROBLEMS, HOW DIFFICULT HAVE THESE PROBLEMS MADE IT FOR YOU TO DO YOUR WORK, TAKE CARE OF THINGS AT HOME, OR GET ALONG WITH OTHER PEOPLE: NOT DIFFICULT AT ALL

## 2023-12-29 ASSESSMENT — ACTIVITIES OF DAILY LIVING (ADL): CURRENT_FUNCTION: NO ASSISTANCE NEEDED

## 2023-12-29 ASSESSMENT — PAIN SCALES - GENERAL: PAINLEVEL: MILD PAIN (2)

## 2023-12-29 NOTE — PROGRESS NOTES
"SUBJECTIVE:   Gerardo is a 68 year old, presenting for the following:  Medicare Visit and Wellness Visit        12/29/2023     9:16 AM   Additional Questions   Roomed by Jackie Marques         Are you in the first 12 months of your Medicare coverage?  No    Healthy Habits:     In general, how would you rate your overall health?  Good    Frequency of exercise:  1 day/week    Duration of exercise:  Less than 15 minutes    Do you usually eat at least 4 servings of fruit and vegetables a day, include whole grains    & fiber and avoid regularly eating high fat or \"junk\" foods?  Yes    Taking medications regularly:  Yes    Medication side effects:  None and Muscle aches    Ability to successfully perform activities of daily living:  No assistance needed    Home Safety:  No safety concerns identified    Hearing Impairment:  No hearing concerns    In the past 6 months, have you been bothered by leaking of urine?  No    In general, how would you rate your overall mental or emotional health?  Good    Additional concerns today:  No      Today's PHQ-9 Score:       12/29/2023     9:09 AM   PHQ-9 SCORE   PHQ-9 Total Score MyChart 4 (Minimal depression)   PHQ-9 Total Score 4           Have you ever done Advance Care Planning? (For example, a Health Directive, POLST, or a discussion with a medical provider or your loved ones about your wishes): No, advance care planning information given to patient to review.  Patient plans to discuss their wishes with loved ones or provider.         Fall risk  Fallen 2 or more times in the past year?: No  Any fall with injury in the past year?: No    Cognitive Screening   NORMAL  3) 3 item recall: Recalls 2 objects   Results: ABNORMAL clock, 1-2 items recalled: PROBABLE COGNITIVE IMPAIRMENT, **INFORM PROVIDER**    Mini-CogTM Copyright PERRY Baez. Licensed by the author for use in [facility]; reprinted with permission (gricel@.[facility]). All rights reserved.      Do you have sleep apnea, excessive " snoring or daytime drowsiness? : no    Reviewed and updated as needed this visit by clinical staff   Tobacco  Allergies  Meds              Reviewed and updated as needed this visit by Provider                 Social History     Tobacco Use     Smoking status: Former     Packs/day: 0.00     Years: 4.00     Additional pack years: 0.00     Total pack years: 0.00     Types: Cigarettes     Smokeless tobacco: Never   Substance Use Topics     Alcohol use: Yes     Alcohol/week: 6.0 standard drinks of alcohol     Types: 6 Cans of beer per week     Comment: occ glass of wine             12/29/2023     9:14 AM   Alcohol Use   Prescreen: >3 drinks/day or >7 drinks/week? No     Do you have a current opioid prescription? No  Do you use any other controlled substances or medications that are not prescribed by a provider? None          Diabetes Follow-up    How often are you checking your blood sugar? Two times daily  Blood sugar testing frequency justification:  Uncontrolled diabetes and Adjustment of medication(s)  What time of day are you checking your blood sugars (select all that apply)?  Before and after meals  Have you had any blood sugars above 200?  No  Have you had any blood sugars below 70?  No  What symptoms do you notice when your blood sugar is low?  Shaky and Dizzy  What concerns do you have today about your diabetes? None   Do you have any of these symptoms? (Select all that apply)  Numbness in feet  Have you had a diabetic eye exam in the last 12 months? No        BP Readings from Last 2 Encounters:   12/29/23 110/60   11/21/23 112/74     Hemoglobin A1C (%)   Date Value   12/29/2023 8.2 (H)   07/10/2023 7.8 (H)   05/13/2021 7.6 (H)   02/08/2021 15.0 (H)     LDL Cholesterol Calculated (mg/dL)   Date Value   07/10/2023 61   07/01/2022 55   02/08/2021 129 (H)   11/06/2019     Cannot estimate LDL when triglyceride exceeds 400 mg/dL     LDL Cholesterol Direct (mg/dL)   Date Value   11/06/2019 149 (H)              Hyperlipidemia Follow-Up    Are you regularly taking any medication or supplement to lower your cholesterol?   yes  Are you having muscle aches or other side effects that you think could be caused by your cholesterol lowering medication?  No    Anxiety Follow-Up  How are you doing with your anxiety since your last visit? No change  Are you having other symptoms that might be associated with anxiety? No  Have you had a significant life event? No   Are you feeling depressed? No  Do you have any concerns with your use of alcohol or other drugs? No    Social History     Tobacco Use     Smoking status: Former     Packs/day: 0.00     Years: 4.00     Additional pack years: 0.00     Total pack years: 0.00     Types: Cigarettes     Smokeless tobacco: Never   Vaping Use     Vaping Use: Never used   Substance Use Topics     Alcohol use: Yes     Alcohol/week: 6.0 standard drinks of alcohol     Types: 6 Cans of beer per week     Comment: occ glass of wine     Drug use: No         12/21/2022    12:29 PM 11/21/2023     7:51 AM 12/29/2023     9:10 AM   NAOMI-7 SCORE   Total Score 2 (minimal anxiety) 1 (minimal anxiety) 2 (minimal anxiety)   Total Score 2 1 2         12/21/2022    12:28 PM 11/21/2023     7:51 AM 12/29/2023     9:09 AM   PHQ   PHQ-9 Total Score 0 1 4   Q9: Thoughts of better off dead/self-harm past 2 weeks Not at all Not at all Not at all         12/29/2023     9:09 AM   Last PHQ-9   1.  Little interest or pleasure in doing things 1   2.  Feeling down, depressed, or hopeless 1   3.  Trouble falling or staying asleep, or sleeping too much 1   4.  Feeling tired or having little energy 1   5.  Poor appetite or overeating 0   6.  Feeling bad about yourself 0   7.  Trouble concentrating 0   8.  Moving slowly or restless 0   Q9: Thoughts of better off dead/self-harm past 2 weeks 0   PHQ-9 Total Score 4         12/29/2023     9:10 AM   NAOMI-7    1. Feeling nervous, anxious, or on edge 0   2. Not being able to stop  or control worrying 0   3. Worrying too much about different things 0   4. Trouble relaxing 1   5. Being so restless that it is hard to sit still 0   6. Becoming easily annoyed or irritable 1   7. Feeling afraid, as if something awful might happen 0   NAOMI-7 Total Score 2   If you checked any problems, how difficult have they made it for you to do your work, take care of things at home, or get along with other people? Not difficult at all     Chronic Kidney Disease Follow-up    Do you take any over the counter pain medicine?: No      Refills on all other meds. Working well for pt.    Reports 'triggering' of bilateral middle fingers x 6 months. Worse in the morning and has to 'unlock' them    Current providers sharing in care for this patient include:   Patient Care Team:  Raisa Calvillo PA-C as PCP - General (Physician Assistant)  Raisa Calvillo PA-C as Assigned PCP  Barbara Kc RPH as Pharmacist (Pharmacist)  Aliza Young, RN as Personal Advocate & Liaison (PAL) (Family Medicine)  Natalie Harding RN as Diabetes Educator (Diabetes Education)  Barbara Kc RPH as Assigned [facility] Pharmacist    The following health maintenance items are reviewed in Epic and correct as of today:  Health Maintenance   Topic Date Due     ZOSTER IMMUNIZATION (1 of 2) Never done     LUNG CANCER SCREENING  Never done     RSV VACCINE (Pregnancy & 60+) (1 - 1-dose 60+ series) Never done     EYE EXAM  11/18/2020     CMP  07/01/2023     DIABETIC FOOT EXAM  07/01/2023     ANNUAL REVIEW OF HM ORDERS  07/01/2023     A1C  11/10/2023     MEDICARE ANNUAL WELLNESS VISIT  12/21/2023     BMP  04/03/2024     NAOMI ASSESSMENT  06/29/2024     PHQ-9  06/29/2024     LIPID  07/10/2024     MICROALBUMIN  07/10/2024     FALL RISK ASSESSMENT  12/29/2024     COLORECTAL CANCER SCREENING  05/05/2026     ADVANCE CARE PLANNING  12/22/2027     DTAP/TDAP/TD IMMUNIZATION (3 - Td or Tdap) 02/08/2028     HEPATITIS C SCREENING  Completed     INFLUENZA  "VACCINE  Completed     Pneumococcal Vaccine: 65+ Years  Completed     AORTIC ANEURYSM SCREENING (SYSTEM ASSIGNED)  Completed     COVID-19 Vaccine  Completed     IPV IMMUNIZATION  Aged Out     HPV IMMUNIZATION  Aged Out     MENINGITIS IMMUNIZATION  Aged Out     RSV MONOCLONAL ANTIBODY  Aged Out     Lab work is in process          Review of Systems   Constitutional:  Negative for chills and fever.   HENT:  Negative for congestion, ear pain, hearing loss and sore throat.    Eyes:  Negative for pain and visual disturbance.   Respiratory:  Negative for cough and shortness of breath.    Cardiovascular:  Negative for palpitations and peripheral edema.   Gastrointestinal:  Positive for heartburn. Negative for abdominal pain, constipation, diarrhea, hematochezia and nausea.   Genitourinary:  Positive for frequency and impotence. Negative for dysuria, genital sores, hematuria, penile discharge and urgency.   Musculoskeletal:  Positive for arthralgias. Negative for joint swelling and myalgias.   Skin:  Negative for rash.   Neurological:  Positive for headaches. Negative for dizziness, weakness and paresthesias.   Psychiatric/Behavioral:  Negative for mood changes. The patient is not nervous/anxious.          OBJECTIVE:   /60 (BP Location: Right arm, Patient Position: Sitting, Cuff Size: Adult Regular)   Pulse 108   Temp 97.8  F (36.6  C) (Oral)   Resp 14   Ht 1.702 m (5' 7\")   Wt 76.2 kg (167 lb 14.4 oz)   SpO2 97%   BMI 26.30 kg/m   Estimated body mass index is 26.3 kg/m  as calculated from the following:    Height as of this encounter: 1.702 m (5' 7\").    Weight as of this encounter: 76.2 kg (167 lb 14.4 oz).  Physical Exam  GENERAL: healthy, alert and no distress  NECK: no adenopathy, no asymmetry, masses, or scars and thyroid normal to palpation  RESP: lungs clear to auscultation - no rales, rhonchi or wheezes  CV: regular rate and rhythm, normal S1 S2, no S3 or S4, no murmur, click or rub, no peripheral edema " and peripheral pulses strong  ABDOMEN: soft, nontender, no hepatosplenomegaly, no masses and bowel sounds normal  MS: bilateral middle fingers with locking and clicking   SKIN: no suspicious lesions or rashes  NEURO: Normal strength and tone, mentation intact and speech normal  PSYCH: mentation appears normal, affect normal/bright  Diabetic foot exam: normal DP and PT pulses, no trophic changes or ulcerative lesions, and normal monofilament exam, except for findings noted on diabetic foot graphical image.              Diagnostic Test Results:  Labs reviewed in Epic    ASSESSMENT / PLAN:   (Z00.00) Encounter for Medicare annual wellness exam  (primary encounter diagnosis)  Comment:   Plan: [place] ANNUAL WELLNESS VISIT, PPS, SUBSEQUENT            (E11.42,  Z79.4) Type 2 diabetes mellitus with diabetic polyneuropathy, with long-term current use of insulin (H)  Comment: await labs. Due for eye exam  Plan: Adult Eye  Referral, COMPREHENSIVE         METABOLIC PANEL, HEMOGLOBIN A1C, CBC with         platelets, FOOT EXAM, Continuous Blood Gluc         Sensor (FREESTYLE OLIVER 3 SENSOR) MISC, insulin        glargine 100 UNIT/ML pen, losartan (COZAAR) 25         MG tablet, metFORMIN (GLUCOPHAGE XR) 500 MG 24         hr tablet, rosuvastatin (CRESTOR) 20 MG tablet,        Semaglutide, 2 MG/DOSE, (OZEMPIC) 8 MG/3ML pen,        OFFICE/OUTPT VISIT,EST,LEVL IV            (N18.31) Chronic kidney disease, stage 3a (H)  Comment: await labs.   Plan: losartan (COZAAR) 25 MG tablet, OFFICE/OUTPT         VISIT,EST,LEVL IV            (K21.00) Gastroesophageal reflux disease with esophagitis without hemorrhage  Comment:   Plan: pantoprazole (PROTONIX) 20 MG EC tablet            (E78.5) Hyperlipidemia LDL goal <70  Comment: await labs. Tolerates statin  Plan: Lipid panel reflex to direct LDL Fasting,         rosuvastatin (CRESTOR) 20 MG tablet,         OFFICE/OUTPT VISIT,EST,LEVL IV            (M65.332) Trigger finger, left middle  "finger  Comment: referral for consult.   Plan: Orthopedic  Referral, OFFICE/OUTPT         VISIT,EST,LEVL IV            (M65.331) Trigger finger, right middle finger  Comment:   Plan: Orthopedic  Referral, OFFICE/OUTPT         VISIT,EST,LEVL IV            (F41.9) Anxiety  Comment: overall stable.   Plan: DULoxetine (CYMBALTA) 30 MG capsule, DULoxetine        (CYMBALTA) 60 MG capsule, gabapentin         (NEURONTIN) 800 MG tablet, OFFICE/OUTPT         VISIT,EST,LEVL IV            (N40.1,  R35.1) Benign prostatic hyperplasia with nocturia  Comment: symptoms tolerable.   Plan: finasteride (PROSCAR) 5 MG tablet, tamsulosin         (FLOMAX) 0.4 MG capsule            (G47.00) Persistent insomnia  Comment:   Plan: gabapentin (NEURONTIN) 800 MG tablet, QUEtiapine        (SEROQUEL) 25 MG tablet            (M62.838) Muscle spasm  Comment:   Plan: tiZANidine (ZANAFLEX) 4 MG tablet            (H91.93) Decreased hearing of both ears  Comment:   Plan: Adult Audiology  Referral            (Z12.5) Screening for prostate cancer  Comment:   Plan: PSA, screen            Patient has been advised of split billing requirements and indicates understanding: No      COUNSELING:  Reviewed preventive health counseling, as reflected in patient instructions       Regular exercise       Healthy diet/nutrition       Vision screening       Hearing screening       Dental care       Bladder control       Prostate cancer screening       Declined vaxxines      BMI:   Estimated body mass index is 26.3 kg/m  as calculated from the following:    Height as of this encounter: 1.702 m (5' 7\").    Weight as of this encounter: 76.2 kg (167 lb 14.4 oz).   Weight management plan: Discussed healthy diet and exercise guidelines      He reports that he has quit smoking. He has never used smokeless tobacco.      Appropriate preventive services were discussed with this patient, including applicable screening as appropriate for fall " prevention, nutrition, physical activity, Tobacco-use cessation, weight loss and cognition.  Checklist reviewing preventive services available has been given to the patient.    Reviewed patients plan of care and provided an AVS. The Intermediate Care Plan ( asthma action plan, low back pain action plan, and migraine action plan) for Gerardo meets the Care Plan requirement. This Care Plan has been established and reviewed with the Patient.          Raisa Calvillo PA-C  [facility]    Identified Health Risks:  I have reviewed Opioid Use Disorder and Substance Use Disorder risk factors and made any needed referrals.

## 2023-12-29 NOTE — PATIENT INSTRUCTIONS
"Patient Education   Personalized Prevention Plan  You are due for the preventive services outlined below.  Your care team is available to assist you in scheduling these services.  If you have already completed any of these items, please share that information with your care team to update in your medical record.  Health Maintenance Due   Topic Date Due     Zoster (Shingles) Vaccine (1 of 2) Never done     RSV VACCINE (Pregnancy & 60+) (1 - 1-dose 60+ series) Never done     Eye Exam  11/18/2020     Comprehensive Metabolic Panel  07/01/2023     Diabetic Foot Exam  07/01/2023     ANNUAL REVIEW OF HM ORDERS  07/01/2023     A1C Lab  11/10/2023     Annual Wellness Visit  12/21/2023     Learning About Being Physically Active  What is physical activity?     Being physically active means doing any kind of activity that gets your body moving.  The types of physical activity that can help you get fit and stay healthy include:  Aerobic or \"cardio\" activities. These make your heart beat faster and make you breathe harder, such as brisk walking, riding a bike, or running. They strengthen your heart and lungs and build up your endurance.  Strength training activities. These make your muscles work against, or \"resist,\" something. Examples include lifting weights or doing push-ups. These activities help tone and strengthen your muscles and bones.  Stretches. These let you move your joints and muscles through their full range of motion. Stretching helps you be more flexible.  Reaching a balance between these three types of physical activity is important because each one contributes to your overall fitness.  What are the benefits of being active?  Being active is one of the best things you can do for your health. It helps you to:  Feel stronger and have more energy to do all the things you like to do.  Focus better at school or work.  Feel, think, and sleep better.  Reach and stay at a healthy weight.  Lose fat and build lean " "muscle.  Lower your risk for serious health problems, including diabetes, heart attack, high blood pressure, and some cancers.  Keep your heart, lungs, bones, muscles, and joints strong and healthy.  How can you make being active part of your life?  Start slowly. Make it your long-term goal to get at least 30 minutes of exercise on most days of the week. Walking is a good choice. You also may want to do other activities, such as running, swimming, cycling, or playing tennis or team sports.  Pick activities that you like--ones that make your heart beat faster, your muscles stronger, and your muscles and joints more flexible. If you find more than one thing you like doing, do them all. You don't have to do the same thing every day.  Get your heart pumping every day. Any activity that makes your heart beat faster and keeps it at that rate for a while counts.  Here are some great ways to get your heart beating faster:  Go for a brisk walk, run, or hike.  Go for a swim or bike ride.  Take an online exercise class or dance.  Play a game of touch football, basketball, or soccer.  Play tennis, pickleball, or racquetball.  Climb stairs.  Even some household chores can be aerobic. Just do them at a faster pace. Raking or mowing the lawn, sweeping the garage, and vacuuming and cleaning your home all can help get your heart rate up.  Strengthen your muscles during the week. You don't have to lift heavy weights or grow big, bulky muscles to get stronger. Doing a few simple activities that make your muscles work against, or \"resist,\" something can help you get stronger. Aim for at least twice a week.  For example, you can:  Do push-ups or sit-ups, which use your own body weight as resistance.  Lift weights or dumbbells or use stretch bands at home or in a gym or community center.  Stretch your muscles often. Stretching will help you as you become more active. It can help you stay flexible and loosen tight muscles. It can also " "help improve your balance and posture and can be a great way to relax.  Be sure to stretch the muscles you'll be using when you work out. It's best to warm your muscles slightly before you stretch them. Walk or do some other light aerobic activity for a few minutes. Then start stretching.  When you stretch your muscles:  Do it slowly. Stretching is not about going fast or making sudden movements.  Don't push or bounce during a stretch.  Hold each stretch for at least 15 to 30 seconds, if you can. You should feel a stretch in the muscle, but not pain.  Breathe out as you do the stretch. Then breathe in as you hold the stretch. Don't hold your breath.  If you're worried about how more activity might affect your health, have a checkup before you start. Follow any special advice your doctor gives you for getting a smart start.  Where can you learn more?  Go to https://www.[employer].net/patiented  Enter W332 in the search box to learn more about \"Learning About Being Physically Active.\"  Current as of: June 6, 2023               Content Version: 13.8    1736-7334 [employer].   Care instructions adapted under license by your healthcare professional. If you have questions about a medical condition or this instruction, always ask your healthcare professional. [employer] disclaims any warranty or liability for your use of this information.         "

## 2023-12-31 RX ORDER — GABAPENTIN 800 MG/1
TABLET ORAL
Qty: 360 TABLET | Refills: 3 | Status: SHIPPED | OUTPATIENT
Start: 2023-12-31

## 2024-01-04 DIAGNOSIS — K21.00 GASTROESOPHAGEAL REFLUX DISEASE WITH ESOPHAGITIS WITHOUT HEMORRHAGE: ICD-10-CM

## 2024-01-05 RX ORDER — ESOMEPRAZOLE MAGNESIUM 10 MG/1
10 GRANULE, FOR SUSPENSION, EXTENDED RELEASE ORAL DAILY
Qty: 90 EACH | Refills: 3 | Status: SHIPPED | OUTPATIENT
Start: 2024-01-05 | End: 2024-05-29

## 2024-01-08 NOTE — PROGRESS NOTES
Orthopaedic [facility] H&P Note:  Date: Jan 9, 2024    Patient Name: Gerardo Gonzalez  MRN: [id number]    Consult requested by: Raisa Calvillo    CHIEF COMPLAINT: bilateral middle finger trigger fingers    Dominant Hand:right  Occupation: retired       HPI:  Mr. Gerardo Gonzalez is a 68 year old male right hand dominant who presents with bilateral middle finger trigger fingers.  Ongoing for 1 year.  Middle fingers catching and locking are very painful.  It also wakes him from sleep.  He endorses diminished active flexion.  He does think that other fingers are becoming infected as well.  He has not tried any treatment.    PMH  Diabetes yes   Thyroid Problems none   Smoking Y/N non smoker       PAST MEDICAL HISTORY:  Past Medical History:   Diagnosis Date    Psoriatic arthropathy (H)     Type II or unspecified type diabetes mellitus without mention of complication, not stated as uncontrolled     2006    Vaccination not carried out        PAST SURGICAL HISTORY:  Past Surgical History:   Procedure Laterality Date    CYSTOSCOPY      EXAM UNDER ANESTHESIA RECTUM N/A 5/5/2021    Procedure: Foreign body removal x3, flexible sigmoidoscopy, anoscopy;  Surgeon: Ibeth Mack MD;  Location:  OR     KNEE SCOPE, DIAGNOSTIC      Arthroscopy, Knee- Left       MEDICATIONS:  Current Outpatient Medications   Medication    B-D U/F 31G X 8 MM insulin pen needle    blood glucose monitoring (ONE TOUCH DELICA) lancets    Continuous Blood Gluc Sensor (FREESTYLE OLIVER 3 SENSOR) MISC    DULoxetine (CYMBALTA) 30 MG capsule    DULoxetine (CYMBALTA) 60 MG capsule    esomeprazole (NEXIUM) 10 MG packet    finasteride (PROSCAR) 5 MG tablet    gabapentin (NEURONTIN) 800 MG tablet    insulin glargine 100 UNIT/ML pen    ketoconazole (NIZORAL) 2 % external cream    losartan (COZAAR) 25 MG tablet    metFORMIN (GLUCOPHAGE XR) 500 MG 24 hr tablet    mirtazapine (REMERON) 15 MG tablet    ONETOUCH VERIO IQ test strip     QUEtiapine (SEROQUEL) 25 MG tablet    rosuvastatin (CRESTOR) 20 MG tablet    Semaglutide, 2 MG/DOSE, (OZEMPIC) 8 MG/3ML pen    tamsulosin (FLOMAX) 0.4 MG capsule    tiZANidine (ZANAFLEX) 4 MG tablet    vitamin D3 (CHOLECALCIFEROL) 50 mcg (2000 units) tablet     No current facility-administered medications for this visit.       ALLERGIES:     Allergies   Allergen Reactions    Ace Inhibitors Cough    Fentanyl Itching    Lipitor [Atorvastatin Calcium]      Body Aches       FAMILY HISTORY:  No pertinent family history    SOCIAL HISTORY:  Social History     Tobacco Use    Smoking status: Former     Packs/day: 0.00     Years: 4.00     Additional pack years: 0.00     Total pack years: 0.00     Types: Cigarettes    Smokeless tobacco: Never   Vaping Use    Vaping Use: Never used   Substance Use Topics    Alcohol use: Yes     Alcohol/week: 6.0 standard drinks of alcohol     Types: 6 Cans of beer per week     Comment: occ glass of wine    Drug use: No       The patient's past medical, family, and social history was reviewed and confirmed.    REVIEW OF SYMPTOMS:      General: Negative   Eyes: Negative   Ear, Nose and Throat: Negative   Respiratory: Negative   Cardiovascular: Negative   Gastrointestinal: Negative   Genito-urinary: Negative   Musculoskeletal: Negative  Neurological: Negative   Psychological: Negative  HEME: Negative   ENDO: Negative   SKIN: Negative    VITALS:  There were no vitals filed for this visit.    EXAM:  General: NAD, A&Ox3  HEENT: NC/AT  CV: RRR by peripheral pulse  Pulmonary: Non-labored breathing on RA  BUE:  Skin intact, mild physiologic swan-neck deformities of the ring fingers.  Tenderness palpation at the A1 pulley with palpable crepitus bilateral middle fingers  Visible triggering bilateral middle fingers  Intact FDP, FDS, EDC  Sensation intact to light touch median, radial, ulnar nerve distributions  Well-perfused, capillary fill less than 2 seconds    IMAGING:  None    Labs:  HbA1c  8.2  12/29/23    I have personally reviewed the above images and labs.         IMPRESSION AND RECOMMENDATIONS:  Mr. Gerardo Gonzalez is a 68 year old male right hand dominant with bilateral middle finger trigger fingers    I discussed diagnosis, prognosis, and treatment options with the patient.  I advised that the trigger fingers are secondary to his diabetes.  I emphasized the importance of strict glycemic control.  The patient states that he did recently have adjustments to his insulin and started Ozempic.    I discussed both steroid injections as well as surgery.  The patient would like to avoid surgery for now.  I did advise that steroid injections can raise his blood sugars.  I counseled the patient to watch him closely over the next days.    After obtaining written consent, I cleansed the skin over the volar aspect of bilateral middle fingers with chlorhexidine.  I then anesthetized the skin with ethyl chloride spray after which I injected 1 cc 1% lidocaine 1 cc of dexamethasone 4 mg/mL into the A1 pulley region flexor tendon sheaths of bilateral middle fingers.    Patient tolerated this well without complication.    If in 6 weeks, the patient has improved but has residual symptoms, the patient may return for consideration of a repeat injection versus surgical discussion.    All questions answered.  He voiced understanding and agreement.    Hand / Upper Extremity Injection/Arthrocentesis: bilateral long A1    Date/Time: 1/9/2024 1:21 PM    Performed by: Julian Ramirez MD  Authorized by: Julian Ramirez MD    Indications:  Pain  Needle Size:  25 G  Guidance: landmark    Approach:  Volar  Condition: trigger finger    Laterality:  Bilateral  Location:  Long finger    Site:  Bilateral long A1  Medications (Right):  1 mL dexAMETHasone 120 MG/30ML; 1 mL lidocaine 1 %  Medications (Left):  1 mL dexAMETHasone 120 MG/30ML; 1 mL lidocaine 1 %  Procedure discussed: discussed risks, benefits, and alternatives    Consent Given by:   Patient  Timeout: timeout called immediately prior to procedure    Prep: patient was prepped and draped in usual sterile fashion        Julian Ramirez MD    Hand, Upper Extremity & Microvascular Surgery  Department of Orthopaedic Surgery  [facility]

## 2024-01-09 ENCOUNTER — OFFICE VISIT (OUTPATIENT)
Dept: ORTHOPEDICS | Facility: CLINIC | Age: 69
End: 2024-01-09
Attending: PHYSICIAN ASSISTANT
Payer: COMMERCIAL

## 2024-01-09 DIAGNOSIS — M65.332 TRIGGER FINGER, LEFT MIDDLE FINGER: ICD-10-CM

## 2024-01-09 DIAGNOSIS — M65.331 ACQUIRED TRIGGER FINGER OF BOTH MIDDLE FINGERS: Primary | ICD-10-CM

## 2024-01-09 DIAGNOSIS — M65.332 ACQUIRED TRIGGER FINGER OF BOTH MIDDLE FINGERS: Primary | ICD-10-CM

## 2024-01-09 DIAGNOSIS — M65.331 TRIGGER FINGER, RIGHT MIDDLE FINGER: ICD-10-CM

## 2024-01-09 PROCEDURE — 20550 NJX 1 TENDON SHEATH/LIGAMENT: CPT | Mod: F2 | Performed by: STUDENT IN AN ORGANIZED HEALTH CARE EDUCATION/TRAINING PROGRAM

## 2024-01-09 PROCEDURE — 99204 OFFICE O/P NEW MOD 45 MIN: CPT | Mod: 25 | Performed by: STUDENT IN AN ORGANIZED HEALTH CARE EDUCATION/TRAINING PROGRAM

## 2024-01-09 RX ORDER — LIDOCAINE HYDROCHLORIDE 10 MG/ML
1 INJECTION, SOLUTION INFILTRATION; PERINEURAL
Status: SHIPPED | OUTPATIENT
Start: 2024-01-09

## 2024-01-09 RX ORDER — TESTOSTERONE CYPIONATE 200 MG/ML
1 INJECTION INTRAMUSCULAR
Status: SHIPPED | OUTPATIENT
Start: 2024-01-09

## 2024-01-09 RX ADMIN — TESTOSTERONE CYPIONATE 1 ML: 200 INJECTION INTRAMUSCULAR at 13:21

## 2024-01-09 RX ADMIN — LIDOCAINE HYDROCHLORIDE 1 ML: 10 INJECTION, SOLUTION INFILTRATION; PERINEURAL at 13:21

## 2024-01-09 NOTE — LETTER
1/9/2024         RE: Gerardo Gonzalez  [address]  [place] [address]        Dear Colleague,    Thank you for referring your patient, Gerardo Gonzalez, to the [facility] [place]. Please see a copy of my visit note below.    Orthopaedic Surgery Hand and Upper Extremity Clinic H&P Note:  Date: Jan 9, 2024    Patient Name: Gerardo Gonzalez  MRN: [id number]    Consult requested by: Raisa Calvillo    CHIEF COMPLAINT: bilateral middle finger trigger fingers    Dominant Hand:right  Occupation: retired       HPI:  Mr. Gerardo Gonzalez is a 68 year old male right hand dominant who presents with bilateral middle finger trigger fingers.  Ongoing for 1 year.  Middle fingers catching and locking are very painful.  It also wakes him from sleep.  He endorses diminished active flexion.  He does think that other fingers are becoming infected as well.  He has not tried any treatment.    PMH  Diabetes yes   Thyroid Problems none   Smoking Y/N non smoker       PAST MEDICAL HISTORY:  Past Medical History:   Diagnosis Date     Psoriatic arthropathy (H)      Type II or unspecified type diabetes mellitus without mention of complication, not stated as uncontrolled     2006     Vaccination not carried out        PAST SURGICAL HISTORY:  Past Surgical History:   Procedure Laterality Date     CYSTOSCOPY       EXAM UNDER ANESTHESIA RECTUM N/A 5/5/2021    Procedure: Foreign body removal x3, flexible sigmoidoscopy, anoscopy;  Surgeon: Ibeth Mack MD;  Location:  OR      KNEE SCOPE, DIAGNOSTIC      Arthroscopy, Knee- Left       MEDICATIONS:  Current Outpatient Medications   Medication     B-D U/F 31G X 8 MM insulin pen needle     blood glucose monitoring (ONE TOUCH DELICA) lancets     Continuous Blood Gluc Sensor (FREESTYLE OLIVER 3 SENSOR) MISC     DULoxetine (CYMBALTA) 30 MG capsule     DULoxetine (CYMBALTA) 60 MG capsule     esomeprazole (NEXIUM) 10 MG packet     finasteride (PROSCAR) 5 MG tablet      gabapentin (NEURONTIN) 800 MG tablet     insulin glargine 100 UNIT/ML pen     ketoconazole (NIZORAL) 2 % external cream     losartan (COZAAR) 25 MG tablet     metFORMIN (GLUCOPHAGE XR) 500 MG 24 hr tablet     mirtazapine (REMERON) 15 MG tablet     ONETOUCH VERIO IQ test strip     QUEtiapine (SEROQUEL) 25 MG tablet     rosuvastatin (CRESTOR) 20 MG tablet     Semaglutide, 2 MG/DOSE, (OZEMPIC) 8 MG/3ML pen     tamsulosin (FLOMAX) 0.4 MG capsule     tiZANidine (ZANAFLEX) 4 MG tablet     vitamin D3 (CHOLECALCIFEROL) 50 mcg (2000 units) tablet     No current facility-administered medications for this visit.       ALLERGIES:     Allergies   Allergen Reactions     Ace Inhibitors Cough     Fentanyl Itching     Lipitor [Atorvastatin Calcium]      Body Aches       FAMILY HISTORY:  No pertinent family history    SOCIAL HISTORY:  Social History     Tobacco Use     Smoking status: Former     Packs/day: 0.00     Years: 4.00     Additional pack years: 0.00     Total pack years: 0.00     Types: Cigarettes     Smokeless tobacco: Never   Vaping Use     Vaping Use: Never used   Substance Use Topics     Alcohol use: Yes     Alcohol/week: 6.0 standard drinks of alcohol     Types: 6 Cans of beer per week     Comment: occ glass of wine     Drug use: No       The patient's past medical, family, and social history was reviewed and confirmed.    REVIEW OF SYMPTOMS:      General: Negative   Eyes: Negative   Ear, Nose and Throat: Negative   Respiratory: Negative   Cardiovascular: Negative   Gastrointestinal: Negative   Genito-urinary: Negative   Musculoskeletal: Negative  Neurological: Negative   Psychological: Negative  HEME: Negative   ENDO: Negative   SKIN: Negative    VITALS:  There were no vitals filed for this visit.    EXAM:  General: NAD, A&Ox3  HEENT: NC/AT  CV: RRR by peripheral pulse  Pulmonary: Non-labored breathing on RA  BUE:  Skin intact, mild physiologic swan-neck deformities of the ring fingers.  Tenderness palpation at the A1  pulley with palpable crepitus bilateral middle fingers  Visible triggering bilateral middle fingers  Intact FDP, FDS, EDC  Sensation intact to light touch median, radial, ulnar nerve distributions  Well-perfused, capillary fill less than 2 seconds    IMAGING:  None    Labs:  HbA1c  8.2 12/29/23    I have personally reviewed the above images and labs.         IMPRESSION AND RECOMMENDATIONS:  Mr. Gerardo Gonzalez is a 68 year old male right hand dominant with bilateral middle finger trigger fingers    I discussed diagnosis, prognosis, and treatment options with the patient.  I advised that the trigger fingers are secondary to his diabetes.  I emphasized the importance of strict glycemic control.  The patient states that he did recently have adjustments to his insulin and started Ozempic.    I discussed both steroid injections as well as surgery.  The patient would like to avoid surgery for now.  I did advise that steroid injections can raise his blood sugars.  I counseled the patient to watch him closely over the next days.    After obtaining written consent, I cleansed the skin over the volar aspect of bilateral middle fingers with chlorhexidine.  I then anesthetized the skin with ethyl chloride spray after which I injected 1 cc 1% lidocaine 1 cc of dexamethasone 4 mg/mL into the A1 pulley region flexor tendon sheaths of bilateral middle fingers.    Patient tolerated this well without complication.    If in 6 weeks, the patient has improved but has residual symptoms, the patient may return for consideration of a repeat injection versus surgical discussion.    All questions answered.  He voiced understanding and agreement.    Hand / Upper Extremity Injection/Arthrocentesis: bilateral long A1    Date/Time: 1/9/2024 1:21 PM    Performed by: Julian Ramirez MD  Authorized by: Julian Ramirez MD    Indications:  Pain  Needle Size:  25 G  Guidance: landmark    Approach:  Volar  Condition: trigger finger    Laterality:   Bilateral  Location:  Long finger    Site:  Bilateral long A1  Medications (Right):  1 mL dexAMETHasone 120 MG/30ML; 1 mL lidocaine 1 %  Medications (Left):  1 mL dexAMETHasone 120 MG/30ML; 1 mL lidocaine 1 %  Procedure discussed: discussed risks, benefits, and alternatives    Consent Given by:  Patient  Timeout: timeout called immediately prior to procedure    Prep: patient was prepped and draped in usual sterile fashion        Julian Ramirez MD    Hand, Upper Extremity & Microvascular Surgery  Department of Orthopaedic Surgery  [facility]          Again, thank you for allowing me to participate in the care of your patient.        Sincerely,        Julian Ramirez MD

## 2024-01-30 ENCOUNTER — OFFICE VISIT (OUTPATIENT)
Dept: ORTHOPEDICS | Facility: CLINIC | Age: 69
End: 2024-01-30
Payer: COMMERCIAL

## 2024-01-30 VITALS — SYSTOLIC BLOOD PRESSURE: 140 MMHG | BODY MASS INDEX: 26.16 KG/M2 | WEIGHT: 167 LBS | DIASTOLIC BLOOD PRESSURE: 76 MMHG

## 2024-01-30 DIAGNOSIS — M65.332 ACQUIRED TRIGGER FINGER OF BOTH MIDDLE FINGERS: Primary | ICD-10-CM

## 2024-01-30 DIAGNOSIS — M65.331 ACQUIRED TRIGGER FINGER OF BOTH MIDDLE FINGERS: Primary | ICD-10-CM

## 2024-01-30 PROCEDURE — 99213 OFFICE O/P EST LOW 20 MIN: CPT | Performed by: STUDENT IN AN ORGANIZED HEALTH CARE EDUCATION/TRAINING PROGRAM

## 2024-01-30 NOTE — LETTER
1/30/2024         RE: Gerardo Gonzalez  [address]  [place] [address]        Dear Colleague,    Thank you for referring your patient, Gerardo Gonzalez, to the [facility] [place]. Please see a copy of my visit note below.    Orthopaedic Surgery Hand and Upper Extremity Clinic Progress Note:  Date: Jan 30, 2024    Patient Name: eGrardo Gonzalez  MRN: [id number]    Consult requested by: Raisa Calvillo    CHIEF COMPLAINT: bilateral middle finger trigger fingers    Dominant Hand:right  Occupation: retired     01/30/24: Patient was last seen on 01/09/24. Patient had bilateral ring trigger finger injections done and reports relief for 2 weeks. He states both middle fingers are now locking and catching again. He reports that both of his ring fingers have started locking as well. He states when he has the locking he has to release them manually.       HPI:    Mr. Gerardo Gonzalez is a 68 year old male right hand dominant who presents with bilateral middle finger trigger fingers.  Ongoing for 1 year.  Middle fingers catching and locking are very painful.  It also wakes him from sleep.  He endorses diminished active flexion.  He does think that other fingers are becoming infected as well.  He has not tried any treatment.    PMH  Diabetes yes   Thyroid Problems none   Smoking Y/N non smoker         VITALS:  There were no vitals filed for this visit.    EXAM:  General: NAD, A&Ox3  HEENT: NC/AT  CV: RRR by peripheral pulse  Pulmonary: Non-labored breathing on RA  BUE:  Skin intact, mild physiologic swan-neck deformities of the ring fingers.  Tenderness palpation at the A1 pulley with palpable crepitus bilateral middle fingers  Visible triggering bilateral middle fingers  TTP A1 pulley bilateral ring fingers, no visible triggering today  Intact FDP, FDS, EDC  Sensation intact to light touch median, radial, ulnar nerve distributions  Well-perfused, capillary fill less than 2  seconds    IMAGING:  None    Labs:  HbA1c  8.2 12/29/23    I have personally reviewed the above images and labs.         IMPRESSION AND RECOMMENDATIONS:  Mr. Gerardo Gonzalez is a 68 year old male right hand dominant with bilateral middle finger trigger fingers. Now with bilateral ring trigger fingers as well.    Symptoms refractory to injection 3 weeks ago. I advised that he could either wait to have a repeat injection at the 6 week lorenzo, or we could consider surgery. He wishes to have another round of injections.    I advised that we should only do 2 injections next time, as last round caused sugars to rise to about 245 for a day.    All questions answered.  He voiced understanding and agreement. He will return in 3 weeks for repeat injections.      Julian Ramirez MD    Hand, Upper Extremity & Microvascular Surgery  Department of Orthopaedic Surgery  [facility]            Again, thank you for allowing me to participate in the care of your patient.        Sincerely,        Julian Ramirez MD

## 2024-01-30 NOTE — PROGRESS NOTES
Orthopaedic Surgery Hand and Upper Extremity Clinic Progress Note:  Date: Jan 30, 2024    Patient Name: Gerardo Gonzalez  MRN: [id number]    Consult requested by: Raisa Calvillo    CHIEF COMPLAINT: bilateral middle finger trigger fingers    Dominant Hand:right  Occupation: retired     01/30/24: Patient was last seen on 01/09/24. Patient had bilateral ring trigger finger injections done and reports relief for 2 weeks. He states both middle fingers are now locking and catching again. He reports that both of his ring fingers have started locking as well. He states when he has the locking he has to release them manually.       HPI:    Mr. Gerardo Gonzalez is a 68 year old male right hand dominant who presents with bilateral middle finger trigger fingers.  Ongoing for 1 year.  Middle fingers catching and locking are very painful.  It also wakes him from sleep.  He endorses diminished active flexion.  He does think that other fingers are becoming infected as well.  He has not tried any treatment.    PMH  Diabetes yes   Thyroid Problems none   Smoking Y/N non smoker         VITALS:  There were no vitals filed for this visit.    EXAM:  General: NAD, A&Ox3  HEENT: NC/AT  CV: RRR by peripheral pulse  Pulmonary: Non-labored breathing on RA  BUE:  Skin intact, mild physiologic swan-neck deformities of the ring fingers.  Tenderness palpation at the A1 pulley with palpable crepitus bilateral middle fingers  Visible triggering bilateral middle fingers  TTP A1 pulley bilateral ring fingers, no visible triggering today  Intact FDP, FDS, EDC  Sensation intact to light touch median, radial, ulnar nerve distributions  Well-perfused, capillary fill less than 2 seconds    IMAGING:  None    Labs:  HbA1c  8.2 12/29/23    I have personally reviewed the above images and labs.         IMPRESSION AND RECOMMENDATIONS:  Mr. Gerardo Gonzalez is a 68 year old male right hand dominant with bilateral middle finger trigger fingers. Now with bilateral ring  trigger fingers as well.    Symptoms refractory to injection 3 weeks ago. I advised that he could either wait to have a repeat injection at the 6 week lorenzo, or we could consider surgery. He wishes to have another round of injections.    I advised that we should only do 2 injections next time, as last round caused sugars to rise to about 245 for a day.    All questions answered.  He voiced understanding and agreement. He will return in 3 weeks for repeat injections.      Julian Ramirez MD    Hand, Upper Extremity & Microvascular Surgery  Department of Orthopaedic Surgery  [facility]

## 2024-02-10 DIAGNOSIS — E11.42 TYPE 2 DIABETES MELLITUS WITH DIABETIC POLYNEUROPATHY, WITH LONG-TERM CURRENT USE OF INSULIN (H): ICD-10-CM

## 2024-02-10 DIAGNOSIS — Z79.4 TYPE 2 DIABETES MELLITUS WITH DIABETIC POLYNEUROPATHY, WITH LONG-TERM CURRENT USE OF INSULIN (H): ICD-10-CM

## 2024-02-12 RX ORDER — INSULIN DEGLUDEC 100 U/ML
INJECTION, SOLUTION SUBCUTANEOUS
Refills: 0 | OUTPATIENT
Start: 2024-02-12

## 2024-02-17 NOTE — PROGRESS NOTES
Orthopaedic Surgery Hand and Upper Extremity Clinic Progress Note:  Feb 20, 2024    Patient Name: Gerardo Gonzalez  MRN: [id number]    Consult requested by: Raisa Calvillo    CHIEF COMPLAINT: bilateral middle finger trigger fingers    Dominant Hand:right  Occupation: retired     02/20/24: Patient was last seen 01/30/24. Patient is here to repeat bilateral middle trigger finger injections. He reports that the pain is worsening, worse at the middle fingers. Also affecting ring  and maybe small now as well. He is wearing his oval 8's at night.    01/30/24: Patient was last seen on 01/09/24. Patient had bilateral ring trigger finger injections done and reports relief for 2 weeks. He states both middle fingers are now locking and catching again. He reports that both of his ring fingers have started locking as well. He states when he has the locking he has to release them manually.       HPI:    Mr. Gerardo Gonzalez is a 68 year old male right hand dominant who presents with bilateral middle finger trigger fingers.  Ongoing for 1 year.  Middle fingers catching and locking are very painful.  It also wakes him from sleep.  He endorses diminished active flexion.  He does think that other fingers are becoming infected as well.  He has not tried any treatment.    PMH  Diabetes yes   Thyroid Problems none   Smoking Y/N non smoker         VITALS:  There were no vitals filed for this visit.    EXAM:  General: NAD, A&Ox3  HEENT: NC/AT  CV: RRR by peripheral pulse  Pulmonary: Non-labored breathing on RA  BUE:  Skin intact, mild physiologic swan-neck deformities of the ring fingers.  Tenderness palpation at the A1 pulley with palpable crepitus bilateral middle fingers  Visible triggering bilateral middle fingers  TTP A1 pulley bilateral ring fingers, no visible triggering today  Intact FDP, FDS, EDC  Sensation intact to light touch median, radial, ulnar nerve distributions  Well-perfused, capillary fill less than 2  seconds    IMAGING:  None    Labs:  HbA1c  8.2 12/29/23    I have personally reviewed the above images and labs.         IMPRESSION AND RECOMMENDATIONS:  Mr. Gerardo Gonzalez is a 68 year old male right hand dominant with bilateral middle finger trigger fingers. Now with bilateral ring trigger fingers as well.    Symptoms refractory to bilateral middle finger injections 1/9/24. I did discuss surgery given worsening symptoms but he wishes to have another round of injections. He wishes to have bilateral middle and ring fingers done, he said his sugars only kirti for a day and was not too bad.    After obtaining written consent, I cleansed the skin over bilateral middle and ring finger A1 pulleys with chlorhexidine.  I then anesthetized the skin with ethyl chloride spray after which I injected 1 cc 1% lidocaine 1 cc of dexamethasone 4 mg/mL into the A1 pulley region flexor tendon sheath of bilateral middle and ring fingers.  The patient tolerated this well without complication.      All questions answered.  He voiced understanding and agreement.  If symptoms persist despite this round of injections, I advised we should discuss surgery.    Hand / Upper Extremity Injection/Arthrocentesis: bilateral ring A1    Date/Time: 2/20/2024 8:32 AM    Performed by: Julian Ramirez MD  Authorized by: Julian Ramirez MD    Indications:  Pain  Needle Size:  25 G  Guidance: landmark    Approach:  Volar  Condition: trigger finger    Laterality:  Bilateral  Location:  Ring finger    Site:  Bilateral ring A1  Medications (Right):  1 mL lidocaine 1 %; 1 mL dexAMETHasone 120 MG/30ML  Medications (Left):  1 mL lidocaine 1 %; 1 mL dexAMETHasone 120 MG/30ML  Outcome:  Tolerated well, no immediate complications  Procedure discussed: discussed risks, benefits, and alternatives    Consent Given by:  Patient  Timeout: timeout called immediately prior to procedure    Prep: patient was prepped and draped in usual sterile fashion    Hand / Upper Extremity  Injection/Arthrocentesis: bilateral long A1    Date/Time: 2/20/2024 8:34 AM    Performed by: Julian Ramirez MD  Authorized by: Julian Ramirez MD    Indications:  Pain  Needle Size:  25 G  Guidance: landmark    Approach:  Volar  Condition: trigger finger    Laterality:  Bilateral  Location:  Long finger    Site:  Bilateral long A1  Medications (Right):  1 mL dexAMETHasone 120 MG/30ML; 1 mL lidocaine 1 %  Medications (Left):  1 mL dexAMETHasone 120 MG/30ML; 1 mL lidocaine 1 %  Outcome:  Tolerated well, no immediate complications  Procedure discussed: discussed risks, benefits, and alternatives    Consent Given by:  Patient  Timeout: timeout called immediately prior to procedure    Prep: patient was prepped and draped in usual sterile fashion        Julian Ramirez MD    Hand, Upper Extremity & Microvascular Surgery  Department of Orthopaedic Surgery  [facility]

## 2024-02-20 ENCOUNTER — OFFICE VISIT (OUTPATIENT)
Dept: ORTHOPEDICS | Facility: CLINIC | Age: 69
End: 2024-02-20
Payer: COMMERCIAL

## 2024-02-20 DIAGNOSIS — M65.341 ACQUIRED TRIGGER FINGER OF BOTH RING FINGERS: ICD-10-CM

## 2024-02-20 DIAGNOSIS — M65.332 ACQUIRED TRIGGER FINGER OF BOTH MIDDLE FINGERS: Primary | ICD-10-CM

## 2024-02-20 DIAGNOSIS — M65.342 ACQUIRED TRIGGER FINGER OF BOTH RING FINGERS: ICD-10-CM

## 2024-02-20 DIAGNOSIS — M65.331 ACQUIRED TRIGGER FINGER OF BOTH MIDDLE FINGERS: Primary | ICD-10-CM

## 2024-02-20 PROCEDURE — 20550 NJX 1 TENDON SHEATH/LIGAMENT: CPT | Mod: F7 | Performed by: STUDENT IN AN ORGANIZED HEALTH CARE EDUCATION/TRAINING PROGRAM

## 2024-02-20 PROCEDURE — 99213 OFFICE O/P EST LOW 20 MIN: CPT | Mod: 25 | Performed by: STUDENT IN AN ORGANIZED HEALTH CARE EDUCATION/TRAINING PROGRAM

## 2024-02-20 PROCEDURE — 20550 NJX 1 TENDON SHEATH/LIGAMENT: CPT | Mod: F2 | Performed by: STUDENT IN AN ORGANIZED HEALTH CARE EDUCATION/TRAINING PROGRAM

## 2024-02-20 RX ORDER — LIDOCAINE HYDROCHLORIDE 10 MG/ML
1 INJECTION, SOLUTION INFILTRATION; PERINEURAL
Status: SHIPPED | OUTPATIENT
Start: 2024-02-20

## 2024-02-20 RX ORDER — TESTOSTERONE CYPIONATE 200 MG/ML
1 INJECTION INTRAMUSCULAR
Status: SHIPPED | OUTPATIENT
Start: 2024-02-20

## 2024-02-20 RX ADMIN — LIDOCAINE HYDROCHLORIDE 1 ML: 10 INJECTION, SOLUTION INFILTRATION; PERINEURAL at 08:34

## 2024-02-20 RX ADMIN — TESTOSTERONE CYPIONATE 1 ML: 200 INJECTION INTRAMUSCULAR at 08:34

## 2024-02-20 RX ADMIN — TESTOSTERONE CYPIONATE 1 ML: 200 INJECTION INTRAMUSCULAR at 08:32

## 2024-02-20 RX ADMIN — LIDOCAINE HYDROCHLORIDE 1 ML: 10 INJECTION, SOLUTION INFILTRATION; PERINEURAL at 08:32

## 2024-02-20 NOTE — LETTER
2/20/2024         RE: Gerardo Gonzalez  [address]  [place] [address]        Dear Colleague,    Thank you for referring your patient, Gerardo Gonzalez, to the [facility] [place]. Please see a copy of my visit note below.    Orthopaedic Surgery Hand and Upper Extremity Clinic Progress Note:  Feb 20, 2024    Patient Name: Gerardo Gonzalez  MRN: [id number]    Consult requested by: Raisa Calvillo    CHIEF COMPLAINT: bilateral middle finger trigger fingers    Dominant Hand:right  Occupation: retired     02/20/24: Patient was last seen 01/30/24. Patient is here to repeat bilateral middle trigger finger injections. He reports that the pain is worsening, worse at the middle fingers. Also affecting ring  and maybe small now as well. He is wearing his oval 8's at night.    01/30/24: Patient was last seen on 01/09/24. Patient had bilateral ring trigger finger injections done and reports relief for 2 weeks. He states both middle fingers are now locking and catching again. He reports that both of his ring fingers have started locking as well. He states when he has the locking he has to release them manually.       HPI:    Mr. Gerardo Gonzalez is a 68 year old male right hand dominant who presents with bilateral middle finger trigger fingers.  Ongoing for 1 year.  Middle fingers catching and locking are very painful.  It also wakes him from sleep.  He endorses diminished active flexion.  He does think that other fingers are becoming infected as well.  He has not tried any treatment.    PMH  Diabetes yes   Thyroid Problems none   Smoking Y/N non smoker         VITALS:  There were no vitals filed for this visit.    EXAM:  General: NAD, A&Ox3  HEENT: NC/AT  CV: RRR by peripheral pulse  Pulmonary: Non-labored breathing on RA  BUE:  Skin intact, mild physiologic swan-neck deformities of the ring fingers.  Tenderness palpation at the A1 pulley with palpable crepitus bilateral middle fingers  Visible  triggering bilateral middle fingers  TTP A1 pulley bilateral ring fingers, no visible triggering today  Intact FDP, FDS, EDC  Sensation intact to light touch median, radial, ulnar nerve distributions  Well-perfused, capillary fill less than 2 seconds    IMAGING:  None    Labs:  HbA1c  8.2 12/29/23    I have personally reviewed the above images and labs.         IMPRESSION AND RECOMMENDATIONS:  Mr. Gerardo Gonzalez is a 68 year old male right hand dominant with bilateral middle finger trigger fingers. Now with bilateral ring trigger fingers as well.    Symptoms refractory to bilateral middle finger injections 1/9/24. I did discuss surgery given worsening symptoms but he wishes to have another round of injections. He wishes to have bilateral middle and ring fingers done, he said his sugars only kirti for a day and was not too bad.    After obtaining written consent, I cleansed the skin over bilateral middle and ring finger A1 pulleys with chlorhexidine.  I then anesthetized the skin with ethyl chloride spray after which I injected 1 cc 1% lidocaine 1 cc of dexamethasone 4 mg/mL into the A1 pulley region flexor tendon sheath of bilateral middle and ring fingers.  The patient tolerated this well without complication.      All questions answered.  He voiced understanding and agreement.  If symptoms persist despite this round of injections, I advised we should discuss surgery.    Hand / Upper Extremity Injection/Arthrocentesis: bilateral ring A1    Date/Time: 2/20/2024 8:32 AM    Performed by: Julian Ramirez MD  Authorized by: Julian Ramirez MD    Indications:  Pain  Needle Size:  25 G  Guidance: landmark    Approach:  Volar  Condition: trigger finger    Laterality:  Bilateral  Location:  Ring finger    Site:  Bilateral ring A1  Medications (Right):  1 mL lidocaine 1 %; 1 mL dexAMETHasone 120 MG/30ML  Medications (Left):  1 mL lidocaine 1 %; 1 mL dexAMETHasone 120 MG/30ML  Outcome:  Tolerated well, no immediate  complications  Procedure discussed: discussed risks, benefits, and alternatives    Consent Given by:  Patient  Timeout: timeout called immediately prior to procedure    Prep: patient was prepped and draped in usual sterile fashion    Hand / Upper Extremity Injection/Arthrocentesis: bilateral long A1    Date/Time: 2/20/2024 8:34 AM    Performed by: Julian Ramirez MD  Authorized by: Julian Ramirez MD    Indications:  Pain  Needle Size:  25 G  Guidance: landmark    Approach:  Volar  Condition: trigger finger    Laterality:  Bilateral  Location:  Long finger    Site:  Bilateral long A1  Medications (Right):  1 mL dexAMETHasone 120 MG/30ML; 1 mL lidocaine 1 %  Medications (Left):  1 mL dexAMETHasone 120 MG/30ML; 1 mL lidocaine 1 %  Outcome:  Tolerated well, no immediate complications  Procedure discussed: discussed risks, benefits, and alternatives    Consent Given by:  Patient  Timeout: timeout called immediately prior to procedure    Prep: patient was prepped and draped in usual sterile fashion        Julian Ramirez MD    Hand, Upper Extremity & Microvascular Surgery  Department of Orthopaedic Surgery  [facility]        Again, thank you for allowing me to participate in the care of your patient.        Sincerely,        Julian Ramirez MD

## 2024-03-13 ENCOUNTER — TRANSFERRED RECORDS (OUTPATIENT)
Dept: MULTI SPECIALTY CLINIC | Facility: CLINIC | Age: 69
End: 2024-03-13

## 2024-03-13 LAB — RETINOPATHY: NORMAL

## 2024-04-17 DIAGNOSIS — N40.1 BENIGN PROSTATIC HYPERPLASIA WITH NOCTURIA: ICD-10-CM

## 2024-04-17 DIAGNOSIS — E11.42 TYPE 2 DIABETES MELLITUS WITH DIABETIC POLYNEUROPATHY, WITH LONG-TERM CURRENT USE OF INSULIN (H): ICD-10-CM

## 2024-04-17 DIAGNOSIS — Z79.4 TYPE 2 DIABETES MELLITUS WITH DIABETIC POLYNEUROPATHY, WITH LONG-TERM CURRENT USE OF INSULIN (H): ICD-10-CM

## 2024-04-17 DIAGNOSIS — R35.1 BENIGN PROSTATIC HYPERPLASIA WITH NOCTURIA: ICD-10-CM

## 2024-04-17 DIAGNOSIS — G47.00 PERSISTENT INSOMNIA: ICD-10-CM

## 2024-04-17 DIAGNOSIS — E55.9 VITAMIN D DEFICIENCY: ICD-10-CM

## 2024-04-17 RX ORDER — CHOLECALCIFEROL (VITAMIN D3) 50 MCG
1 TABLET ORAL DAILY
Qty: 90 TABLET | Refills: 3 | OUTPATIENT
Start: 2024-04-17

## 2024-04-17 RX ORDER — MIRTAZAPINE 15 MG/1
15 TABLET, FILM COATED ORAL AT BEDTIME
Qty: 90 TABLET | Refills: 1 | OUTPATIENT
Start: 2024-04-17

## 2024-04-17 RX ORDER — TAMSULOSIN HYDROCHLORIDE 0.4 MG/1
CAPSULE ORAL
Qty: 90 CAPSULE | Refills: 1 | OUTPATIENT
Start: 2024-04-17

## 2024-04-17 RX ORDER — METFORMIN HCL 500 MG
2000 TABLET, EXTENDED RELEASE 24 HR ORAL
Qty: 360 TABLET | Refills: 1 | OUTPATIENT
Start: 2024-04-17

## 2024-05-07 DIAGNOSIS — Z79.4 TYPE 2 DIABETES MELLITUS WITH DIABETIC POLYNEUROPATHY, WITH LONG-TERM CURRENT USE OF INSULIN (H): ICD-10-CM

## 2024-05-07 DIAGNOSIS — E11.42 TYPE 2 DIABETES MELLITUS WITH DIABETIC POLYNEUROPATHY, WITH LONG-TERM CURRENT USE OF INSULIN (H): ICD-10-CM

## 2024-05-07 RX ORDER — INSULIN GLARGINE 100 [IU]/ML
32 INJECTION, SOLUTION SUBCUTANEOUS AT BEDTIME
Qty: 45 ML | Refills: 1 | Status: SHIPPED | OUTPATIENT
Start: 2024-05-07

## 2024-05-07 NOTE — TELEPHONE ENCOUNTER
Please call patient. The medication has been refilled. They are due for a follow-up visit for diabetes and MTM

## 2024-05-08 NOTE — TELEPHONE ENCOUNTER
Patient transferred to RN for scheduling by central scheduler     Appt scheduled with pcp and MTM per pcp request below     Next 5 appointments (look out 90 days)      May 29, 2024  9:00 AM  (Arrive by 8:40 AM)  Provider Visit with Raisa Calvillo PA-C  [facility] ([facility] ) [address] [place] [id number]  [phone]     Jun 04, 2024  1:00 PM  MTM New with Barbara Kc RPH  [facility] ([facility] ) [address] [place] [id number]  [phone]          Cristal Calix, Registered Nurse  [facility]

## 2024-05-29 ENCOUNTER — OFFICE VISIT (OUTPATIENT)
Dept: FAMILY MEDICINE | Facility: CLINIC | Age: 69
End: 2024-05-29
Payer: COMMERCIAL

## 2024-05-29 VITALS
RESPIRATION RATE: 14 BRPM | HEART RATE: 117 BPM | BODY MASS INDEX: 24.17 KG/M2 | HEIGHT: 67 IN | SYSTOLIC BLOOD PRESSURE: 93 MMHG | TEMPERATURE: 97.4 F | WEIGHT: 154 LBS | OXYGEN SATURATION: 96 % | DIASTOLIC BLOOD PRESSURE: 60 MMHG

## 2024-05-29 DIAGNOSIS — E11.42 TYPE 2 DIABETES MELLITUS WITH DIABETIC POLYNEUROPATHY, WITH LONG-TERM CURRENT USE OF INSULIN (H): Primary | ICD-10-CM

## 2024-05-29 DIAGNOSIS — M62.838 MUSCLE SPASM: ICD-10-CM

## 2024-05-29 DIAGNOSIS — G47.00 PERSISTENT INSOMNIA: ICD-10-CM

## 2024-05-29 DIAGNOSIS — N18.31 CHRONIC KIDNEY DISEASE, STAGE 3A (H): ICD-10-CM

## 2024-05-29 DIAGNOSIS — K21.00 GASTROESOPHAGEAL REFLUX DISEASE WITH ESOPHAGITIS WITHOUT HEMORRHAGE: ICD-10-CM

## 2024-05-29 DIAGNOSIS — E78.5 HYPERLIPIDEMIA LDL GOAL <70: ICD-10-CM

## 2024-05-29 DIAGNOSIS — F41.9 ANXIETY: ICD-10-CM

## 2024-05-29 DIAGNOSIS — R35.1 BENIGN PROSTATIC HYPERPLASIA WITH NOCTURIA: ICD-10-CM

## 2024-05-29 DIAGNOSIS — L40.50 PSORIATIC ARTHRITIS (H): ICD-10-CM

## 2024-05-29 DIAGNOSIS — Z79.4 TYPE 2 DIABETES MELLITUS WITH DIABETIC POLYNEUROPATHY, WITH LONG-TERM CURRENT USE OF INSULIN (H): Primary | ICD-10-CM

## 2024-05-29 DIAGNOSIS — N40.1 BENIGN PROSTATIC HYPERPLASIA WITH NOCTURIA: ICD-10-CM

## 2024-05-29 LAB
ANION GAP SERPL CALCULATED.3IONS-SCNC: 13 MMOL/L (ref 7–15)
BUN SERPL-MCNC: 16.2 MG/DL (ref 8–23)
CALCIUM SERPL-MCNC: 10.3 MG/DL (ref 8.8–10.2)
CHLORIDE SERPL-SCNC: 103 MMOL/L (ref 98–107)
CREAT SERPL-MCNC: 1.33 MG/DL (ref 0.67–1.17)
DEPRECATED HCO3 PLAS-SCNC: 22 MMOL/L (ref 22–29)
EGFRCR SERPLBLD CKD-EPI 2021: 59 ML/MIN/1.73M2
GLUCOSE SERPL-MCNC: 135 MG/DL (ref 70–99)
HBA1C MFR BLD: 6.1 % (ref 0–5.6)
HOLD SPECIMEN: NORMAL
POTASSIUM SERPL-SCNC: 4.8 MMOL/L (ref 3.4–5.3)
SODIUM SERPL-SCNC: 138 MMOL/L (ref 135–145)

## 2024-05-29 PROCEDURE — 99214 OFFICE O/P EST MOD 30 MIN: CPT | Performed by: PHYSICIAN ASSISTANT

## 2024-05-29 PROCEDURE — 80048 BASIC METABOLIC PNL TOTAL CA: CPT | Performed by: PHYSICIAN ASSISTANT

## 2024-05-29 PROCEDURE — 36415 COLL VENOUS BLD VENIPUNCTURE: CPT | Performed by: PHYSICIAN ASSISTANT

## 2024-05-29 PROCEDURE — 83036 HEMOGLOBIN GLYCOSYLATED A1C: CPT | Performed by: PHYSICIAN ASSISTANT

## 2024-05-29 RX ORDER — METFORMIN HCL 500 MG
2000 TABLET, EXTENDED RELEASE 24 HR ORAL
Qty: 360 TABLET | Refills: 1 | Status: SHIPPED | OUTPATIENT
Start: 2024-05-29

## 2024-05-29 RX ORDER — FINASTERIDE 5 MG/1
TABLET, FILM COATED ORAL
Qty: 90 TABLET | Refills: 1 | Status: SHIPPED | OUTPATIENT
Start: 2024-05-29

## 2024-05-29 RX ORDER — LOSARTAN POTASSIUM 25 MG/1
12.5 TABLET ORAL DAILY
Qty: 45 TABLET | Refills: 3 | Status: SHIPPED | OUTPATIENT
Start: 2024-05-29

## 2024-05-29 RX ORDER — ROSUVASTATIN CALCIUM 20 MG/1
20 TABLET, COATED ORAL DAILY
Qty: 90 TABLET | Refills: 1 | Status: SHIPPED | OUTPATIENT
Start: 2024-05-29

## 2024-05-29 RX ORDER — DULOXETIN HYDROCHLORIDE 30 MG/1
30 CAPSULE, DELAYED RELEASE ORAL DAILY
Qty: 90 CAPSULE | Refills: 1 | Status: SHIPPED | OUTPATIENT
Start: 2024-05-29

## 2024-05-29 RX ORDER — TAMSULOSIN HYDROCHLORIDE 0.4 MG/1
CAPSULE ORAL
Qty: 90 CAPSULE | Refills: 1 | Status: SHIPPED | OUTPATIENT
Start: 2024-05-29

## 2024-05-29 RX ORDER — MIRTAZAPINE 15 MG/1
15 TABLET, FILM COATED ORAL AT BEDTIME
Qty: 90 TABLET | Refills: 1 | Status: SHIPPED | OUTPATIENT
Start: 2024-05-29

## 2024-05-29 RX ORDER — DULOXETIN HYDROCHLORIDE 60 MG/1
CAPSULE, DELAYED RELEASE ORAL
Qty: 90 CAPSULE | Refills: 1 | Status: SHIPPED | OUTPATIENT
Start: 2024-05-29

## 2024-05-29 RX ORDER — QUETIAPINE FUMARATE 25 MG/1
TABLET, FILM COATED ORAL
Qty: 180 TABLET | Refills: 1 | Status: SHIPPED | OUTPATIENT
Start: 2024-05-29

## 2024-05-29 RX ORDER — ESOMEPRAZOLE MAGNESIUM 10 MG/1
10 GRANULE, FOR SUSPENSION, EXTENDED RELEASE ORAL DAILY
Qty: 90 EACH | Refills: 3 | Status: SHIPPED | OUTPATIENT
Start: 2024-05-29

## 2024-05-29 ASSESSMENT — PAIN SCALES - GENERAL: PAINLEVEL: EXTREME PAIN (9)

## 2024-05-29 ASSESSMENT — PATIENT HEALTH QUESTIONNAIRE - PHQ9: SUM OF ALL RESPONSES TO PHQ QUESTIONS 1-9: 6

## 2024-05-29 NOTE — PROGRESS NOTES
Assessment & Plan     (E11.42,  Z79.4) Type 2 diabetes mellitus with diabetic polyneuropathy, with long-term current use of insulin (H)  (primary encounter diagnosis)  Comment: A1c looks great. Follow-up 6 months.  If increased hypoglycemia then will reduce insulin.   Plan: Hemoglobin A1c, Basic metabolic panel  (Ca, Cl,        CO2, Creat, Gluc, K, Na, BUN), losartan         (COZAAR) 25 MG tablet, metFORMIN (GLUCOPHAGE         XR) 500 MG 24 hr tablet, rosuvastatin (CRESTOR)        20 MG tablet, Semaglutide, 2 MG/DOSE, (OZEMPIC)        8 MG/3ML pen            (L40.50) Psoriatic arthritis (H)  Comment:   Plan: will follow-up with hand specialist    (N18.31) Chronic kidney disease, stage 3a (H)  Comment:   Plan: Basic metabolic panel  (Ca, Cl, CO2, Creat,         Gluc, K, Na, BUN), losartan (COZAAR) 25 MG         tablet        Await labs. Will cut losartan dose to 12.5 mg due to hypotension    (F41.9) Anxiety  Comment: stable with meds.   Plan: DULoxetine (CYMBALTA) 30 MG capsule, DULoxetine        (CYMBALTA) 60 MG capsule            (K21.00) Gastroesophageal reflux disease with esophagitis without hemorrhage  Comment: pharmacy did not have per pt. Will restart.  Pantoprazole not covered.   Plan: esomeprazole (NEXIUM) 10 MG packet            (G47.00) Persistent insomnia  Comment: stable.   Plan: mirtazapine (REMERON) 15 MG tablet, QUEtiapine         (SEROQUEL) 25 MG tablet            (E78.5) Hyperlipidemia LDL goal <70  Comment:   Plan: rosuvastatin (CRESTOR) 20 MG tablet        Await labs.     (N40.1,  R35.1) Benign prostatic hyperplasia with nocturia  Comment:   Plan: finasteride (PROSCAR) 5 MG tablet, tamsulosin         (FLOMAX) 0.4 MG capsule        Stable.     (M62.838) Muscle spasm  Comment:   Plan: tiZANidine (ZANAFLEX) 4 MG tablet                        Rhiannon Carrillo is a 69 year old, presenting for the following health issues:  Diabetes        5/29/2024     8:46 AM   Additional Questions   Roomed by  Courtney Fischer     Via the Health Maintenance questionnaire, the patient has reported the following services have been completed -Eye Exam: yes 2024-03-13, this information has been sent to the abstraction team.  History of Present Illness       Reason for visit:  Regular visit lab work    He eats 2-3 servings of fruits and vegetables daily.He consumes 1 sweetened beverage(s) daily.He exercises with enough effort to increase his heart rate 10 to 19 minutes per day.  He exercises with enough effort to increase his heart rate 3 or less days per week.   He is taking medications regularly.         Diabetes Follow-up    How often are you checking your blood sugar? Continuous glucose monitor  What time of day are you checking your blood sugars (select all that apply)?  Before and after meals  Have you had any blood sugars above 200?  Yes   Have you had any blood sugars below 70?  Yes   What symptoms do you notice when your blood sugar is low?  None  What concerns do you have today about your diabetes? None   Do you have any of these symptoms? (Select all that apply)  No numbness or tingling in feet.  No redness, sores or blisters on feet.  No complaints of excessive thirst.  No reports of blurry vision.  No significant changes to weight.          BP Readings from Last 2 Encounters:   05/29/24 93/60   01/30/24 (!) 140/76     Hemoglobin A1C (%)   Date Value   12/29/2023 8.2 (H)   07/10/2023 7.8 (H)   05/13/2021 7.6 (H)   02/08/2021 15.0 (H)     LDL Cholesterol Calculated (mg/dL)   Date Value   12/29/2023 49   07/10/2023 61   02/08/2021 129 (H)   11/06/2019     Cannot estimate LDL when triglyceride exceeds 400 mg/dL     LDL Cholesterol Direct (mg/dL)   Date Value   11/06/2019 149 (H)           Medication Followup of Losartan (COZAAR) 25 MG  Taking Medication as prescribed: yes  Side Effects:  None  Medication Helping Symptoms:  yes    Medication Followup of Metformin 500 MG  Taking Medication as prescribed: yes  Side  "Effects:  None  Medication Helping Symptoms:  yes    Medication Followup of Semaglutide (Ozempic)  Taking Medication as prescribed: yes  Side Effects:  None  Medication Helping Symptoms:  yes    Anxiety   How are you doing with your anxiety since your last visit? No change  Are you having other symptoms that might be associated with anxiety? No  Have you had a significant life event? No   Are you feeling depressed? No  Do you have any concerns with your use of alcohol or other drugs? No    Social History     Tobacco Use    Smoking status: Former     Current packs/day: 0.00     Types: Cigarettes    Smokeless tobacco: Never   Vaping Use    Vaping status: Never Used   Substance Use Topics    Alcohol use: Yes     Alcohol/week: 6.0 standard drinks of alcohol     Types: 6 Cans of beer per week     Comment: occ glass of wine    Drug use: No         12/21/2022    12:29 PM 11/21/2023     7:51 AM 12/29/2023     9:10 AM   NAOMI-7 SCORE   Total Score 2 (minimal anxiety) 1 (minimal anxiety) 2 (minimal anxiety)   Total Score 2 1 2         11/21/2023     7:51 AM 12/29/2023     9:09 AM 5/29/2024     8:59 AM   PHQ   PHQ-9 Total Score 1 4 6   Q9: Thoughts of better off dead/self-harm past 2 weeks Not at all Not at all Not at all       Chronic Kidney Disease Follow-up    Do you take any over the counter pain medicine?: No          Review of Systems  Constitutional, neuro, ENT, endocrine, pulmonary, cardiac, gastrointestinal, genitourinary, musculoskeletal, integument and psychiatric systems are negative, except as otherwise noted.      Objective    BP 93/60 (BP Location: Right arm, Patient Position: Sitting, Cuff Size: Adult Regular)   Pulse 117   Temp 97.4  F (36.3  C) (Oral)   Resp 14   Ht 1.702 m (5' 7\")   Wt 69.9 kg (154 lb)   SpO2 96%   BMI 24.12 kg/m    Body mass index is 24.12 kg/m .  Physical Exam   GENERAL: alert and no distress  CV: regular rate and rhythm, normal S1 S2, no S3 or S4, no murmur, click or rub, no " peripheral edema   NEURO: Normal strength and tone, mentation intact and speech normal  PSYCH: mentation appears normal, affect normal/bright            Signed Electronically by: Raisa Calvillo PA-C

## 2024-06-03 NOTE — PROGRESS NOTES
"Orthopaedic Surgery Hand and Upper Extremity Clinic Progress Note:  Jun 4, 2024    Patient Name: Gerardo Gonzalez  MRN: [id number]    Consult requested by: Raisa Calvillo    CHIEF COMPLAINT: bilateral middle finger trigger fingers    Dominant Hand:right  Occupation: retired       6/4/24: Patient was last seen 2/20/24. He received bilateral middle and ring trigger finger injections at that time that provided relief for 5 weeks. Now he notes when he wakes up his hands feel swollen and he has difficulty with finger flexion. He notes constant pain and describes it as \"unbearable\". Upon further questioning, he does report intermittent numbness/tingling. Mostly affecting middle and ring fingers. Aggravated by activity      02/20/24: Patient was last seen 01/30/24. Patient is here to repeat bilateral middle trigger finger injections. He reports that the pain is worsening, worse at the middle fingers. Also affecting ring  and maybe small now as well. He is wearing his oval 8's at night.    01/30/24: Patient was last seen on 01/09/24. Patient had bilateral ring trigger finger injections done and reports relief for 2 weeks. He states both middle fingers are now locking and catching again. He reports that both of his ring fingers have started locking as well. He states when he has the locking he has to release them manually.       HPI:    Mr. Gerardo Gonzalez is a 68 year old male right hand dominant who presents with bilateral middle finger trigger fingers.  Ongoing for 1 year.  Middle fingers catching and locking are very painful.  It also wakes him from sleep.  He endorses diminished active flexion.  He does think that other fingers are becoming infected as well.  He has not tried any treatment.    PMH  Diabetes yes   Thyroid Problems none   Smoking Y/N non smoker         VITALS:  There were no vitals filed for this visit.    EXAM:  General: NAD, A&Ox3  HEENT: NC/AT  CV: RRR by peripheral pulse  Pulmonary: Non-labored breathing " on RA  BUE:  Skin intact, mild physiologic swan-neck deformities of the ring fingers. Bilateral thenar atrophy, L worse than R  Tenderness palpation at the A1 pulley with palpable crepitus bilateral middle fingers  Visible triggering bilateral middle fingers  TTP A1 pulley bilateral ring fingers, no visible triggering today  Diminished active flexion of bilateral middle and ring fingers  Intact FDP, FDS, EDC  Sensation intact to light touch median, radial, ulnar nerve distributions  +Tinels and Durkan's compression test of bilateral carpal tunnels  Well-perfused, capillary fill less than 2 seconds    IMAGING:  None    Labs:  HbA1c  8.2 12/29/23  6.1 5/29/24    I have personally reviewed the above images and labs.         IMPRESSION AND RECOMMENDATIONS:  Mr. Gerardo Gonzalez is a 68 year old male right hand dominant with bilateral middle and ring finger trigger fingers. Also with bilateral carpal tunnel syndrome    Symptoms refractory to bilateral middle finger injections x2. Patient is ready to proceed with surgery at this point. Given his numbness/tingling, I have recommended a carpal tunnel release as well.    The indications for surgery were discussed with the patient. The benefits, risks, and alternatives of operative management were discussed in detail with the patient. The patient understands that the risks of surgery include, but are not limited to: infection, bleeding, injury to nearby structures (such as nerves, blood vessels, and tendons), persistent symptoms, wound healing problems, temporary weakness in , need for additional surgery, pain, stiffness, scarring, need for rehabilitation, and anesthetic complications.  Patient expressed understanding and elected to proceed with surgery. All questions were answered to the patient's satisfaction.    Case request placed. Local only. He wishes to have repeat injections in the meantime.    After obtaining written consent, I cleansed the skin over bilateral  middle and ring finger A1 pulleys with chlorhexidine.  I then anesthetized the skin with ethyl chloride spray after which I injected 1 cc 1% lidocaine 1 cc of dexamethasone 4 mg/mL into the A1 pulley region flexor tendon sheath of bilateral middle and ring fingers.  The patient tolerated this well without complication.    Julian Ramirez MD    Hand, Upper Extremity & Microvascular Surgery  Department of Orthopaedic Surgery  [facility]    Hand / Upper Extremity Injection/Arthrocentesis: bilateral long A1    Date/Time: 6/4/2024 8:54 AM    Performed by: Julian Ramirez MD  Authorized by: Julian Ramirez MD    Indications:  Pain  Needle Size:  25 G  Guidance: landmark    Approach:  Volar  Condition: trigger finger    Laterality:  Bilateral  Location:  Long finger    Site:  Bilateral long A1  Medications (Right):  1 mL dexAMETHasone 120 MG/30ML; 1 mL lidocaine 1 %  Medications (Left):  1 mL dexAMETHasone 120 MG/30ML; 1 mL lidocaine 1 %  Outcome:  Tolerated well, no immediate complications  Procedure discussed: discussed risks, benefits, and alternatives    Timeout: timeout called immediately prior to procedure    Prep: patient was prepped and draped in usual sterile fashion    Hand / Upper Extremity Injection/Arthrocentesis: bilateral ring A1    Date/Time: 6/4/2024 8:57 AM    Performed by: Julian Ramirez MD  Authorized by: Julian Ramirez MD    Indications:  Pain  Needle Size:  25 G  Guidance: landmark    Approach:  Volar  Condition: trigger finger    Laterality:  Bilateral  Location:  Ring finger    Site:  Bilateral ring A1  Medications (Right):  1 mL dexAMETHasone 120 MG/30ML; 1 mL lidocaine 1 %  Medications (Left):  1 mL dexAMETHasone 120 MG/30ML; 1 mL lidocaine 1 %  Outcome:  Tolerated well, no immediate complications  Procedure discussed: discussed risks, benefits, and alternatives    Timeout: timeout called immediately prior to procedure    Prep: patient was prepped and draped in usual sterile  fashion

## 2024-06-04 ENCOUNTER — OFFICE VISIT (OUTPATIENT)
Dept: ORTHOPEDICS | Facility: CLINIC | Age: 69
End: 2024-06-04
Payer: COMMERCIAL

## 2024-06-04 VITALS
DIASTOLIC BLOOD PRESSURE: 66 MMHG | WEIGHT: 154 LBS | BODY MASS INDEX: 24.17 KG/M2 | SYSTOLIC BLOOD PRESSURE: 127 MMHG | HEIGHT: 67 IN

## 2024-06-04 DIAGNOSIS — M65.332 TRIGGER MIDDLE FINGER OF LEFT HAND: ICD-10-CM

## 2024-06-04 DIAGNOSIS — M65.349 TRIGGER RING FINGER, UNSPECIFIED LATERALITY: ICD-10-CM

## 2024-06-04 DIAGNOSIS — G56.02 LEFT CARPAL TUNNEL SYNDROME: Primary | ICD-10-CM

## 2024-06-04 PROCEDURE — 99214 OFFICE O/P EST MOD 30 MIN: CPT | Mod: 25 | Performed by: STUDENT IN AN ORGANIZED HEALTH CARE EDUCATION/TRAINING PROGRAM

## 2024-06-04 PROCEDURE — 20550 NJX 1 TENDON SHEATH/LIGAMENT: CPT | Mod: F2 | Performed by: STUDENT IN AN ORGANIZED HEALTH CARE EDUCATION/TRAINING PROGRAM

## 2024-06-04 RX ORDER — TESTOSTERONE CYPIONATE 200 MG/ML
1 INJECTION INTRAMUSCULAR
Status: SHIPPED | OUTPATIENT
Start: 2024-06-04

## 2024-06-04 RX ORDER — LIDOCAINE HYDROCHLORIDE 10 MG/ML
1 INJECTION, SOLUTION INFILTRATION; PERINEURAL
Status: SHIPPED | OUTPATIENT
Start: 2024-06-04

## 2024-06-04 RX ADMIN — LIDOCAINE HYDROCHLORIDE 1 ML: 10 INJECTION, SOLUTION INFILTRATION; PERINEURAL at 08:54

## 2024-06-04 RX ADMIN — TESTOSTERONE CYPIONATE 1 ML: 200 INJECTION INTRAMUSCULAR at 08:57

## 2024-06-04 RX ADMIN — LIDOCAINE HYDROCHLORIDE 1 ML: 10 INJECTION, SOLUTION INFILTRATION; PERINEURAL at 08:57

## 2024-06-04 RX ADMIN — TESTOSTERONE CYPIONATE 1 ML: 200 INJECTION INTRAMUSCULAR at 08:54

## 2024-06-04 NOTE — LETTER
"6/4/2024      Gerardo Gonzalez  [address]  [place] [address]      Dear Colleague,    Thank you for referring your patient, Gerardo Gonzalez, to the [facility] [place]. Please see a copy of my visit note below.    Orthopaedic Surgery Hand and Upper Extremity Clinic Progress Note:  Jun 4, 2024    Patient Name: Gerardo Gonzalez  MRN: [id number]    Consult requested by: Raisa Calvillo    CHIEF COMPLAINT: bilateral middle finger trigger fingers    Dominant Hand:right  Occupation: retired       6/4/24: Patient was last seen 2/20/24. He received bilateral middle and ring trigger finger injections at that time that provided relief for 5 weeks. Now he notes when he wakes up his hands feel swollen and he has difficulty with finger flexion. He notes constant pain and describes it as \"unbearable\". Upon further questioning, he does report intermittent numbness/tingling. Mostly affecting middle and ring fingers. Aggravated by activity      02/20/24: Patient was last seen 01/30/24. Patient is here to repeat bilateral middle trigger finger injections. He reports that the pain is worsening, worse at the middle fingers. Also affecting ring  and maybe small now as well. He is wearing his oval 8's at night.    01/30/24: Patient was last seen on 01/09/24. Patient had bilateral ring trigger finger injections done and reports relief for 2 weeks. He states both middle fingers are now locking and catching again. He reports that both of his ring fingers have started locking as well. He states when he has the locking he has to release them manually.       HPI:    Mr. Gerardo Gonzalez is a 68 year old male right hand dominant who presents with bilateral middle finger trigger fingers.  Ongoing for 1 year.  Middle fingers catching and locking are very painful.  It also wakes him from sleep.  He endorses diminished active flexion.  He does think that other fingers are becoming infected as well.  He has not tried " any treatment.    PMH  Diabetes yes   Thyroid Problems none   Smoking Y/N non smoker         VITALS:  There were no vitals filed for this visit.    EXAM:  General: NAD, A&Ox3  HEENT: NC/AT  CV: RRR by peripheral pulse  Pulmonary: Non-labored breathing on RA  BUE:  Skin intact, mild physiologic swan-neck deformities of the ring fingers. Bilateral thenar atrophy, L worse than R  Tenderness palpation at the A1 pulley with palpable crepitus bilateral middle fingers  Visible triggering bilateral middle fingers  TTP A1 pulley bilateral ring fingers, no visible triggering today  Diminished active flexion of bilateral middle and ring fingers  Intact FDP, FDS, EDC  Sensation intact to light touch median, radial, ulnar nerve distributions  +Tinels and Durkan's compression test of bilateral carpal tunnels  Well-perfused, capillary fill less than 2 seconds    IMAGING:  None    Labs:  HbA1c  8.2 12/29/23  6.1 5/29/24    I have personally reviewed the above images and labs.         IMPRESSION AND RECOMMENDATIONS:  Mr. Gerardo Gonzalez is a 68 year old male right hand dominant with bilateral middle and ring finger trigger fingers. Also with bilateral carpal tunnel syndrome    Symptoms refractory to bilateral middle finger injections x2. Patient is ready to proceed with surgery at this point. Given his numbness/tingling, I have recommended a carpal tunnel release as well.    The indications for surgery were discussed with the patient. The benefits, risks, and alternatives of operative management were discussed in detail with the patient. The patient understands that the risks of surgery include, but are not limited to: infection, bleeding, injury to nearby structures (such as nerves, blood vessels, and tendons), persistent symptoms, wound healing problems, temporary weakness in , need for additional surgery, pain, stiffness, scarring, need for rehabilitation, and anesthetic complications.  Patient expressed understanding and  elected to proceed with surgery. All questions were answered to the patient's satisfaction.    Case request placed. Local only. He wishes to have repeat injections in the meantime.    After obtaining written consent, I cleansed the skin over bilateral middle and ring finger A1 pulleys with chlorhexidine.  I then anesthetized the skin with ethyl chloride spray after which I injected 1 cc 1% lidocaine 1 cc of dexamethasone 4 mg/mL into the A1 pulley region flexor tendon sheath of bilateral middle and ring fingers.  The patient tolerated this well without complication.    Julian Ramirez MD    Hand, Upper Extremity & Microvascular Surgery  Department of Orthopaedic Surgery  [facility]    Hand / Upper Extremity Injection/Arthrocentesis: bilateral long A1    Date/Time: 6/4/2024 8:54 AM    Performed by: Julian Ramirez MD  Authorized by: Julian Ramirez MD    Indications:  Pain  Needle Size:  25 G  Guidance: landmark    Approach:  Volar  Condition: trigger finger    Laterality:  Bilateral  Location:  Long finger    Site:  Bilateral long A1  Medications (Right):  1 mL dexAMETHasone 120 MG/30ML; 1 mL lidocaine 1 %  Medications (Left):  1 mL dexAMETHasone 120 MG/30ML; 1 mL lidocaine 1 %  Outcome:  Tolerated well, no immediate complications  Procedure discussed: discussed risks, benefits, and alternatives    Timeout: timeout called immediately prior to procedure    Prep: patient was prepped and draped in usual sterile fashion    Hand / Upper Extremity Injection/Arthrocentesis: bilateral ring A1    Date/Time: 6/4/2024 8:57 AM    Performed by: Julian Ramirez MD  Authorized by: Julian Ramirez MD    Indications:  Pain  Needle Size:  25 G  Guidance: landmark    Approach:  Volar  Condition: trigger finger    Laterality:  Bilateral  Location:  Ring finger    Site:  Bilateral ring A1  Medications (Right):  1 mL dexAMETHasone 120 MG/30ML; 1 mL lidocaine 1 %  Medications (Left):  1 mL dexAMETHasone 120 MG/30ML; 1 mL  lidocaine 1 %  Outcome:  Tolerated well, no immediate complications  Procedure discussed: discussed risks, benefits, and alternatives    Timeout: timeout called immediately prior to procedure    Prep: patient was prepped and draped in usual sterile fashion           Again, thank you for allowing me to participate in the care of your patient.        Sincerely,        Julian Ramirez MD

## 2024-06-06 PROBLEM — G56.02 LEFT CARPAL TUNNEL SYNDROME: Status: ACTIVE | Noted: 2024-06-04

## 2024-06-06 PROBLEM — M65.332 TRIGGER MIDDLE FINGER OF LEFT HAND: Status: ACTIVE | Noted: 2024-06-04

## 2024-07-12 DIAGNOSIS — Z79.4 TYPE 2 DIABETES MELLITUS WITH DIABETIC NEPHROPATHY, WITH LONG-TERM CURRENT USE OF INSULIN (H): ICD-10-CM

## 2024-07-12 DIAGNOSIS — E11.21 TYPE 2 DIABETES MELLITUS WITH DIABETIC NEPHROPATHY, WITH LONG-TERM CURRENT USE OF INSULIN (H): ICD-10-CM

## 2024-07-12 RX ORDER — SEMAGLUTIDE 1.34 MG/ML
1 INJECTION, SOLUTION SUBCUTANEOUS
Refills: 3 | OUTPATIENT
Start: 2024-07-12

## 2024-07-17 DIAGNOSIS — E55.9 VITAMIN D DEFICIENCY: ICD-10-CM

## 2024-07-17 RX ORDER — CHOLECALCIFEROL (VITAMIN D3) 50 MCG
1 TABLET ORAL DAILY
Qty: 90 TABLET | Refills: 0 | Status: SHIPPED | OUTPATIENT
Start: 2024-07-17 | End: 2024-09-04

## 2024-07-18 ENCOUNTER — HOSPITAL ENCOUNTER (OUTPATIENT)
Facility: AMBULATORY SURGERY CENTER | Age: 69
Discharge: HOME | End: 2024-07-18
Attending: STUDENT IN AN ORGANIZED HEALTH CARE EDUCATION/TRAINING PROGRAM | Admitting: STUDENT IN AN ORGANIZED HEALTH CARE EDUCATION/TRAINING PROGRAM
Payer: COMMERCIAL

## 2024-07-18 VITALS
SYSTOLIC BLOOD PRESSURE: 127 MMHG | RESPIRATION RATE: 14 BRPM | DIASTOLIC BLOOD PRESSURE: 72 MMHG | HEIGHT: 67 IN | TEMPERATURE: 98.3 F | HEART RATE: 96 BPM | OXYGEN SATURATION: 95 % | WEIGHT: 143 LBS | BODY MASS INDEX: 22.44 KG/M2

## 2024-07-18 DIAGNOSIS — G56.02 LEFT CARPAL TUNNEL SYNDROME: Primary | ICD-10-CM

## 2024-07-18 PROCEDURE — 64721 CARPAL TUNNEL SURGERY: CPT | Mod: LT | Performed by: STUDENT IN AN ORGANIZED HEALTH CARE EDUCATION/TRAINING PROGRAM

## 2024-07-18 PROCEDURE — 26055 INCISE FINGER TENDON SHEATH: CPT | Mod: F1 | Performed by: STUDENT IN AN ORGANIZED HEALTH CARE EDUCATION/TRAINING PROGRAM

## 2024-07-18 PROCEDURE — 26055 INCISE FINGER TENDON SHEATH: CPT | Mod: F1,F2,F3 | Performed by: STUDENT IN AN ORGANIZED HEALTH CARE EDUCATION/TRAINING PROGRAM

## 2024-07-18 RX ORDER — LIDOCAINE HYDROCHLORIDE AND EPINEPHRINE 10; 10 MG/ML; UG/ML
INJECTION, SOLUTION INFILTRATION; PERINEURAL PRN
Status: DISCONTINUED | OUTPATIENT
Start: 2024-07-18 | End: 2024-07-18 | Stop reason: HOSPADM

## 2024-07-18 RX ORDER — LIDOCAINE HYDROCHLORIDE AND EPINEPHRINE 10; 10 MG/ML; UG/ML
20 INJECTION, SOLUTION INFILTRATION; PERINEURAL ONCE
Status: DISCONTINUED | OUTPATIENT
Start: 2024-07-18 | End: 2024-07-19 | Stop reason: HOSPADM

## 2024-07-18 RX ORDER — OXYCODONE HYDROCHLORIDE 5 MG/1
5 TABLET ORAL EVERY 6 HOURS PRN
Qty: 3 TABLET | Refills: 0 | Status: SHIPPED | OUTPATIENT
Start: 2024-07-18 | End: 2024-07-21

## 2024-07-18 NOTE — BRIEF OP NOTE
[facility] Brief Operative Note    Pre-operative diagnosis: Left carpal tunnel syndrome [G56.02]  Trigger index, middle, ring finger of left hand [M65.332]   Post-operative diagnosis Same   Procedure: Procedure(s):  RELEASE, LEFT CARPAL TUNNEL  RELEASE, LEFT INDEX, MIDDLE AND RING TRIGGER FINGERS   Surgeon(s): Surgeons and Role:     * Julian Ramirez MD - Primary  Travis Lowe MD - Resident Assisting   Estimated blood loss: 5 ml    Specimens: * No specimens in log *   Findings: See op report

## 2024-07-18 NOTE — OP NOTE
ORTHOPEDIC SURGERY OPERATIVE NOTE     Medical Record Number: [id number]     YOB: 1969     Surgery Date: 07/18/24       Primary Surgeon:    Julian Ramirez MD     Assistants:   Travis Lowe MD - ortho PGY4     Pre-operative Diagnosis:      Left carpal tunnel syndrome  Left index trigger finger  Left long trigger finger  Left ring trigger finger     Post-operative Diagnosis:  Same      Procedure:    Left carpal tunnel release  Left index finger A1 pulley release  Left long finger A1 pulley release  Left ring finger A1 pulley release     Anesthesia: Local only (1% lidocaine with epi, 25cc total)     Tourniquet time: 24 mins at 250 mmHg     EBL: 5 mL     Specimens: None     Complications: None apparent     Findings: Median nerve completely decompressed through the carpal tunnel, moderate synovitis but no masses. Recurrent motor branch extraligamentous and radial. Index, long, and ring A1 pulleys completely released. Flexor tendon cyst at the long finger flexor tendon sheath.     Implants: None     Indications:     This is a 69-year-old male with subjective and objective findings of carpal tunnel syndrome, as well as trigger fingers affecting the left long and ring fingers. Surgery was discussed in the form of a carpal tunnel release and A1 pulley releases which could be performed concurrently. On day of surgery he mentioned his index finger was now affected as well and wished to have that released. The risks include but are not limited to incomplete pain/numbness relief, stiffness, scar sensitivity, pillar pain, temporary weakness in , wound healing problems, infection, nerve/artery injury. The patient expressed an understanding of the above and elected to proceed.     Procedure:   The patient was identified in the preoperative holding area where the correct operative site was marked. The surgical consent was verified. The patient was then taken to the operating room. The patient was placed into the  supine position on a hand table with all bony prominences well-padded. A forearm tourniquet was applied. A local block was performed over the area of the carpal tunnel and the A1 pulleys of the index, long, and ring fingers on the palmar hand, using lidocaine with epinephrine. The operative extremity was then prepped and draped in typical sterile fashion. A time-out was performed per hospital protocol correctly identifying the patient, operative site, and procedure to be performed.       A skin incision was made in the thenar crease of the palm in line with the 4th ray and the palmaris longus tendon.  Blunt dissection was carried down through subcutaneous tissues. The longitudinal fibers of the superficial palmar fascia were identified and sharply divided with a 15 blade, revealing the transverse carpal ligament underneath.  The ligament was then sharply divided longitudinally in its entirety, distally until perivascular fat was visualized and proximally to the leading edge of the antebrachial fascia.  The release was extended proximally with tenotomy scissors several centimeters into the antebrachial fascia.  The carpal tunnel and median nerve were then visually inspected, and complete decompression was verified.  No masses or recurrent motor branch were encountered. There was moderate synovitis. Attention was then turned to the trigger finger releases. All three were performed identically using two separate incisions. The first was a transverse incision made in the palmar crease over the long and ring fingers. The second was a transverse/oblique incision at the palmar crease over the index finger. Subcutaneous tissue was developed bluntly with tenotomy scissors to reveal the A1 pulley. The neurovascular bundles were directly visualized and protected. The A1 pulley was incised longitudinally. The proximal edge of the A2 pulley was also vented, as well as A0. The tendons were gently retracted out of the wound and  inspected, and there was mild tenosynovitis which was debrided. Of note there was a mucinous appearing flexor tendon sheath cyst at the long finger A1 pulley. The patient was asked to actively flex and extend his fingers and he did not feel any residual locking or catching symptoms. The tourniquet was let down. Hemostasis was achieved with bipolar electrocautery. The wounds were thoroughly irrigated with sterile saline, and then closed with interrupted buried 4-0 monocryl in the dermis.  The wound was dressed with steri strips, 4x4 gauze, an ABD, and an ACE.    The patient was taken to the postoperative recovery unit in stable condition. All needle and sponge counts were correct at the end of the procedure. There were no complications.     Dr. Ramirez was scrubbed for the entire procedure.    Plan:   Patient will be discharged home. He will be coffee cup weight bearing. Dressing to remain on until follow-up. He will return for a postoperative visit in 7-10 days for a wound check.      Travis Lowe MD ortho PGY4     I was present and scrubbed for the entire surgery. I agree with the operative note with the above addenda.    Julian Ramirez MD    Hand & Upper Extremity Surgery  Department of Orthopedic Surgery  [facility]

## 2024-07-18 NOTE — DISCHARGE INSTRUCTIONS
Procedure Performed: Left carpal tunnel release; Left index, long, and ring finger trigger finger releases  Attending Surgeon: Julian Ramirez MD  Date: 2024    DIAGNOSIS  Left carpal tunnel syndrome  Left index, long, and ring finger trigger fingers    MEDICATIONS   Resume all home medications as directed unless otherwise instructed during this hospitalization. If there is any question, double check with your primary care provider.  Start new discharge medications as directed.    Take 1 tablet of 650 mg Tylenol (acetaminophen) Arthritis Strength (extended release) and 1 tablet of Aleve (naproxen) 220 mg in the morning with breakfast and in the evening with dinner.     For breakthrough pain use narcotic pain medication as prescribed.    Do not drive or operate machinery while taking narcotic pain medications.   If you are taking other Tylenol containing medicines at home, be sure NOT to exceed 4 gram's (4000 milligrams) of Tylenol per day.   If you are taking pain medications, be sure to take Colace (docusate sodium) as well to prevent constipation. If constipated, try adding another cathartic or enema.  If nausea and vomiting, call the hospital or seek medical attention.    ACTIVITY   Weight bearin lb coffee cup weight bearing to operative extremity    DIET  Resume same diet prior to your hospital admission.    WOUND   Leave dressing on until you are seen in clinic for your follow up visit.   Watch for signs and symptoms of infection of your wounds including; pain, redness, swelling, drainage or fever.  If you notice any of these symptoms please call or seek medical attention.    Keep wound clean, dry, and intact.  Do not submerge wounds in water until they are healed. No baths, soaking, swimming, or prolonged water exposure for 4 weeks after surgery.    RETURN   Follow-up with Orthopedic Clinic as directed.     Future Appointments   Date Time Provider Department Center   2024 10:40 AM [facility] Turpin  [facility] [place]   11/29/2024  9:00 AM Raisa Calvillo PA-C [facility]       Call the [facility] Orthopedic Clinic at [phone] during business hours for any symptoms such as:    * Fevers with Temperature greater than 101.5 degrees.   * Pus drainage from wound site.   * Severe pain, not controlled by medication.   * Persistent nausea, vomiting and inablility to tolerate fluids.    If you are receiving care in [place], you may call the Orthopedic clinic at [phone].    FOR URGENT PROBLEMS ONLY, after hours or on weekends call the hospital  at [phone] and ask to speak with the orthopedic resident on call.

## 2024-07-24 NOTE — PROGRESS NOTES
HISTORY OF PRESENT ILLNESS:    Gerardo Gonzalez is a 69 year old male who is seen in follow up for procedure 7/18/24 with dr cochran:    Left carpal tunnel release  Left index finger A1 pulley release  Left long finger A1 pulley release  Left ring finger A1 pulley release.  Present symptoms: Pt reports some significant pain improvement. Was keeping covered. Is not using hand for activities, just took the bandages off today and applied antibiotic ointment. Finger tips are still numb No new complaints. Taking Ibuprofen for pain. 9/10 in the morning. 6/10 currently.   Denies Chest pain, Calve pain, Fever, Chills.    Current Treatment: Postop.    PHYSICAL EXAM:  There were no vitals taken for this visit.  There is no weight on file to calculate BMI.   GENERAL APPEARANCE: healthy, alert and no distress   PSYCH: mentation appears normal and affect normal/bright    MSK:  Left: hand. .  Incisions clean and dry, subcutaneous present, healing.  Some increased incisional exposure under callus due to application of antibiotic ointment.  No incisional erythema.   No Ecchymosis.  Edema minimal at hand and digits.  CMS: zaina incisional numbness, otherwise grossly intact to digits.  Subjective paresthesia to 1 through 4 tips.  AROM: mild restriction in full finger extension index long and ring, mild generalized flexion restriction, otherwise WNL with no triggering.      ASSESSMENT:  Gerardo Gonzalez is a 69 year old male  S/P left carpal tunnel and multiple A1 pulley releases.  Healing incision.    We discussed that sensation recovery may take several months.  Given his multiple procedures and lack of function and upcoming right hand procedure recommend hand therapy to improve function ASAP.    PLAN:  - Surgery discussed, images reviewed if applicable, and all questions were answered at this time.  - Sutures removed with sterile technique, steri-strips applied in usual fashion, care instructions given and verbally acknowledged.  -  Medications: OTC PRN.  - Physical Therapy: As instructed/ [facility] and PROM.  - AAT  -Hand therapy referral.    Return to clinic PRN.    Christiano Turpin PA-C    Dept. Orthopedic Surgery  [facility]     7/24/2024

## 2024-07-25 ENCOUNTER — TELEPHONE (OUTPATIENT)
Dept: ORTHOPEDICS | Facility: CLINIC | Age: 69
End: 2024-07-25
Payer: COMMERCIAL

## 2024-07-25 DIAGNOSIS — M65.332 TRIGGER MIDDLE FINGER OF LEFT HAND: ICD-10-CM

## 2024-07-25 DIAGNOSIS — G56.02 LEFT CARPAL TUNNEL SYNDROME: Primary | ICD-10-CM

## 2024-07-25 RX ORDER — OXYCODONE HYDROCHLORIDE 5 MG/1
5 TABLET ORAL EVERY 6 HOURS PRN
Qty: 7 TABLET | Refills: 0 | Status: SHIPPED | OUTPATIENT
Start: 2024-07-25 | End: 2024-07-28

## 2024-07-25 NOTE — TELEPHONE ENCOUNTER
No consent to communicate on file. Left VM for patient to return call.     Cha Brewster MSA, ATC  Certified Athletic Trainer

## 2024-07-25 NOTE — TELEPHONE ENCOUNTER
Patient is returning our call. Patient had a carpal tunnel release done by Dr Ramirez on 7/18/24.     Hasn't been able to sleep well. When he does fall asleep, he is unsure if he is sleeping on that hand and it is very painful. He has been trying to rest it but hasn't done ice.    He has been taking tylenol and advil. He only got 3 oxycodone pills and used them over the last few days, they helped a lot with the pain. He would like a refill of the oxycodone.     Pharmacy updated in [facility], [facility] in [place] on [address].    Please refill prescription as appropriate.     Kandi Mabry, STEPHANIE, LAT, ATC  Certified Athletic Trainer

## 2024-07-25 NOTE — TELEPHONE ENCOUNTER
Patient notified via [employer] that refill has been sent.     Cha Brewster MSA, ATC  Certified Athletic Trainer

## 2024-07-29 ENCOUNTER — OFFICE VISIT (OUTPATIENT)
Dept: ORTHOPEDICS | Facility: CLINIC | Age: 69
End: 2024-07-29
Payer: COMMERCIAL

## 2024-07-29 VITALS
HEIGHT: 67 IN | WEIGHT: 143 LBS | BODY MASS INDEX: 22.44 KG/M2 | DIASTOLIC BLOOD PRESSURE: 83 MMHG | SYSTOLIC BLOOD PRESSURE: 139 MMHG

## 2024-07-29 DIAGNOSIS — M65.332 TRIGGER MIDDLE FINGER OF LEFT HAND: ICD-10-CM

## 2024-07-29 DIAGNOSIS — M65.342 ACQUIRED TRIGGER FINGER OF BOTH RING FINGERS: ICD-10-CM

## 2024-07-29 DIAGNOSIS — M65.341 ACQUIRED TRIGGER FINGER OF BOTH RING FINGERS: ICD-10-CM

## 2024-07-29 DIAGNOSIS — G56.02 LEFT CARPAL TUNNEL SYNDROME: ICD-10-CM

## 2024-07-29 DIAGNOSIS — Z47.89 ORTHOPEDIC AFTERCARE: Primary | ICD-10-CM

## 2024-07-29 PROCEDURE — 99024 POSTOP FOLLOW-UP VISIT: CPT | Performed by: PHYSICIAN ASSISTANT

## 2024-07-29 NOTE — Clinical Note
7/29/2024      Gerardo Gonzalez  [address]  [place] [address]      Dear Colleague,    Thank you for referring your patient, Gerardo Gonzalez, to the [facility] [place]. Please see a copy of my visit note below.    HISTORY OF PRESENT ILLNESS:    Gerardo Gonzalez is a 69 year old male who is seen in follow up for procedure 7/18/24 with dr cochran:    Left carpal tunnel release  Left index finger A1 pulley release  Left long finger A1 pulley release  Left ring finger A1 pulley release.  Present symptoms: Pt reports some improvement. Was keeping covered. Is not using hand for activities, just took the bandages off today. Finger tips are still numb No new complaints. Taking Ibuprofen for pain. 9/10 in the morning. 6/10 currently.   Denies Chest pain, Calve pain, Fever, Chills.    Current Treatment: Postop.    PHYSICAL EXAM:  There were no vitals taken for this visit.  There is no weight on file to calculate BMI.   GENERAL APPEARANCE: healthy, alert and no distress   PSYCH: mentation appears normal and affect normal/bright    MSK:  {[id number]} .  Incision clean and dry, Sutures present, healing.  *** incisional erythema.   No Ecchymosis.  Edema *** at *** digits.  CMS: zaina incisional numbness, otherwise grossly intact to digits.  AROM: mild restriction in *** flexion, otherwise WNL with no triggering.      ASSESSMENT:  Gerardo Gonzalez is a 69 year old male  S/P *** A1 pulley release.  Healing.    PLAN:  - Surgery discussed, images reviewed if applicable, and all questions were answered at this time.  - Sutures removed with sterile technique, steri-strips applied in usual fashion, care instructions given and verbally acknowledged.  - Medications: OTC PRN.  - Physical Therapy: As instructed/ [facility] and PROM.  - AAT    Return to clinic PRN.    Christiano Turpin PA-C    Dept. Orthopedic Surgery  [facility]     7/24/2024       HISTORY OF PRESENT ILLNESS:    Gerardo Gonzalez is a 69 year  old male who is seen in follow up for procedure 7/18/24 with dr cochran:    Left carpal tunnel release  Left index finger A1 pulley release  Left long finger A1 pulley release  Left ring finger A1 pulley release.  Present symptoms: Pt reports some significant pain improvement. Was keeping covered. Is not using hand for activities, just took the bandages off today and applied antibiotic ointment. Finger tips are still numb No new complaints. Taking Ibuprofen for pain. 9/10 in the morning. 6/10 currently.   Denies Chest pain, Calve pain, Fever, Chills.    Current Treatment: Postop.    PHYSICAL EXAM:  There were no vitals taken for this visit.  There is no weight on file to calculate BMI.   GENERAL APPEARANCE: healthy, alert and no distress   PSYCH: mentation appears normal and affect normal/bright    MSK:  Left: hand. .  Incisions clean and dry, subcutaneous present, healing.  Some increased incisional exposure under callus due to application of antibiotic ointment.  No incisional erythema.   No Ecchymosis.  Edema minimal at hand and digits.  CMS: zaina incisional numbness, otherwise grossly intact to digits.  Subjective paresthesia to 1 through 4 tips.  AROM: mild restriction in full finger extension index long and ring, mild generalized flexion restriction, otherwise WNL with no triggering.      ASSESSMENT:  Gerardo Gonzalez is a 69 year old male  S/P left carpal tunnel and multiple A1 pulley releases.  Healing incision.    We discussed that sensation recovery may take several months.  Given his multiple procedures and lack of function and upcoming right hand procedure recommend hand therapy to improve function ASAP.    PLAN:  - Surgery discussed, images reviewed if applicable, and all questions were answered at this time.  - Sutures removed with sterile technique, steri-strips applied in usual fashion, care instructions given and verbally acknowledged.  - Medications: OTC PRN.  - Physical Therapy: As instructed/ [facility] and  PROM.  - AAT  -Hand therapy referral.    Return to clinic PRN.    Christiano Turpin PA-C    Dept. Orthopedic Surgery  [facility]     7/24/2024         Again, thank you for allowing me to participate in the care of your patient.        Sincerely,        Christiano Turpin PA-C

## 2024-07-29 NOTE — PATIENT INSTRUCTIONS
Incision Care:  Showering is okay at this time, however no soaking, submerging or scrubbing of incision until all scabs have resolved.  Any applied Steri-strips will most likely fall off on their own, however they may be removed after 1 weeks with rubbing alcohol if they have not.    If there is no draining or bleeding, the tape-strips or clean garments are enough coverage unless you were instructed otherwise or you would like to cover for comfort.  If drainage or bleeding occurs, please cover the incision with clean dressings.  If drainage or bleeding is significant or does not stop within 48 hours please notify the office.    Gradually increase your activities as you can tolerated them, starting at a level well below what you would normally do.  Lifting may be uncomfortable for a while.    The skin around the incision may peel or have a hard skin edge over the next couple weeks.  Also, the incision may be sensitive to the touch and be prominent due to scar tissue.  You may massage the area when tolerated several times per day, using lotion if needed, to desensitize the area and reduce scar tissue.    Also, the nerve involved may heal for over a year from the time of surgery.  During this time your symptoms may continue to improve or even recur temporarily and then resolve as part of the healing.    I have referred you to hand therapy to improve your function prior to your right hand surgery.    Follow up as needed in clinic.

## 2024-08-09 ENCOUNTER — THERAPY VISIT (OUTPATIENT)
Dept: OCCUPATIONAL THERAPY | Facility: CLINIC | Age: 69
End: 2024-08-09
Payer: COMMERCIAL

## 2024-08-09 DIAGNOSIS — M25.642 STIFFNESS OF FINGER JOINT OF LEFT HAND: ICD-10-CM

## 2024-08-09 DIAGNOSIS — G56.02 LEFT CARPAL TUNNEL SYNDROME: ICD-10-CM

## 2024-08-09 DIAGNOSIS — M79.645 PAIN OF FINGER OF LEFT HAND: ICD-10-CM

## 2024-08-09 DIAGNOSIS — Z47.89 AFTERCARE FOLLOWING SURGERY OF THE MUSCULOSKELETAL SYSTEM: ICD-10-CM

## 2024-08-09 DIAGNOSIS — M65.341 ACQUIRED TRIGGER FINGER OF BOTH RING FINGERS: ICD-10-CM

## 2024-08-09 DIAGNOSIS — M65.342 ACQUIRED TRIGGER FINGER OF BOTH RING FINGERS: ICD-10-CM

## 2024-08-09 PROCEDURE — 97140 MANUAL THERAPY 1/> REGIONS: CPT | Mod: GO | Performed by: OCCUPATIONAL THERAPIST

## 2024-08-09 PROCEDURE — 97165 OT EVAL LOW COMPLEX 30 MIN: CPT | Mod: GO | Performed by: OCCUPATIONAL THERAPIST

## 2024-08-09 PROCEDURE — 97110 THERAPEUTIC EXERCISES: CPT | Mod: GO | Performed by: OCCUPATIONAL THERAPIST

## 2024-08-09 NOTE — PROGRESS NOTES
OCCUPATIONAL THERAPY EVALUATION  Type of Visit: Evaluation       Fall Risk Screen:  Fall screen completed by: OT  Have you fallen 2 or more times in the past year?: No  Have you fallen and had an injury in the past year?: No  Is patient a fall risk?: No    Subjective      Presenting condition or subjective complaint:  Left hand after surgery  Date of onset: 07/18/24 (DOS)    Relevant medical history:     Past Medical History:   Diagnosis Date    Psoriatic arthropathy (H)     Type II or unspecified type diabetes mellitus without mention of complication, not stated as uncontrolled     2006    Vaccination not carried out      Dates & types of surgery:    Past Surgical History:   Procedure Laterality Date    CYSTOSCOPY      EXAM UNDER ANESTHESIA RECTUM N/A 5/5/2021    Procedure: Foreign body removal x3, flexible sigmoidoscopy, anoscopy;  Surgeon: Ibeth Mack MD;  Location: [facility] OR    HC KNEE SCOPE, DIAGNOSTIC      Arthroscopy, Knee- Left    RELEASE CARPAL TUNNEL Left 7/18/2024    Procedure: RELEASE, LEFT CARPAL TUNNEL;  Surgeon: Julian Ramirez MD;  Location: [facility] OR    RELEASE TRIGGER FINGER Left 7/18/2024    Procedure: RELEASE, LEFT INDEX, MIDDLE AND RING TRIGGER FINGERS;  Surgeon: Julian Ramirez MD;  Location: [facility] OR     Prior diagnostic imaging/testing results:     none  Prior therapy history for the same diagnosis, illness or injury: No      Prior Level of Function  Transfers: Independent  Ambulation: Independent  ADL: Independent    Living Environment  Social support: With a significant other or spouse   Type of home: House   Ramp: No   Stairs inside the home: Yes   Is there a railing: No     Help at home: Self Cares (home health aide/personal care attendant, family, etc)    Employment: No       Objective   ADDITIONAL HISTORY:  Right hand dominant  Patient reports symptoms of pain, stiffness/loss of motion, weakness/loss of strength, and numbness  Transportation: drives  Currently retired    PAIN:  Pain Level at  Rest: 5/10  Pain Level with Use: 10/10  Pain Location: hand, index finger, long finger, ring finger, and    Pain Quality: Miserable, Numb, and Stabbing  Pain Frequency: constant  Pain is Worst: daytime or nighttime  Pain is Exacerbated By: use of hand  Pain is Relieved By: tylenol helps, but doesn't get rid of the pain  Pain Progression: Improved    EDEMA: None     SCAR/WOUND: Sensitivity: mild sensitivity to touch  Quality: red, moderate adherence    SENSATION: Decreased Median Nerve distribution per pt report that is present in the tips of the index, long and ring fingers     ROM:   Wrist ROM  Left AROM Right AROM    Extension 54 62   Flexion 54 65   Radial Deviation (RD) 20 18   Ulnar Deviation (UD) 27 24     Hand ROM  Left AROM   Index MP 0/60   PIP 0/87   DIP 0/55   Total Active Motion 202   Long MP -5/64   PIP 0/94   DIP -14/62   Total Active Motion 201   RING MP 0/70   PIP 0/90   DIP 0/51   Total Active Motion 211   Small MP 0/82   PIP 0/95   DIP 0/71   Total Active Motion 248     NEURAL TENSION TESTING: MNT: Median Neurodynamic Test (based on ALBIN Hudson's ULNT)   8/9/2024   0-5 Scale 3   Position:   0/5: Arm across abdomen in coronal plane  1/5: Depress shoulder, ER to neutral ABD shoulder to 45 degrees  2/5: ER shoulder to end range, keep elbow at 90 degrees  3/5: Extend elbow to 0 degrees  4/5: Fully supinate forearm  5/5: Extend wrist, fingers and thumb  Notes:  (+) indicates beyond grade level but less than senior living to next level  (-) indicates over senior living to level  S1 onset/change of patient's symptoms  S2 definite stop point based on patient's discomfort level    Assessment & Plan   CLINICAL IMPRESSIONS  Medical Diagnosis: L CTR and Trigger Release    Treatment Diagnosis: Finger pain and stiffness    Impression/Assessment: Pt is a 69 year old male presenting to Occupational Therapy due to finger and wrist pain and stiffness following surgical releases.  The following significant findings have been  identified: Impaired activity tolerance, Impaired coordination, Impaired ROM, Impaired sensation, Impaired strength, Pain, and Post-surgical precautions.  These identified deficits interfere with their ability to perform self care tasks, recreational activities, household chores, driving , and meal planning and preparation as compared to previous level of function.   Patient's limitations or Problem List includes: Pain, Decreased ROM/motion, Weakness, Decreased coordination, Decreased dexterity, Tightness in musculature, and Adherence in connective tissue of the left wrist, index finger, long finger, and ring finger which interferes with the patient's ability to perform Self Care Tasks (dressing, eating, bathing), Recreational Activities, Household Chores, and Driving  as compared to previous level of function.    Clinical Decision Making (Complexity):  Assessment of Occupational Performance: 3-5 Performance Deficits  Occupational Performance Limitations: dressing, driving and community mobility, home establishment and management, meal preparation and cleanup, and leisure activities  Clinical Decision Making (Complexity): Low complexity    PLAN OF CARE  Treatment Interventions:  Modalities:  US  Therapeutic Exercise:  AROM, AAROM, PROM, Tendon Gliding, Blocking, Place and Hold, Extensor Tracking, Isotonics, and Isometrics  Neuromuscular re-education:  Nerve Gliding, Coordination/Dexterity, and Kinesiotaping  Manual Techniques:  Coordination/Dexterity, Joint mobilization, Scar mobilization, and Myofascial release  Orthotic Fabrication:  Static and Forearm based  Self Care:  Ergonomic Considerations    Long Term Goals   OT Goal 1  Goal Identifier: Household Chores  Goal Description: Pt will be able to open a new jar without pain in order to maximize independence with ADLs  Target Date: 09/20/24      Frequency of Treatment: 1x/week  Duration of Treatment: 6 weeks     Recommended Referrals to Other Professionals:   none  Education Assessment: Learner/Method: Patient;No Barriers to Learning     Risks and benefits of evaluation/treatment have been explained.   Patient/Family/caregiver agrees with Plan of Care.     Evaluation Time:    OT Eval, Low Complexity Minutes (00764): 25   Present: Not applicable     Signing Clinician: PIO Strange [facility]                                                                                   OUTPATIENT OCCUPATIONAL THERAPY      PLAN OF TREATMENT FOR OUTPATIENT REHABILITATION   Patient's Last Name, First Name, Gerardo Farmer YOB: 1955   Provider's Name   [facility]   Medical Record No.  [id number]     Onset Date: 07/18/24 (DOS) Start of Care Date: 08/09/24     Medical Diagnosis:  L CTR and Trigger Release      OT Treatment Diagnosis:  Finger pain and stiffness Plan of Treatment  Frequency/Duration:1x/week/6 weeks    Certification date from 08/09/24   To 09/20/24        See note for plan of treatment details and functional goals     Radha Marie OT                         I CERTIFY THE NEED FOR THESE SERVICES FURNISHED UNDER        THIS PLAN OF TREATMENT AND WHILE UNDER MY CARE     (Physician attestation of this document indicates review and certification of the therapy plan).              Referring Provider:  Referred Self    Initial Assessment  See Epic Evaluation- 08/09/24

## 2024-08-12 PROBLEM — M79.645 PAIN OF FINGER OF LEFT HAND: Status: ACTIVE | Noted: 2024-08-12

## 2024-08-12 PROBLEM — M25.642 STIFFNESS OF FINGER JOINT OF LEFT HAND: Status: ACTIVE | Noted: 2024-08-12

## 2024-08-12 PROBLEM — M65.342 ACQUIRED TRIGGER FINGER OF BOTH RING FINGERS: Status: ACTIVE | Noted: 2024-08-12

## 2024-08-12 PROBLEM — M65.341 ACQUIRED TRIGGER FINGER OF BOTH RING FINGERS: Status: ACTIVE | Noted: 2024-08-12

## 2024-08-12 PROBLEM — Z47.89 AFTERCARE FOLLOWING SURGERY OF THE MUSCULOSKELETAL SYSTEM: Status: ACTIVE | Noted: 2024-08-12

## 2024-08-12 NOTE — TELEPHONE ENCOUNTER
Summary:    Patient is due/failing the following:   Eye exam    Action needed:   None per pts chart eye exam compelted on 2/11/19    Type of outreach:    routed to panel pool                                                                                 COLTEN De Guzman         
Speaking Coherently

## 2024-08-22 ENCOUNTER — TELEPHONE (OUTPATIENT)
Dept: ORTHOPEDICS | Facility: CLINIC | Age: 69
End: 2024-08-22

## 2024-08-22 NOTE — TELEPHONE ENCOUNTER
Patient had the following surgery with Dr. Ramirez on 7/18/24:   Left carpal tunnel release  Left index finger A1 pulley release  Left long finger A1 pulley release  Left ring finger A1 pulley release.    He was last seen by Mathew Turpin on 7/29/24.     Patient is wanting to discuss surgery for right hand. Please advise if follow up visit is needed or if case request can be placed without follow up. Last seen for right hand on 6/4/24.     Cha Brewster, ATC

## 2024-08-22 NOTE — TELEPHONE ENCOUNTER
Other: pt had surgery on July 18.  Post op with Christiano Turpin on 07/29.  He has been doing his prescribed hand therapy and is calling to ask when the next surgery and appointment for his right hand would be.      Please contact him via Ratio to discuss.     Could we send this information to you in [employer] or would you prefer to receive a phone call?:   Patient would like to be contacted via [employer]

## 2024-08-26 ENCOUNTER — THERAPY VISIT (OUTPATIENT)
Dept: OCCUPATIONAL THERAPY | Facility: CLINIC | Age: 69
End: 2024-08-26
Payer: COMMERCIAL

## 2024-08-26 DIAGNOSIS — G56.02 LEFT CARPAL TUNNEL SYNDROME: Primary | ICD-10-CM

## 2024-08-26 DIAGNOSIS — Z47.89 AFTERCARE FOLLOWING SURGERY OF THE MUSCULOSKELETAL SYSTEM: ICD-10-CM

## 2024-08-26 DIAGNOSIS — M25.642 STIFFNESS OF FINGER JOINT OF LEFT HAND: ICD-10-CM

## 2024-08-26 DIAGNOSIS — M79.645 PAIN OF FINGER OF LEFT HAND: ICD-10-CM

## 2024-08-26 DIAGNOSIS — M65.341 ACQUIRED TRIGGER FINGER OF BOTH RING FINGERS: ICD-10-CM

## 2024-08-26 DIAGNOSIS — M65.342 ACQUIRED TRIGGER FINGER OF BOTH RING FINGERS: ICD-10-CM

## 2024-08-26 PROCEDURE — 97035 APP MDLTY 1+ULTRASOUND EA 15: CPT | Mod: GO | Performed by: OCCUPATIONAL THERAPIST

## 2024-08-26 PROCEDURE — 97112 NEUROMUSCULAR REEDUCATION: CPT | Mod: GO | Performed by: OCCUPATIONAL THERAPIST

## 2024-09-03 ENCOUNTER — OFFICE VISIT (OUTPATIENT)
Dept: ORTHOPEDICS | Facility: CLINIC | Age: 69
End: 2024-09-03
Payer: COMMERCIAL

## 2024-09-03 VITALS — HEIGHT: 67 IN | BODY MASS INDEX: 22.6 KG/M2 | WEIGHT: 144 LBS

## 2024-09-03 DIAGNOSIS — M65.331 TRIGGER FINGER, RIGHT MIDDLE FINGER: ICD-10-CM

## 2024-09-03 DIAGNOSIS — M65.321 TRIGGER INDEX FINGER OF RIGHT HAND: ICD-10-CM

## 2024-09-03 DIAGNOSIS — G56.01 RIGHT CARPAL TUNNEL SYNDROME: Primary | ICD-10-CM

## 2024-09-03 DIAGNOSIS — M65.341 TRIGGER FINGER, RIGHT RING FINGER: ICD-10-CM

## 2024-09-03 PROCEDURE — 99024 POSTOP FOLLOW-UP VISIT: CPT | Performed by: STUDENT IN AN ORGANIZED HEALTH CARE EDUCATION/TRAINING PROGRAM

## 2024-09-03 PROCEDURE — 99214 OFFICE O/P EST MOD 30 MIN: CPT | Mod: 24 | Performed by: STUDENT IN AN ORGANIZED HEALTH CARE EDUCATION/TRAINING PROGRAM

## 2024-09-03 NOTE — LETTER
9/3/2024      Gerardo Gonzalez  [address]  [place] [address]      Dear Colleague,    Thank you for referring your patient, Gerardo Gonzalez, to the [facility] [place]. Please see a copy of my visit note below.    Orthopaedic Surgery Hand Clinic Progress Note    Patient Name: Gerardo Gonzalez  MRN: [id number]  : 1955  Date: Sep 3, 2024     Date of Surgery: 24    Surgery Performed:   Left carpal tunnel release  Left index finger A1 pulley release  Left long finger A1 pulley release  Left ring finger A1 pulley release    Subjective:  Updated 9/3/24 Patient returns s/p the above procedure, he states that his left hand is doing well and continues to improve daily.  Attending OT which he states his helping. He would like to talk about his right hand today.  States he has the same symptoms on the right, numbness and tingling in the median nerve distribution with pain at the A1 pulleys and triggering of multiple fingers.    Objective:  There were no vitals taken for this visit.    General: alert, appropriate, NAD  HEENT: NC/AT  CV: RRR by pulse  Pulm: Unlabored on RA  MSK:  Incisions are clean, dry, intact  No erythema, drainage, evidence of infection  Excellent improvement in scarring  No further triggering    On the RUE:  Diminished sensation to light touch in the index finger, intact ulnar and radial  Mild thenar atrophy  Tenderness palpation at the A1 pulley of the index, middle, ring fingers.  Mild triggering  Intact FDP, FDS, EDC  Warm well-perfused, capillary refill less than 2 seconds    Assessment/Plan:   69-year-old male status post left carpal tunnel release and trigger finger releases of the left index, long, ring fingers 2024    Patient is progressing appropriately and has achieved excellent result.  He wishes to proceed with the same surgery on the right.    The indications for surgery were discussed with the patient. The benefits, risks, and alternatives of  operative management were discussed in detail with the patient. The patient understands that the risks of surgery include, but are not limited to: infection, bleeding, injury to nearby structures (such as nerves, blood vessels, and tendons), wound healing problems, temporary weakness in , need for additional surgery, pain, stiffness, scarring, need for rehabilitation, and anesthetic complications.  Patient expressed understanding and elected to proceed with surgery. All questions were answered to the patient's satisfaction.    Case request placed.  Local only.    Julian Ramirez MD    Hand & Upper Extremity Surgery  Department of Orthopedic Surgery  [facility]      Again, thank you for allowing me to participate in the care of your patient.        Sincerely,        Julian Ramirez MD

## 2024-09-03 NOTE — PROGRESS NOTES
Orthopaedic Surgery Hand Clinic Progress Note    Patient Name: Gerardo Gonzalez  MRN: [id number]  : 1955  Date: Sep 3, 2024     Date of Surgery: 24    Surgery Performed:   Left carpal tunnel release  Left index finger A1 pulley release  Left long finger A1 pulley release  Left ring finger A1 pulley release    Subjective:  Updated 9/3/24 Patient returns s/p the above procedure, he states that his left hand is doing well and continues to improve daily.  Attending OT which he states his helping. He would like to talk about his right hand today.  States he has the same symptoms on the right, numbness and tingling in the median nerve distribution with pain at the A1 pulleys and triggering of multiple fingers.    Objective:  There were no vitals taken for this visit.    General: alert, appropriate, NAD  HEENT: NC/AT  CV: RRR by pulse  Pulm: Unlabored on RA  MSK:  Incisions are clean, dry, intact  No erythema, drainage, evidence of infection  Excellent improvement in scarring  No further triggering    On the RUE:  Diminished sensation to light touch in the index finger, intact ulnar and radial  Mild thenar atrophy  Tenderness palpation at the A1 pulley of the index, middle, ring fingers.  Mild triggering  Intact FDP, FDS, EDC  Warm well-perfused, capillary refill less than 2 seconds    Assessment/Plan:   69-year-old male status post left carpal tunnel release and trigger finger releases of the left index, long, ring fingers 2024    Patient is progressing appropriately and has achieved excellent result.  He wishes to proceed with the same surgery on the right.    The indications for surgery were discussed with the patient. The benefits, risks, and alternatives of operative management were discussed in detail with the patient. The patient understands that the risks of surgery include, but are not limited to: infection, bleeding, injury to nearby structures (such as nerves, blood vessels, and tendons), wound  healing problems, temporary weakness in , need for additional surgery, pain, stiffness, scarring, need for rehabilitation, and anesthetic complications.  Patient expressed understanding and elected to proceed with surgery. All questions were answered to the patient's satisfaction.    Case request placed.  Local only.    Julian Ramirez MD    Hand & Upper Extremity Surgery  Department of Orthopedic Surgery  [facility]

## 2024-09-04 DIAGNOSIS — E55.9 VITAMIN D DEFICIENCY: ICD-10-CM

## 2024-09-04 PROBLEM — M65.321 TRIGGER INDEX FINGER OF RIGHT HAND: Status: ACTIVE | Noted: 2024-09-03

## 2024-09-04 PROBLEM — M65.341 TRIGGER FINGER, RIGHT RING FINGER: Status: ACTIVE | Noted: 2024-09-03

## 2024-09-04 PROBLEM — M65.331 TRIGGER FINGER, RIGHT MIDDLE FINGER: Status: ACTIVE | Noted: 2024-09-03

## 2024-09-04 PROBLEM — G56.01 RIGHT CARPAL TUNNEL SYNDROME: Status: ACTIVE | Noted: 2024-09-03

## 2024-09-04 RX ORDER — CHOLECALCIFEROL (VITAMIN D3) 50 MCG
1 TABLET ORAL DAILY
Qty: 90 TABLET | Refills: 0 | Status: SHIPPED | OUTPATIENT
Start: 2024-09-04

## 2024-09-13 PROBLEM — M65.341 ACQUIRED TRIGGER FINGER OF BOTH RING FINGERS: Status: RESOLVED | Noted: 2024-08-12 | Resolved: 2024-09-13

## 2024-09-13 PROBLEM — M65.342 ACQUIRED TRIGGER FINGER OF BOTH RING FINGERS: Status: RESOLVED | Noted: 2024-08-12 | Resolved: 2024-09-13

## 2024-09-13 PROBLEM — G56.02 LEFT CARPAL TUNNEL SYNDROME: Status: RESOLVED | Noted: 2024-06-04 | Resolved: 2024-09-13

## 2024-09-13 PROBLEM — Z47.89 AFTERCARE FOLLOWING SURGERY OF THE MUSCULOSKELETAL SYSTEM: Status: RESOLVED | Noted: 2024-08-12 | Resolved: 2024-09-13

## 2024-09-13 PROBLEM — M25.642 STIFFNESS OF FINGER JOINT OF LEFT HAND: Status: RESOLVED | Noted: 2024-08-12 | Resolved: 2024-09-13

## 2024-09-13 PROBLEM — M79.645 PAIN OF FINGER OF LEFT HAND: Status: RESOLVED | Noted: 2024-08-12 | Resolved: 2024-09-13

## 2024-09-16 ENCOUNTER — ALLIED HEALTH/NURSE VISIT (OUTPATIENT)
Dept: FAMILY MEDICINE | Facility: CLINIC | Age: 69
End: 2024-09-16
Payer: COMMERCIAL

## 2024-09-16 DIAGNOSIS — Z23 NEED FOR SHINGLES VACCINE: ICD-10-CM

## 2024-09-16 DIAGNOSIS — Z23 NEED FOR PROPHYLACTIC VACCINATION AND INOCULATION AGAINST INFLUENZA: Primary | ICD-10-CM

## 2024-09-16 PROCEDURE — 90750 HZV VACC RECOMBINANT IM: CPT

## 2024-09-16 PROCEDURE — G0008 ADMIN INFLUENZA VIRUS VAC: HCPCS | Mod: 59

## 2024-09-16 PROCEDURE — 90471 IMMUNIZATION ADMIN: CPT

## 2024-09-16 PROCEDURE — 90662 IIV NO PRSV INCREASED AG IM: CPT

## 2024-09-28 DIAGNOSIS — E55.9 VITAMIN D DEFICIENCY: ICD-10-CM

## 2024-09-30 RX ORDER — CHOLECALCIFEROL (VITAMIN D3) 50 MCG
1 TABLET ORAL DAILY
Qty: 90 TABLET | Refills: 0 | OUTPATIENT
Start: 2024-09-30

## 2024-10-12 ENCOUNTER — IMMUNIZATION (OUTPATIENT)
Dept: FAMILY MEDICINE | Facility: CLINIC | Age: 69
End: 2024-10-12
Payer: COMMERCIAL

## 2024-10-12 DIAGNOSIS — Z23 NEED FOR TDAP VACCINATION: ICD-10-CM

## 2024-10-12 DIAGNOSIS — Z23 ENCOUNTER FOR IMMUNIZATION: Primary | ICD-10-CM

## 2024-10-12 DIAGNOSIS — Z23 NEED FOR PROPHYLACTIC VACCINATION AND INOCULATION AGAINST INFLUENZA: ICD-10-CM

## 2024-10-12 PROCEDURE — 90677 PCV20 VACCINE IM: CPT

## 2024-10-12 PROCEDURE — 91320 SARSCV2 VAC 30MCG TRS-SUC IM: CPT

## 2024-10-12 PROCEDURE — 90472 IMMUNIZATION ADMIN EACH ADD: CPT

## 2024-10-12 PROCEDURE — 99207 PR NO CHARGE NURSE ONLY: CPT

## 2024-10-12 PROCEDURE — 90715 TDAP VACCINE 7 YRS/> IM: CPT

## 2024-10-12 PROCEDURE — 90480 ADMN SARSCOV2 VAC 1/ONLY CMP: CPT

## 2024-10-12 PROCEDURE — 90471 IMMUNIZATION ADMIN: CPT

## 2024-10-12 NOTE — PROGRESS NOTES
Prior to immunization administration, verified patients identity using patient s name and date of birth. Please see Immunization Activity for additional information.     Is the patient's temperature normal (100.5 or less)? Yes     Patient MEETS CRITERIA. PROCEED with vaccine administration.          10/12/2024   COVID   Have you had myocarditis or pericarditis (inflammation of or around the heart muscle) after getting a COVID-19 vaccine? No   Have you had a serious reaction to a COVID vaccine or something in a COVID vaccine, like polyethylene glycol (PEG) or polysorbate? No   Have you had multisystem inflammatory syndrome from COVID-19 in the past 90 days? No            Patient MEETS CRITERIA. PROCEED with vaccine administration.    Patient instructed to remain in clinic for 15 minutes afterwards, and to report any adverse reactions.      Link to Ancillary Visit Immunization Standing Orders SmartSet     Screening performed by Jackie Marques CMA on 10/12/2024 at 8:10 AM.

## 2024-10-29 ENCOUNTER — TELEPHONE (OUTPATIENT)
Dept: ORTHOPEDICS | Facility: CLINIC | Age: 69
End: 2024-10-29
Payer: COMMERCIAL

## 2024-11-01 ENCOUNTER — HOSPITAL ENCOUNTER (OUTPATIENT)
Facility: AMBULATORY SURGERY CENTER | Age: 69
Discharge: HOME | End: 2024-11-01
Attending: STUDENT IN AN ORGANIZED HEALTH CARE EDUCATION/TRAINING PROGRAM | Admitting: STUDENT IN AN ORGANIZED HEALTH CARE EDUCATION/TRAINING PROGRAM
Payer: COMMERCIAL

## 2024-11-01 VITALS
BODY MASS INDEX: 21.97 KG/M2 | DIASTOLIC BLOOD PRESSURE: 89 MMHG | OXYGEN SATURATION: 98 % | WEIGHT: 140 LBS | RESPIRATION RATE: 16 BRPM | HEART RATE: 108 BPM | SYSTOLIC BLOOD PRESSURE: 144 MMHG | TEMPERATURE: 97.1 F | HEIGHT: 67 IN

## 2024-11-01 DIAGNOSIS — M65.331 TRIGGER FINGER, RIGHT MIDDLE FINGER: ICD-10-CM

## 2024-11-01 DIAGNOSIS — G56.01 RIGHT CARPAL TUNNEL SYNDROME: ICD-10-CM

## 2024-11-01 DIAGNOSIS — M65.341 TRIGGER FINGER, RIGHT RING FINGER: Primary | ICD-10-CM

## 2024-11-01 DIAGNOSIS — M65.321 TRIGGER INDEX FINGER OF RIGHT HAND: ICD-10-CM

## 2024-11-01 PROCEDURE — 64721 CARPAL TUNNEL SURGERY: CPT | Mod: RT | Performed by: STUDENT IN AN ORGANIZED HEALTH CARE EDUCATION/TRAINING PROGRAM

## 2024-11-01 PROCEDURE — 64721 CARPAL TUNNEL SURGERY: CPT | Mod: RT

## 2024-11-01 PROCEDURE — 26055 INCISE FINGER TENDON SHEATH: CPT | Mod: F6,F7,F8

## 2024-11-01 PROCEDURE — 26055 INCISE FINGER TENDON SHEATH: CPT | Mod: F6 | Performed by: STUDENT IN AN ORGANIZED HEALTH CARE EDUCATION/TRAINING PROGRAM

## 2024-11-01 RX ORDER — LIDOCAINE HYDROCHLORIDE AND EPINEPHRINE 10; 10 MG/ML; UG/ML
10 INJECTION, SOLUTION INFILTRATION; PERINEURAL ONCE
Status: COMPLETED | OUTPATIENT
Start: 2024-11-01 | End: 2024-11-01

## 2024-11-01 RX ORDER — ONDANSETRON 4 MG/1
4 TABLET, ORALLY DISINTEGRATING ORAL EVERY 30 MIN PRN
Status: DISCONTINUED | OUTPATIENT
Start: 2024-11-01 | End: 2024-11-02 | Stop reason: HOSPADM

## 2024-11-01 RX ORDER — OXYCODONE HYDROCHLORIDE 5 MG/1
10 TABLET ORAL
Status: DISCONTINUED | OUTPATIENT
Start: 2024-11-01 | End: 2024-11-02 | Stop reason: HOSPADM

## 2024-11-01 RX ORDER — SODIUM CHLORIDE, SODIUM LACTATE, POTASSIUM CHLORIDE, CALCIUM CHLORIDE 600; 310; 30; 20 MG/100ML; MG/100ML; MG/100ML; MG/100ML
INJECTION, SOLUTION INTRAVENOUS CONTINUOUS
Status: DISCONTINUED | OUTPATIENT
Start: 2024-11-01 | End: 2024-11-02 | Stop reason: HOSPADM

## 2024-11-01 RX ORDER — ONDANSETRON 2 MG/ML
4 INJECTION INTRAMUSCULAR; INTRAVENOUS EVERY 30 MIN PRN
Status: DISCONTINUED | OUTPATIENT
Start: 2024-11-01 | End: 2024-11-02 | Stop reason: HOSPADM

## 2024-11-01 RX ORDER — LIDOCAINE 40 MG/G
CREAM TOPICAL
Status: DISCONTINUED | OUTPATIENT
Start: 2024-11-01 | End: 2024-11-02 | Stop reason: HOSPADM

## 2024-11-01 RX ORDER — LABETALOL HYDROCHLORIDE 5 MG/ML
10 INJECTION, SOLUTION INTRAVENOUS
Status: DISCONTINUED | OUTPATIENT
Start: 2024-11-01 | End: 2024-11-02 | Stop reason: HOSPADM

## 2024-11-01 RX ORDER — NALOXONE HYDROCHLORIDE 0.4 MG/ML
0.1 INJECTION, SOLUTION INTRAMUSCULAR; INTRAVENOUS; SUBCUTANEOUS
Status: DISCONTINUED | OUTPATIENT
Start: 2024-11-01 | End: 2024-11-02 | Stop reason: HOSPADM

## 2024-11-01 RX ORDER — HYDROMORPHONE HYDROCHLORIDE 1 MG/ML
0.2 INJECTION, SOLUTION INTRAMUSCULAR; INTRAVENOUS; SUBCUTANEOUS EVERY 5 MIN PRN
Status: DISCONTINUED | OUTPATIENT
Start: 2024-11-01 | End: 2024-11-02 | Stop reason: HOSPADM

## 2024-11-01 RX ORDER — DEXAMETHASONE SODIUM PHOSPHATE 10 MG/ML
4 INJECTION, SOLUTION INTRAMUSCULAR; INTRAVENOUS
Status: DISCONTINUED | OUTPATIENT
Start: 2024-11-01 | End: 2024-11-02 | Stop reason: HOSPADM

## 2024-11-01 RX ORDER — LIDOCAINE HYDROCHLORIDE AND EPINEPHRINE 10; 10 MG/ML; UG/ML
10 INJECTION, SOLUTION INFILTRATION; PERINEURAL ONCE
Status: DISCONTINUED | OUTPATIENT
Start: 2024-11-01 | End: 2024-11-02 | Stop reason: HOSPADM

## 2024-11-01 RX ORDER — ACETAMINOPHEN 325 MG/1
975 TABLET ORAL ONCE
Status: COMPLETED | OUTPATIENT
Start: 2024-11-01 | End: 2024-11-01

## 2024-11-01 RX ORDER — FENTANYL CITRATE 50 UG/ML
25 INJECTION, SOLUTION INTRAMUSCULAR; INTRAVENOUS EVERY 5 MIN PRN
Status: DISCONTINUED | OUTPATIENT
Start: 2024-11-01 | End: 2024-11-02 | Stop reason: HOSPADM

## 2024-11-01 RX ORDER — OXYCODONE HYDROCHLORIDE 5 MG/1
5 TABLET ORAL
Status: DISCONTINUED | OUTPATIENT
Start: 2024-11-01 | End: 2024-11-02 | Stop reason: HOSPADM

## 2024-11-01 RX ORDER — FENTANYL CITRATE 50 UG/ML
50 INJECTION, SOLUTION INTRAMUSCULAR; INTRAVENOUS EVERY 5 MIN PRN
Status: DISCONTINUED | OUTPATIENT
Start: 2024-11-01 | End: 2024-11-02 | Stop reason: HOSPADM

## 2024-11-01 RX ORDER — HYDRALAZINE HYDROCHLORIDE 20 MG/ML
2.5-5 INJECTION INTRAMUSCULAR; INTRAVENOUS EVERY 10 MIN PRN
Status: DISCONTINUED | OUTPATIENT
Start: 2024-11-01 | End: 2024-11-02 | Stop reason: HOSPADM

## 2024-11-01 RX ORDER — HYDROMORPHONE HYDROCHLORIDE 1 MG/ML
0.4 INJECTION, SOLUTION INTRAMUSCULAR; INTRAVENOUS; SUBCUTANEOUS EVERY 5 MIN PRN
Status: DISCONTINUED | OUTPATIENT
Start: 2024-11-01 | End: 2024-11-02 | Stop reason: HOSPADM

## 2024-11-01 RX ORDER — OXYCODONE HYDROCHLORIDE 5 MG/1
5 TABLET ORAL EVERY 6 HOURS PRN
Qty: 10 TABLET | Refills: 0 | Status: SHIPPED | OUTPATIENT
Start: 2024-11-01 | End: 2024-11-04

## 2024-11-01 RX ADMIN — LIDOCAINE HYDROCHLORIDE AND EPINEPHRINE 10 ML: 10; 10 INJECTION, SOLUTION INFILTRATION; PERINEURAL at 10:16

## 2024-11-01 RX ADMIN — ACETAMINOPHEN 975 MG: 325 TABLET ORAL at 09:37

## 2024-11-01 RX ADMIN — LIDOCAINE HYDROCHLORIDE AND EPINEPHRINE 10 ML: 10; 10 INJECTION, SOLUTION INFILTRATION; PERINEURAL at 10:18

## 2024-11-01 NOTE — OP NOTE
Patient: Gerardo Gonzalez  : 1955  MRN: [id number]    DATE OF OPERATION: 2024      OPERATIVE REPORT       PREOPERATIVE DIAGNOSIS:  1) Right carpal tunnel syndrome  2) Right index, middle, ring finger trigger fingers     POSTOPERATIVE DIAGNOSIS:  1) Right carpal tunnel syndrome  2) Right index, middle, ring finger trigger fingers     PROCEDURE:  1) Right open carpal tunnel release  2) Right index finger A1 pulley release  3) Right middle finger A1 pulley release  4) Right ring finger A1 pulley release    SURGEON:  Julian Ramirez MD     ASSISTANT(S):  MD Mathew Coyne PA-C    ANESTHESIA:  Local: 30cc 1% lidocaine 1:100,000 epinephrine     IMPLANTS:   None    ESTIMATED BLOOD LOSS:  10cc    DVT PROPHYLAXIS:  SCDs    TOURNIQUET TIME:  None     SPECIMENS REMOVED:  None    INTRAOPERATIVE FINDINGS:  Compressed median nerve at the carpal tunnel. Recurrent motor branch extraligamentous and radial. Tenosynovitis at A1 pulleys of the index, middle, ring fingers    COMPLICATIONS:  None    DISPOSITION:  Stable to PACU.     INDICATIONS:  Gerardo Gonzalez is a 69 year old male with a history of right hand numbness and tingling in the median nerve distribution refractory to conservative measures.  Symptoms are consistent with carpal tunnel syndrome.  He also developed trigger fingers of the right index, middle, ring fingers refractory to conservative measures.  He wished to proceed with surgery.  He previously underwent the same surgery on his left hand with excellent results.    Indications for surgery were discussed with the patient in detail. After discussing risks, benefits and alternatives to procedure, the patient elected to proceed with surgical intervention.     The patient understood that risks include, but are not limited to: Bleeding, infection, damage to surrounding structures (such as nerve, vessel or tendon), persistent symptoms or numbness, need for additional procedures, pillar pain,  stiffness, need for therapy, and anesthetic complications. The patient expressed understanding and agreement and wished to proceed.     Consent was obtained for the procedure.     DESCRIPTION OF PROCEDURE IN DETAIL:  The patient was seen in the preoperative care unit. Patient identity, consent, procedure to be performed, and operative site were verified with the patient. The right upper extremity was marked.  The skin in the palm was cleansed with alcohol and 10 cc of the above local anesthetic was infiltrated in the skin and subcutaneous tissues over the carpal tunnel.  20 cc was then infiltrated in the skin and subcutaneous tissues over the A1 pulleys of the index, middle, ring fingers.     The patient was brought to the operating room and placed supine on the operating room table. The right upper extremity was placed on an armboard.     The right upper extremity was prepped and draped in the usual sterile fashion. A timeout was performed confirming the correct patient, procedure, and operative site. All were in agreement.    A standard longitudinal 2.5cm incision was made in the skin directly over the carpal tunnel.  Blunt dissection was carried down through the skin and subcutaneous tissues to the superficial palmar fascia.  Hemostasis was achieved with bipolar cautery.  The superficial palmar fascia was divided sharply exposing the transverse carpal ligament. The transverse carpal ligament was then divided sharply and released in its entirety, exposing the median nerve which appeared pale and compressed. Distally, the superficial palmar fascia was divided with scissors under direct visualization, protecting the superficial palmar arch below. Attention was then turned proximally, and the remaining transverse carpal ligament and antebrachial fascia were released under direct visualization. A full median nerve release was performed. The median nerve had excellent excursion at the end of the case. The recurrent  motor branch was noted to be extraligamentous and radial.    Incision was then made on the volar aspect of right middle and ring fingers in a transverse fashion in the palmar crease over the A1 pulley. Blunt dissection was taken down to the level of the flexor sheath of the ring finger, identifying and protecting the neurovascular bundle on either side of the sheath.    The A1 pulley in its entirety was visualized. This was sharply incised with a [place] blade, protecting the neurovascular bundle on either side and the tendon below. Proximally the A0 pulley was also divided under direct visualization. A complete release was performed and visualized. The tenosynovitis on the tendons was gently debrided with tenotomy scissors.    The A1 pulley of the right middle finger was then released through the same incision. Blunt dissection was taken down to the level of the flexor sheath of the middle finger, identifying and protecting the neurovascular bundle on either side of the sheath.    The A1 pulley in its entirety was visualized. This was sharply incised with a [place] blade, protecting the neurovascular bundle on either side and the tendon below. Proximally the A0 pulley was also divided under direct visualization. A complete release was performed and visualized. The tenosynovitis on the tendons was gently debrided with tenotomy scissors.    A separate transverse incision was then made in the distal palmar crease over the A1 pulley of the right index finger. Blunt dissection was taken down to the level of the flexor sheath of the index finger, identifying and protecting the neurovascular bundle on either side of the sheath.    The A1 pulley in its entirety was visualized. This was sharply incised with a [place] blade, protecting the neurovascular bundle on either side and the tendon below. Proximally the A0 pulley was also divided under direct visualization. A complete release was performed and visualized. The  tenosynovitis on the tendons was gently debrided with tenotomy scissors.    The wounds were copiously irrigated. The incisions were then closed with interrupted, buried 4-0 Monocryl sutures.  Steri-Strips were applied.  A sterile, bulky soft dressing was placed.     All needle and sponge counts were correct at the end of the procedure. There were no complications.     The patient was taken to the postoperative recovery unit in stable condition.     I was present and scrubbed for the entire procedure.     POSTOPERATIVE PLAN:  The patient will be discharged home. The patient was instructed to be 1 pound coffee cup weightbearing.  The patient was instructed on finger range of motion exercises. The dressing will remain in place until follow-up. The patient was instructed to keep it clean and dry. The patient will return to clinic in 10-14 days for a wound check.    Julian Ramirez MD     Hand, Upper Extremity & Microvascular Surgery  Department of Orthopedic Surgery  [facility]

## 2024-11-01 NOTE — BRIEF OP NOTE
[facility]    Brief Operative Note    Pre-operative diagnosis: Right carpal tunnel syndrome [G56.01]  Trigger index finger of right hand [M65.321]  Trigger finger, right middle finger [M65.331]  Trigger finger, right ring finger [M65.341]  Post-operative diagnosis Same as pre-operative diagnosis    Procedure: RELEASE, RIGHT CARPAL TUNNEL, Right - Wrist  RELEASE, RIGHT INDEX, MIDDLE, RING TRIGGER FINGERS, Right - Wrist    Surgeon: Surgeons and Role:     * Julian Ramirez MD - Primary     * Christiano Turpin PA-C - Assisting     * Marc Khan MD - Resident - Assisting  Anesthesia: Local   Estimated Blood Loss: Minimal    Drains: None  Specimens: * No specimens in log *  Findings:   None.  Complications: None.  Implants: * No implants in log *        - Keep dressing in place until follow up   - OK for light use of the operative hand   - Elevate operative hand   - Alternate tylenol and ibuprofen for pain. Use oxycodone for break through pain   - Follow up as scheduled   - Discharge to home when meets PACU discharge criteria

## 2024-11-01 NOTE — DISCHARGE INSTRUCTIONS
Procedure Performed: Right carpal tunnel release, Right index, middle, and ring trigger finger releases  Attending Surgeon: Dr. Julian Ramirez  Date: 2024    DIAGNOSIS  1. Trigger finger, right ring finger    2. Trigger finger, right middle finger    3. Trigger index finger of right hand    4. Right carpal tunnel syndrome        MEDICATIONS   Resume all home medications as directed unless otherwise instructed during this hospitalization. If there is any question, double check with your primary care provider.  Start new discharge medications as directed.    Take 1 tablet of 650 mg Tylenol (acetaminophen) Arthritis Strength (extended release) and 1 tablet of Aleve (naproxen) 220 mg in the morning with breakfast and in the evening with dinner.     For breakthrough pain use narcotic pain medication as prescribed.    Do not drive or operate machinery while taking narcotic pain medications.   If you are taking other Tylenol containing medicines at home, be sure NOT to exceed 4 gram's (4000 milligrams) of Tylenol per day.   If you are taking pain medications, be sure to take Colace (docusate sodium) as well to prevent constipation. If constipated, try adding another cathartic or enema.  If nausea and vomiting, call the hospital or seek medical attention.    ACTIVITY   Weight bearin lb coffee cup weight bearing to operative extremity    DIET  Resume same diet prior to your hospital admission.    WOUND   Leave dressing on until you are seen in clinic for your follow up visit.   Watch for signs and symptoms of infection of your wounds including; pain, redness, swelling, drainage or fever.  If you notice any of these symptoms please call or seek medical attention.    Keep wound clean, dry, and intact.  Do not submerge wounds in water until they are healed. No baths, soaking, swimming, or prolonged water exposure for 4 weeks after surgery.    RETURN   Follow-up with Orthopedic Clinic as directed.     Future Appointments    Date Time Provider Department [place]   11/14/2024  8:30 AM Christiano Turpin PA-C [facility]   11/29/2024 11:00 AM Malaika Jimenez PA-C [facility]       Call the [facility] at [phone] during business hours for any symptoms such as:    * Fevers with Temperature greater than 101.5 degrees.   * Pus drainage from wound site.   * Severe pain, not controlled by medication.   * Persistent nausea, vomiting and inablility to tolerate fluids.    If you are receiving care in [place], you may call the Orthopedic clinic at [phone].    FOR URGENT PROBLEMS ONLY, after hours or on weekends call the hospital  at [phone] and ask to speak with the orthopedic resident on call.          [facility]  Home Care Following Your Procedure  Call a doctor if you have signs of infection (fever, growing tenderness at the surgery site, a large amount of drainage or bleeding, severe pain, foul-smelling drainage, redness, swelling).             Tylenol/Acetaminophen Consumption    If you feel your pain relief is insufficient, you may take Tylenol/Acetaminophen in addition to your narcotic pain medication.   Be careful not to exceed 4,000 mg of Tylenol/Acetaminophen in a 24 hour period from all sources.  If you are taking extra strength Tylenol/acetaminophen (500 mg), the maximum dose is 8 tablets in 24 hours.  If you are taking regular strength acetaminophen (325 mg), the maximum dose is 12 tablets in 24 hours.      Your doctor is:       Dr. Julian Ramirez, Orthopaedics: [phone]             Or dial [phone] and ask for the resident on call for:  Orthopaedics  For emergency care, call the:  [place]:  [phone] (TTY for hearing impaired: [phone])

## 2024-11-08 ENCOUNTER — MYC MEDICAL ADVICE (OUTPATIENT)
Dept: ORTHOPEDICS | Facility: CLINIC | Age: 69
End: 2024-11-08
Payer: COMMERCIAL

## 2024-11-08 ENCOUNTER — TELEPHONE (OUTPATIENT)
Dept: ORTHOPEDICS | Facility: CLINIC | Age: 69
End: 2024-11-08
Payer: COMMERCIAL

## 2024-11-08 DIAGNOSIS — M65.331 TRIGGER FINGER, RIGHT MIDDLE FINGER: ICD-10-CM

## 2024-11-08 DIAGNOSIS — M65.321 TRIGGER INDEX FINGER OF RIGHT HAND: ICD-10-CM

## 2024-11-08 DIAGNOSIS — M65.341 TRIGGER FINGER, RIGHT RING FINGER: ICD-10-CM

## 2024-11-08 DIAGNOSIS — G56.01 RIGHT CARPAL TUNNEL SYNDROME: Primary | ICD-10-CM

## 2024-11-08 NOTE — TELEPHONE ENCOUNTER
Medication Question or concern regarding medication   Prescription Clarification  Name of Medication: oxycodone  Prescribing Provider: Dr. Ramirez   Pharmacy: [facility] on [address] in [place]   What on the order needs clarification? Pt is completely out of medication at this point.        Could we send this information to you in CHORD or would you prefer to receive a phone call?:   Patient would like to be contacted via CHORD

## 2024-11-08 NOTE — TELEPHONE ENCOUNTER
Even though pt is already communicating with someone via [employer], he is wondering if he will be getting a refill of pain meds. Please refer to the [employer] messages and respond to pt

## 2024-11-08 NOTE — TELEPHONE ENCOUNTER
Medication Request for: Oxycodone 5mg            Patient currently takin tab twice daily   Patient has none of medication remaining.  Patient is also taking OTC: patient unable to take ibuprofen due to CKD and GERD  Prescription last written on 24 by Dr. Ramirez   Sig: take 1 tab every 6 hrs prn   Last Fill Quantity: 10 with # refills: 0     Date of Surgery (if applicable): 24  Procedure Performed (if applicable): R CTR, trigger finger release of right index, middle, and, ring fingers  Future Office Visit:   24    Left voicemail asking for a call back and sent a [employer] message. Asked patient to call back so we can discuss his symptoms further. ]  Called back a 2nd time a few minutes apart, but did not leave another message.     IRINA Cox RN

## 2024-11-09 RX ORDER — OXYCODONE HYDROCHLORIDE 5 MG/1
5 TABLET ORAL EVERY 6 HOURS PRN
Qty: 8 TABLET | Refills: 0 | Status: SHIPPED | OUTPATIENT
Start: 2024-11-09 | End: 2024-11-12

## 2024-11-09 NOTE — TELEPHONE ENCOUNTER
See patient MyChart messages and advise if agreeable to refill. Patient did not answer all questions asked by clinical staff in regard to symptoms and management.     Cha Brewster, ATC

## 2024-11-10 ENCOUNTER — HEALTH MAINTENANCE LETTER (OUTPATIENT)
Age: 69
End: 2024-11-10

## 2024-12-30 DIAGNOSIS — E55.9 VITAMIN D DEFICIENCY: ICD-10-CM

## 2024-12-30 DIAGNOSIS — G47.00 PERSISTENT INSOMNIA: ICD-10-CM

## 2025-01-13 ENCOUNTER — TRANSFERRED RECORDS (OUTPATIENT)
Dept: MULTI SPECIALTY CLINIC | Facility: CLINIC | Age: 70
End: 2025-01-13

## 2025-01-13 LAB — RETINOPATHY: NORMAL

## 2025-02-02 ENCOUNTER — HEALTH MAINTENANCE LETTER (OUTPATIENT)
Age: 70
End: 2025-02-02

## 2025-03-30 RX ORDER — QUETIAPINE FUMARATE 25 MG/1
TABLET, FILM COATED ORAL
Qty: 180 TABLET | Refills: 1 | OUTPATIENT
Start: 2025-03-30

## 2025-03-30 RX ORDER — CHOLECALCIFEROL (VITAMIN D3) 50 MCG
1 TABLET ORAL DAILY
Qty: 90 TABLET | Refills: 0 | OUTPATIENT
Start: 2025-03-30

## 2025-04-19 ENCOUNTER — HEALTH MAINTENANCE LETTER (OUTPATIENT)
Age: 70
End: 2025-04-19

## 2025-04-23 SDOH — HEALTH STABILITY: PHYSICAL HEALTH: ON AVERAGE, HOW MANY MINUTES DO YOU ENGAGE IN EXERCISE AT THIS LEVEL?: 30 MIN

## 2025-04-23 SDOH — HEALTH STABILITY: PHYSICAL HEALTH: ON AVERAGE, HOW MANY DAYS PER WEEK DO YOU ENGAGE IN MODERATE TO STRENUOUS EXERCISE (LIKE A BRISK WALK)?: 3 DAYS

## 2025-04-23 ASSESSMENT — SOCIAL DETERMINANTS OF HEALTH (SDOH): HOW OFTEN DO YOU GET TOGETHER WITH FRIENDS OR RELATIVES?: ONCE A WEEK

## 2025-04-28 ENCOUNTER — OFFICE VISIT (OUTPATIENT)
Dept: FAMILY MEDICINE | Facility: CLINIC | Age: 70
End: 2025-04-28
Payer: COMMERCIAL

## 2025-04-28 VITALS
DIASTOLIC BLOOD PRESSURE: 52 MMHG | HEIGHT: 67 IN | SYSTOLIC BLOOD PRESSURE: 90 MMHG | OXYGEN SATURATION: 98 % | BODY MASS INDEX: 23.06 KG/M2 | WEIGHT: 146.9 LBS | HEART RATE: 111 BPM | TEMPERATURE: 97.4 F

## 2025-04-28 DIAGNOSIS — N40.1 BENIGN PROSTATIC HYPERPLASIA WITH NOCTURIA: ICD-10-CM

## 2025-04-28 DIAGNOSIS — F41.9 ANXIETY: ICD-10-CM

## 2025-04-28 DIAGNOSIS — N18.31 CHRONIC KIDNEY DISEASE, STAGE 3A (H): ICD-10-CM

## 2025-04-28 DIAGNOSIS — E78.5 HYPERLIPIDEMIA LDL GOAL <70: ICD-10-CM

## 2025-04-28 DIAGNOSIS — E11.9 TYPE 2 DIABETES MELLITUS WITHOUT COMPLICATION, WITH LONG-TERM CURRENT USE OF INSULIN (H): ICD-10-CM

## 2025-04-28 DIAGNOSIS — G47.00 PERSISTENT INSOMNIA: ICD-10-CM

## 2025-04-28 DIAGNOSIS — Z23 NEED FOR COVID-19 VACCINE: ICD-10-CM

## 2025-04-28 DIAGNOSIS — Z79.4 TYPE 2 DIABETES MELLITUS WITHOUT COMPLICATION, WITH LONG-TERM CURRENT USE OF INSULIN (H): ICD-10-CM

## 2025-04-28 DIAGNOSIS — R35.1 BENIGN PROSTATIC HYPERPLASIA WITH NOCTURIA: ICD-10-CM

## 2025-04-28 DIAGNOSIS — L40.50 PSORIATIC ARTHRITIS (H): ICD-10-CM

## 2025-04-28 DIAGNOSIS — Z00.00 ENCOUNTER FOR MEDICARE ANNUAL WELLNESS EXAM: Primary | ICD-10-CM

## 2025-04-28 DIAGNOSIS — M62.838 MUSCLE SPASM: ICD-10-CM

## 2025-04-28 LAB
EST. AVERAGE GLUCOSE BLD GHB EST-MCNC: 111 MG/DL
HBA1C MFR BLD: 5.5 % (ref 0–5.6)
HGB BLD-MCNC: 12.7 G/DL (ref 13.3–17.7)

## 2025-04-28 PROCEDURE — 36415 COLL VENOUS BLD VENIPUNCTURE: CPT

## 2025-04-28 PROCEDURE — 1125F AMNT PAIN NOTED PAIN PRSNT: CPT

## 2025-04-28 PROCEDURE — 91320 SARSCV2 VAC 30MCG TRS-SUC IM: CPT

## 2025-04-28 PROCEDURE — G0439 PPPS, SUBSEQ VISIT: HCPCS

## 2025-04-28 PROCEDURE — 99207 PR FOOT EXAM NO CHARGE: CPT

## 2025-04-28 PROCEDURE — G2211 COMPLEX E/M VISIT ADD ON: HCPCS

## 2025-04-28 PROCEDURE — 85018 HEMOGLOBIN: CPT

## 2025-04-28 PROCEDURE — 3074F SYST BP LT 130 MM HG: CPT

## 2025-04-28 PROCEDURE — 3078F DIAST BP <80 MM HG: CPT

## 2025-04-28 PROCEDURE — 90480 ADMN SARSCOV2 VAC 1/ONLY CMP: CPT

## 2025-04-28 PROCEDURE — 83036 HEMOGLOBIN GLYCOSYLATED A1C: CPT

## 2025-04-28 PROCEDURE — 99214 OFFICE O/P EST MOD 30 MIN: CPT | Mod: 25

## 2025-04-28 RX ORDER — QUETIAPINE FUMARATE 25 MG/1
TABLET, FILM COATED ORAL
Qty: 180 TABLET | Refills: 1 | Status: SHIPPED | OUTPATIENT
Start: 2025-04-28

## 2025-04-28 RX ORDER — INSULIN GLARGINE 100 [IU]/ML
22 INJECTION, SOLUTION SUBCUTANEOUS AT BEDTIME
Qty: 30 ML | Refills: 2 | Status: SHIPPED | OUTPATIENT
Start: 2025-04-28

## 2025-04-28 RX ORDER — DULOXETIN HYDROCHLORIDE 60 MG/1
CAPSULE, DELAYED RELEASE ORAL
Qty: 90 CAPSULE | Refills: 3 | Status: SHIPPED | OUTPATIENT
Start: 2025-04-28

## 2025-04-28 RX ORDER — INSULIN GLARGINE 100 [IU]/ML
INJECTION, SOLUTION SUBCUTANEOUS
Refills: 0 | OUTPATIENT
Start: 2025-04-28

## 2025-04-28 RX ORDER — DULOXETIN HYDROCHLORIDE 30 MG/1
30 CAPSULE, DELAYED RELEASE ORAL DAILY
Qty: 90 CAPSULE | Refills: 3 | Status: SHIPPED | OUTPATIENT
Start: 2025-04-28

## 2025-04-28 RX ORDER — FINASTERIDE 5 MG/1
TABLET, FILM COATED ORAL
Qty: 90 TABLET | Refills: 1 | Status: SHIPPED | OUTPATIENT
Start: 2025-04-28

## 2025-04-28 RX ORDER — TAMSULOSIN HYDROCHLORIDE 0.4 MG/1
CAPSULE ORAL
Qty: 90 CAPSULE | Refills: 1 | Status: SHIPPED | OUTPATIENT
Start: 2025-04-28

## 2025-04-28 RX ORDER — ROSUVASTATIN CALCIUM 20 MG/1
20 TABLET, COATED ORAL DAILY
Qty: 90 TABLET | Refills: 1 | Status: SHIPPED | OUTPATIENT
Start: 2025-04-28

## 2025-04-28 ASSESSMENT — PAIN SCALES - GENERAL: PAINLEVEL_OUTOF10: MILD PAIN (2)

## 2025-04-28 NOTE — PATIENT INSTRUCTIONS
Patient Education   Preventive Care Advice   This is general advice given by our system to help you stay healthy. However, your care team may have specific advice just for you. Please talk to your care team about your preventive care needs.  Nutrition  Eat 5 or more servings of fruits and vegetables each day.  Try wheat bread, brown rice and whole grain pasta (instead of white bread, rice, and pasta).  Get enough calcium and vitamin D. Check the label on foods and aim for 100% of the RDA (recommended daily allowance).  Lifestyle  Exercise at least 150 minutes each week  (30 minutes a day, 5 days a week).  Do muscle strengthening activities 2 days a week. These help control your weight and prevent disease.  No smoking.  Wear sunscreen to prevent skin cancer.  Have a dental exam and cleaning every 6 months.  Yearly exams  See your health care team every year to talk about:  Any changes in your health.  Any medicines your care team has prescribed.  Preventive care, family planning, and ways to prevent chronic diseases.  Shots (vaccines)   HPV shots (up to age 26), if you've never had them before.  Hepatitis B shots (up to age 59), if you've never had them before.  COVID-19 shot: Get this shot when it's due.  Flu shot: Get a flu shot every year.  Tetanus shot: Get a tetanus shot every 10 years.  Pneumococcal, hepatitis A, and RSV shots: Ask your care team if you need these based on your risk.  Shingles shot (for age 50 and up)  General health tests  Diabetes screening:  Starting at age 35, Get screened for diabetes at least every 3 years.  If you are younger than age 35, ask your care team if you should be screened for diabetes.  Cholesterol test: At age 39, start having a cholesterol test every 5 years, or more often if advised.  Bone density scan (DEXA): At age 50, ask your care team if you should have this scan for osteoporosis (brittle bones).  Hepatitis C: Get tested at least once in your life.  STIs (sexually  transmitted infections)  Before age 24: Ask your care team if you should be screened for STIs.  After age 24: Get screened for STIs if you're at risk. You are at risk for STIs (including HIV) if:  You are sexually active with more than one person.  You don't use condoms every time.  You or a partner was diagnosed with a sexually transmitted infection.  If you are at risk for HIV, ask about PrEP medicine to prevent HIV.  Get tested for HIV at least once in your life, whether you are at risk for HIV or not.  Cancer screening tests  Cervical cancer screening: If you have a cervix, begin getting regular cervical cancer screening tests starting at age 21.  Breast cancer scan (mammogram): If you've ever had breasts, begin having regular mammograms starting at age 40. This is a scan to check for breast cancer.  Colon cancer screening: It is important to start screening for colon cancer at age 45.  Have a colonoscopy test every 10 years (or more often if you're at risk) Or, ask your provider about stool tests like a FIT test every year or Cologuard test every 3 years.  To learn more about your testing options, visit:   .  For help making a decision, visit:   https://bit.ly/rk58331.  Prostate cancer screening test: If you have a prostate, ask your care team if a prostate cancer screening test (PSA) at age 55 is right for you.  Lung cancer screening: If you are a current or former smoker ages 50 to 80, ask your care team if ongoing lung cancer screenings are right for you.  For informational purposes only. Not to replace the advice of your health care provider. Copyright   2023 [employer]. All rights reserved. Clinically reviewed by the [facility] Transitions Program. [employer] [id number] - REV 01/24.

## 2025-04-28 NOTE — PROGRESS NOTES
Assessment & Plan     (Z00.00) Encounter for Medicare annual wellness exam  (primary encounter diagnosis)  Stable exam. Routine screening labs were recently performed but repeat hemoglobin and A1c today. Follow up in 1 year for annual wellness; sooner as needed for acute concerns.  Plan: PRIMARY CARE FOLLOW-UP SCHEDULING          (E11.9,  Z79.4) Type 2 diabetes mellitus without complication, with long-term current use of insulin (H)  A1c of 5.5% today. Currently on 22 units glargine nightly, 2000 mg metformin daily, and 2 mg Ozempic weekly. Discussed goal A1c between 6 and 7, recommend either decreasing glargine units or decreasing metformin dose. Patient elected to decrease metformin. Instructed him to start by taking 1500 mg instead of 2000 mg, then further decrease his dose by 500 mg every 2 weeks. Monitor closely via CGM. May need to increase glargine if blood sugar spikes.  Patient expresses understanding. Normal diabetic foot exam. Will continue to monitor.  Plan: HEMOGLOBIN A1C, FOOT EXAM, insulin glargine         (LANTUS SOLOSTAR) 100 UNIT/ML pen          (M62.838) Muscle spasm  Well controlled with use of tizanidine. No new side effects of the medication. Refill provided. Will continue to monitor.   Plan: tiZANidine (ZANAFLEX) 4 MG tablet          (G47.00) Persistent insomnia  Well controlled with use of seroquel at bedtime. No new side effects of the medication. Refill provided. Will continue to monitor.   Plan: QUEtiapine (SEROQUEL) 25 MG tablet          (N40.1,  R35.1) Benign prostatic hyperplasia with nocturia  Well controlled with use of finasteride 5 mg and tamsulosin 0.4 mg daily. No new side effects. Refills provided today. Will continue to monitor.  Plan: finasteride (PROSCAR) 5 MG tablet, tamsulosin         (FLOMAX) 0.4 MG capsule          (N18.31) Chronic kidney disease, stage 3a (H)  Most recent CMP with stable kidney function. Low hemoglobin today of 12.7, likely secondary to CKD. No symptoms  at this time. Will continue to monitor.  Plan: Hemoglobin          (F41.9) Anxiety  Well controlled with use of duloxetine 90 mg daily. No new side effects of the medication. Refill provided. Will continue to monitor.   Plan: DULoxetine (CYMBALTA) 30 MG capsule, DULoxetine        (CYMBALTA) 60 MG capsule          (E78.5) Hyperlipidemia LDL goal <70  Well controlled with use of rosuvastatin. Most recent lipid panel was stable. No new side effects of the medication. Refill provided.  Plan: rosuvastatin (CRESTOR) 20 MG tablet          (L40.50) Psoriatic arthritis (H)  Affecting bilateral hands. Has been somewhat worse since carpal tunnel and trigger finger. Mostly triggered by weather. Does not use anything for his symptoms. Discussed could try voltaren.    (Z23) Need for COVID-19 vaccine  Plan: COVID-19 12+ (PFIZER)          Patient has been advised of split billing requirements and indicates understanding: Yes    Counseling  Appropriate preventive services were addressed with this patient via screening, questionnaire, or discussion as appropriate for fall prevention, nutrition, physical activity, Tobacco-use cessation, social engagement, weight loss and cognition.  Checklist reviewing preventive services available has been given to the patient.  Reviewed patient's diet, addressing concerns and/or questions.   He is at risk for lack of exercise and has been provided with information to increase physical activity for the benefit of his well-being.     Follow up in 3 months for diabetes recheck; sooner with any acute concerns.    Rhiannon Carrillo is a 70 year old, presenting for the following health issues:  Diabetes (DM check up)        4/28/2025     8:11 AM   Additional Questions   Roomed by macario bronson     Via the Health Maintenance questionnaire, the patient has reported the following services have been completed -Eye Exam: [facility] 2025-01-13, this information has been sent to the abstraction team.    History  of Present Illness       Reason for visit:  Routine   He is taking medications regularly.    Diabetes Follow-up  How often are you checking your blood sugar? Continuous glucose monitor  What time of day are you checking your blood sugars (select all that apply)?    Have you had any blood sugars above 200?  No  Have you had any blood sugars below 70?  No  What symptoms do you notice when your blood sugar is low?    What concerns do you have today about your diabetes? None   Do you have any of these symptoms? (Select all that apply)      Blood sugars on CGM range from 80s to 140s (non-fasting). No lows and not getting any readings above 150. No numbness or tingling in hands or feet. No vision changes. Follows with [facility], last visit in November of 2024. Using 22 units of Glargine at bedtime.    Anxiety well controlled with Duloxetine. Using Seroquel intermittently for sleep, working well.    Not checking blood pressure at home.    On finasteride and tamsulosin for BPH. No urinary frequency or leakage. On finasteride and tamsulosin for BPH.    BP Readings from Last 2 Encounters:   04/28/25 90/52   11/29/24 124/75     Hemoglobin A1C (%)   Date Value   04/28/2025 5.5   11/29/2024 5.5   05/13/2021 7.6 (H)   02/08/2021 15.0 (H)     LDL Cholesterol Calculated (mg/dL)   Date Value   11/29/2024 33   12/29/2023 49   02/08/2021 129 (H)   11/06/2019     Cannot estimate LDL when triglyceride exceeds 400 mg/dL     LDL Cholesterol Direct (mg/dL)   Date Value   11/06/2019 149 (H)     Annual Wellness Visit   Patient has been advised of split billing requirements and indicates understanding: Yes     Health Care Directive  Patient does not have a Health Care Directive: Discussed advance care planning with patient; however, patient declined at this time.      4/23/2025   General Health   How would you rate your overall physical health? Good   Feel stress (tense, anxious, or unable to sleep) Not at all          4/23/2025    Nutrition   Diet: Diabetic         4/23/2025   Exercise   Days per week of moderate/strenous exercise 3 days   Average minutes spent exercising at this level 30 min         4/23/2025   Social Factors   Frequency of gathering with friends or relatives Once a week   Worry food won't last until get money to buy more No   Food not last or not have enough money for food? No   Do you have housing? (Housing is defined as stable permanent housing and does not include staying outside in a car, in a tent, in an abandoned building, in an overnight shelter, or couch-surfing.) Yes   Are you worried about losing your housing? No   Lack of transportation? No   Unable to get utilities (heat,electricity)? No           4/28/2025   Fall Risk   Fallen 2 or more times in the past year? No   Trouble with walking or balance? No          4/23/2025   Activities of Daily Living- Home Safety   Needs help with the following daily activites None of the above   Safety concerns in the home None of the above         4/23/2025   Dental   Dentist two times every year? Yes         4/23/2025   Hearing Screening   Hearing concerns? None of the above         4/23/2025   Driving Risk Screening   Patient/family members have concerns about driving No         4/23/2025   General Alertness/Fatigue Screening   Have you been more tired than usual lately? No         4/23/2025   Urinary Incontinence Screening   Bothered by leaking urine in past 6 months No         11/28/2024   TB Screening   Were you born outside of the [place]? No         4/23/2025   Substance Use   Alcohol more than 3/day or more than 7/wk No   Do you have a current opioid prescription? No   How severe/bad is pain from 1 to 10? 5/10   Do you use any other substances recreationally? No     Social History     Tobacco Use    Smoking status: Former     Current packs/day: 0.00     Types: Cigarettes    Smokeless tobacco: Never   Vaping Use    Vaping status: Never Used   Substance Use Topics    Alcohol  use: Yes     Alcohol/week: 6.0 standard drinks of alcohol     Types: 6 Cans of beer per week     Comment: occ glass of wine    Drug use: No     Today's PHQ-2 Score:       4/28/2025     8:19 AM   PHQ-2 ( 1999 [employer])   Q1: Little interest or pleasure in doing things 0   Q2: Feeling down, depressed or hopeless 0   PHQ-2 Score 0     Last PSA:   PSA   Date Value Ref Range Status   01/02/2012 1.17 0 - 4 ug/L Final     Prostate Specific Antigen Screen   Date Value Ref Range Status   11/29/2024 0.70 0.00 - 4.50 ng/mL Final     ASCVD Risk   The ASCVD Risk score (Danita FERNANDEZ, et al., 2019) failed to calculate for the following reasons:    The valid total cholesterol range is 130 to 320 mg/dL    Reviewed and updated as needed this visit by Provider   Tobacco  Allergies  Meds  Problems  Med Hx  Surg Hx  Fam Hx          Past Medical History:   Diagnosis Date    Psoriatic arthropathy (H)     Type II or unspecified type diabetes mellitus without mention of complication, not stated as uncontrolled     2006    Vaccination not carried out      Past Surgical History:   Procedure Laterality Date    CYSTOSCOPY      EXAM UNDER ANESTHESIA RECTUM N/A 5/5/2021    Procedure: Foreign body removal x3, flexible sigmoidoscopy, anoscopy;  Surgeon: Ibeth Mack MD;  Location: [facility] OR    HC KNEE SCOPE, DIAGNOSTIC      Arthroscopy, Knee- Left    RELEASE CARPAL TUNNEL Left 7/18/2024    Procedure: RELEASE, LEFT CARPAL TUNNEL;  Surgeon: Julian Ramirez MD;  Location: [facility] OR    RELEASE CARPAL TUNNEL Right 11/1/2024    Procedure: RELEASE, RIGHT CARPAL TUNNEL;  Surgeon: Julian Ramirez MD;  Location: [facility] OR    RELEASE TRIGGER FINGER Left 7/18/2024    Procedure: RELEASE, LEFT INDEX, MIDDLE AND RING TRIGGER FINGERS;  Surgeon: Julian Ramirez MD;  Location: [facility] OR    RELEASE TRIGGER FINGER Right 11/1/2024    Procedure: RELEASE, RIGHT INDEX, MIDDLE, RING TRIGGER FINGERS;  Surgeon: Julian Ramirez MD;  Location: [facility] OR     BP Readings from Last 3  Encounters:   04/28/25 90/52   11/29/24 124/75   11/01/24 (!) 144/89    Wt Readings from Last 3 Encounters:   04/28/25 66.6 kg (146 lb 14.4 oz)   11/29/24 69.9 kg (154 lb)   11/01/24 63.5 kg (140 lb)         Current providers sharing in care for this patient include:  Patient Care Team:  Malaika Jimenez PA-C as PCP - General (Family Medicine)  Barbara Kc [facility] as Pharmacist (Pharmacist)  Natalie Harding RN as Diabetes Educator (Diabetes Education)  Macho Cagle MD as Physician (Ophthalmology)  Lani Boucher AuD as Audiologist (Audiology)  Julian Ramirez MD as Assigned Musculoskeletal Provider  Malaika Jimenez PA-C as Assigned PCP    The following health maintenance items are reviewed in Epic and correct as of today:  Health Maintenance   Topic Date Due    ANNUAL REVIEW OF HM ORDERS  07/01/2023    MEDICARE ANNUAL WELLNESS VISIT  12/29/2024    DIABETIC FOOT EXAM  12/29/2024    EYE EXAM  03/13/2025    COVID-19 Vaccine (8 - 2024-25 season) 04/12/2025    NAOMI ASSESSMENT  05/29/2025    A1C  08/28/2025    BMP  11/29/2025    CMP  11/29/2025    LIPID  11/29/2025    MICROALBUMIN  11/29/2025    PSA  11/29/2025    FALL RISK ASSESSMENT  04/28/2026    HEMOGLOBIN  04/28/2026    COLORECTAL CANCER SCREENING  05/05/2026    ADVANCE CARE PLANNING  12/29/2028    DTAP/TDAP/TD IMMUNIZATION (4 - Td or Tdap) 10/12/2034    HEPATITIS C SCREENING  Completed    PHQ-2 (once per calendar year)  Completed    INFLUENZA VACCINE  Completed    Pneumococcal Vaccine: 50+ Years  Completed    URINALYSIS  Completed    ZOSTER IMMUNIZATION  Completed    RSV VACCINE  Completed    AORTIC ANEURYSM SCREENING (SYSTEM ASSIGNED)  Completed    HPV IMMUNIZATION  Aged Out    MENINGITIS IMMUNIZATION  Aged Out    LUNG CANCER SCREENING  Discontinued     Appropriate preventive services were discussed with this patient, including applicable screening as appropriate for fall prevention, nutrition, physical activity, Tobacco-use cessation, weight loss and  "cognition.  Checklist reviewing preventive services available has been given to the patient.         4/28/2025   Mini Cog   Clock Draw Score 2 Normal   3 Item Recall 3 objects recalled   Mini Cog Total Score 5         4/28/2025   Vision Screen   Reason Vision Screen Not Completed Attempted, unable to cooperate     Review of Systems  Constitutional, HEENT, cardiovascular, pulmonary, GI, , musculoskeletal, neuro, skin, endocrine and psych systems are negative, except as otherwise noted.      Objective    BP 90/52 (BP Location: Right arm, Patient Position: Sitting, Cuff Size: Adult Regular)   Pulse 111   Temp 97.4  F (36.3  C) (Oral)   Ht 1.702 m (5' 7\")   Wt 66.6 kg (146 lb 14.4 oz)   SpO2 98%   BMI 23.01 kg/m    Body mass index is 23.01 kg/m .  Physical Exam   GENERAL: alert and no distress  EYES: Eyes grossly normal to inspection, conjunctivae and sclerae normal  HENT: ear canals and TM's normal, mouth without ulcers or lesions  NECK: no adenopathy, no asymmetry, masses, or scars  RESP: lungs clear to auscultation - no rales, rhonchi or wheezes  CV: regular rate and rhythm, normal S1 S2, no S3 or S4, no murmur, click or rub, no peripheral edema  ABDOMEN: soft, nontender, no masses and bowel sounds normal  MS: no gross musculoskeletal defects noted, no edema  SKIN: no suspicious lesions or rashes  NEURO: Normal strength and tone, mentation intact and speech normal  PSYCH: mentation appears normal, affect normal/bright  Diabetic foot exam: normal DP and PT pulses, no trophic changes or ulcerative lesions, normal sensory exam, and normal monofilament exam        NARCISO Matthews    I was present with the student who participated in the service and in the documentation of the note which I have reviewed and verified. I personally assessed, examined and made clinical decisions reflected in the documentation.    Signed Electronically by: Malaika Jimenez PA-C    "

## 2025-05-13 DIAGNOSIS — E11.42 TYPE 2 DIABETES MELLITUS WITH DIABETIC POLYNEUROPATHY, WITH LONG-TERM CURRENT USE OF INSULIN (H): ICD-10-CM

## 2025-05-13 DIAGNOSIS — Z79.4 TYPE 2 DIABETES MELLITUS WITH DIABETIC POLYNEUROPATHY, WITH LONG-TERM CURRENT USE OF INSULIN (H): ICD-10-CM

## 2025-05-14 RX ORDER — SEMAGLUTIDE 2.68 MG/ML
2 INJECTION, SOLUTION SUBCUTANEOUS
Qty: 3 ML | Refills: 2 | Status: SHIPPED | OUTPATIENT
Start: 2025-05-14

## 2025-05-14 NOTE — TELEPHONE ENCOUNTER
Patient calling and wanting Malaika to refill  Ozempic.  Refill came in Raisa's name and to pool that is not being monitored.  Routing to current primary to review.  Bozena Francisco RN

## 2025-07-19 DIAGNOSIS — F41.9 ANXIETY: ICD-10-CM

## 2025-07-19 DIAGNOSIS — Z79.4 TYPE 2 DIABETES MELLITUS WITH DIABETIC POLYNEUROPATHY, WITH LONG-TERM CURRENT USE OF INSULIN (H): ICD-10-CM

## 2025-07-19 DIAGNOSIS — E11.42 TYPE 2 DIABETES MELLITUS WITH DIABETIC POLYNEUROPATHY, WITH LONG-TERM CURRENT USE OF INSULIN (H): ICD-10-CM

## 2025-07-19 DIAGNOSIS — G47.00 PERSISTENT INSOMNIA: ICD-10-CM

## 2025-07-21 RX ORDER — GABAPENTIN 800 MG/1
TABLET ORAL
Qty: 360 TABLET | Refills: 1 | Status: SHIPPED | OUTPATIENT
Start: 2025-07-21

## (undated) DEVICE — BNDG ELASTIC 3"X5YDS UNSTERILE 6611-30

## (undated) DEVICE — PACK HAND CUSTOM ASC

## (undated) DEVICE — PREP CHLORAPREP 26ML TINTED HI-LITE ORANGE 930815

## (undated) DEVICE — SOL WATER IRRIG 500ML BOTTLE 2F7113

## (undated) DEVICE — LINEN ORTHO PACK 5446

## (undated) DEVICE — DRSG GAUZE 4X4" 3033

## (undated) DEVICE — SU MONOCRYL 4-0 PS-2 27" UND Y426H

## (undated) DEVICE — COVER CAMERA IN-LIGHT DISP LT-C02

## (undated) DEVICE — BNDG KLING 2" 2231

## (undated) DEVICE — CAST PADDING 3" STERILE 9043S

## (undated) DEVICE — SYR EAR BULB 3OZ 0035830

## (undated) DEVICE — LINEN TOWEL PACK X10 5473

## (undated) DEVICE — LINEN TOWEL PACK X5 5464

## (undated) DEVICE — PACK MINOR HAND CUSTOM ASC 37-0097A

## (undated) DEVICE — SOL NACL 0.9% IRRIG 500ML BOTTLE 2F7123

## (undated) DEVICE — DRSG STERI STRIP 1X5" R1548

## (undated) DEVICE — PREP POVIDONE IODINE SOLUTION 10% 4OZ BOTTLE 29906-004

## (undated) DEVICE — TUBING SUCTION 12"X1/4" N612

## (undated) DEVICE — PACK MINOR CUSTOM RIDGES SBA32RMRMA

## (undated) DEVICE — PREP SKIN SCRUB TRAY 4461A

## (undated) DEVICE — LINEN FULL SHEET 5511

## (undated) DEVICE — GLOVE BIOGEL PI MICRO SZ 7.0 48570

## (undated) DEVICE — SOL NACL 0.9% IRRIG 1000ML BOTTLE 2F7124

## (undated) DEVICE — DRSG ABDOMINAL 07 1/2X8" 7197D

## (undated) DEVICE — GLOVE PROTEXIS W/NEU-THERA 7.0  2D73TE70

## (undated) DEVICE — Device

## (undated) DEVICE — LINEN HALF SHEET 5512

## (undated) DEVICE — SU MONOCRYL 4-0 RB-1 27" Y214H

## (undated) DEVICE — ESU GROUND PAD ADULT W/CORD E7507

## (undated) DEVICE — LINEN DRAPE 54X72" 5467

## (undated) DEVICE — GRASPING DEVICE RAPTOR RAT TOOTH 00711177

## (undated) DEVICE — BAG CLEAR TRASH 1.3M 39X33" P4040C

## (undated) DEVICE — DRAPE LAP PEDS DISP 29492

## (undated) DEVICE — BNDG ELASTIC 2"X5YDS UNSTERILE 6611-20

## (undated) DEVICE — PANTIES MESH LG/XLG 2PK 706M2

## (undated) RX ORDER — CEFAZOLIN SODIUM 1 G/3ML
INJECTION, POWDER, FOR SOLUTION INTRAMUSCULAR; INTRAVENOUS
Status: DISPENSED
Start: 2021-05-05

## (undated) RX ORDER — FENTANYL CITRATE 50 UG/ML
INJECTION, SOLUTION INTRAMUSCULAR; INTRAVENOUS
Status: DISPENSED
Start: 2021-05-05

## (undated) RX ORDER — LIDOCAINE HYDROCHLORIDE AND EPINEPHRINE 10; 10 MG/ML; UG/ML
INJECTION, SOLUTION INFILTRATION; PERINEURAL
Status: DISPENSED
Start: 2024-07-18

## (undated) RX ORDER — CEFAZOLIN SODIUM 2 G/100ML
INJECTION, SOLUTION INTRAVENOUS
Status: DISPENSED
Start: 2021-05-05

## (undated) RX ORDER — ACETAMINOPHEN 325 MG/1
TABLET ORAL
Status: DISPENSED
Start: 2024-11-01

## (undated) RX ORDER — LIDOCAINE HYDROCHLORIDE AND EPINEPHRINE 10; 10 MG/ML; UG/ML
INJECTION, SOLUTION INFILTRATION; PERINEURAL
Status: DISPENSED
Start: 2024-11-01